# Patient Record
Sex: MALE | Race: WHITE | NOT HISPANIC OR LATINO | Employment: FULL TIME | URBAN - METROPOLITAN AREA
[De-identification: names, ages, dates, MRNs, and addresses within clinical notes are randomized per-mention and may not be internally consistent; named-entity substitution may affect disease eponyms.]

---

## 2022-02-08 ENCOUNTER — HOSPITAL ENCOUNTER (INPATIENT)
Facility: HOSPITAL | Age: 67
LOS: 9 days | Discharge: LEFT BEFORE DISCHARGE | DRG: 177 | End: 2022-02-17
Attending: EMERGENCY MEDICINE | Admitting: STUDENT IN AN ORGANIZED HEALTH CARE EDUCATION/TRAINING PROGRAM
Payer: COMMERCIAL

## 2022-02-08 ENCOUNTER — APPOINTMENT (EMERGENCY)
Dept: RADIOLOGY | Facility: HOSPITAL | Age: 67
DRG: 177 | End: 2022-02-08
Payer: COMMERCIAL

## 2022-02-08 DIAGNOSIS — U07.1 COVID: Primary | ICD-10-CM

## 2022-02-08 DIAGNOSIS — R77.8 ELEVATED TROPONIN: ICD-10-CM

## 2022-02-08 PROBLEM — R79.89 ELEVATED TROPONIN: Status: ACTIVE | Noted: 2022-02-08

## 2022-02-08 PROBLEM — I10 PRIMARY HYPERTENSION: Chronic | Status: ACTIVE | Noted: 2022-02-08

## 2022-02-08 PROBLEM — E11.9 TYPE 2 DIABETES MELLITUS (HCC): Chronic | Status: ACTIVE | Noted: 2022-02-08

## 2022-02-08 PROBLEM — J96.00 ACUTE RESPIRATORY FAILURE DUE TO COVID-19 (HCC): Status: ACTIVE | Noted: 2022-02-08

## 2022-02-08 LAB
ALBUMIN SERPL BCP-MCNC: 4 G/DL (ref 3.5–5)
ALP SERPL-CCNC: 98 U/L (ref 34–104)
ALT SERPL W P-5'-P-CCNC: 63 U/L (ref 7–52)
ANION GAP SERPL CALCULATED.3IONS-SCNC: 11 MMOL/L (ref 4–13)
AST SERPL W P-5'-P-CCNC: 58 U/L (ref 13–39)
BASOPHILS # BLD AUTO: 0.01 THOUSANDS/ΜL (ref 0–0.1)
BASOPHILS NFR BLD AUTO: 0 % (ref 0–1)
BILIRUB SERPL-MCNC: 1 MG/DL (ref 0.2–1)
BUN SERPL-MCNC: 17 MG/DL (ref 5–25)
CALCIUM SERPL-MCNC: 8.6 MG/DL (ref 8.4–10.2)
CARDIAC TROPONIN I PNL SERPL HS: 112 NG/L
CHLORIDE SERPL-SCNC: 100 MMOL/L (ref 96–108)
CO2 SERPL-SCNC: 21 MMOL/L (ref 21–32)
CREAT SERPL-MCNC: 0.9 MG/DL (ref 0.6–1.3)
D DIMER PPP FEU-MCNC: 0.5 UG/ML FEU
EOSINOPHIL # BLD AUTO: 0 THOUSAND/ΜL (ref 0–0.61)
EOSINOPHIL NFR BLD AUTO: 0 % (ref 0–6)
ERYTHROCYTE [DISTWIDTH] IN BLOOD BY AUTOMATED COUNT: 12.8 % (ref 11.6–15.1)
FLUAV RNA RESP QL NAA+PROBE: NEGATIVE
FLUBV RNA RESP QL NAA+PROBE: NEGATIVE
GFR SERPL CREATININE-BSD FRML MDRD: 89 ML/MIN/1.73SQ M
GLUCOSE SERPL-MCNC: 371 MG/DL (ref 65–140)
HCT VFR BLD AUTO: 42.2 % (ref 36.5–49.3)
HGB BLD-MCNC: 14.7 G/DL (ref 12–17)
IMM GRANULOCYTES # BLD AUTO: 0.03 THOUSAND/UL (ref 0–0.2)
IMM GRANULOCYTES NFR BLD AUTO: 0 % (ref 0–2)
INR PPP: 0.99 (ref 0.84–1.19)
LACTATE SERPL-SCNC: 1.4 MMOL/L (ref 0.5–2)
LYMPHOCYTES # BLD AUTO: 1.78 THOUSANDS/ΜL (ref 0.6–4.47)
LYMPHOCYTES NFR BLD AUTO: 24 % (ref 14–44)
MAGNESIUM SERPL-MCNC: 1.9 MG/DL (ref 1.9–2.7)
MCH RBC QN AUTO: 33.4 PG (ref 26.8–34.3)
MCHC RBC AUTO-ENTMCNC: 34.8 G/DL (ref 31.4–37.4)
MCV RBC AUTO: 96 FL (ref 82–98)
MONOCYTES # BLD AUTO: 0.58 THOUSAND/ΜL (ref 0.17–1.22)
MONOCYTES NFR BLD AUTO: 8 % (ref 4–12)
NEUTROPHILS # BLD AUTO: 5.01 THOUSANDS/ΜL (ref 1.85–7.62)
NEUTS SEG NFR BLD AUTO: 68 % (ref 43–75)
NRBC BLD AUTO-RTO: 0 /100 WBCS
PLATELET # BLD AUTO: 138 THOUSANDS/UL (ref 149–390)
PMV BLD AUTO: 10.7 FL (ref 8.9–12.7)
POTASSIUM SERPL-SCNC: 4.3 MMOL/L (ref 3.5–5.3)
PROCALCITONIN SERPL-MCNC: 0.3 NG/ML
PROT SERPL-MCNC: 6.8 G/DL (ref 6.4–8.4)
PROTHROMBIN TIME: 13 SECONDS (ref 11.6–14.5)
RBC # BLD AUTO: 4.4 MILLION/UL (ref 3.88–5.62)
RSV RNA RESP QL NAA+PROBE: NEGATIVE
SARS-COV-2 RNA RESP QL NAA+PROBE: POSITIVE
SODIUM SERPL-SCNC: 132 MMOL/L (ref 135–147)
WBC # BLD AUTO: 7.41 THOUSAND/UL (ref 4.31–10.16)

## 2022-02-08 PROCEDURE — 96360 HYDRATION IV INFUSION INIT: CPT

## 2022-02-08 PROCEDURE — 71045 X-RAY EXAM CHEST 1 VIEW: CPT

## 2022-02-08 PROCEDURE — 85025 COMPLETE CBC W/AUTO DIFF WBC: CPT | Performed by: EMERGENCY MEDICINE

## 2022-02-08 PROCEDURE — 0241U HB NFCT DS VIR RESP RNA 4 TRGT: CPT | Performed by: EMERGENCY MEDICINE

## 2022-02-08 PROCEDURE — 83605 ASSAY OF LACTIC ACID: CPT | Performed by: EMERGENCY MEDICINE

## 2022-02-08 PROCEDURE — 85610 PROTHROMBIN TIME: CPT | Performed by: EMERGENCY MEDICINE

## 2022-02-08 PROCEDURE — 87040 BLOOD CULTURE FOR BACTERIA: CPT | Performed by: EMERGENCY MEDICINE

## 2022-02-08 PROCEDURE — 99285 EMERGENCY DEPT VISIT HI MDM: CPT | Performed by: EMERGENCY MEDICINE

## 2022-02-08 PROCEDURE — 84145 PROCALCITONIN (PCT): CPT | Performed by: EMERGENCY MEDICINE

## 2022-02-08 PROCEDURE — 85379 FIBRIN DEGRADATION QUANT: CPT | Performed by: EMERGENCY MEDICINE

## 2022-02-08 PROCEDURE — 99285 EMERGENCY DEPT VISIT HI MDM: CPT

## 2022-02-08 PROCEDURE — 83735 ASSAY OF MAGNESIUM: CPT | Performed by: EMERGENCY MEDICINE

## 2022-02-08 PROCEDURE — 84484 ASSAY OF TROPONIN QUANT: CPT | Performed by: EMERGENCY MEDICINE

## 2022-02-08 PROCEDURE — 80053 COMPREHEN METABOLIC PANEL: CPT | Performed by: EMERGENCY MEDICINE

## 2022-02-08 PROCEDURE — XW033E5 INTRODUCTION OF REMDESIVIR ANTI-INFECTIVE INTO PERIPHERAL VEIN, PERCUTANEOUS APPROACH, NEW TECHNOLOGY GROUP 5: ICD-10-PCS | Performed by: INTERNAL MEDICINE

## 2022-02-08 PROCEDURE — 99223 1ST HOSP IP/OBS HIGH 75: CPT | Performed by: PHYSICIAN ASSISTANT

## 2022-02-08 PROCEDURE — 86140 C-REACTIVE PROTEIN: CPT | Performed by: PHYSICIAN ASSISTANT

## 2022-02-08 PROCEDURE — 36415 COLL VENOUS BLD VENIPUNCTURE: CPT | Performed by: EMERGENCY MEDICINE

## 2022-02-08 RX ORDER — BENZONATATE 100 MG/1
100 CAPSULE ORAL 3 TIMES DAILY PRN
COMMUNITY
End: 2022-02-17 | Stop reason: HOSPADM

## 2022-02-08 RX ORDER — DEXAMETHASONE SODIUM PHOSPHATE 4 MG/ML
6 INJECTION, SOLUTION INTRA-ARTICULAR; INTRALESIONAL; INTRAMUSCULAR; INTRAVENOUS; SOFT TISSUE EVERY 24 HOURS
Status: DISCONTINUED | OUTPATIENT
Start: 2022-02-08 | End: 2022-02-17 | Stop reason: HOSPADM

## 2022-02-08 RX ORDER — LISINOPRIL 5 MG/1
5 TABLET ORAL DAILY
COMMUNITY

## 2022-02-08 RX ORDER — ACETAMINOPHEN 325 MG/1
650 TABLET ORAL EVERY 4 HOURS PRN
Status: DISCONTINUED | OUTPATIENT
Start: 2022-02-08 | End: 2022-02-17 | Stop reason: HOSPADM

## 2022-02-08 RX ORDER — GLIPIZIDE 10 MG/1
10 TABLET ORAL
COMMUNITY
End: 2022-03-04 | Stop reason: HOSPADM

## 2022-02-08 RX ORDER — ATORVASTATIN CALCIUM 10 MG/1
10 TABLET, FILM COATED ORAL DAILY
COMMUNITY

## 2022-02-08 RX ORDER — ONDANSETRON 2 MG/ML
4 INJECTION INTRAMUSCULAR; INTRAVENOUS EVERY 6 HOURS PRN
Status: DISCONTINUED | OUTPATIENT
Start: 2022-02-08 | End: 2022-02-17 | Stop reason: HOSPADM

## 2022-02-08 RX ADMIN — SODIUM CHLORIDE 1000 ML: 0.9 INJECTION, SOLUTION INTRAVENOUS at 20:21

## 2022-02-09 LAB
2HR DELTA HS TROPONIN: 123 NG/L
4HR DELTA HS TROPONIN: 79 NG/L
ALBUMIN SERPL BCP-MCNC: 3.2 G/DL (ref 3.5–5)
ALP SERPL-CCNC: 79 U/L (ref 34–104)
ALT SERPL W P-5'-P-CCNC: 47 U/L (ref 7–52)
ANION GAP SERPL CALCULATED.3IONS-SCNC: 8 MMOL/L (ref 4–13)
AST SERPL W P-5'-P-CCNC: 46 U/L (ref 13–39)
BASOPHILS # BLD AUTO: 0 THOUSANDS/ΜL (ref 0–0.1)
BASOPHILS NFR BLD AUTO: 0 % (ref 0–1)
BILIRUB SERPL-MCNC: 0.61 MG/DL (ref 0.2–1)
BUN SERPL-MCNC: 14 MG/DL (ref 5–25)
CALCIUM ALBUM COR SERPL-MCNC: 7.7 MG/DL (ref 8.3–10.1)
CALCIUM SERPL-MCNC: 7.1 MG/DL (ref 8.4–10.2)
CARDIAC TROPONIN I PNL SERPL HS: 191 NG/L
CARDIAC TROPONIN I PNL SERPL HS: 235 NG/L
CHLORIDE SERPL-SCNC: 104 MMOL/L (ref 96–108)
CO2 SERPL-SCNC: 22 MMOL/L (ref 21–32)
CREAT SERPL-MCNC: 0.82 MG/DL (ref 0.6–1.3)
CRP SERPL QL: 70 MG/L
CRP SERPL QL: 72.4 MG/L
CRP SERPL QL: 78.8 MG/L
EOSINOPHIL # BLD AUTO: 0 THOUSAND/ΜL (ref 0–0.61)
EOSINOPHIL NFR BLD AUTO: 0 % (ref 0–6)
ERYTHROCYTE [DISTWIDTH] IN BLOOD BY AUTOMATED COUNT: 13.1 % (ref 11.6–15.1)
GFR SERPL CREATININE-BSD FRML MDRD: 92 ML/MIN/1.73SQ M
GLUCOSE SERPL-MCNC: 319 MG/DL (ref 65–140)
GLUCOSE SERPL-MCNC: 349 MG/DL (ref 65–140)
GLUCOSE SERPL-MCNC: 357 MG/DL (ref 65–140)
GLUCOSE SERPL-MCNC: 362 MG/DL (ref 65–140)
GLUCOSE SERPL-MCNC: 440 MG/DL (ref 65–140)
GLUCOSE SERPL-MCNC: 499 MG/DL (ref 65–140)
HCT VFR BLD AUTO: 36 % (ref 36.5–49.3)
HGB BLD-MCNC: 12.4 G/DL (ref 12–17)
IMM GRANULOCYTES # BLD AUTO: 0.02 THOUSAND/UL (ref 0–0.2)
IMM GRANULOCYTES NFR BLD AUTO: 0 % (ref 0–2)
LYMPHOCYTES # BLD AUTO: 1.24 THOUSANDS/ΜL (ref 0.6–4.47)
LYMPHOCYTES NFR BLD AUTO: 24 % (ref 14–44)
MCH RBC QN AUTO: 33.4 PG (ref 26.8–34.3)
MCHC RBC AUTO-ENTMCNC: 34.4 G/DL (ref 31.4–37.4)
MCV RBC AUTO: 97 FL (ref 82–98)
MONOCYTES # BLD AUTO: 0.26 THOUSAND/ΜL (ref 0.17–1.22)
MONOCYTES NFR BLD AUTO: 5 % (ref 4–12)
NEUTROPHILS # BLD AUTO: 3.75 THOUSANDS/ΜL (ref 1.85–7.62)
NEUTS SEG NFR BLD AUTO: 71 % (ref 43–75)
NRBC BLD AUTO-RTO: 0 /100 WBCS
PLATELET # BLD AUTO: 122 THOUSANDS/UL (ref 149–390)
PMV BLD AUTO: 11 FL (ref 8.9–12.7)
POTASSIUM SERPL-SCNC: 4.3 MMOL/L (ref 3.5–5.3)
PROCALCITONIN SERPL-MCNC: 0.23 NG/ML
PROT SERPL-MCNC: 5.5 G/DL (ref 6.4–8.4)
RBC # BLD AUTO: 3.71 MILLION/UL (ref 3.88–5.62)
SODIUM SERPL-SCNC: 134 MMOL/L (ref 135–147)
WBC # BLD AUTO: 5.27 THOUSAND/UL (ref 4.31–10.16)

## 2022-02-09 PROCEDURE — 82948 REAGENT STRIP/BLOOD GLUCOSE: CPT

## 2022-02-09 PROCEDURE — 94760 N-INVAS EAR/PLS OXIMETRY 1: CPT

## 2022-02-09 PROCEDURE — 84484 ASSAY OF TROPONIN QUANT: CPT | Performed by: EMERGENCY MEDICINE

## 2022-02-09 PROCEDURE — 36415 COLL VENOUS BLD VENIPUNCTURE: CPT | Performed by: EMERGENCY MEDICINE

## 2022-02-09 PROCEDURE — 86140 C-REACTIVE PROTEIN: CPT | Performed by: INTERNAL MEDICINE

## 2022-02-09 PROCEDURE — 80053 COMPREHEN METABOLIC PANEL: CPT | Performed by: PHYSICIAN ASSISTANT

## 2022-02-09 PROCEDURE — 84145 PROCALCITONIN (PCT): CPT | Performed by: EMERGENCY MEDICINE

## 2022-02-09 PROCEDURE — 99222 1ST HOSP IP/OBS MODERATE 55: CPT | Performed by: INTERNAL MEDICINE

## 2022-02-09 PROCEDURE — 97163 PT EVAL HIGH COMPLEX 45 MIN: CPT

## 2022-02-09 PROCEDURE — 99232 SBSQ HOSP IP/OBS MODERATE 35: CPT | Performed by: PHYSICIAN ASSISTANT

## 2022-02-09 PROCEDURE — 97166 OT EVAL MOD COMPLEX 45 MIN: CPT

## 2022-02-09 PROCEDURE — 85025 COMPLETE CBC W/AUTO DIFF WBC: CPT | Performed by: PHYSICIAN ASSISTANT

## 2022-02-09 PROCEDURE — 93005 ELECTROCARDIOGRAM TRACING: CPT

## 2022-02-09 RX ORDER — LISINOPRIL 5 MG/1
5 TABLET ORAL DAILY
Status: DISCONTINUED | OUTPATIENT
Start: 2022-02-09 | End: 2022-02-17 | Stop reason: HOSPADM

## 2022-02-09 RX ORDER — INSULIN GLARGINE 100 [IU]/ML
10 INJECTION, SOLUTION SUBCUTANEOUS EVERY 12 HOURS SCHEDULED
Status: DISCONTINUED | OUTPATIENT
Start: 2022-02-09 | End: 2022-02-10

## 2022-02-09 RX ORDER — INSULIN GLARGINE 100 [IU]/ML
10 INJECTION, SOLUTION SUBCUTANEOUS ONCE
Status: COMPLETED | OUTPATIENT
Start: 2022-02-09 | End: 2022-02-09

## 2022-02-09 RX ORDER — BENZONATATE 100 MG/1
100 CAPSULE ORAL 3 TIMES DAILY PRN
Status: DISCONTINUED | OUTPATIENT
Start: 2022-02-09 | End: 2022-02-17 | Stop reason: HOSPADM

## 2022-02-09 RX ORDER — ATORVASTATIN CALCIUM 10 MG/1
10 TABLET, FILM COATED ORAL
Status: DISCONTINUED | OUTPATIENT
Start: 2022-02-09 | End: 2022-02-17 | Stop reason: HOSPADM

## 2022-02-09 RX ADMIN — INSULIN LISPRO 5 UNITS: 100 INJECTION, SOLUTION INTRAVENOUS; SUBCUTANEOUS at 01:17

## 2022-02-09 RX ADMIN — REMDESIVIR 100 MG: 100 INJECTION, POWDER, LYOPHILIZED, FOR SOLUTION INTRAVENOUS at 22:27

## 2022-02-09 RX ADMIN — REMDESIVIR 200 MG: 100 INJECTION, POWDER, LYOPHILIZED, FOR SOLUTION INTRAVENOUS at 01:03

## 2022-02-09 RX ADMIN — ENOXAPARIN SODIUM 30 MG: 100 INJECTION SUBCUTANEOUS at 08:20

## 2022-02-09 RX ADMIN — LISINOPRIL 5 MG: 5 TABLET ORAL at 08:19

## 2022-02-09 RX ADMIN — DEXAMETHASONE SODIUM PHOSPHATE 6 MG: 4 INJECTION, SOLUTION INTRA-ARTICULAR; INTRALESIONAL; INTRAMUSCULAR; INTRAVENOUS; SOFT TISSUE at 01:01

## 2022-02-09 RX ADMIN — INSULIN LISPRO 10 UNITS: 100 INJECTION, SOLUTION INTRAVENOUS; SUBCUTANEOUS at 16:50

## 2022-02-09 RX ADMIN — INSULIN LISPRO 6 UNITS: 100 INJECTION, SOLUTION INTRAVENOUS; SUBCUTANEOUS at 22:20

## 2022-02-09 RX ADMIN — INSULIN LISPRO 6 UNITS: 100 INJECTION, SOLUTION INTRAVENOUS; SUBCUTANEOUS at 16:49

## 2022-02-09 RX ADMIN — INSULIN LISPRO 6 UNITS: 100 INJECTION, SOLUTION INTRAVENOUS; SUBCUTANEOUS at 12:24

## 2022-02-09 RX ADMIN — ENOXAPARIN SODIUM 30 MG: 100 INJECTION SUBCUTANEOUS at 01:09

## 2022-02-09 RX ADMIN — INSULIN LISPRO 6 UNITS: 100 INJECTION, SOLUTION INTRAVENOUS; SUBCUTANEOUS at 07:57

## 2022-02-09 RX ADMIN — DEXAMETHASONE SODIUM PHOSPHATE 6 MG: 4 INJECTION, SOLUTION INTRA-ARTICULAR; INTRALESIONAL; INTRAMUSCULAR; INTRAVENOUS; SOFT TISSUE at 22:27

## 2022-02-09 RX ADMIN — ENOXAPARIN SODIUM 30 MG: 100 INJECTION SUBCUTANEOUS at 22:26

## 2022-02-09 RX ADMIN — INSULIN GLARGINE 10 UNITS: 100 INJECTION, SOLUTION SUBCUTANEOUS at 16:51

## 2022-02-09 RX ADMIN — ATORVASTATIN CALCIUM 10 MG: 10 TABLET, FILM COATED ORAL at 16:51

## 2022-02-09 RX ADMIN — INSULIN GLARGINE 10 UNITS: 100 INJECTION, SOLUTION SUBCUTANEOUS at 22:17

## 2022-02-09 NOTE — ASSESSMENT & PLAN NOTE
Mildly elevated high sensitivity troponin  Levels are flat without significant rise and fall  Symptoms  No ekg tracings available-will order

## 2022-02-09 NOTE — ASSESSMENT & PLAN NOTE
Lab Results   Component Value Date    HGBA1C 7 7 (H) 12/06/2021       Recent Labs     02/09/22  0112 02/09/22  0755 02/09/22  1224   POCGLU 349* 357* 499*       Blood Sugar Average: Last 72 hrs:  · (P) 294 [id number]   · Blood glucose significantly elevated     · Will give Lantus 10 units now  · Start Lantus 10 units q12 hours and Humalog   · hold oral medication  · SSI coverage

## 2022-02-09 NOTE — PHYSICAL THERAPY NOTE
Physical Therapy Evaluation     Patient's Name: Jose Thakakr    Admitting Diagnosis  COVID-19 [U07 1]    Problem List  Patient Active Problem List   Diagnosis    Acute [facility] failure due to COVID-19 [facility])    Elevated troponin    Type 2 diabetes mellitus ([facility] [address] )    Primary hypertension       Past Medical History  History reviewed  No pertinent past medical history  Past Surgical History  Past Surgical History:   Procedure Laterality Date    ANKLE FRACTURE SURGERY Left     FOOT FRACTURE SURGERY      KNEE SURGERY Right     VASECTOMY          02/09/22 0827   PT Last Visit   PT Visit Date 02/09/22   Note Type   Note type Evaluation   Pain Assessment   Pain Assessment Tool 0-10   Pain Score No Pain  (denies)   Restrictions/Precautions   Weight Bearing Precautions Per Order No   Other Precautions Contact/isolation; Airborne/isolation;Multiple lines;Telemetry;O2  (8 L [address] [facility])   Home Living   Type of Home House   Home Layout Two level;Bed/bath upstairs;Stairs to enter with rails  (6 STEPHEN, full flight to 2nd floor)   Bathroom Shower/Tub Walk-in shower   Bathroom Toilet Standard   Bathroom Equipment Grab bars in shower; Shower chair   [address] Walker;Cane  (did not utilize prior to arrival)   Prior Function   Level of [place] Independent with ADLs and functional mobility   Lives With Spouse   ADL Assistance Independent   IADLs Independent   Falls in the last 6 months 0  (denies)   Vocational Full time employment  ()   General   Additional Pertinent History Frieda OT present for co-assessment due to medical complexity, care coordination of 2 skilled interventions  Family/Caregiver Present No   Cognition   Overall Cognitive Status WFL   Arousal/Participation Alert   Orientation Level Oriented X4   Memory Within functional limits   Following Commands Follows all commands and directions without difficulty   Comments Pt  agreeable to PT assessment, cooperative  Subjective   Subjective "you probably want me to sit out of bed on a chair for awhile" -provided education on the benefits of sitting up, pt  agreeable   RLE Assessment   RLE Assessment X  (4-/5 gross musculature)   LLE Assessment   LLE Assessment X  (4-/5 gross musculature)   Vision-Basic Assessment   Current Vision Wears glasses all the time   Visual History Cataracts   Vestibular   Spontaneous Nystagmus (-) no evidence of nystagmus at rest in room light   Coordination   Movements are Fluid and Coordinated 1   Bed Mobility   Supine to Sit 7  Independent   Sit to Supine   (DNT as pt  was sitting out of bed on chair upon conclusion)   Additional Comments Pt  denied lightheadedness/dizziness with positional change  SOTELO exhibited w/supplemental O2 maintained  Breathing conservation technique education provided  Transfers   Sit to Stand 5  Supervision   Additional items Assist x 1;Verbal cues   Stand to Sit 5  Supervision   Additional items Assist x 1;Verbal cues   Stand pivot 5  Supervision   Additional items Assist x 1;Verbal cues   Additional Comments Verbal cues for environmental awareness, decreased rony for line management  Ambulation/Elevation   Gait pattern Improper Weight shift; Short stride  (impulsivity)   Gait Assistance 5  Supervision   Additional items Assist x 1   Assistive Device None   Distance 20 feet   Stair Management Assistance Not tested   Balance   Static Sitting Normal   Dynamic Sitting Good   Static Standing Good   Dynamic Standing Fair +   Ambulatory Fair +   Endurance Deficit   Endurance Deficit Yes   Endurance Deficit Description SOTELO exhibited w/ambulatory progression  O2 dipped to 86% at lowest during mobility  Improvement with increased time and breathing conservation technique utilization  Activity Tolerance   Activity Tolerance Treatment limited secondary to medical complications (SOTELO)   Nurse Made Aware yes, nursing staff was informed of assessment outcome     Assessment Prognosis Fair   Problem List Decreased strength;Decreased endurance; Impaired balance;Decreased mobility; Impaired judgement;Decreased safety awareness   Assessment Pt is 73 y o  male seen for PT evaluation s/p admit to [facility] on 2/8/2022 w/ Acute respiratory failure due to COVID-19 [facility])  PT consulted to assess pt's functional mobility and d/c needs  Order placed for PT eval and tx, w/ up and OOB as tolerated order  Comorbidities affecting pt's physical performance at time of assessment include: weakness,acute respiratory failure due to COVID-19, elevated troponin, HTN,type 2 DM   PTA, pt was independent w/ all functional mobility w/ o AD usage  Personal factors affecting pt at time of IE include: stairs to enter home, inability to navigate community distances, unable to perform dynamic tasks in community, impulsivity, limited insight into impairments and inability to perform IADLs  Please find objective findings from PT assessment regarding body systems outlined above with impairments and limitations including weakness, impaired balance, decreased endurance, gait deviations, decreased activity tolerance, decreased functional mobility tolerance, decreased safety awareness and SOB upon exertion  From PT/mobility standpoint, recommendation at time of d/c would be no rehabilitation needs pending progress in order to facilitate return to PLOF  Goals   Patient Goals to go home soon   LTG Expiration Date 02/19/22   Long Term Goal #1 Patient will complete transfers independently to decrease risk of falls, facilitate upright standing posture  BLE strength to greater than/equal to 4+/5 gross musculature to increase ability to safely transfer, control descent to chair  Patient will exhibit increase dynamic standing to Good 3-4 minutes without LOB distant supervision to improve activity tolerance  Patient will exhibit increase dynamic ambulatory balance to Good 100-200 feet w/AD prn distant supervision to improve ability to mobilize to toilet, chair and decrease risk for additional medical complications  PT Treatment Day 0   Plan   Treatment/Interventions Functional transfer training;LE strengthening/ROM; Therapeutic exercise; Endurance training;Patient/family training;Gait training; Compensatory technique education;Spoke to nursing   PT Frequency 2-3x/wk   Recommendation   PT Discharge Recommendation No rehabilitation needs   Additional Comments Upon conclusion, pt  was resting out of bed on chair w/all needs within reach  Additional Comments 2 Pt's raw score on the AM-[facility] Basic Mobility inpatient short form is 20, standardized score is 43 99  Patients at this level are likely to benefit from DC to home  However, please refer to therapist recommendation for safe DC planning     AM-PAC Basic Mobility Inpatient   Turning in Bed Without Bedrails 4   Lying on Back to Sitting on Edge of Flat Bed 4   Moving Bed to Chair 3   Standing Up From Chair 3   Walk in Room 3   Climb 3-5 Stairs 3   Basic Mobility Inpatient Raw Score 20   Basic Mobility Standardized Score 43 99   Highest Level Of Mobility   [facility] Goal 6: Walk 10 steps or more     History/Personal Factors/Comorbidities: weakness,acute respiratory failure due to COVID-19, elevated troponin, HTN,type 2 DM    # of body structures/limitations: muscle weakness, activity intolerance,decreased endurance, impaired balance, gait deviations     Clinical presentation: unstable as seen in SOTELO, O2 desaturation w/8 L MFNC maintained, progressive symptoms prior to hospitalization, impulsivity    Initial Assessment Time: 2789-7141    Yo Anne, PT

## 2022-02-09 NOTE — OCCUPATIONAL THERAPY NOTE
Occupational Therapy Evaluation      Parker Jean    2/9/2022    Patient Active Problem List   Diagnosis    Acute respiratory failure due to COVID-19 [facility])    Elevated troponin    Type 2 diabetes mellitus ([address] )    Primary hypertension       History reviewed  No pertinent past medical history  Past Surgical History:   Procedure Laterality Date    ANKLE FRACTURE SURGERY Left     FOOT FRACTURE SURGERY      KNEE SURGERY Right     VASECTOMY          02/09/22 4995   OT Last Visit   OT Visit Date 02/09/22   Note Type   Note type Evaluation   Additional Comments Pt agreeable to OT eval  Upon arrival pt supine in bed    Restrictions/Precautions   Weight Bearing Precautions Per Order No   Other Precautions Contact/isolation; Airborne/isolation;O2;Fall Risk  (8 L MFNC- not used at baseline )   Pain Assessment   Pain Assessment Tool 0-10   Pain Score No Pain   Home Living   Type of Home House   Home Layout Two level;Bed/bath upstairs;Stairs to enter with rails  (6 STEPHEN; FOS to 2nd floor )   Bathroom Shower/Tub Walk-in shower   Bathroom Toilet Standard   Bathroom Equipment Grab bars in shower; Shower chair   Bathroom Accessibility Accessible   Home Equipment Walker;Cane  (no AD used at baseline )   Prior Function   Level of [place] Independent with ADLs and functional mobility   Lives With Spouse   ADL Assistance Independent   IADLs Independent   Falls in the last 6 months 0   Vocational Full time employment   Comments (+) driving    Lifestyle   Autonomy Patient reporting being independent with ADLs/IADLs, ambulatory with no AD and lives with wife in a two story house    Reciprocal Relationships pt reports that he cares for his wife    Service to Others works as a     ADL   [address] 6  Modified independent   [address] [facility] 6  Modified Independent   [address] 6  [address] 5  [address] [facility] 6  Modified independent LB Dressing Assistance 5  Supervision/Setup   Toileting Assistance  5  Supervision/Setup   Bed Mobility   Supine to Sit 6  Modified independent   Additional items HOB elevated   Sit to Supine   (DNT: pt seated OOB in chair at end of eval )   Additional Comments Pt denied lightheaded/dizziness with transitional movements    Transfers   Sit to Stand 6  Modified independent   Additional items Bedrails   Stand to Sit 6  Modified independent   Additional items Armrests   Functional Mobility   Functional Mobility 7  Independent   Additional Comments Pt ambulated in room from bed to chair with no overt LOB  SpO2 decreased to 86% with mobility  Encouraged pursed lip breathing  SpO2 returned to 90% with ~2 minutes of rest     Additional items   (Pt ambulated with no AD)   Balance   Static Sitting Normal   Dynamic Sitting Good   Static Standing Good   Dynamic Standing Fair +   Activity Tolerance   Activity Tolerance Treatment limited secondary to medical complications (Comment)  (SOB/SOTELO)   Medical Staff Made Aware Pt seen as a co-eval with PT due to the patient's co-morbidities & clinically unstable presentation   Nurse Made Aware RN made aware of outcomes    RUE Assessment   RUE Assessment WNL   LUE Assessment   LUE Assessment WNL   Hand Function   Gross Motor Coordination Functional   Fine Motor Coordination Functional   Sensation   Light Touch No apparent deficits   Vision-Basic Assessment   Current Vision Wears glasses all the time   Visual History Cataracts  ("just starting to get them")   Cognition   Overall Cognitive Status [facility]   Arousal/Participation Alert; Responsive; Cooperative   Attention Within functional limits   Orientation Level Oriented X4   Memory Within functional limits   Following Commands Follows all commands and directions without difficulty   Assessment   Prognosis Good   Assessment Pt is a 73 y o  male who was admitted to [facility] on 2/8/2022 with Acute respiratory failure due to COVID-19 ([facility] [address] )  At this time, patient is also affected by the comorbidities of: HTN and DM2  Additionally, patient is affected by the following personal factors:steps to enter environment  Orders placed for OT evaluation/treatment with up and OOB as tolerated orders  Prior to admission, Patient reporting being independent with ADLs/IADLs, ambulatory with no AD and lives with wife in a two story house  Upon OT evaluation, patient requires modified independent  for UB ADLs and supervision/set-up for LB ADLs  Patient presents with functional use of BUEs, with intact prehension and fine motor coordination, and symmetrical muscle strength  Patient appears to be functioning at baseline, no further Acute OT needs identified at this time to warrant OT services  D/C OT and from OT standpoint, recommendation at time of d/c would be No rehabilitation needs  Goals   Patient Goals to go home soon    Plan   OT Frequency Eval only   Recommendation   OT Discharge Recommendation No rehabilitation needs   OT - OK to Discharge Yes  (Once medically cleared )   Additional Comments  At end of eval, pt seated OOB in chair with all needs met and breakfast tray set-up    Additional Comments 2 The patient's raw score on the AM-PAC Daily Activity inpatient short form is 22, standardized score is 47 1, greater than 39 4  Patients at this level are likely to benefit from discharge to home  Please refer to the recommendation of the Occupational Therapist for safe discharge planning  AM-PAC Daily Activity Inpatient   Lower Body Dressing 3   Bathing 3   Toileting 4   Upper Body Dressing 4   Grooming 4   Eating 4   Daily Activity Raw Score 22   Daily Activity Standardized Score (Calc for Raw Score >=11) 47  1   AM-PAC Applied Cognition Inpatient   Following a Speech/Presentation 4   Understanding Ordinary Conversation 4   Taking Medications 4   Remembering Where Things Are Placed or Put Away 4   Remembering List of 4-5 Errands 4   Taking Care of Complicated Tasks 4   Applied Cognition Raw Score 24   Applied Cognition Standardized Score 62 21     Wally Oconnor, OT

## 2022-02-09 NOTE — PROGRESS NOTES
Angelique U  67   Progress Note - Kathryn Najjar 1955, 73 y o  male MRN: [id number]  Unit/Bed#: ED 21 Encounter: [id number]  Primary Care Provider: Jenene Krabbe, MD   Date and time admitted to hospital: 2/8/2022  8:41 PM    * Acute respiratory failure due to COVID-19 [facility])  Assessment & Plan  · Patient reported pulse ox 74% at home  · Placed on 4L NC in ED, currently on 7 L midflow  · Moderate pathway  · Serial labs    Primary hypertension  Assessment & Plan  · Continue lisinopril  · Monitor blood pressure trends    Type 2 diabetes mellitus [facility])  Assessment & Plan  Lab Results   Component Value Date    HGBA1C 7 7 (H) 12/06/2021       Recent Labs     02/09/22  0112 02/09/22  0755 02/09/22  1224   POCGLU 349* 357* 499*       Blood Sugar Average: Last 72 hrs:  · (P) 569 [id number]   · Blood glucose significantly elevated  · Will give Lantus 10 units now  · Start Lantus 10 units q12 hours and Humalog   · hold oral medication  · SSI coverage    Elevated troponin  Assessment & Plan  · Likely secondary to covid infection with hypoxia  · Trending down  · No chest pain      VTE Pharmacologic Prophylaxis:   Moderate Risk (Score 3-4) - Pharmacological DVT Prophylaxis Ordered: enoxaparin (Lovenox)  Patient Centered Rounds: I performed bedside rounds with nursing staff today  Discussions with Specialists or Other Care Team Provider: nursing, CM    Education and Discussions with Family / Patient: Patient declined call to   Time Spent for Care: 20 minutes  More than 50% of total time spent on counseling and coordination of care as described above  Current Length of Stay: 1 day(s)  Current Patient Status: Inpatient   Certification Statement: The patient will continue to require additional inpatient hospital stay due to treatment for COVID  Discharge Plan: Anticipate discharge in 48-72 hrs to home  Code Status: Level 1 - Full Code    Subjective:    The patient was seen and examined  The patient denies any complaints  Objective:     Vitals:   Temp (24hrs), Av 4 °F (36 9 °C), Min:97 7 °F (36 5 °C), Max:99 °F (37 2 °C)    Temp:  [97 7 °F (36 5 °C)-99 °F (37 2 °C)] 98 4 °F (36 9 °C)  HR:  [73-96] 80  Resp:  [18-20] 20  BP: (123-161)/(64-78) 123/74  SpO2:  [86 %-95 %] 91 %  Body mass index is 29 65 kg/m²  Input and Output Summary (last 24 hours):   No intake or output data in the 24 hours ending 22 1519    Physical Exam:   Physical Exam  Vitals and nursing note reviewed  Constitutional:       General: He is awake  Appearance: Normal appearance  Interventions: Nasal cannula in place  Cardiovascular:      Rate and Rhythm: Normal rate and regular rhythm  Pulmonary:      Effort: Pulmonary effort is normal       Breath sounds: Normal breath sounds  No wheezing, rhonchi or rales  Abdominal:      Palpations: Abdomen is soft  Tenderness: There is no abdominal tenderness  Skin:     General: Skin is warm and dry  Neurological:      General: No focal deficit present  Mental Status: He is alert and oriented to person, place, and time  Psychiatric:         Attention and Perception: Attention normal          Mood and Affect: Mood normal          Speech: Speech normal          Behavior: Behavior is cooperative            Additional Data:     Labs:  Results from last 7 days   Lab Units 22  0435   WBC Thousand/uL 5 27   HEMOGLOBIN g/dL 12 4   HEMATOCRIT % 36 0*   PLATELETS Thousands/uL 122*   NEUTROS PCT % 71   LYMPHS PCT % 24   MONOS PCT % 5   EOS PCT % 0     Results from last 7 days   Lab Units 22  0435   SODIUM mmol/L 134*   POTASSIUM mmol/L 4 3   CHLORIDE mmol/L 104   CO2 mmol/L 22   BUN mg/dL 14   CREATININE mg/dL 0 82   ANION GAP mmol/L 8   CALCIUM mg/dL 7 1*   ALBUMIN g/dL 3 2*   TOTAL BILIRUBIN mg/dL 0 61   ALK PHOS U/L 79   ALT U/L 47   AST U/L 46*   GLUCOSE RANDOM mg/dL 319*     Results from last 7 days   Lab Units 22 INR  0 99     Results from last 7 days   Lab Units 02/09/22  1224 02/09/22  0755 02/09/22  0112   POC GLUCOSE mg/dl 499* 357* 349*         Results from last 7 days   Lab Units 02/09/22  0435 02/08/22 2021   LACTIC ACID mmol/L  --  1 4   PROCALCITONIN ng/ml 0 23 0 30*       Lines/Drains:  Invasive Devices  Report    Peripheral Intravenous Line            Peripheral IV 02/08/22 Left Antecubital <1 day                      Imaging: No pertinent imaging reviewed  Recent Cultures (last 7 days):   Results from last 7 days   Lab Units 02/08/22 2021   BLOOD CULTURE  Received in Microbiology Lab  Culture in Progress  Received in Microbiology Lab  Culture in Progress  Last 24 Hours Medication List:   Current Facility-Administered Medications   Medication Dose Route Frequency Provider Last Rate    acetaminophen  650 mg Oral Q4H PRN Nada PufferCORINA      atorvastatin  10 mg Oral Daily With [address] [place], [place]      benzonatate  100 mg Oral TID PRN Nada CORINA Chaudhry      dexamethasone  6 mg Intravenous Q24H [facility] Americo Gibson PA-C      enoxaparin  30 mg Subcutaneous Q12H [address] Monserrat [place], [place]      insulin glargine  10 Units Subcutaneous Once [employer], PA-C      insulin glargine  10 Units Subcutaneous Q12H [address] Kahlil Murdock PA-C      insulin lispro  1-6 Units Subcutaneous TID AC Monserrat Gibson PA-C      insulin lispro  1-6 Units Subcutaneous HS [place] CORINA Chaudhry      insulin lispro  10 Units Subcutaneous TID With Meals [employer], PA-C      lisinopril  5 mg Oral Daily Nada PuCORINA mojica      ondansetron  4 mg Intravenous Q6H PRN Nada PuCORINA mojica      remdesivir  100 mg Intravenous Q24H Rolanda CORINA Chaudhry          Today, Patient Was Seen By: [employer], PA-C    **Please Note: This note may have been constructed using a voice recognition system  **

## 2022-02-09 NOTE — ASSESSMENT & PLAN NOTE
Lab Results   Component Value Date    HGBA1C 7 7 (H) 12/06/2021       No results for input(s): POCGLU in the last 72 hours      Blood Sugar Average: Last 72 hrs:  · hold oral medication  · SSI coverage

## 2022-02-09 NOTE — ED PROVIDER NOTES
History  Chief Complaint   Patient presents with    Shortness of Breath     55-year-old male presenting with low oxygen reading of 70% on room air at home  Patient tested positive for COVID with a home test yesterday  Describes shortness of breath, nonproductive cough  Shortness of Breath  Severity:  Moderate  Onset quality:  Gradual  Timing:  Constant  Chronicity:  New  Relieved by:  Nothing  Worsened by:  Nothing  Ineffective treatments:  None tried  Associated symptoms: cough and fever    Associated symptoms: no abdominal pain, no chest pain, no rash, no sore throat, no vomiting and no wheezing        Prior to Admission Medications   Prescriptions Last Dose Informant Patient Reported? Taking? Semaglutide (OZEMPIC, 0 25 OR 0 5 MG/DOSE, SC)   Yes Yes   Sig: Inject under the skin   atorvastatin (LIPITOR) 10 mg tablet   Yes Yes   Sig: Take 10 mg by mouth daily   benzonatate (TESSALON PERLES) 100 mg capsule   Yes Yes   Sig: Take 100 mg by mouth 3 (three) times a day as needed for cough   glipiZIDE (GLUCOTROL) 10 mg tablet   Yes Yes   Sig: Take 10 mg by mouth 2 (two) times a day before meals   lisinopril (ZESTRIL) 5 mg tablet   Yes Yes   Sig: Take 5 mg by mouth daily   metFORMIN (GLUCOPHAGE) 1000 MG tablet   Yes Yes   Sig: Take 1,000 mg by mouth 2 (two) times a day with meals      Facility-Administered Medications: None       History reviewed  No pertinent past medical history  Past Surgical History:   Procedure Laterality Date    ANKLE FRACTURE SURGERY Left     FOOT FRACTURE SURGERY      KNEE SURGERY Right     VASECTOMY         Family History   Problem Relation Age of Onset    Diabetes Mother     Alcohol abuse Father      I have reviewed and agree with the history as documented      E-Cigarette/Vaping    E-Cigarette Use Never User      E-Cigarette/Vaping Substances     Social History     Tobacco Use    Smoking status: Never Smoker    Smokeless tobacco: Never Used   Vaping Use    Vaping Use: Never used   Substance Use Topics    Alcohol use: Not Currently    Drug use: Never       Review of Systems   Constitutional: Positive for fever  Negative for chills  HENT: Negative for congestion, nosebleeds, rhinorrhea and sore throat  Eyes: Negative for pain and visual disturbance  Respiratory: Positive for cough and shortness of breath  Negative for wheezing  Cardiovascular: Negative for chest pain and leg swelling  Gastrointestinal: Negative for abdominal distention, abdominal pain, diarrhea, nausea and vomiting  Genitourinary: Negative for dysuria and frequency  Musculoskeletal: Negative for back pain and joint swelling  Skin: Negative for rash and wound  Neurological: Negative for weakness and numbness  Psychiatric/Behavioral: Negative for decreased concentration and suicidal ideas  Physical Exam  Physical Exam  Vitals and nursing note reviewed  Constitutional:       Appearance: He is well-developed  HENT:      Head: Normocephalic and atraumatic  Eyes:      Conjunctiva/sclera: Conjunctivae normal       Pupils: Pupils are equal, round, and reactive to light  Neck:      Trachea: No tracheal deviation  Cardiovascular:      Rate and Rhythm: Normal rate and regular rhythm  Heart sounds: Normal heart sounds  No murmur heard  Pulmonary:      Effort: Pulmonary effort is normal  No respiratory distress  Breath sounds: Normal breath sounds  No wheezing or rales  Abdominal:      General: Bowel sounds are normal  There is no distension  Palpations: Abdomen is soft  Tenderness: There is no abdominal tenderness  Musculoskeletal:         General: No deformity  Cervical back: Normal range of motion and neck supple  Skin:     General: Skin is warm and dry  Capillary Refill: Capillary refill takes less than 2 seconds  Neurological:      Mental Status: He is alert and oriented to person, place, and time  Sensory: No sensory deficit     Psychiatric: Judgment: Judgment normal          Vital Signs  ED Triage Vitals   Temperature Pulse Respirations Blood Pressure SpO2   02/08/22 2005 02/08/22 2005 02/08/22 2005 02/08/22 2005 02/08/22 2005   99 °F (37 2 °C) 89 18 146/64 93 %      Temp Source Heart Rate Source Patient Position - Orthostatic VS BP Location FiO2 (%)   02/08/22 2005 02/09/22 1226 02/10/22 0855 02/09/22 1226 --   Oral Monitor Lying Left arm       Pain Score       02/08/22 2005       5           Vitals:    02/10/22 0855 02/10/22 0900 02/10/22 1000 02/10/22 1400   BP: 155/79   160/70   Pulse: 72 71 73 69   Patient Position - Orthostatic VS: Lying   Sitting         Visual Acuity      ED Medications  Medications   dexamethasone (DECADRON) injection 6 mg (6 mg Intravenous Given 2/9/22 2227)   enoxaparin (LOVENOX) subcutaneous injection 30 mg (30 mg Subcutaneous Given 2/10/22 0857)   remdesivir (Veklury) 200 mg in sodium chloride 0 9 % 290 mL IVPB (200 mg Intravenous Given 2/9/22 0103)     Followed by   remdesivir Ermalinda Setting) 100 mg in sodium chloride 0 9 % 270 mL IVPB (0 mg Intravenous Stopped 2/10/22 0001)   acetaminophen (TYLENOL) tablet 650 mg (has no administration in time range)   ondansetron (ZOFRAN) injection 4 mg (has no administration in time range)   atorvastatin (LIPITOR) tablet 10 mg (10 mg Oral Given 2/9/22 1651)   lisinopril (ZESTRIL) tablet 5 mg (5 mg Oral Given 2/10/22 0859)   benzonatate (TESSALON PERLES) capsule 100 mg (100 mg Oral Given 2/10/22 0909)   insulin regular (HumuLIN R,NovoLIN R) 1 Units/mL in sodium chloride 0 9 % 100 mL infusion (10 Units/hr Intravenous New Bag 2/10/22 1330)   sodium chloride 0 9 % bolus 1,000 mL (0 mL Intravenous Stopped 2/8/22 2221)   insulin glargine (LANTUS) subcutaneous injection 10 Units 0 1 mL (10 Units Subcutaneous Given 2/9/22 1651)       Diagnostic Studies  Results Reviewed     Procedure Component Value Units Date/Time    Fingerstick Glucose (POCT) [[id number]]  (Abnormal) Collected: 02/10/22 5453 Lab Status: Final result Updated: 02/10/22 1325     POC Glucose >500 mg/dl     Fingerstick Glucose (POCT) [[id number]]  (Abnormal) Collected: 02/10/22 1200    Lab Status: Final result Updated: 02/10/22 1208     POC Glucose >500 mg/dl     Fingerstick Glucose (POCT) [[id number]]  (Abnormal) Collected: 02/10/22 0833    Lab Status: Final result Updated: 02/10/22 0836     POC Glucose 390 mg/dl     Blood culture #1 [[id number]] Collected: 02/08/22 2021    Lab Status: Preliminary result Specimen: Blood from Arm, Left Updated: 02/10/22 0801     Blood Culture No Growth at 24 hrs  Blood culture #2 [[id number]] Collected: 02/08/22 2021    Lab Status: Preliminary result Specimen: Blood from Arm, Left Updated: 02/10/22 0801     Blood Culture No Growth at 24 hrs      Procalcitonin with AM Reflex [[id number]]  (Normal) Collected: 02/10/22 0613    Lab Status: Final result Specimen: Blood from Arm, Left Updated: 02/10/22 0651     Procalcitonin 0 19 ng/ml     Procalcitonin Reflex [[id number]]     Lab Status: No result Specimen: Blood     Basic metabolic panel [[id number]]  (Abnormal) Collected: 02/10/22 0613    Lab Status: Final result Specimen: Blood from Arm, Left Updated: 02/10/22 0643     Sodium 131 mmol/L      Potassium 4 6 mmol/L      Chloride 101 mmol/L      CO2 22 mmol/L      ANION GAP 8 mmol/L      BUN 26 mg/dL      Creatinine 0 93 mg/dL      Glucose 403 mg/dL      Calcium 8 4 mg/dL      eGFR 85 ml/min/1 73sq m     Narrative:      [facility] guidelines for Chronic Kidney Disease (CKD):     Stage 1 with normal or high GFR (GFR > 90 mL/min/1 73 square meters)    Stage 2 Mild CKD (GFR = 60-89 mL/min/1 73 square meters)    Stage 3A Moderate CKD (GFR = 45-59 mL/min/1 73 square meters)    Stage 3B Moderate CKD (GFR = 30-44 mL/min/1 73 square meters)    Stage 4 Severe CKD (GFR = 15-29 mL/min/1 73 square meters)    Stage 5 End Stage CKD (GFR <15 mL/min/1 73 square meters)  Note: GFR calculation is accurate only with a steady state creatinine    CBC and differential [[id number]]  (Abnormal) Collected: 02/10/22 0613    Lab Status: Final result Specimen: Blood from Arm, Left Updated: 02/10/22 0622     WBC 7 44 Thousand/uL      RBC 4 02 Million/uL      Hemoglobin 13 5 g/dL      Hematocrit 39 3 %      MCV 98 fL      MCH 33 6 pg      MCHC 34 4 g/dL      RDW 12 8 %      MPV 10 8 fL      Platelets 888 Thousands/uL      nRBC 0 /100 WBCs      Neutrophils Relative 69 %      Immat GRANS % 0 %      Lymphocytes Relative 26 %      Monocytes Relative 5 %      Eosinophils Relative 0 %      Basophils Relative 0 %      Neutrophils Absolute 5 12 Thousands/µL      Immature Grans Absolute 0 03 Thousand/uL      Lymphocytes Absolute 1 91 Thousands/µL      Monocytes Absolute 0 37 Thousand/µL      Eosinophils Absolute 0 00 Thousand/µL      Basophils Absolute 0 01 Thousands/µL     Fingerstick Glucose (POCT) [[id number]]  (Abnormal) Collected: 02/09/22 2217    Lab Status: Final result Updated: 02/09/22 2236     POC Glucose 362 mg/dl     Fingerstick Glucose (POCT) [[id number]]  (Abnormal) Collected: 02/09/22 1648    Lab Status: Final result Updated: 02/09/22 1700     POC Glucose 440 mg/dl     Fingerstick Glucose (POCT) [[id number]]  (Abnormal) Collected: 02/09/22 1224    Lab Status: Final result Updated: 02/09/22 1400     POC Glucose 499 mg/dl     C-reactive protein [[id number]]  (Abnormal) Collected: 02/09/22 0746    Lab Status: Final result Specimen: Blood Updated: 02/09/22 1316     CRP 70 0 mg/L     C-reactive protein [[id number]]  (Abnormal) Collected: 02/08/22 2021    Lab Status: Final result Specimen: Blood from Arm, Left Updated: 02/09/22 0817     CRP 78 8 mg/L     Fingerstick Glucose (POCT) [[id number]]  (Abnormal) Collected: 02/09/22 0755    Lab Status: Final result Updated: 02/09/22 0800     POC Glucose 357 mg/dl     Procalcitonin Reflex [[id number]]  (Normal) Collected: 02/09/22 0435    Lab Status: Final result Specimen: Blood from Arm, Left Updated: 02/09/22 0524     Procalcitonin 0 23 ng/ml     HS Troponin I 4hr [[id number]]  (Abnormal) Collected: 02/09/22 0435    Lab Status: Final result Specimen: Blood from Arm, Left Updated: 02/09/22 0524     hs TnI 4hr 191 ng/L      Delta 4hr hsTnI 79 ng/L     Comprehensive metabolic panel [[id number]]  (Abnormal) Collected: 02/09/22 0435    Lab Status: Final result Specimen: Blood from Arm, Left Updated: 02/09/22 0508     Sodium 134 mmol/L      Potassium 4 3 mmol/L      Chloride 104 mmol/L      CO2 22 mmol/L      ANION GAP 8 mmol/L      BUN 14 mg/dL      Creatinine 0 82 mg/dL      Glucose 319 mg/dL      Calcium 7 1 mg/dL      Corrected Calcium 7 7 mg/dL      AST 46 U/L      ALT 47 U/L      Alkaline Phosphatase 79 U/L      Total Protein 5 5 g/dL      Albumin 3 2 g/dL      Total Bilirubin 0 61 mg/dL      eGFR 92 ml/min/1 73sq m     Narrative:      [facility] guidelines for Chronic Kidney Disease (CKD):     Stage 1 with normal or high GFR (GFR > 90 mL/min/1 73 square meters)    Stage 2 Mild CKD (GFR = 60-89 mL/min/1 73 square meters)    Stage 3A Moderate CKD (GFR = 45-59 mL/min/1 73 square meters)    Stage 3B Moderate CKD (GFR = 30-44 mL/min/1 73 square meters)    Stage 4 Severe CKD (GFR = 15-29 mL/min/1 73 square meters)    Stage 5 End Stage CKD (GFR <15 mL/min/1 73 square meters)  Note: GFR calculation is accurate only with a steady state creatinine    C-reactive protein [[id number]]  (Abnormal) Collected: 02/09/22 0435    Lab Status: Final result Specimen: Blood from Arm, Left Updated: 02/09/22 0508     CRP 72 4 mg/L     CBC and differential [[id number]]  (Abnormal) Collected: 02/09/22 0435    Lab Status: Final result Specimen: Blood from Arm, Left Updated: 02/09/22 0455     WBC 5 27 Thousand/uL      RBC 3 71 Million/uL      Hemoglobin 12 4 g/dL      Hematocrit 36 0 %      MCV 97 fL      MCH 33 4 pg      MCHC 34 4 g/dL      RDW 13 1 %      MPV 11 0 fL      Platelets 452 Thousands/uL      nRBC 0 /100 WBCs      Neutrophils Relative 71 %      Immat GRANS % 0 %      Lymphocytes Relative 24 %      Monocytes Relative 5 %      Eosinophils Relative 0 %      Basophils Relative 0 %      Neutrophils Absolute 3 75 Thousands/µL      Immature Grans Absolute 0 02 Thousand/uL      Lymphocytes Absolute 1 24 Thousands/µL      Monocytes Absolute 0 26 Thousand/µL      Eosinophils Absolute 0 00 Thousand/µL      Basophils Absolute 0 00 Thousands/µL     HS Troponin I 2hr [[id number]]  (Abnormal) Collected: 02/09/22 0100    Lab Status: Final result Specimen: Blood from Arm, Left Updated: 02/09/22 0143     hs TnI 2hr 235 ng/L      Delta 2hr hsTnI 123 ng/L     Fingerstick Glucose (POCT) [[id number]]  (Abnormal) Collected: 02/09/22 0112    Lab Status: Final result Updated: 02/09/22 0129     POC Glucose 349 mg/dl     COVID/FLU/RSV [[id number]]  (Abnormal) Collected: 02/08/22 2021    Lab Status: Final result Specimen: Nares from Nasopharyngeal Swab Updated: 02/08/22 2124     SARS-CoV-2 Positive     INFLUENZA A PCR Negative     INFLUENZA B PCR Negative     RSV PCR Negative    Narrative:      FOR PEDIATRIC PATIENTS - copy/paste COVID Guidelines URL to browser: [email or web address]    SARS-CoV-2 assay is a Nucleic Acid Amplification assay intended for the  qualitative detection of nucleic acid from SARS-CoV-2 in nasopharyngeal  swabs  Results are for the presumptive identification of SARS-CoV-2 RNA  Positive results are indicative of infection with SARS-CoV-2, the virus  causing COVID-19, but do not rule out bacterial infection or co-infection  with other viruses  Laboratories within the [place] and its  territories are required to report all positive results to the appropriate  public health authorities  Negative results do not preclude SARS-CoV-2  infection and should not be used as the sole basis for treatment or other  patient management decisions  Negative results must be combined with  clinical observations, patient history, and epidemiological information  This test has not been FDA cleared or approved  This test has been authorized by FDA under an Emergency Use Authorization  (EUA)  This test is only authorized for the duration of time the  declaration that circumstances exist justifying the authorization of the  emergency use of an in vitro diagnostic tests for detection of SARS-CoV-2  virus and/or diagnosis of COVID-19 infection under section 564(b)(1) of  the Act, 21 U  S C  264TXE-9(D)(2), unless the authorization is terminated  or revoked sooner  The test has been validated but independent review by FDA  and CLIA is pending  Test performed using [employer] Gene[employer]pert: This RT-PCR assay targets N2,  a region unique to SARS-CoV-2  A conserved region in the E-gene was chosen  for pan-Sarbecovirus detection which includes SARS-CoV-2  CBC and differential [[id number]]  (Abnormal) Collected: 02/08/22 2020    Lab Status: Final result Specimen: Blood from Arm, Left Updated: 02/08/22 2109     WBC 7 41 Thousand/uL      RBC 4 40 Million/uL      Hemoglobin 14 7 g/dL      Hematocrit 42 2 %      MCV 96 fL      MCH 33 4 pg      MCHC 34 8 g/dL      RDW 12 8 %      MPV 10 7 fL      Platelets 277 Thousands/uL      nRBC 0 /100 WBCs      Neutrophils Relative 68 %      Immat GRANS % 0 %      Lymphocytes Relative 24 %      Monocytes Relative 8 %      Eosinophils Relative 0 %      Basophils Relative 0 %      Neutrophils Absolute 5 01 Thousands/µL      Immature Grans Absolute 0 03 Thousand/uL      Lymphocytes Absolute 1 78 Thousands/µL      Monocytes Absolute 0 58 Thousand/µL      Eosinophils Absolute 0 00 Thousand/µL      Basophils Absolute 0 01 Thousands/µL     Narrative: This is an appended report  These results have been appended to a previously verified report      HS Troponin 0hr (reflex protocol) [[id number]]  (Abnormal) Collected: 02/08/22 2021    Lab Status: Final result Specimen: Blood from Arm, Left Updated: 02/08/22 2104     hs TnI 0hr 112 ng/L     Procalcitonin with AM Reflex [[id number]]  (Abnormal) Collected: 02/08/22 2021    Lab Status: Final result Specimen: Blood from Arm, Left Updated: 02/08/22 2104     Procalcitonin 0 30 ng/ml     Comprehensive metabolic panel [[id number]]  (Abnormal) Collected: 02/08/22 2021    Lab Status: Final result Specimen: Blood from Arm, Left Updated: 02/08/22 2054     Sodium 132 mmol/L      Potassium 4 3 mmol/L      Chloride 100 mmol/L      CO2 21 mmol/L      ANION GAP 11 mmol/L      BUN 17 mg/dL      Creatinine 0 90 mg/dL      Glucose 371 mg/dL      Calcium 8 6 mg/dL      AST 58 U/L      ALT 63 U/L      Alkaline Phosphatase 98 U/L      Total Protein 6 8 g/dL      Albumin 4 0 g/dL      Total Bilirubin 1 00 mg/dL      eGFR 89 ml/min/1 73sq m     Narrative:      [facility] guidelines for Chronic Kidney Disease (CKD):     Stage 1 with normal or high GFR (GFR > 90 mL/min/1 73 square meters)    Stage 2 Mild CKD (GFR = 60-89 mL/min/1 73 square meters)    Stage 3A Moderate CKD (GFR = 45-59 mL/min/1 73 square meters)    Stage 3B Moderate CKD (GFR = 30-44 mL/min/1 73 square meters)    Stage 4 Severe CKD (GFR = 15-29 mL/min/1 73 square meters)    Stage 5 End Stage CKD (GFR <15 mL/min/1 73 square meters)  Note: GFR calculation is accurate only with a steady state creatinine    Magnesium [[id number]]  (Normal) Collected: 02/08/22 2021    Lab Status: Final result Specimen: Blood from Arm, Left Updated: 02/08/22 2054     Magnesium 1 9 mg/dL     D-dimer, quantitative [[id number]]  (Abnormal) Collected: 02/08/22 2021    Lab Status: Final result Specimen: Blood from Arm, Left Updated: 02/08/22 2053     D-Dimer, Quant 0 50 ug/ml FEU     Lactic acid, plasma [[id number]]  (Normal) Collected: 02/08/22 2021    Lab Status: Final result Specimen: Blood from Arm, Left Updated: 02/08/22 2052     LACTIC ACID 1 4 mmol/L     Narrative: Result may be elevated if tourniquet was used during collection  Protime-INR [[id number]]  (Normal) Collected: 02/08/22 2021    Lab Status: Final result Specimen: Blood from Arm, Left Updated: 02/08/22 2047     Protime 13 0 seconds      INR 0 99                 XR chest 1 view portable   Final Result by Rona Simons MD (02/09 6812)      Bilateral multifocal groundglass opacities are present  In the setting of COVID-19 infection, this is most in keeping with COVID 19 pneumonia  Workstation performed: [id number]                    Procedures  Procedures         ED Course  ED Course as of 02/10/22 1419   Tue Feb 08, 2022 2134 Slightly elevated troponin which is likely hypoxia driven from [place], will repeat                                             MDM  Number of Diagnoses or Management Options  COVID: new and requires workup  Diagnosis management comments: This is a 71-year-old male presenting with hypoxic respiratory failure in the setting of COVID diagnosed on home test  Patient is 81 date a 5% on room air, was placed on 2 L nasal cannula and started saturating at 90%, increased to 4 L as patient was seen in the family room with no available rooms for patient due to the [place] pandemic and excess boarding in the emergency department        Will require admission for covid       Amount and/or Complexity of Data Reviewed  Review and summarize past medical records: yes  Independent visualization of images, tracings, or specimens: yes    Risk of Complications, Morbidity, and/or Mortality  Presenting problems: high  Diagnostic procedures: minimal  Management options: high        Disposition  Final diagnoses:   COVID     Time reflects when diagnosis was documented in both MDM as applicable and the Disposition within this note     Time User Action Codes Description Comment    2/8/2022  9:34 PM Samantha Winter Add [U07 1] COVID     2/9/2022  1:53 AM [address] [facility] Add [R77 8] Elevated troponin       ED Disposition     ED Disposition Condition Date/Time Comment    Admit Stable Tue Feb 8, 2022  9:34 PM Case was discussed with Anastasia Ford and the patient's admission status was agreed to be Admission Status: inpatient status to the service of Dr Indra Moore   Follow-up Information    None         Patient's Medications   Discharge Prescriptions    No medications on file       No discharge procedures on file      PDMP Review     None          ED Provider  Electronically Signed by           Mark Hilliard DO  02/10/22 7332

## 2022-02-09 NOTE — UTILIZATION REVIEW
Initial Clinical Review    Admission: Date/Time/Statement:   Admission Orders (From admission, onward)     Ordered        02/08/22 2138  Inpatient Admission  Once                      Orders Placed This Encounter   Procedures    Inpatient Admission     Standing Status:   Standing     Number of Occurrences:   1     Order Specific Question:   Level of Care     Answer:   Med Surg [16]     Order Specific Question:   Estimated length of stay     Answer:   More than 2 Midnights     Order Specific Question:   Certification     Answer:   I certify that inpatient services are medically necessary for this patient for a duration of greater than two midnights  See H&P and MD Progress Notes for additional information about the patient's course of treatment  ED Arrival Information     Expected Arrival Acuity    - 2/8/2022 19:00 Urgent         Means of arrival Escorted by Service Admission type    [facility] Urgent         Arrival complaint    COVID +, O2 IS 74/fam waiting room        Chief Complaint   Patient presents with    Shortness of Breath     Initial Presentation:   73 yom to ER from home c/o SOB, hypoxic @ 70% RA, NPC, tested COVID+ on home test yesterday  Hx HLD, DM2 not on insulin, HTN  Presents febrile with s/s as above, lungs with decreased breath sounds  Admission work-up showing COVID+, hyponatremia, elevated d-dimer, procalcitonin, CRP, +troponin, bilateral opacities on imaging  Admitted to inpatient status for acute respiratory failure 2nd COVID pneumonia  O2 requirements @ 6ltr/44%FiO2  Started on COVID tx  Date: 2/9/22   Day 2:   Respiratory failure 2nd COVID  Persistent SOTELO, lungs with diminished breath sounds  O2 requirements increased to 8ltr/52%FiO2 [address]  Prone positioning encouraged  Per cardio: Elevated troponin/non MI troponin elevation in the setting of hypoxia due to Covid  Patient is without any symptoms suggestive of or concerning for ACS       ED Triage Vitals [02/08/22 2005]   Temperature Pulse Respirations Blood Pressure SpO2   99 °F (37 2 °C) 89 18 146/64 93 %      Temp Source Heart Rate Source Patient Position - Orthostatic VS BP Location FiO2 (%)   Oral -- -- -- --      Pain Score       5          Wt Readings from Last 1 Encounters:   02/08/22 88 5 kg (195 lb)     Additional Vital Signs:   02/09/22 0825 -- -- -- -- -- 91 % 52 8 L/min Mid flow nasal cannula   02/09/22 0818 97 7 °F (36 5 °C) 73 20 161/71 -- 95 % 396 94 L/min --   02/09/22 0531 -- -- -- -- -- 91 % 52 8 L/min Mid flow nasal cannula   02/09/22 0500 -- 73 -- -- -- 86 % Abnormal  -- -- --   02/09/22 0300 -- 81 -- -- -- 94 % -- -- --   02/09/22 0100 -- 85 -- -- -- 90 % -- -- --   02/09/22 0000 98 5 °F (36 9 °C) 82 -- 131/67 92 90 % -- -- --   02/08/22 2300 -- 93 -- 139/66 93 91 % -- -- --   02/08/22 2230 -- 96 -- 134/78 100 91 % 44 6 L/min Nasal cannula     Pertinent Labs/Diagnostic Test Results:   Results from last 7 days   Lab Units 02/08/22 2021   SARS-COV-2  Positive*     Results from last 7 days   Lab Units 02/09/22 0435 02/08/22 2020   WBC Thousand/uL 5 27 7 41   HEMOGLOBIN g/dL 12 4 14 7   HEMATOCRIT % 36 0* 42 2   PLATELETS Thousands/uL 122* 138*   NEUTROS ABS Thousands/µL 3 75 5 01     Results from last 7 days   Lab Units 02/09/22 0435 02/08/22 2021   SODIUM mmol/L 134* 132*   POTASSIUM mmol/L 4 3 4 3   CHLORIDE mmol/L 104 100   CO2 mmol/L 22 21   ANION GAP mmol/L 8 11   BUN mg/dL 14 17   CREATININE mg/dL 0 82 0 90   EGFR ml/min/1 73sq m 92 89   CALCIUM mg/dL 7 1* 8 6   MAGNESIUM mg/dL  --  1 9     Results from last 7 days   Lab Units 02/09/22  0435 02/08/22 2021   AST U/L 46* 58*   ALT U/L 47 63*   ALK PHOS U/L 79 98   TOTAL PROTEIN g/dL 5 5* 6 8   ALBUMIN g/dL 3 2* 4 0   TOTAL BILIRUBIN mg/dL 0 61 1 00     Results from last 7 days   Lab Units 02/09/22  0755 02/09/22  0112   POC GLUCOSE mg/dl 357* 349*     Results from last 7 days   Lab Units 02/09/22  0435 02/08/22 2021   GLUCOSE RANDOM mg/dL 319* 371*     Results from last 7 days   Lab Units 02/09/22  0435 02/09/22  0100 02/08/22 2021   HS TNI 0HR ng/L  --   --  112*   HS TNI 2HR ng/L  --  235*  --    HSTNI D2 ng/L  --  123*  --    HS TNI 4HR ng/L 191*  --   --    HSTNI D4 ng/L 79*  --   --      Results from last 7 days   Lab Units 02/08/22 2021   D-DIMER QUANTITATIVE ug/ml FEU 0 50*     Results from last 7 days   Lab Units 02/08/22 2021   PROTIME seconds 13 0   INR  0 99     Results from last 7 days   Lab Units 02/09/22  0435 02/08/22 2021   PROCALCITONIN ng/ml 0 23 0 30*     Results from last 7 days   Lab Units 02/08/22 2021   LACTIC ACID mmol/L 1 4     Results from last 7 days   Lab Units 02/09/22 0435 02/08/22 2021   CRP mg/L 72 4* 78 8*     Results from last 7 days   Lab Units 02/08/22 2021   INFLUENZA A PCR  Negative   INFLUENZA B PCR  Negative   RSV PCR  Negative     2/8  Cxr=  Bilateral multifocal groundglass opacities are present  In the setting of COVID-19 infection, this is most in keeping with COVID 19 pneumonia  ED Treatment:   Medication Administration from 02/08/2022 1900 to 02/09/2022 9481       Date/Time Order Dose Route Action     02/08/2022 2021 sodium chloride 0 9 % bolus 1,000 mL 1,000 mL Intravenous New Bag     02/09/2022 0101 dexamethasone (DECADRON) injection 6 mg 6 mg Intravenous Given     02/09/2022 0820 enoxaparin (LOVENOX) subcutaneous injection 30 mg 30 mg Subcutaneous Given     02/09/2022 0109 enoxaparin (LOVENOX) subcutaneous injection 30 mg 30 mg Subcutaneous Given     02/09/2022 0103 remdesivir (Veklury) 200 mg in sodium chloride 0 9 % 290 mL IVPB 200 mg Intravenous Given     02/09/2022 0757 insulin lispro (HumaLOG) 100 units/mL subcutaneous injection 1-6 Units 6 Units Subcutaneous Given     02/09/2022 0117 insulin lispro (HumaLOG) 100 units/mL subcutaneous injection 1-6 Units 5 Units Subcutaneous Given     02/09/2022 0819 lisinopril (ZESTRIL) tablet 5 mg 5 mg Oral Given        History reviewed   No pertinent past medical history  Present on Admission:   Acute respiratory failure due to COVID-19 ([facility] [address] )   Elevated troponin   Type 2 diabetes mellitus (HCC)   Primary hypertension    Admitting Diagnosis: COVID-19 [U07 1]  Age/Sex: 73 y o  male  Admission Orders:  O2 to keep sats>90%  Self-proning  Pt/iot eval & tx  Scd/foot pumps  accuchecks  Consult cardio    Scheduled Medications:  atorvastatin, 10 mg, Oral, Daily With Dinner  dexamethasone, 6 mg, Intravenous, Q24H  enoxaparin, 30 mg, Subcutaneous, Q12H [address]  insulin lispro, 1-6 Units, Subcutaneous, TID AC  insulin lispro, 1-6 Units, Subcutaneous, HS  lisinopril, 5 mg, Oral, Daily  remdesivir, 100 mg, Intravenous, Q24H    PRN Meds:  acetaminophen, 650 mg, Oral, Q4H PRN  benzonatate, 100 mg, Oral, TID PRN  ondansetron, 4 mg, Intravenous, Q6H PRN    Network Utilization Review Department  ATTENTION: Please call with any questions or concerns to [phone] and carefully listen to the prompts so that you are directed to the right person  All voicemails are confidential   Farrel Bodily all requests for admission clinical reviews, approved or denied determinations and any other requests to dedicated fax number below belonging to the campus where the patient is receiving treatment   List of dedicated fax numbers for the Facilities:  [address] DENIALS (Administrative/Medical Necessity) [phone]   [address] (Maternity/NICU/Pediatrics) [phone]   [address]  [address] [facility] [place] [address] [address] [place] [address] [phone]   Jose Manuel Dennison [address] [place] [address] [address] [place] [address] [phone]

## 2022-02-09 NOTE — RESPIRATORY THERAPY NOTE
02/09/22 1329   Non-Invasive Information   O2 Interface Device MFNC prongs   Non-Invasive Ventilation Mode MFNC (Mid flow)   SpO2 91 %   $ Pulse Oximetry Spot Check Charge Completed   Non-Invasive Settings   Flow (lpm) 7

## 2022-02-09 NOTE — CONSULTS
Elvira Winston [address] 1955, 73 y o  male MRN: [id number]  Unit/Bed#: ED 21 Encounter: [id number]  Primary Care Provider: Dread Soriano MD   Date and time admitted to hospital: 2/8/2022  8:41 PM    Inpatient consult to Cardiology  Consult performed by: NEGRITA Farah  Consult ordered by: Shilpa Chaudhry PA-C          Primary hypertension  Assessment & Plan  Fairly well controlled  Continue home regimen for now and monitor    Elevated troponin  Assessment & Plan  Mildly elevated high sensitivity troponin  Levels are flat without significant rise and fall  Symptoms  No ekg tracings available-will order      * Acute respiratory failure due to COVID-19 [facility])  Assessment & Plan  Primary reason for admission  Management per medical team      Other summary comments:   Elevated troponin/non MI troponin elevation in the setting of hypoxia due to Covid  Patient is without any symptoms suggestive of or concerning for ACS  Will check EKG as I was not able to find one for review  Pt is stable from a cardiac perspective  We will arrange for outpatient follow up and ischemic testing  Outpatient Cardiologist: none    HPI: Malik Gonzalez is a 73y o  year old male who presented with hypoxia  Patient reports symptoms of shortness of breath and cough beginning on 01/31/2022  He tested positive for COVID on a home test performed 02/08/2022  He measured his pulse oximetry at home and had a reading of 74%  It is for this reason he presented to the emergency department  In the emergency department, troponin levels were drawn and were noted to be elevated  It is for this reason the Cardiology was consulted  Medical problems include non-insulin-dependent type 2 diabetes mellitus, hypertension, hyperlipidemia  There is no known prior cardiac history  Ami Reap is active at home and works as a     He denies any symptoms of chest pain, pressure, tightness, burning  He does not experience any palpitations, dizziness, lightheadedness, syncope  No changes in his breathing or exertional capacity  EKG:  Ordered      MOST  RECENT CARDIAC IMAGING:   None      Review of Systems: a 10 point review of systems was conducted and is negative except for as mentioned in the HPI or as below  Historical Information   History reviewed  No pertinent past medical history  Past Surgical History:   Procedure Laterality Date    ANKLE FRACTURE SURGERY Left     FOOT FRACTURE SURGERY      KNEE SURGERY Right     VASECTOMY       Social History     Substance and Sexual Activity   Alcohol Use Not Currently     Social History     Substance and Sexual Activity   Drug Use Never     Social History     Tobacco Use   Smoking Status Never Smoker   Smokeless Tobacco Never Used       Family History:  Father  of an MI at the age of 48      Meds/Allergies   all current active meds have been reviewed  (Not in a hospital admission)      No Known Allergies    Objective   Vitals: Blood pressure 161/71, pulse 73, temperature 97 7 °F (36 5 °C), resp  rate 20, weight 88 5 kg (195 lb), SpO2 91 %  , There is no height or weight on file to calculate BMI ,   Orthostatic Blood Pressures      Most Recent Value   Blood Pressure 161/71 filed at 2022 0536          Systolic (89FJG), SMS:140 , Min:131 , UTD:123     Diastolic (71YZN), ISR:87, Min:64, Max:78      Physical Exam:    General:  Normal appearance in no distress  Eyes:  Anicteric  Oral mucosa:  Moist   Neck:  No JVD  Carotid upstrokes are brisk without bruits  No masses  Chest:  Deferred due to PAPR use  Cardiac:  Deferred due to PAPR use  Abdomen:  Soft and nontender  No palpable organomegaly or aortic enlargement  Extremities:  No peripheral edema  Musculoskeletal:  Symmetric  Vascular:   Pedal pulses are intact  Neuro:  Grossly symmetric  Psych:  Alert and oriented x3          Lab Results:     Troponins:    Results from last 7 days   Lab Units 02/09/22  0435 02/09/22  0100 02/08/22 2021   HS TNI 0HR ng/L  --   --  112*   HS TNI 2HR ng/L  --  235*  --    HSTNI D2 ng/L  --  123*  --    HS TNI 4HR ng/L 191*  --   --    HSTNI D4 ng/L 79*  --   --      BNP:       CBC :   Results from last 7 days   Lab Units 02/09/22 0435 02/08/22 2020   WBC Thousand/uL 5 27 7 41   HEMOGLOBIN g/dL 12 4 14 7   HEMATOCRIT % 36 0* 42 2   MCV fL 97 96   PLATELETS Thousands/uL 122* 138*     TSH:     CMP:   Results from last 7 days   Lab Units 02/09/22 0435 02/08/22 2021   POTASSIUM mmol/L 4 3 4 3   CHLORIDE mmol/L 104 100   CO2 mmol/L 22 21   BUN mg/dL 14 17   CREATININE mg/dL 0 82 0 90   AST U/L 46* 58*   ALT U/L 47 63*   EGFR ml/min/1 73sq m 92 89     Lipid Profile:     Coags:   Results from last 7 days   Lab Units 02/08/22 2021   INR  0 99

## 2022-02-09 NOTE — ASSESSMENT & PLAN NOTE
· Patient reported pulse ox 74% at home  · Placed on 4L NC in ED, currently on 7 L midflow  · Moderate pathway  · Serial labs

## 2022-02-09 NOTE — PLAN OF CARE
Problem: PHYSICAL THERAPY ADULT  Goal: Performs mobility at highest level of function for planned discharge setting  See evaluation for individualized goals  Description: Treatment/Interventions: Functional transfer training,LE strengthening/ROM,Therapeutic exercise,Endurance training,Patient/family training,Gait training,Compensatory technique education,Spoke to nursing          See flowsheet documentation for full assessment, interventions and recommendations  Note: Prognosis: Fair  Problem List: Decreased strength,Decreased endurance,Impaired balance,Decreased mobility,Impaired judgement,Decreased safety awareness  Assessment: Pt is 73 y o  male seen for PT evaluation s/p admit to [place] [address] on 2/8/2022 w/ Acute respiratory failure due to COVID-19 [facility])  PT consulted to assess pt's functional mobility and d/c needs  Order placed for PT eval and tx, w/ up and OOB as tolerated order  Comorbidities affecting pt's physical performance at time of assessment include: weakness,acute respiratory failure due to COVID-19, elevated troponin, HTN,type 2 DM   PTA, pt was independent w/ all functional mobility w/ o AD usage  Personal factors affecting pt at time of IE include: stairs to enter home, inability to navigate community distances, unable to perform dynamic tasks in community, impulsivity, limited insight into impairments and inability to perform IADLs  Please find objective findings from PT assessment regarding body systems outlined above with impairments and limitations including weakness, impaired balance, decreased endurance, gait deviations, decreased activity tolerance, decreased functional mobility tolerance, decreased safety awareness and SOB upon exertion  From PT/mobility standpoint, recommendation at time of d/c would be no rehabilitation needs pending progress in order to facilitate return to PLOF             PT Discharge Recommendation: No rehabilitation needs          See flowsheet documentation for full assessment

## 2022-02-09 NOTE — H&P
[address]  H&P- Yamilet Madison 1955, 73 y o  male MRN: [id number]  Unit/Bed#: ED 21 Encounter: [id number]  Primary Care Provider: Mitchell Grullon MD   Date and time admitted to hospital: 2/8/2022  8:41 PM    * Acute respiratory failure due to COVID-19 [facility])  Assessment & Plan  · Patient reported pulse ox 74% at home  · Placed on 4L NC in ED  · Moderate pathway  · Serial labs    Elevated troponin  Assessment & Plan  · Likely secondary to covid infection with hypoxia  · Continue to trend  · No chest pain    Primary hypertension  Assessment & Plan  · Continue meds and monitor    Type 2 diabetes mellitus ([facility] [address] )  Assessment & Plan  Lab Results   Component Value Date    HGBA1C 7 7 (H) 12/06/2021       No results for input(s): POCGLU in the last 72 hours  Blood Sugar Average: Last 72 hrs:  · hold oral medication  · SSI coverage    VTE Pharmacologic Prophylaxis:   Low Risk (Score 0-2) - Encourage Ambulation  Code Status: Level 1 - Full Code   Discussion with patient    Anticipated Length of Stay: Patient will be admitted on an inpatient basis with an anticipated length of stay of greater than 2 midnights secondary to covid treatment  Chief Complaint: shortness of breath    History of Present Illness:  Yamilet Madison is a 73 y o  male with a PMH of HLD, DM2 not on insulin, HTN who presents with shortness of breath  Patient started with covid symptoms on 1/31/22  He did home test on Monday  Got [place] from PCP on Tuesday  Cough, sob, He checked pulse ox at home was 74% on RA  Has fatigue  No loss taste or smell  Has dry mouth  Review of Systems:  Review of Systems   Constitutional: Positive for fatigue  Negative for chills and fever  HENT: Positive for rhinorrhea  Negative for sore throat and trouble swallowing  Eyes: Negative for discharge and redness  Respiratory: Positive for cough and shortness of breath      Cardiovascular: Negative for chest pain and leg swelling  Gastrointestinal: Negative for abdominal pain, diarrhea, nausea and vomiting  Genitourinary: Negative for dysuria and hematuria  Musculoskeletal: Positive for back pain and neck pain  Negative for myalgias  Skin: Negative for rash and wound  Neurological: Positive for weakness  Negative for dizziness and headaches  Psychiatric/Behavioral: Negative for agitation and confusion  Past Medical and Surgical History:   History reviewed  No pertinent past medical history  Past Surgical History:   Procedure Laterality Date    ANKLE FRACTURE SURGERY Left     FOOT FRACTURE SURGERY      KNEE SURGERY Right     VASECTOMY         Meds/Allergies:  Prior to Admission medications    Medication Sig Start Date End Date Taking? Authorizing Provider   atorvastatin (LIPITOR) 10 mg tablet Take 10 mg by mouth daily   Yes Historical Provider, MD   benzonatate (TESSALON PERLES) 100 mg capsule Take 100 mg by mouth 3 (three) times a day as needed for cough   Yes Historical Provider, MD   glipiZIDE (GLUCOTROL) 10 mg tablet Take 10 mg by mouth 2 (two) times a day before meals   Yes Historical Provider, MD   lisinopril (ZESTRIL) 5 mg tablet Take 5 mg by mouth daily   Yes Historical Provider, MD   metFORMIN (GLUCOPHAGE) 1000 MG tablet Take 1,000 mg by mouth 2 (two) times a day with meals   Yes Historical Provider, MD   Semaglutide (OZEMPIC, 0 25 OR 0 5 MG/DOSE, SC) Inject under the skin   Yes Historical Provider, MD     I have reviewed home medications with patient personally      Allergies: No Known Allergies    Social History:  Marital Status: /Civil Union   Occupation:   Patient Pre-hospital Living Situation: Home  Patient Pre-hospital Level of Mobility: walks  Patient Pre-hospital Diet Restrictions: None  Substance Use History:   Social History     Substance and Sexual Activity   Alcohol Use Not Currently     Social History     Tobacco Use   Smoking Status Never Smoker   Smokeless Tobacco Never Used     Social History     Substance and Sexual Activity   Drug Use Never       Family History:  Family History   Problem Relation Age of Onset    Diabetes Mother     Alcohol abuse Father        Physical Exam:     Vitals:   Blood Pressure: 131/67 (02/09/22 0000)  Pulse: 82 (02/09/22 0000)  Temperature: 98 5 °F (36 9 °C) (02/09/22 0000)  Temp Source: Oral (02/09/22 0000)  Respirations: 18 (02/08/22 2005)  Weight - Scale: 88 5 kg (195 lb) (02/08/22 2133)  SpO2: 90 % (02/09/22 0000)    Physical Exam  Vitals reviewed  Constitutional:       General: He is not in acute distress  Appearance: Normal appearance  Comments: Occasional cough   HENT:      Head: Normocephalic and atraumatic  Right Ear: External ear normal       Left Ear: External ear normal       Nose: Nose normal    Eyes:      General:         Right eye: No discharge  Left eye: No discharge  Conjunctiva/sclera: Conjunctivae normal    Cardiovascular:      Rate and Rhythm: Normal rate and regular rhythm  Heart sounds: Normal heart sounds  No murmur heard  Pulmonary:      Effort: Pulmonary effort is normal  No respiratory distress  Breath sounds: Examination of the right-lower field reveals decreased breath sounds  Examination of the left-lower field reveals decreased breath sounds  Decreased breath sounds present  No wheezing or rales  Abdominal:      General: Bowel sounds are normal  There is no distension  Palpations: Abdomen is soft  Tenderness: There is no abdominal tenderness  There is no guarding  Musculoskeletal:         General: Normal range of motion  Cervical back: Normal range of motion  Skin:     General: Skin is warm and dry  Neurological:      Mental Status: He is alert and oriented to person, place, and time  Mental status is at baseline  Psychiatric:         Mood and Affect: Mood normal          Behavior: Behavior normal          Thought Content:  Thought content normal  Judgment: Judgment normal           Additional Data:     Lab Results:  Results from last 7 days   Lab Units 02/08/22 2020   WBC Thousand/uL 7 41   HEMOGLOBIN g/dL 14 7   HEMATOCRIT % 42 2   PLATELETS Thousands/uL 138*   NEUTROS PCT % 68   LYMPHS PCT % 24   MONOS PCT % 8   EOS PCT % 0     Results from last 7 days   Lab Units 02/08/22 2021   SODIUM mmol/L 132*   POTASSIUM mmol/L 4 3   CHLORIDE mmol/L 100   CO2 mmol/L 21   BUN mg/dL 17   CREATININE mg/dL 0 90   ANION GAP mmol/L 11   CALCIUM mg/dL 8 6   ALBUMIN g/dL 4 0   TOTAL BILIRUBIN mg/dL 1 00   ALK PHOS U/L 98   ALT U/L 63*   AST U/L 58*   GLUCOSE RANDOM mg/dL 371*     Results from last 7 days   Lab Units 02/08/22 2021   INR  0 99     Results from last 7 days   Lab Units 02/09/22  0112   POC GLUCOSE mg/dl 349*         Results from last 7 days   Lab Units 02/08/22 2021   LACTIC ACID mmol/L 1 4   PROCALCITONIN ng/ml 0 30*       Imaging: CXR pending  XR chest 1 view portable    (Results Pending)       ** Please Note: This note has been constructed using a voice recognition system   **

## 2022-02-10 LAB
ANION GAP SERPL CALCULATED.3IONS-SCNC: 8 MMOL/L (ref 4–13)
ATRIAL RATE: 87 BPM
BASOPHILS # BLD AUTO: 0.01 THOUSANDS/ΜL (ref 0–0.1)
BASOPHILS NFR BLD AUTO: 0 % (ref 0–1)
BUN SERPL-MCNC: 26 MG/DL (ref 5–25)
CALCIUM SERPL-MCNC: 8.4 MG/DL (ref 8.4–10.2)
CHLORIDE SERPL-SCNC: 101 MMOL/L (ref 96–108)
CO2 SERPL-SCNC: 22 MMOL/L (ref 21–32)
CREAT SERPL-MCNC: 0.93 MG/DL (ref 0.6–1.3)
EOSINOPHIL # BLD AUTO: 0 THOUSAND/ΜL (ref 0–0.61)
EOSINOPHIL NFR BLD AUTO: 0 % (ref 0–6)
ERYTHROCYTE [DISTWIDTH] IN BLOOD BY AUTOMATED COUNT: 12.8 % (ref 11.6–15.1)
GFR SERPL CREATININE-BSD FRML MDRD: 85 ML/MIN/1.73SQ M
GLUCOSE SERPL-MCNC: 228 MG/DL (ref 65–140)
GLUCOSE SERPL-MCNC: 254 MG/DL (ref 65–140)
GLUCOSE SERPL-MCNC: 366 MG/DL (ref 65–140)
GLUCOSE SERPL-MCNC: 371 MG/DL (ref 65–140)
GLUCOSE SERPL-MCNC: 390 MG/DL (ref 65–140)
GLUCOSE SERPL-MCNC: 403 MG/DL (ref 65–140)
GLUCOSE SERPL-MCNC: 412 MG/DL (ref 65–140)
GLUCOSE SERPL-MCNC: 415 MG/DL (ref 65–140)
GLUCOSE SERPL-MCNC: >500 MG/DL (ref 65–140)
GLUCOSE SERPL-MCNC: >500 MG/DL (ref 65–140)
HCT VFR BLD AUTO: 39.3 % (ref 36.5–49.3)
HGB BLD-MCNC: 13.5 G/DL (ref 12–17)
IMM GRANULOCYTES # BLD AUTO: 0.03 THOUSAND/UL (ref 0–0.2)
IMM GRANULOCYTES NFR BLD AUTO: 0 % (ref 0–2)
LYMPHOCYTES # BLD AUTO: 1.91 THOUSANDS/ΜL (ref 0.6–4.47)
LYMPHOCYTES NFR BLD AUTO: 26 % (ref 14–44)
MCH RBC QN AUTO: 33.6 PG (ref 26.8–34.3)
MCHC RBC AUTO-ENTMCNC: 34.4 G/DL (ref 31.4–37.4)
MCV RBC AUTO: 98 FL (ref 82–98)
MONOCYTES # BLD AUTO: 0.37 THOUSAND/ΜL (ref 0.17–1.22)
MONOCYTES NFR BLD AUTO: 5 % (ref 4–12)
NEUTROPHILS # BLD AUTO: 5.12 THOUSANDS/ΜL (ref 1.85–7.62)
NEUTS SEG NFR BLD AUTO: 69 % (ref 43–75)
NRBC BLD AUTO-RTO: 0 /100 WBCS
P AXIS: 52 DEGREES
PLATELET # BLD AUTO: 144 THOUSANDS/UL (ref 149–390)
PMV BLD AUTO: 10.8 FL (ref 8.9–12.7)
POTASSIUM SERPL-SCNC: 4.6 MMOL/L (ref 3.5–5.3)
PR INTERVAL: 142 MS
PROCALCITONIN SERPL-MCNC: 0.19 NG/ML
QRS AXIS: -69 DEGREES
QRSD INTERVAL: 144 MS
QT INTERVAL: 410 MS
QTC INTERVAL: 493 MS
RBC # BLD AUTO: 4.02 MILLION/UL (ref 3.88–5.62)
SODIUM SERPL-SCNC: 131 MMOL/L (ref 135–147)
T WAVE AXIS: -8 DEGREES
VENTRICULAR RATE: 87 BPM
WBC # BLD AUTO: 7.44 THOUSAND/UL (ref 4.31–10.16)

## 2022-02-10 PROCEDURE — 80048 BASIC METABOLIC PNL TOTAL CA: CPT | Performed by: PHYSICIAN ASSISTANT

## 2022-02-10 PROCEDURE — 93010 ELECTROCARDIOGRAM REPORT: CPT | Performed by: INTERNAL MEDICINE

## 2022-02-10 PROCEDURE — 84145 PROCALCITONIN (PCT): CPT | Performed by: PHYSICIAN ASSISTANT

## 2022-02-10 PROCEDURE — 36415 COLL VENOUS BLD VENIPUNCTURE: CPT | Performed by: PHYSICIAN ASSISTANT

## 2022-02-10 PROCEDURE — 82948 REAGENT STRIP/BLOOD GLUCOSE: CPT

## 2022-02-10 PROCEDURE — 99232 SBSQ HOSP IP/OBS MODERATE 35: CPT | Performed by: PHYSICIAN ASSISTANT

## 2022-02-10 PROCEDURE — 85025 COMPLETE CBC W/AUTO DIFF WBC: CPT | Performed by: PHYSICIAN ASSISTANT

## 2022-02-10 PROCEDURE — 94760 N-INVAS EAR/PLS OXIMETRY 1: CPT

## 2022-02-10 RX ORDER — OMEGA-3S/DHA/EPA/FISH OIL/D3 300MG-1000
400 CAPSULE ORAL DAILY
Status: DISCONTINUED | OUTPATIENT
Start: 2022-02-11 | End: 2022-02-17 | Stop reason: HOSPADM

## 2022-02-10 RX ORDER — ASCORBIC ACID 500 MG
1000 TABLET ORAL 2 TIMES DAILY
Status: DISCONTINUED | OUTPATIENT
Start: 2022-02-10 | End: 2022-02-17 | Stop reason: HOSPADM

## 2022-02-10 RX ORDER — INSULIN GLARGINE 100 [IU]/ML
15 INJECTION, SOLUTION SUBCUTANEOUS EVERY 12 HOURS SCHEDULED
Status: DISCONTINUED | OUTPATIENT
Start: 2022-02-10 | End: 2022-02-10

## 2022-02-10 RX ORDER — ZINC SULFATE 50(220)MG
220 CAPSULE ORAL DAILY
Status: DISCONTINUED | OUTPATIENT
Start: 2022-02-11 | End: 2022-02-17 | Stop reason: HOSPADM

## 2022-02-10 RX ORDER — CHLORAL HYDRATE 500 MG
1000 CAPSULE ORAL DAILY
Status: DISCONTINUED | OUTPATIENT
Start: 2022-02-11 | End: 2022-02-17 | Stop reason: HOSPADM

## 2022-02-10 RX ORDER — LANOLIN ALCOHOL/MO/W.PET/CERES
100 CREAM (GRAM) TOPICAL DAILY
Status: DISCONTINUED | OUTPATIENT
Start: 2022-02-11 | End: 2022-02-17 | Stop reason: HOSPADM

## 2022-02-10 RX ADMIN — ATORVASTATIN CALCIUM 10 MG: 10 TABLET, FILM COATED ORAL at 17:45

## 2022-02-10 RX ADMIN — OXYCODONE HYDROCHLORIDE AND ACETAMINOPHEN 1000 MG: 500 TABLET ORAL at 18:40

## 2022-02-10 RX ADMIN — DEXAMETHASONE SODIUM PHOSPHATE 6 MG: 4 INJECTION, SOLUTION INTRA-ARTICULAR; INTRALESIONAL; INTRAMUSCULAR; INTRAVENOUS; SOFT TISSUE at 21:38

## 2022-02-10 RX ADMIN — BENZONATATE 100 MG: 100 CAPSULE ORAL at 09:09

## 2022-02-10 RX ADMIN — INSULIN LISPRO 10 UNITS: 100 INJECTION, SOLUTION INTRAVENOUS; SUBCUTANEOUS at 08:57

## 2022-02-10 RX ADMIN — SODIUM CHLORIDE 10 UNITS/HR: 9 INJECTION, SOLUTION INTRAVENOUS at 13:30

## 2022-02-10 RX ADMIN — INSULIN LISPRO 6 UNITS: 100 INJECTION, SOLUTION INTRAVENOUS; SUBCUTANEOUS at 08:58

## 2022-02-10 RX ADMIN — INSULIN GLARGINE 15 UNITS: 100 INJECTION, SOLUTION SUBCUTANEOUS at 08:58

## 2022-02-10 RX ADMIN — ENOXAPARIN SODIUM 30 MG: 100 INJECTION SUBCUTANEOUS at 21:39

## 2022-02-10 RX ADMIN — ENOXAPARIN SODIUM 30 MG: 100 INJECTION SUBCUTANEOUS at 08:57

## 2022-02-10 RX ADMIN — LISINOPRIL 5 MG: 5 TABLET ORAL at 08:59

## 2022-02-10 RX ADMIN — REMDESIVIR 100 MG: 100 INJECTION, POWDER, LYOPHILIZED, FOR SOLUTION INTRAVENOUS at 21:40

## 2022-02-10 NOTE — PROGRESS NOTES
Angelique U  67   Progress Note - Tyrese McGee 1955, 73 y o  male MRN: [id number]  Unit/Bed#: ED 21 Encounter: [id number]  Primary Care Provider: Susan Hermosillo MD   Date and time admitted to hospital: 2/8/2022  8:41 PM    * Acute respiratory failure due to COVID-19 [facility])  Assessment & Plan  · Patient reported pulse ox 74% at home  · Placed on 4L NC in ED, was on 7 L midflow on 2/9, currently on 9 L midflow today  · Moderate pathway  · Remdesivir day 3/5  · Decadron day 3/10  · Will discuss with the patient regarding starting baricitinib  · Serial labs    Primary hypertension  Assessment & Plan  · Continue lisinopril  · Monitor blood pressure trends    Type 2 diabetes mellitus [facility])  Assessment & Plan  Lab Results   Component Value Date    HGBA1C 7 7 (H) 12/06/2021       Recent Labs     02/09/22  1648 02/09/22  2217 02/10/22  0833 02/10/22  1200   POCGLU 440* 362* 390* >500*       Blood Sugar Average: Last 72 hrs:  · (P) 399 5   · hold oral medication  · Hyperglycemia is likely steroid induced   · Blood glucose continue to be significantly elevated despite initiating lantus, meal coverage with humalog and sliding scale  · Will start non DKA insulin drip given patient will continue to need to be on high dose steroids in the setting of COVID    Elevated troponin  Assessment & Plan  · Likely secondary to covid infection with hypoxia  · Trended down  · No chest pain      VTE Pharmacologic Prophylaxis: VTE Score: 4 Moderate Risk (Score 3-4) - Pharmacological DVT Prophylaxis Ordered: enoxaparin (Lovenox)  Patient Centered Rounds: I performed bedside rounds with nursing staff today  Discussions with Specialists or Other Care Team Provider: nursing, CM    Education and Discussions with Family / Patient: Patient declined call to   Time Spent for Care: 20 minutes  More than 50% of total time spent on counseling and coordination of care as described above      Current Length of Stay: 2 day(s)  Current Patient Status: Inpatient   Certification Statement: The patient will continue to require additional inpatient hospital stay due to treatment of COVID  Discharge Plan: Anticipate discharge in 48-72 hrs to home  Code Status: Level 1 - Full Code    Subjective: The patient was seen and examined  The patient states he feels well  He continues to be on mid flow oxygen at 9 L  Objective:     Vitals:   Temp (24hrs), Av 2 °F (36 2 °C), Min:97 2 °F (36 2 °C), Max:97 2 °F (36 2 °C)    Temp:  [97 2 °F (36 2 °C)] 97 2 °F (36 2 °C)  HR:  [55-73] 73  Resp:  [20-21] 21  BP: (108-155)/(54-79) 155/79  SpO2:  [77 %-95 %] 92 %  Body mass index is 29 65 kg/m²  Input and Output Summary (last 24 hours): Intake/Output Summary (Last 24 hours) at 2/10/2022 1344  Last data filed at 2/10/2022 0001  Gross per 24 hour   Intake 250 ml   Output --   Net 250 ml       Physical Exam:   Physical Exam  Vitals and nursing note reviewed  Constitutional:       General: He is awake  Interventions: Nasal cannula in place  Cardiovascular:      Rate and Rhythm: Normal rate and regular rhythm  Pulmonary:      Effort: Pulmonary effort is normal       Breath sounds: Normal breath sounds  Abdominal:      Palpations: Abdomen is soft  Tenderness: There is no abdominal tenderness  Skin:     General: Skin is warm and dry  Neurological:      General: No focal deficit present  Mental Status: He is alert and oriented to person, place, and time  Psychiatric:         Attention and Perception: Attention normal          Mood and Affect: Mood normal          Speech: Speech normal          Behavior: Behavior is cooperative            Additional Data:     Labs:  Results from last 7 days   Lab Units 02/10/22  0613   WBC Thousand/uL 7 44   HEMOGLOBIN g/dL 13 5   HEMATOCRIT % 39 3   PLATELETS Thousands/uL 144*   NEUTROS PCT % 69   LYMPHS PCT % 26   MONOS PCT % 5   EOS PCT % 0     Results from last 7 days Lab Units 02/10/22  0613 02/09/22  0435 02/09/22  0435   SODIUM mmol/L 131*   < > 134*   POTASSIUM mmol/L 4 6   < > 4 3   CHLORIDE mmol/L 101   < > 104   CO2 mmol/L 22   < > 22   BUN mg/dL 26*   < > 14   CREATININE mg/dL 0 93   < > 0 82   ANION GAP mmol/L 8   < > 8   CALCIUM mg/dL 8 4   < > 7 1*   ALBUMIN g/dL  --   --  3 2*   TOTAL BILIRUBIN mg/dL  --   --  0 61   ALK PHOS U/L  --   --  79   ALT U/L  --   --  47   AST U/L  --   --  46*   GLUCOSE RANDOM mg/dL 403*   < > 319*    < > = values in this interval not displayed  Results from last 7 days   Lab Units 02/08/22 2021   INR  0 99     Results from last 7 days   Lab Units 02/10/22  1324 02/10/22  1200 02/10/22  0833 02/09/22  2217 02/09/22  1648 02/09/22  1224 02/09/22  0755 02/09/22  0112   POC GLUCOSE mg/dl >500* >500* 390* 362* 440* 499* 357* 349*         Results from last 7 days   Lab Units 02/10/22  0613 02/09/22  0435 02/08/22 2021   LACTIC ACID mmol/L  --   --  1 4   PROCALCITONIN ng/ml 0 19 0 23 0 30*       Lines/Drains:  Invasive Devices  Report    Peripheral Intravenous Line            Peripheral IV 02/08/22 Left Antecubital 1 day                      Imaging: No pertinent imaging reviewed  Recent Cultures (last 7 days):   Results from last 7 days   Lab Units 02/08/22 2021   BLOOD CULTURE  No Growth at 24 hrs  No Growth at 24 hrs         Last 24 Hours Medication List:   Current Facility-Administered Medications   Medication Dose Route Frequency Provider Last Rate    acetaminophen  650 mg Oral Q4H PRN Berta Alert, PA-C      atorvastatin  10 mg Oral Daily With [address] [place], [place]      benzonatate  100 mg Oral TID PRN Berta Alert, PA-C      dexamethasone  6 mg Intravenous Q24H [facility] [place]      enoxaparin  30 mg Subcutaneous Q12H [address] Monserrat Lamas [place], [place]      insulin regular (HumuLIN R,NovoLIN R) infusion  0 3-21 Units/hr Intravenous Titrated CORINA [place] Units/hr (02/10/22 1330)    lisinopril  5 mg Oral Daily Roland Rabago PA-C      ondansetron  4 mg Intravenous Q6H PRN Roland Rabago PA-C      remdesivir  100 mg Intravenous Q24H [place] Stopped (02/10/22 0001)        Today, Patient Was Seen By: Emily Forrester PA-C    **Please Note: This note may have been constructed using a voice recognition system  **

## 2022-02-10 NOTE — ASSESSMENT & PLAN NOTE
· Patient reported pulse ox 74% at home  · Placed on 4L NC in ED, was on 7 L midflow on 2/9, currently on 9 L midflow today  · Moderate pathway  · Remdesivir day 3/5  · Decadron day 3/10  · Will discuss with the patient regarding starting baricitinib  · Serial labs

## 2022-02-10 NOTE — ASSESSMENT & PLAN NOTE
Lab Results   Component Value Date    HGBA1C 7 7 (H) 12/06/2021       Recent Labs     02/09/22  1648 02/09/22  2217 02/10/22  0833 02/10/22  1200   POCGLU 440* 362* 390* >500*       Blood Sugar Average: Last 72 hrs:  · (P) 399 5   · hold oral medication  · Hyperglycemia is likely steroid induced   · Blood glucose continue to be significantly elevated despite initiating lantus, meal coverage with humalog and sliding scale      · Will start non DKA insulin drip given patient will continue to need to be on high dose steroids in the setting of COVID

## 2022-02-11 LAB
ANION GAP SERPL CALCULATED.3IONS-SCNC: 8 MMOL/L (ref 4–13)
BUN SERPL-MCNC: 24 MG/DL (ref 5–25)
CALCIUM SERPL-MCNC: 8.5 MG/DL (ref 8.4–10.2)
CHLORIDE SERPL-SCNC: 103 MMOL/L (ref 96–108)
CO2 SERPL-SCNC: 23 MMOL/L (ref 21–32)
CREAT SERPL-MCNC: 0.77 MG/DL (ref 0.6–1.3)
ERYTHROCYTE [DISTWIDTH] IN BLOOD BY AUTOMATED COUNT: 12.6 % (ref 11.6–15.1)
GFR SERPL CREATININE-BSD FRML MDRD: 95 ML/MIN/1.73SQ M
GLUCOSE SERPL-MCNC: 165 MG/DL (ref 65–140)
GLUCOSE SERPL-MCNC: 178 MG/DL (ref 65–140)
GLUCOSE SERPL-MCNC: 179 MG/DL (ref 65–140)
GLUCOSE SERPL-MCNC: 180 MG/DL (ref 65–140)
GLUCOSE SERPL-MCNC: 193 MG/DL (ref 65–140)
GLUCOSE SERPL-MCNC: 200 MG/DL (ref 65–140)
GLUCOSE SERPL-MCNC: 247 MG/DL (ref 65–140)
GLUCOSE SERPL-MCNC: 260 MG/DL (ref 65–140)
GLUCOSE SERPL-MCNC: 265 MG/DL (ref 65–140)
HCT VFR BLD AUTO: 38.7 % (ref 36.5–49.3)
HGB BLD-MCNC: 13.6 G/DL (ref 12–17)
MCH RBC QN AUTO: 33.2 PG (ref 26.8–34.3)
MCHC RBC AUTO-ENTMCNC: 35.1 G/DL (ref 31.4–37.4)
MCV RBC AUTO: 94 FL (ref 82–98)
PLATELET # BLD AUTO: 185 THOUSANDS/UL (ref 149–390)
PMV BLD AUTO: 10.8 FL (ref 8.9–12.7)
POTASSIUM SERPL-SCNC: 4.1 MMOL/L (ref 3.5–5.3)
PROCALCITONIN SERPL-MCNC: 0.16 NG/ML
RBC # BLD AUTO: 4.1 MILLION/UL (ref 3.88–5.62)
SODIUM SERPL-SCNC: 134 MMOL/L (ref 135–147)
WBC # BLD AUTO: 10.29 THOUSAND/UL (ref 4.31–10.16)

## 2022-02-11 PROCEDURE — 94760 N-INVAS EAR/PLS OXIMETRY 1: CPT

## 2022-02-11 PROCEDURE — 82948 REAGENT STRIP/BLOOD GLUCOSE: CPT

## 2022-02-11 PROCEDURE — 85027 COMPLETE CBC AUTOMATED: CPT | Performed by: PHYSICIAN ASSISTANT

## 2022-02-11 PROCEDURE — 99232 SBSQ HOSP IP/OBS MODERATE 35: CPT | Performed by: PHYSICIAN ASSISTANT

## 2022-02-11 PROCEDURE — 80048 BASIC METABOLIC PNL TOTAL CA: CPT | Performed by: PHYSICIAN ASSISTANT

## 2022-02-11 PROCEDURE — 84145 PROCALCITONIN (PCT): CPT | Performed by: PHYSICIAN ASSISTANT

## 2022-02-11 RX ADMIN — ATORVASTATIN CALCIUM 10 MG: 10 TABLET, FILM COATED ORAL at 15:55

## 2022-02-11 RX ADMIN — REMDESIVIR 100 MG: 100 INJECTION, POWDER, LYOPHILIZED, FOR SOLUTION INTRAVENOUS at 21:50

## 2022-02-11 RX ADMIN — ENOXAPARIN SODIUM 30 MG: 100 INJECTION SUBCUTANEOUS at 21:50

## 2022-02-11 RX ADMIN — SODIUM CHLORIDE 8 UNITS/HR: 9 INJECTION, SOLUTION INTRAVENOUS at 01:24

## 2022-02-11 RX ADMIN — ZINC SULFATE 220 MG (50 MG) CAPSULE 220 MG: CAPSULE at 09:24

## 2022-02-11 RX ADMIN — CHOLECALCIFEROL TAB 10 MCG (400 UNIT) 400 UNITS: 10 TAB at 09:24

## 2022-02-11 RX ADMIN — BENZONATATE 100 MG: 100 CAPSULE ORAL at 15:55

## 2022-02-11 RX ADMIN — OMEGA-3 FATTY ACIDS CAP 1000 MG 1000 MG: 1000 CAP at 09:27

## 2022-02-11 RX ADMIN — OXYCODONE HYDROCHLORIDE AND ACETAMINOPHEN 1000 MG: 500 TABLET ORAL at 09:24

## 2022-02-11 RX ADMIN — BENZONATATE 100 MG: 100 CAPSULE ORAL at 22:26

## 2022-02-11 RX ADMIN — LISINOPRIL 5 MG: 5 TABLET ORAL at 09:24

## 2022-02-11 RX ADMIN — SODIUM CHLORIDE 15 UNITS/HR: 9 INJECTION, SOLUTION INTRAVENOUS at 22:12

## 2022-02-11 RX ADMIN — DEXAMETHASONE SODIUM PHOSPHATE 6 MG: 4 INJECTION, SOLUTION INTRA-ARTICULAR; INTRALESIONAL; INTRAMUSCULAR; INTRAVENOUS; SOFT TISSUE at 21:50

## 2022-02-11 RX ADMIN — ENOXAPARIN SODIUM 30 MG: 100 INJECTION SUBCUTANEOUS at 09:24

## 2022-02-11 RX ADMIN — SODIUM CHLORIDE 15 UNITS/HR: 9 INJECTION, SOLUTION INTRAVENOUS at 15:23

## 2022-02-11 RX ADMIN — OXYCODONE HYDROCHLORIDE AND ACETAMINOPHEN 1000 MG: 500 TABLET ORAL at 18:59

## 2022-02-11 RX ADMIN — Medication 30 MG: at 09:24

## 2022-02-11 RX ADMIN — THIAMINE HCL TAB 100 MG 100 MG: 100 TAB at 09:24

## 2022-02-11 NOTE — PROGRESS NOTES
Patient transferred to ICU 9 via wheelchair  Report given to Chelsea Rocha RN at bedside  No questions or concerns at this time

## 2022-02-11 NOTE — RESPIRATORY THERAPY NOTE
02/11/22 0743   Non-Invasive Information   O2 Interface Device MFNC prongs   Non-Invasive Ventilation Mode MFNC (Mid flow)   SpO2 (!) 86 %   $ Pulse Oximetry Spot Check Charge Completed   Non-Invasive Settings   Flow (lpm) 10

## 2022-02-11 NOTE — NURSING NOTE
Awake and oriented x4  02 is maintained 10 liters mid flow   Denies sob  02 sat is 89-91%  Lungs clear and decreased  Denies cough  Abdomen soft lbm 2/9/22  Denies pain  No edema  Insulin drip maintained and and absorbing 10 units /hr  Call bell near

## 2022-02-11 NOTE — CASE MANAGEMENT
Case Management Assessment    Patient name Sunita Castelan  Location ICU 09/ICU 45-65 MRN [id number]  : 1955 Date 2022       Current Admission Date: 2022  Current Admission Diagnosis:Acute respiratory failure due to [address])   Patient Active Problem List    Diagnosis Date Noted    Acute respiratory failure due to [address]) 2022    Elevated troponin 2022    Type 2 diabetes mellitus ([facility] [address] ) 2022    Primary hypertension 2022      LOS (days): 3  Geometric Mean LOS (GMLOS) (days): 5 40  Days to GMLOS:2 6     OBJECTIVE:    Risk of Unplanned Readmission Score: 10     Current admission status: Inpatient  Referral Reason:  (Discharge planning)    Preferred Pharmacy:    [address] [address]  Phone: [phone] Fax: [phone], [facility] [address]  Phone: [phone] Fax: [phone]    Primary Care Provider: Ana Rowe MD    Primary Insurance: [address] [place] [address]  Secondary Insurance: MEDICARE    ASSESSMENT:  Active Health Care Agents    There are no active Health Care Agents on file         Advance Directives  Does patient have a [address]?: No  Was patient offered paperwork?: Yes (Wife states to ask spouse)  Does patient currently have a Health Care decision maker?: Yes, please see Health Care Proxy section  Does patient have Advance Directives?: No  Was patient offered paperwork?: Yes (Wife states to ask spouse)  Primary Contact: Raegan wife  Readmission Root Cause  30 Day Readmission: No    Patient Information  Admitted from[de-identified] Home  Mental Status: Alert  During Assessment patient was accompanied by: Not accompanied during assessment  Assessment information provided by[de-identified] Spouse  Primary Caregiver: Self  Support Systems: Spouse/significant other  [place] of Residence: [address] [place] [address] do you live in?: [facility]  entry access options   Select all that apply : Stairs  Number of steps to enter home : 6  Do the steps have railings?: Yes  Type of Current Residence: 2 story home  Upon entering residence, is there a bedroom on the main floor (no further steps)?: No  A bedroom is located on the following floor levels of residence (select all that apply):: 2nd Floor  Upon entering residence, is there a bathroom on the main floor (no further steps)?: No  Indicate which floors of current residence have a bathroom (select all the apply):: 2nd Floor  Number of steps to 2nd floor from main floor: One Flight  In the last 12 months, was there a time when you were not able to pay the mortgage or rent on time?: No  In the last 12 months, how many places have you lived?: 1  In the last 12 months, was there a time when you did not have a steady place to sleep or slept in a shelter (including now)?: No  Homeless/housing insecurity resource given?: No  Living Arrangements: Lives w/ Spouse/significant other  Is patient a ?: No    Activities of Daily Living Prior to Admission  Functional Status: Independent  Completes ADLs independently?: Yes  Ambulates independently?: Yes  Does patient use assisted devices?: No  Does patient currently own DME?: No  Does patient have a history of Outpatient Therapy (PT/OT)?: No  Does the patient have a history of Short-Term Rehab?: No  Does patient have a history of HHC?: No  Does patient currently have [facility]?: No    Patient Information Continued  Income Source: Employed  Does patient have prescription coverage?: Yes  Within the past 12 months, you worried that your food would run out before you got the money to buy more : Never true  Within the past 12 months, the food you bought just didnt last and you didnt have money to get more : Never true  Food insecurity resource given?: N/A  Does patient receive dialysis treatments?: No  Does patient have a history of substance abuse?: No  Does patient have a history of Mental Health Diagnosis?: No    PHQ 2/9 Screening   Reviewed PHQ 2/9 Depression Screening Score?: No    Means of Transportation  Means of Transport to Appts[de-identified] Drives Self  In the past 12 months, has lack of transportation kept you from medical appointments or from getting medications?: No  In the past 12 months, has lack of transportation kept you from meetings, work, or from getting things needed for daily living?: No  Was application for public transport provided?: N/A

## 2022-02-11 NOTE — RESPIRATORY THERAPY NOTE
02/11/22 1513   Non-Invasive Information   O2 Interface Device MFNC prongs   Non-Invasive Ventilation Mode MFNC (Mid flow)   SpO2 (!) 88 %   $ Pulse Oximetry Spot Check Charge Completed   Non-Invasive Settings   Flow (lpm) 10

## 2022-02-12 LAB
ANION GAP SERPL CALCULATED.3IONS-SCNC: 8 MMOL/L (ref 4–13)
BUN SERPL-MCNC: 20 MG/DL (ref 5–25)
CALCIUM SERPL-MCNC: 8.6 MG/DL (ref 8.4–10.2)
CHLORIDE SERPL-SCNC: 103 MMOL/L (ref 96–108)
CO2 SERPL-SCNC: 22 MMOL/L (ref 21–32)
CREAT SERPL-MCNC: 0.85 MG/DL (ref 0.6–1.3)
ERYTHROCYTE [DISTWIDTH] IN BLOOD BY AUTOMATED COUNT: 12.7 % (ref 11.6–15.1)
GFR SERPL CREATININE-BSD FRML MDRD: 91 ML/MIN/1.73SQ M
GLUCOSE SERPL-MCNC: 132 MG/DL (ref 65–140)
GLUCOSE SERPL-MCNC: 162 MG/DL (ref 65–140)
GLUCOSE SERPL-MCNC: 164 MG/DL (ref 65–140)
GLUCOSE SERPL-MCNC: 201 MG/DL (ref 65–140)
GLUCOSE SERPL-MCNC: 206 MG/DL (ref 65–140)
GLUCOSE SERPL-MCNC: 207 MG/DL (ref 65–140)
GLUCOSE SERPL-MCNC: 221 MG/DL (ref 65–140)
GLUCOSE SERPL-MCNC: 225 MG/DL (ref 65–140)
GLUCOSE SERPL-MCNC: 233 MG/DL (ref 65–140)
GLUCOSE SERPL-MCNC: 242 MG/DL (ref 65–140)
GLUCOSE SERPL-MCNC: 248 MG/DL (ref 65–140)
GLUCOSE SERPL-MCNC: 271 MG/DL (ref 65–140)
GLUCOSE SERPL-MCNC: 280 MG/DL (ref 65–140)
GLUCOSE SERPL-MCNC: 338 MG/DL (ref 65–140)
HCT VFR BLD AUTO: 39.1 % (ref 36.5–49.3)
HGB BLD-MCNC: 13.9 G/DL (ref 12–17)
MCH RBC QN AUTO: 33.7 PG (ref 26.8–34.3)
MCHC RBC AUTO-ENTMCNC: 35.5 G/DL (ref 31.4–37.4)
MCV RBC AUTO: 95 FL (ref 82–98)
PLATELET # BLD AUTO: 186 THOUSANDS/UL (ref 149–390)
PMV BLD AUTO: 11.2 FL (ref 8.9–12.7)
POTASSIUM SERPL-SCNC: 4.2 MMOL/L (ref 3.5–5.3)
RBC # BLD AUTO: 4.12 MILLION/UL (ref 3.88–5.62)
SODIUM SERPL-SCNC: 133 MMOL/L (ref 135–147)
WBC # BLD AUTO: 11.53 THOUSAND/UL (ref 4.31–10.16)

## 2022-02-12 PROCEDURE — 82948 REAGENT STRIP/BLOOD GLUCOSE: CPT

## 2022-02-12 PROCEDURE — 94760 N-INVAS EAR/PLS OXIMETRY 1: CPT

## 2022-02-12 PROCEDURE — 99232 SBSQ HOSP IP/OBS MODERATE 35: CPT | Performed by: PHYSICIAN ASSISTANT

## 2022-02-12 PROCEDURE — 80048 BASIC METABOLIC PNL TOTAL CA: CPT | Performed by: PHYSICIAN ASSISTANT

## 2022-02-12 PROCEDURE — 85027 COMPLETE CBC AUTOMATED: CPT | Performed by: PHYSICIAN ASSISTANT

## 2022-02-12 RX ORDER — PANTOPRAZOLE SODIUM 40 MG/1
40 TABLET, DELAYED RELEASE ORAL
Status: DISCONTINUED | OUTPATIENT
Start: 2022-02-12 | End: 2022-02-17 | Stop reason: HOSPADM

## 2022-02-12 RX ORDER — CALCIUM CARBONATE 200(500)MG
1000 TABLET,CHEWABLE ORAL 2 TIMES DAILY PRN
Status: DISCONTINUED | OUTPATIENT
Start: 2022-02-12 | End: 2022-02-17 | Stop reason: HOSPADM

## 2022-02-12 RX ADMIN — DEXAMETHASONE SODIUM PHOSPHATE 6 MG: 4 INJECTION, SOLUTION INTRA-ARTICULAR; INTRALESIONAL; INTRAMUSCULAR; INTRAVENOUS; SOFT TISSUE at 21:09

## 2022-02-12 RX ADMIN — ENOXAPARIN SODIUM 30 MG: 100 INJECTION SUBCUTANEOUS at 21:09

## 2022-02-12 RX ADMIN — ATORVASTATIN CALCIUM 10 MG: 10 TABLET, FILM COATED ORAL at 16:41

## 2022-02-12 RX ADMIN — OXYCODONE HYDROCHLORIDE AND ACETAMINOPHEN 1000 MG: 500 TABLET ORAL at 17:56

## 2022-02-12 RX ADMIN — THIAMINE HCL TAB 100 MG 100 MG: 100 TAB at 08:40

## 2022-02-12 RX ADMIN — CALCIUM CARBONATE (ANTACID) CHEW TAB 500 MG 1000 MG: 500 CHEW TAB at 21:09

## 2022-02-12 RX ADMIN — SODIUM CHLORIDE 14 UNITS/HR: 9 INJECTION, SOLUTION INTRAVENOUS at 10:14

## 2022-02-12 RX ADMIN — PANTOPRAZOLE SODIUM 40 MG: 40 TABLET, DELAYED RELEASE ORAL at 21:09

## 2022-02-12 RX ADMIN — OXYCODONE HYDROCHLORIDE AND ACETAMINOPHEN 1000 MG: 500 TABLET ORAL at 08:41

## 2022-02-12 RX ADMIN — REMDESIVIR 100 MG: 100 INJECTION, POWDER, LYOPHILIZED, FOR SOLUTION INTRAVENOUS at 22:20

## 2022-02-12 RX ADMIN — LISINOPRIL 5 MG: 5 TABLET ORAL at 08:40

## 2022-02-12 RX ADMIN — Medication 30 MG: at 08:41

## 2022-02-12 RX ADMIN — ZINC SULFATE 220 MG (50 MG) CAPSULE 220 MG: CAPSULE at 08:40

## 2022-02-12 RX ADMIN — CHOLECALCIFEROL TAB 10 MCG (400 UNIT) 400 UNITS: 10 TAB at 08:41

## 2022-02-12 RX ADMIN — SODIUM CHLORIDE 18 UNITS/HR: 9 INJECTION, SOLUTION INTRAVENOUS at 14:23

## 2022-02-12 RX ADMIN — ENOXAPARIN SODIUM 30 MG: 100 INJECTION SUBCUTANEOUS at 08:41

## 2022-02-12 RX ADMIN — OMEGA-3 FATTY ACIDS CAP 1000 MG 1000 MG: 1000 CAP at 08:40

## 2022-02-12 NOTE — PLAN OF CARE
Pt  Doing okay but oxygen requirements are up tonight  Pt  Educated about fall precautions  Pt  Wears shoes when ambulating/OOB  Pt  Reminded to call for help when needed as he is connected to a lot of equipment  Pt  States he will call if needed  He is oriented and steady on his feet  Continue care and monitoring

## 2022-02-12 NOTE — PLAN OF CARE
Patient is awake, alert and oriented x4  No c/o discomfort  Complex physical assessment as noted see chart for details  Call bell in reach  Verbalizing needs

## 2022-02-12 NOTE — PROGRESS NOTES
[address]  Progress Note - Koki Vance 1955, 73 y o  male MRN: [id number]  Unit/Bed#: ICU 09-01 Encounter: [id number]  Primary Care Provider: Asya Valente MD   Date and time admitted to hospital: 2/8/2022  8:41 PM    * Acute respiratory failure due to COVID-19 [facility])  Assessment & Plan  · Patient reported pulse ox 74% at home  · Placed on 4L NC in ED, was on 7 L midflow on 2/9, currently on 10 L midflow today  · Moderate pathway  · Remdesivir day 4/5  · Decadron day 4/10  · I discuss with the patient regarding starting baricitinib, patient declined medication at this time since it's not FDA approved to treat COVID  · Serial labs    Primary hypertension  Assessment & Plan  · Continue lisinopril  · Monitor blood pressure trends    Type 2 diabetes mellitus [facility])  Assessment & Plan  Lab Results   Component Value Date    HGBA1C 7 7 (H) 12/06/2021       Recent Labs     02/11/22  0930 02/11/22  1135 02/11/22  1328 02/11/22  1544   POCGLU 260* 265* 247* 193*       Blood Sugar Average: Last 72 hrs:  · (P) 306 55   · hold oral medication  · Hyperglycemia is likely steroid induced   · Blood glucose continue to be significantly elevated despite initiating lantus, meal coverage with humalog and sliding scale  · Continue non DKA insulin drip given patient will continue to need to be on high dose steroids in the setting of COVID    Elevated troponin  Assessment & Plan  · Likely secondary to covid infection with hypoxia  · Trended down  · No chest pain        VTE Pharmacologic Prophylaxis: VTE Score: 4 Moderate Risk (Score 3-4) - Pharmacological DVT Prophylaxis Ordered: enoxaparin (Lovenox)  Patient Centered Rounds: I performed bedside rounds with nursing staff today  Discussions with Specialists or Other Care Team Provider: nursing, CM    Education and Discussions with Family / Patient: Patient declined call to   Time Spent for Care: 20 minutes   More than 50% of total time spent on counseling and coordination of care as described above  Current Length of Stay: 3 day(s)  Current Patient Status: Inpatient   Certification Statement: The patient will continue to require additional inpatient hospital stay due to treatment of COVID  Discharge Plan: Anticipate discharge in >72 hrs to home  Code Status: Level 1 - Full Code    Subjective: The patient was seen and examined  The patient reports dyspnea with exertion otherwise feeling well  Objective:     Vitals:   Temp (24hrs), Av 9 °F (36 6 °C), Min:97 6 °F (36 4 °C), Max:98 1 °F (36 7 °C)    Temp:  [97 6 °F (36 4 °C)-98 1 °F (36 7 °C)] 97 6 °F (36 4 °C)  HR:  [59-83] 64  Resp:  [18-27] 22  BP: (120-151)/(54-99) 132/67  SpO2:  [85 %-91 %] 90 %  Body mass index is 29 65 kg/m²  Input and Output Summary (last 24 hours): Intake/Output Summary (Last 24 hours) at 2022  Last data filed at 2022 1300  Gross per 24 hour   Intake 585 ml   Output 1129 ml   Net -544 ml       Physical Exam:   Physical Exam  Vitals and nursing note reviewed  Constitutional:       General: He is awake  Appearance: Normal appearance  Interventions: Nasal cannula in place  Cardiovascular:      Rate and Rhythm: Normal rate and regular rhythm  Pulmonary:      Effort: Pulmonary effort is normal       Breath sounds: Normal breath sounds  Abdominal:      Palpations: Abdomen is soft  Tenderness: There is no abdominal tenderness  Skin:     General: Skin is warm and dry  Neurological:      General: No focal deficit present  Mental Status: He is alert and oriented to person, place, and time  Psychiatric:         Attention and Perception: Attention normal          Mood and Affect: Mood normal          Speech: Speech normal          Behavior: Behavior is cooperative          Additional Data:     Labs:  Results from last 7 days   Lab Units 22  0610 02/10/22  0613 02/10/22  0613   WBC Thousand/uL 10 29*   < > 7 44   HEMOGLOBIN g/dL 13 6   < > 13 5   HEMATOCRIT % 38 7   < > 39 3   PLATELETS Thousands/uL 185   < > 144*   NEUTROS PCT %  --   --  69   LYMPHS PCT %  --   --  26   MONOS PCT %  --   --  5   EOS PCT %  --   --  0    < > = values in this interval not displayed  Results from last 7 days   Lab Units 02/11/22  0610 02/10/22  0613 02/09/22  0435   SODIUM mmol/L 134*   < > 134*   POTASSIUM mmol/L 4 1   < > 4 3   CHLORIDE mmol/L 103   < > 104   CO2 mmol/L 23   < > 22   BUN mg/dL 24   < > 14   CREATININE mg/dL 0 77   < > 0 82   ANION GAP mmol/L 8   < > 8   CALCIUM mg/dL 8 5   < > 7 1*   ALBUMIN g/dL  --   --  3 2*   TOTAL BILIRUBIN mg/dL  --   --  0 61   ALK PHOS U/L  --   --  79   ALT U/L  --   --  47   AST U/L  --   --  46*   GLUCOSE RANDOM mg/dL 179*   < > 319*    < > = values in this interval not displayed  Results from last 7 days   Lab Units 02/08/22 2021   INR  0 99     Results from last 7 days   Lab Units 02/11/22  1544 02/11/22  1328 02/11/22  1135 02/11/22  0930 02/11/22  0738 02/11/22  0537 02/11/22  0329 02/11/22  0131 02/10/22  2338 02/10/22  2127 02/10/22  1932 02/10/22  1737   POC GLUCOSE mg/dl 193* 247* 265* 260* 180* 200* 178* 165* 228* 254* 366* 371*         Results from last 7 days   Lab Units 02/11/22  0616 02/10/22  0613 02/09/22  0435 02/08/22 2021   LACTIC ACID mmol/L  --   --   --  1 4   PROCALCITONIN ng/ml 0 16 0 19 0 23 0 30*       Lines/Drains:  Invasive Devices  Report    Peripheral Intravenous Line            Peripheral IV 02/08/22 Left Antecubital 2 days                      Imaging: No pertinent imaging reviewed  Recent Cultures (last 7 days):   Results from last 7 days   Lab Units 02/08/22 2021   BLOOD CULTURE  No Growth at 48 hrs  No Growth at 48 hrs         Last 24 Hours Medication List:   Current Facility-Administered Medications   Medication Dose Route Frequency Provider Last Rate    acetaminophen  650 mg Oral Q4H PRN Felecia Monique PA-C      ascorbic acid 1,000 mg Oral BID [facility] [place]      atorvastatin  10 mg Oral Daily With [address] [place], [place]      benzonatate  100 mg Oral TID PRN Shilpa Chaudhry PA-C      cholecalciferol  400 Units Oral Daily [place], [place]      co-enzyme Q-10  30 mg Oral Daily [place], [place]      dexamethasone  6 mg Intravenous Q24H [place], [place]      enoxaparin  30 mg Subcutaneous Q12H [address] Monserr[address] Cydney [place], [place]      fish oil  1,000 mg Oral Daily [place]      insulin regular (HumuLIN R,NovoLIN R) infusion  0 3-21 Units/hr Intravenous Titrated Shilpa Chaudhry PA-C 12 Units/hr (02/11/22 1910)    lisinopril  5 mg Oral Daily [facility]      ondansetron  4 mg Intravenous Q6H PRN Shilpa Chaudhry PA-C      remdesivir  100 mg Intravenous Q24H Shilpa Chaudhry PA-C 0 mg (02/10/22 0001)    thiamine  100 mg Oral Daily Katherine Stewart      zinc sulfate  220 mg Oral Daily Shilpa Chaudhry PA-C          Today, Patient Was Seen By: Yani Luna PA-C    **Please Note: This note may have been constructed using a voice recognition system  **

## 2022-02-12 NOTE — PROGRESS NOTES
Pt  Up walking around at the start of the shift  SPO2 around 88-90% on 10L NC  As the night has gone on, pt  Is resting and his SPO2 is down  Oxygen titrated up to get to goal SPO2 of 88%  Pt  Currently laying on his left side on 15 L with SPO@ of 83%  Pt  States he is unable to prone  Pt  Is doing the IS  He is able to hold conversation without getting SOB  He has a minimal cough  His HR is in the 60s and RR 14-28  Pt  Appears in no distress and states he doesn't feel SOB  Will notify PA and Respiratory

## 2022-02-12 NOTE — ASSESSMENT & PLAN NOTE
Lab Results   Component Value Date    HGBA1C 7 7 (H) 12/06/2021       Recent Labs     02/11/22  0930 02/11/22  1135 02/11/22  1328 02/11/22  1544   POCGLU 260* 265* 247* 193*       Blood Sugar Average: Last 72 hrs:  · (P) 306 55   · hold oral medication  · Hyperglycemia is likely steroid induced   · Blood glucose continue to be significantly elevated despite initiating lantus, meal coverage with humalog and sliding scale      · Continue non DKA insulin drip given patient will continue to need to be on high dose steroids in the setting of COVID

## 2022-02-12 NOTE — PROGRESS NOTES
[facility] 46  Progress Note - Lolly Calhoun 1955, 73 y o  male MRN: [id number]  Unit/Bed#: ICU 09-01 Encounter: [id number]  Primary Care Provider: Alphonso Strickland MD   Date and time admitted to hospital: 2/8/2022  8:41 PM    * Acute respiratory failure due to COVID-19 [facility])  Assessment & Plan  · Patient reported pulse ox 74% at home  · Placed on 4L NC in ED, was on 7 L midflow on 2/9, currently on 10 L -> 11 L midflow today  · Moderate pathway  · Remdesivir day 5/5  · Decadron day 5/10  · I discuss with the patient regarding starting baricitinib, patient declined medication at this time since it's not FDA approved to treat COVID  · Serial labs    Primary hypertension  Assessment & Plan  · Continue lisinopril  · Monitor blood pressure trends    Type 2 diabetes mellitus [facility])  Assessment & Plan  Lab Results   Component Value Date    HGBA1C 7 7 (H) 12/06/2021       Recent Labs     02/12/22  0636 02/12/22  1013 02/12/22  1122 02/12/22  1422   POCGLU 164* 233* 242* 338*       Blood Sugar Average: Last 72 hrs:  · (P) 266 [id number]   · hold oral medication  · Hyperglycemia is likely steroid induced   · Blood glucose continue to be significantly elevated despite initiating lantus, meal coverage with humalog and sliding scale  · Continue non DKA insulin drip given patient will continues to need to be on high dose steroids in the setting of COVID    Elevated troponin  Assessment & Plan  · Likely secondary to covid infection with hypoxia  · Trended down  · No chest pain        VTE Pharmacologic Prophylaxis: VTE Score: 4 Moderate Risk (Score 3-4) - Pharmacological DVT Prophylaxis Ordered: enoxaparin (Lovenox)  Patient Centered Rounds: I performed bedside rounds with nursing staff today  Discussions with Specialists or Other Care Team Provider: nursing, CM    Education and Discussions with Family / Patient: Updated  (wife) via phone  Time Spent for Care: 20 minutes  More than 50% of total time spent on counseling and coordination of care as described above  Current Length of Stay: 4 day(s)  Current Patient Status: Inpatient   Certification Statement: The patient will continue to require additional inpatient hospital stay due to treatment for COVID  Discharge Plan: Anticipate discharge in 48-72 hrs to home  Code Status: Level 1 - Full Code    Subjective: The patient was seen and examined  The patient denies any complaints  He currently is on 11 L midflow  He denies shortness of breath  Objective:     Vitals:   Temp (24hrs), Av 7 °F (36 5 °C), Min:97 °F (36 1 °C), Max:98 4 °F (36 9 °C)    Temp:  [97 °F (36 1 °C)-98 4 °F (36 9 °C)] 98 2 °F (36 8 °C)  HR:  [58-83] 65  Resp:  [13-37] 31  BP: (109-151)/(56-77) 129/62  SpO2:  [80 %-96 %] 92 %  Body mass index is 28 93 kg/m²  Input and Output Summary (last 24 hours): Intake/Output Summary (Last 24 hours) at 2022 1520  Last data filed at 2022 1423  Gross per 24 hour   Intake 1718 47 ml   Output 2000 ml   Net -281 53 ml       Physical Exam:   Physical Exam  Vitals and nursing note reviewed  Constitutional:       General: He is awake  Appearance: Normal appearance  Interventions: Nasal cannula in place  Cardiovascular:      Rate and Rhythm: Normal rate and regular rhythm  Pulmonary:      Effort: Pulmonary effort is normal       Breath sounds: Normal breath sounds  No wheezing, rhonchi or rales  Abdominal:      Palpations: Abdomen is soft  Tenderness: There is no abdominal tenderness  Skin:     General: Skin is warm and dry  Neurological:      General: No focal deficit present  Mental Status: He is alert and oriented to person, place, and time  Psychiatric:         Attention and Perception: Attention normal          Mood and Affect: Mood normal          Speech: Speech normal          Behavior: Behavior is cooperative            Additional Data:     Labs:  Results from last 7 days   Lab Units 02/12/22  0433 02/11/22  0610 02/10/22  0613   WBC Thousand/uL 11 53*   < > 7 44   HEMOGLOBIN g/dL 13 9   < > 13 5   HEMATOCRIT % 39 1   < > 39 3   PLATELETS Thousands/uL 186   < > 144*   NEUTROS PCT %  --   --  69   LYMPHS PCT %  --   --  26   MONOS PCT %  --   --  5   EOS PCT %  --   --  0    < > = values in this interval not displayed  Results from last 7 days   Lab Units 02/12/22  0433 02/10/22  0613 02/09/22  0435   SODIUM mmol/L 133*   < > 134*   POTASSIUM mmol/L 4 2   < > 4 3   CHLORIDE mmol/L 103   < > 104   CO2 mmol/L 22   < > 22   BUN mg/dL 20   < > 14   CREATININE mg/dL 0 85   < > 0 82   ANION GAP mmol/L 8   < > 8   CALCIUM mg/dL 8 6   < > 7 1*   ALBUMIN g/dL  --   --  3 2*   TOTAL BILIRUBIN mg/dL  --   --  0 61   ALK PHOS U/L  --   --  79   ALT U/L  --   --  47   AST U/L  --   --  46*   GLUCOSE RANDOM mg/dL 206*   < > 319*    < > = values in this interval not displayed  Results from last 7 days   Lab Units 02/08/22 2021   INR  0 99     Results from last 7 days   Lab Units 02/12/22  1422 02/12/22  1122 02/12/22  1013 02/12/22  0636 02/12/22  0427 02/12/22  0210 02/12/22  0038 02/11/22  2230 02/11/22  2057 02/11/22  1908 02/11/22  1544 02/11/22  1328   POC GLUCOSE mg/dl 338* 242* 233* 164* 201* 162* 132 221* 248* 207* 193* 247*         Results from last 7 days   Lab Units 02/11/22  0616 02/10/22  0613 02/09/22  0435 02/08/22 2021   LACTIC ACID mmol/L  --   --   --  1 4   PROCALCITONIN ng/ml 0 16 0 19 0 23 0 30*       Lines/Drains:  Invasive Devices  Report    Peripheral Intravenous Line            Peripheral IV 02/08/22 Left Antecubital 3 days    Peripheral IV 02/11/22 Distal;Right;Upper;Ventral (anterior) Arm <1 day                      Imaging: No pertinent imaging reviewed  Recent Cultures (last 7 days):   Results from last 7 days   Lab Units 02/08/22 2021   BLOOD CULTURE  No Growth at 72 hrs  No Growth at 72 hrs         Last 24 Hours Medication List:   Current Facility-Administered Medications   Medication Dose Route Frequency Provider Last Rate    acetaminophen  650 mg Oral Q4H PRN Eual Shoe, PA-C      ascorbic acid  1,000 mg Oral BID Eual Shoe, PA-C      atorvastatin  10 mg Oral Daily With [address] [place], [place]      benzonatate  100 mg Oral TID PRN Eual Shoe, PA-C      cholecalciferol  400 Units Oral Daily [place], [place]      co-enzyme Q-10  30 mg Oral Daily [place], [place]      dexamethasone  6 mg Intravenous Q24H [place], [place]      enoxaparin  30 mg Subcutaneous Q12H [address] [place]      fish oil  1,000 mg Oral Daily [place], [place]      insulin regular (HumuLIN R,NovoLIN R) infusion  0 3-21 Units/hr Intravenous Titrated Eual Steven, PA-C 18 Units/hr (02/12/22 1423)    lisinopril  5 mg Oral Daily Eual Shoe, PA-C      ondansetron  4 mg Intravenous Q6H PRN Eual Shoe, PA-C      remdesivir  100 mg Intravenous Q24H Eual Shoe, PA-C 0 mg (02/10/22 0001)    thiamine  100 mg Oral Daily Eual Shoe, PA-C      zinc sulfate  220 mg Oral Daily Eual Shoe, PA-C          Today, Patient Was Seen By: Jolynn Yeboah PA-C    **Please Note: This note may have been constructed using a voice recognition system  **

## 2022-02-12 NOTE — ASSESSMENT & PLAN NOTE
Lab Results   Component Value Date    HGBA1C 7 7 (H) 12/06/2021       Recent Labs     02/12/22  0636 02/12/22  1013 02/12/22  1122 02/12/22  1422   POCGLU 164* 233* 242* 338*       Blood Sugar Average: Last 72 hrs:  · (P) 992 [id number]   · hold oral medication  · Hyperglycemia is likely steroid induced   · Blood glucose continue to be significantly elevated despite initiating lantus, meal coverage with humalog and sliding scale      · Continue non DKA insulin drip given patient will continues to need to be on high dose steroids in the setting of COVID

## 2022-02-12 NOTE — ASSESSMENT & PLAN NOTE
· Patient reported pulse ox 74% at home  · Placed on 4L NC in ED, was on 7 L midflow on 2/9, currently on 10 L -> 11 L midflow today  · Moderate pathway  · Remdesivir day 5/5  · Decadron day 5/10  · I discuss with the patient regarding starting baricitinib, patient declined medication at this time since it's not FDA approved to treat COVID     · Serial labs

## 2022-02-12 NOTE — ASSESSMENT & PLAN NOTE
· Patient reported pulse ox 74% at home  · Placed on 4L NC in ED, was on 7 L midflow on 2/9, currently on 10 L midflow today  · Moderate pathway  · Remdesivir day 4/5  · Decadron day 4/10  · I discuss with the patient regarding starting baricitinib, patient declined medication at this time since it's not FDA approved to treat COVID     · Serial labs

## 2022-02-13 LAB
ANION GAP SERPL CALCULATED.3IONS-SCNC: 9 MMOL/L (ref 4–13)
BUN SERPL-MCNC: 17 MG/DL (ref 5–25)
CALCIUM SERPL-MCNC: 9 MG/DL (ref 8.4–10.2)
CHLORIDE SERPL-SCNC: 103 MMOL/L (ref 96–108)
CO2 SERPL-SCNC: 22 MMOL/L (ref 21–32)
CREAT SERPL-MCNC: 0.78 MG/DL (ref 0.6–1.3)
ERYTHROCYTE [DISTWIDTH] IN BLOOD BY AUTOMATED COUNT: 13.2 % (ref 11.6–15.1)
GFR SERPL CREATININE-BSD FRML MDRD: 94 ML/MIN/1.73SQ M
GLUCOSE SERPL-MCNC: 116 MG/DL (ref 65–140)
GLUCOSE SERPL-MCNC: 127 MG/DL (ref 65–140)
GLUCOSE SERPL-MCNC: 138 MG/DL (ref 65–140)
GLUCOSE SERPL-MCNC: 142 MG/DL (ref 65–140)
GLUCOSE SERPL-MCNC: 145 MG/DL (ref 65–140)
GLUCOSE SERPL-MCNC: 159 MG/DL (ref 65–140)
GLUCOSE SERPL-MCNC: 169 MG/DL (ref 65–140)
GLUCOSE SERPL-MCNC: 184 MG/DL (ref 65–140)
GLUCOSE SERPL-MCNC: 187 MG/DL (ref 65–140)
GLUCOSE SERPL-MCNC: 188 MG/DL (ref 65–140)
GLUCOSE SERPL-MCNC: 198 MG/DL (ref 65–140)
GLUCOSE SERPL-MCNC: 209 MG/DL (ref 65–140)
GLUCOSE SERPL-MCNC: 261 MG/DL (ref 65–140)
GLUCOSE SERPL-MCNC: 299 MG/DL (ref 65–140)
HCT VFR BLD AUTO: 41.6 % (ref 36.5–49.3)
HGB BLD-MCNC: 14.5 G/DL (ref 12–17)
MCH RBC QN AUTO: 33.3 PG (ref 26.8–34.3)
MCHC RBC AUTO-ENTMCNC: 34.9 G/DL (ref 31.4–37.4)
MCV RBC AUTO: 96 FL (ref 82–98)
PLATELET # BLD AUTO: 216 THOUSANDS/UL (ref 149–390)
PMV BLD AUTO: 10.7 FL (ref 8.9–12.7)
POTASSIUM SERPL-SCNC: 3.8 MMOL/L (ref 3.5–5.3)
RBC # BLD AUTO: 4.35 MILLION/UL (ref 3.88–5.62)
SODIUM SERPL-SCNC: 134 MMOL/L (ref 135–147)
WBC # BLD AUTO: 14.67 THOUSAND/UL (ref 4.31–10.16)

## 2022-02-13 PROCEDURE — 80048 BASIC METABOLIC PNL TOTAL CA: CPT | Performed by: PHYSICIAN ASSISTANT

## 2022-02-13 PROCEDURE — 82948 REAGENT STRIP/BLOOD GLUCOSE: CPT

## 2022-02-13 PROCEDURE — 94760 N-INVAS EAR/PLS OXIMETRY 1: CPT

## 2022-02-13 PROCEDURE — 99232 SBSQ HOSP IP/OBS MODERATE 35: CPT | Performed by: PHYSICIAN ASSISTANT

## 2022-02-13 PROCEDURE — 85027 COMPLETE CBC AUTOMATED: CPT | Performed by: PHYSICIAN ASSISTANT

## 2022-02-13 RX ORDER — DOCUSATE SODIUM 100 MG/1
100 CAPSULE, LIQUID FILLED ORAL 2 TIMES DAILY
Status: DISCONTINUED | OUTPATIENT
Start: 2022-02-13 | End: 2022-02-17 | Stop reason: HOSPADM

## 2022-02-13 RX ADMIN — BENZONATATE 100 MG: 100 CAPSULE ORAL at 06:25

## 2022-02-13 RX ADMIN — SODIUM CHLORIDE 12 UNITS/HR: 9 INJECTION, SOLUTION INTRAVENOUS at 20:45

## 2022-02-13 RX ADMIN — PANTOPRAZOLE SODIUM 40 MG: 40 TABLET, DELAYED RELEASE ORAL at 06:25

## 2022-02-13 RX ADMIN — ZINC SULFATE 220 MG (50 MG) CAPSULE 220 MG: CAPSULE at 08:48

## 2022-02-13 RX ADMIN — OXYCODONE HYDROCHLORIDE AND ACETAMINOPHEN 1000 MG: 500 TABLET ORAL at 17:22

## 2022-02-13 RX ADMIN — DEXAMETHASONE SODIUM PHOSPHATE 6 MG: 4 INJECTION, SOLUTION INTRA-ARTICULAR; INTRALESIONAL; INTRAMUSCULAR; INTRAVENOUS; SOFT TISSUE at 22:24

## 2022-02-13 RX ADMIN — LISINOPRIL 5 MG: 5 TABLET ORAL at 08:48

## 2022-02-13 RX ADMIN — CHOLECALCIFEROL TAB 10 MCG (400 UNIT) 400 UNITS: 10 TAB at 08:47

## 2022-02-13 RX ADMIN — Medication 30 MG: at 08:48

## 2022-02-13 RX ADMIN — THIAMINE HCL TAB 100 MG 100 MG: 100 TAB at 08:48

## 2022-02-13 RX ADMIN — ATORVASTATIN CALCIUM 10 MG: 10 TABLET, FILM COATED ORAL at 17:22

## 2022-02-13 RX ADMIN — SODIUM CHLORIDE 12 UNITS/HR: 9 INJECTION, SOLUTION INTRAVENOUS at 02:31

## 2022-02-13 RX ADMIN — SODIUM CHLORIDE 12 UNITS/HR: 9 INJECTION, SOLUTION INTRAVENOUS at 13:20

## 2022-02-13 RX ADMIN — OXYCODONE HYDROCHLORIDE AND ACETAMINOPHEN 1000 MG: 500 TABLET ORAL at 08:47

## 2022-02-13 RX ADMIN — DOCUSATE SODIUM 100 MG: 100 CAPSULE, LIQUID FILLED ORAL at 22:24

## 2022-02-13 RX ADMIN — OMEGA-3 FATTY ACIDS CAP 1000 MG 1000 MG: 1000 CAP at 08:47

## 2022-02-13 RX ADMIN — ENOXAPARIN SODIUM 30 MG: 100 INJECTION SUBCUTANEOUS at 08:47

## 2022-02-13 RX ADMIN — ENOXAPARIN SODIUM 30 MG: 100 INJECTION SUBCUTANEOUS at 20:45

## 2022-02-13 NOTE — ASSESSMENT & PLAN NOTE
Lab Results   Component Value Date    HGBA1C 7 7 (H) 12/06/2021       Recent Labs     02/13/22  0615 02/13/22  0751 02/13/22  1019 02/13/22  1209   POCGLU 127 159* 261* 198*       Blood Sugar Average: Last 72 hrs:  · (P) 167 [id number]   · hold oral medication  · Hyperglycemia is likely steroid induced   · Blood glucose continue to be significantly elevated despite initiating lantus, meal coverage with humalog and sliding scale      · Continue non DKA insulin drip given patient will continues to need to be on high dose steroids in the setting of COVID

## 2022-02-13 NOTE — PROGRESS NOTES
Pt  Doing well overall, back to 10L NC with adequate SPO2  His WOB  Is WNL  Pt  C/o heartburn earlier yaniv Fall NP notified and orders placed for Tums and Protonix  Pt  Irritated with situation and getting tired of being in the hospital   Pt  Offered help to wash up but he wants to wait until am for a shower  Shower considered this shift as pt  Seems to be in no distress and really wants to shower but not done as it may be too much for him given his Covid PNA   VSS  Will continue to monitor

## 2022-02-13 NOTE — PROGRESS NOTES
[facility] [address]  Progress Note - Jose Founds 1955, 73 y o  male MRN: [id number]  Unit/Bed#: ICU 09-01 Encounter: [id number]  Primary Care Provider: Hugh Hernandez MD   Date and time admitted to hospital: 2/8/2022  8:41 PM    * Acute respiratory failure due to COVID-19 [facility])  Assessment & Plan  · Patient reported pulse ox 74% at home  · Placed on 4L NC in ED, was on 7 L midflow on 2/9, on 11 L on 2/12  -> 8 L  midflow today  · Moderate pathway  · Completed 5 days of Remdesivir on 2/12/2022  · Decadron day 6/10  · I discuss with the patient regarding starting baricitinib, patient declined medication at this time since it's not FDA approved to treat COVID  · Serial labs    Primary hypertension  Assessment & Plan  · Continue lisinopril  · Monitor blood pressure trends    Type 2 diabetes mellitus [facility])  Assessment & Plan  Lab Results   Component Value Date    HGBA1C 7 7 (H) 12/06/2021       Recent Labs     02/13/22  0615 02/13/22  0751 02/13/22  1019 02/13/22  1209   POCGLU 127 159* 261* 198*       Blood Sugar Average: Last 72 hrs:  · (P) 384 [id number]   · hold oral medication  · Hyperglycemia is likely steroid induced   · Blood glucose continue to be significantly elevated despite initiating lantus, meal coverage with humalog and sliding scale  · Continue non DKA insulin drip given patient will continues to need to be on high dose steroids in the setting of COVID    Elevated troponin  Assessment & Plan  · Likely secondary to covid infection with hypoxia  · Trended down  · No chest pain        VTE Pharmacologic Prophylaxis: VTE Score: 4 Moderate Risk (Score 3-4) - Pharmacological DVT Prophylaxis Ordered: enoxaparin (Lovenox)  Patient Centered Rounds: I performed bedside rounds with nursing staff today  Discussions with Specialists or Other Care Team Provider: nursing    Education and Discussions with Family / Patient: Patient declined call to        Time Spent for Care: 20 minutes  More than 50% of total time spent on counseling and coordination of care as described above  Current Length of Stay: 5 day(s)  Current Patient Status: Inpatient   Certification Statement: The patient will continue to require additional inpatient hospital stay due to treatment of COVID  Discharge Plan: Anticipate discharge in 48-72 hrs to home  Code Status: Level 1 - Full Code    Subjective: The patient was seen and examined  The patient states he feeling weak/tired today  Objective:     Vitals:   Temp (24hrs), Av 1 °F (36 7 °C), Min:97 °F (36 1 °C), Max:99 5 °F (37 5 °C)    Temp:  [97 °F (36 1 °C)-99 5 °F (37 5 °C)] 99 °F (37 2 °C)  HR:  [54-81] 73  Resp:  [18-39] 30  BP: (115-136)/(57-63) 136/59  SpO2:  [87 %-95 %] 88 %  Body mass index is 28 79 kg/m²  Input and Output Summary (last 24 hours): Intake/Output Summary (Last 24 hours) at 2022 1407  Last data filed at 2022 5122  Gross per 24 hour   Intake 1017 11 ml   Output 1700 ml   Net -682 89 ml       Physical Exam:   Physical Exam  Vitals and nursing note reviewed  Constitutional:       General: He is awake  Interventions: Nasal cannula in place  Cardiovascular:      Rate and Rhythm: Normal rate and regular rhythm  Pulmonary:      Effort: Pulmonary effort is normal       Breath sounds: Normal breath sounds  Abdominal:      Palpations: Abdomen is soft  Tenderness: There is no abdominal tenderness  Skin:     General: Skin is warm and dry  Neurological:      General: No focal deficit present  Mental Status: He is alert and oriented to person, place, and time  Psychiatric:         Attention and Perception: Attention normal          Mood and Affect: Mood normal          Speech: Speech normal          Behavior: Behavior is cooperative            Additional Data:     Labs:  Results from last 7 days   Lab Units 22  0445 22  0610 02/10/22  0613   WBC Thousand/uL 14 67*   < > 7 44 HEMOGLOBIN g/dL 14 5   < > 13 5   HEMATOCRIT % 41 6   < > 39 3   PLATELETS Thousands/uL 216   < > 144*   NEUTROS PCT %  --   --  69   LYMPHS PCT %  --   --  26   MONOS PCT %  --   --  5   EOS PCT %  --   --  0    < > = values in this interval not displayed  Results from last 7 days   Lab Units 02/13/22  0445 02/10/22  0613 02/09/22  0435   SODIUM mmol/L 134*   < > 134*   POTASSIUM mmol/L 3 8   < > 4 3   CHLORIDE mmol/L 103   < > 104   CO2 mmol/L 22   < > 22   BUN mg/dL 17   < > 14   CREATININE mg/dL 0 78   < > 0 82   ANION GAP mmol/L 9   < > 8   CALCIUM mg/dL 9 0   < > 7 1*   ALBUMIN g/dL  --   --  3 2*   TOTAL BILIRUBIN mg/dL  --   --  0 61   ALK PHOS U/L  --   --  79   ALT U/L  --   --  47   AST U/L  --   --  46*   GLUCOSE RANDOM mg/dL 145*   < > 319*    < > = values in this interval not displayed  Results from last 7 days   Lab Units 02/08/22 2021   INR  0 99     Results from last 7 days   Lab Units 02/13/22  1209 02/13/22  1019 02/13/22  0751 02/13/22  0615 02/13/22  0436 02/13/22  0228 02/13/22  0023 02/12/22  2225 02/12/22 2001 02/12/22  1755 02/12/22  1551 02/12/22  1422   POC GLUCOSE mg/dl 198* 261* 159* 127 142* 188* 138 169* 225* 271* 280* 338*         Results from last 7 days   Lab Units 02/11/22  0616 02/10/22  0613 02/09/22  0435 02/08/22 2021   LACTIC ACID mmol/L  --   --   --  1 4   PROCALCITONIN ng/ml 0 16 0 19 0 23 0 30*       Lines/Drains:  Invasive Devices  Report    Peripheral Intravenous Line            Peripheral IV 02/08/22 Left Antecubital 4 days    Peripheral IV 02/11/22 Distal;Right;Upper;Ventral (anterior) Arm 1 day                      Imaging: No pertinent imaging reviewed  Recent Cultures (last 7 days):   Results from last 7 days   Lab Units 02/08/22 2021   BLOOD CULTURE  No Growth After 4 Days  No Growth After 4 Days         Last 24 Hours Medication List:   Current Facility-Administered Medications   Medication Dose Route Frequency Provider Last Rate    acetaminophen 650 mg Oral Q4H PRN Colette Martínez PA-C      ascorbic acid  1,000 mg Oral BID Katherine Posey      atorvastatin  10 mg Oral Daily With [address] [place], [place]      benzonatate  100 mg Oral TID PRN Colette Martínez PA-C      calcium carbonate  1,000 mg Oral BID PRN Colette Martínez PA-C      cholecalciferol  400 Units Oral Daily [place], [place]      co-enzyme Q-10  30 mg Oral Daily [place], [place]      dexamethasone  6 mg Intravenous Q24H [place], [place]      enoxaparin  30 mg Subcutaneous Q12H [address] [place], [place]      fish oil  1,000 mg Oral Daily [place], [place]      insulin regular (HumuLIN R,NovoLIN R) infusion  0 3-21 Units/hr Intravenous Titrated Colette Martínez PA-C 12 Units/hr (02/13/22 1320)    lisinopril  5 mg Oral Daily Colette Martínez PA-C      ondansetron  4 mg Intravenous Q6H PRN Colette Martínez PA-C      pantoprazole  40 mg Oral Early Morning [place], [place]      thiamine  100 mg Oral Daily [place], [place]      zinc sulfate  220 mg Oral Daily Colette Martínez PA-C          Today, Patient Was Seen By: Mely Ji PA-C    **Please Note: This note may have been constructed using a voice recognition system  **

## 2022-02-13 NOTE — ASSESSMENT & PLAN NOTE
· Patient reported pulse ox 74% at home  · Placed on 4L NC in ED, was on 7 L midflow on 2/9, on 11 L on 2/12  -> 8 L  midflow today  · Moderate pathway  · Completed 5 days of Remdesivir on 2/12/2022  · Decadron day 6/10  · I discuss with the patient regarding starting baricitinib, patient declined medication at this time since it's not FDA approved to treat COVID     · Serial labs

## 2022-02-14 LAB
ANION GAP SERPL CALCULATED.3IONS-SCNC: 6 MMOL/L (ref 4–13)
BACTERIA BLD CULT: NORMAL
BACTERIA BLD CULT: NORMAL
BUN SERPL-MCNC: 21 MG/DL (ref 5–25)
CALCIUM SERPL-MCNC: 8.4 MG/DL (ref 8.4–10.2)
CHLORIDE SERPL-SCNC: 103 MMOL/L (ref 96–108)
CO2 SERPL-SCNC: 24 MMOL/L (ref 21–32)
CREAT SERPL-MCNC: 1 MG/DL (ref 0.6–1.3)
ERYTHROCYTE [DISTWIDTH] IN BLOOD BY AUTOMATED COUNT: 13.4 % (ref 11.6–15.1)
GFR SERPL CREATININE-BSD FRML MDRD: 78 ML/MIN/1.73SQ M
GLUCOSE SERPL-MCNC: 130 MG/DL (ref 65–140)
GLUCOSE SERPL-MCNC: 134 MG/DL (ref 65–140)
GLUCOSE SERPL-MCNC: 141 MG/DL (ref 65–140)
GLUCOSE SERPL-MCNC: 149 MG/DL (ref 65–140)
GLUCOSE SERPL-MCNC: 152 MG/DL (ref 65–140)
GLUCOSE SERPL-MCNC: 172 MG/DL (ref 65–140)
GLUCOSE SERPL-MCNC: 175 MG/DL (ref 65–140)
GLUCOSE SERPL-MCNC: 204 MG/DL (ref 65–140)
GLUCOSE SERPL-MCNC: 209 MG/DL (ref 65–140)
GLUCOSE SERPL-MCNC: 216 MG/DL (ref 65–140)
GLUCOSE SERPL-MCNC: 253 MG/DL (ref 65–140)
GLUCOSE SERPL-MCNC: 337 MG/DL (ref 65–140)
HCT VFR BLD AUTO: 40.5 % (ref 36.5–49.3)
HGB BLD-MCNC: 14 G/DL (ref 12–17)
MCH RBC QN AUTO: 33.3 PG (ref 26.8–34.3)
MCHC RBC AUTO-ENTMCNC: 34.6 G/DL (ref 31.4–37.4)
MCV RBC AUTO: 96 FL (ref 82–98)
PLATELET # BLD AUTO: 216 THOUSANDS/UL (ref 149–390)
PMV BLD AUTO: 10.6 FL (ref 8.9–12.7)
POTASSIUM SERPL-SCNC: 4.6 MMOL/L (ref 3.5–5.3)
RBC # BLD AUTO: 4.2 MILLION/UL (ref 3.88–5.62)
SODIUM SERPL-SCNC: 133 MMOL/L (ref 135–147)
WBC # BLD AUTO: 14.94 THOUSAND/UL (ref 4.31–10.16)

## 2022-02-14 PROCEDURE — 80048 BASIC METABOLIC PNL TOTAL CA: CPT | Performed by: PHYSICIAN ASSISTANT

## 2022-02-14 PROCEDURE — 85027 COMPLETE CBC AUTOMATED: CPT | Performed by: PHYSICIAN ASSISTANT

## 2022-02-14 PROCEDURE — 94760 N-INVAS EAR/PLS OXIMETRY 1: CPT

## 2022-02-14 PROCEDURE — 82948 REAGENT STRIP/BLOOD GLUCOSE: CPT

## 2022-02-14 PROCEDURE — 99233 SBSQ HOSP IP/OBS HIGH 50: CPT | Performed by: NURSE PRACTITIONER

## 2022-02-14 RX ADMIN — ZINC SULFATE 220 MG (50 MG) CAPSULE 220 MG: CAPSULE at 08:29

## 2022-02-14 RX ADMIN — DOCUSATE SODIUM 100 MG: 100 CAPSULE, LIQUID FILLED ORAL at 17:25

## 2022-02-14 RX ADMIN — ATORVASTATIN CALCIUM 10 MG: 10 TABLET, FILM COATED ORAL at 17:25

## 2022-02-14 RX ADMIN — OMEGA-3 FATTY ACIDS CAP 1000 MG 1000 MG: 1000 CAP at 08:23

## 2022-02-14 RX ADMIN — LISINOPRIL 5 MG: 5 TABLET ORAL at 08:22

## 2022-02-14 RX ADMIN — DEXAMETHASONE SODIUM PHOSPHATE 6 MG: 4 INJECTION, SOLUTION INTRA-ARTICULAR; INTRALESIONAL; INTRAMUSCULAR; INTRAVENOUS; SOFT TISSUE at 21:16

## 2022-02-14 RX ADMIN — Medication 30 MG: at 08:22

## 2022-02-14 RX ADMIN — SODIUM CHLORIDE 18 UNITS/HR: 9 INJECTION, SOLUTION INTRAVENOUS at 10:42

## 2022-02-14 RX ADMIN — OXYCODONE HYDROCHLORIDE AND ACETAMINOPHEN 1000 MG: 500 TABLET ORAL at 17:25

## 2022-02-14 RX ADMIN — CHOLECALCIFEROL TAB 10 MCG (400 UNIT) 400 UNITS: 10 TAB at 08:22

## 2022-02-14 RX ADMIN — ENOXAPARIN SODIUM 30 MG: 100 INJECTION SUBCUTANEOUS at 08:22

## 2022-02-14 RX ADMIN — ENOXAPARIN SODIUM 30 MG: 100 INJECTION SUBCUTANEOUS at 21:16

## 2022-02-14 RX ADMIN — PANTOPRAZOLE SODIUM 40 MG: 40 TABLET, DELAYED RELEASE ORAL at 06:38

## 2022-02-14 RX ADMIN — THIAMINE HCL TAB 100 MG 100 MG: 100 TAB at 08:22

## 2022-02-14 RX ADMIN — OXYCODONE HYDROCHLORIDE AND ACETAMINOPHEN 1000 MG: 500 TABLET ORAL at 08:22

## 2022-02-14 RX ADMIN — BENZONATATE 100 MG: 100 CAPSULE ORAL at 00:05

## 2022-02-14 RX ADMIN — DOCUSATE SODIUM 100 MG: 100 CAPSULE, LIQUID FILLED ORAL at 08:21

## 2022-02-14 RX ADMIN — SODIUM CHLORIDE 12 UNITS/HR: 9 INJECTION, SOLUTION INTRAVENOUS at 16:31

## 2022-02-14 NOTE — ASSESSMENT & PLAN NOTE
Lab Results   Component Value Date    HGBA1C 7 7 (H) 12/06/2021       Recent Labs     02/14/22  0425 02/14/22  0639 02/14/22  0740 02/14/22  0949   POCGLU 149* 134 175* 337*       Blood Sugar Average: Last 72 hrs:  · (P) 202 7   · hold oral medication  · Hyperglycemia is likely steroid induced   · Blood glucose continue to be significantly elevated despite initiating lantus, meal coverage with humalog and sliding scale      · Continue non DKA insulin drip given patient will continues to need to be on high dose steroids in the setting of COVID

## 2022-02-14 NOTE — ASSESSMENT & PLAN NOTE
· Patient reported pulse ox 74% at home  · Placed on 4L NC in ED, was on 7 L midflow on 2/9, on 11 L on 2/12  -> 8 L  midflow today  · Moderate pathway  · Completed 5 days of Remdesivir on 2/12/2022  · Decadron day 7/10   · Patient has declined baricitinib at this time since it's not FDA approved to treat COVID  · Serial labs

## 2022-02-14 NOTE — PLAN OF CARE
Pt  Doing okay  He remains on insulin drip for glucose control  UO adequate, PO intake adequate  Pt  Given colace as he has not had a BM since admission  Tolerating midflow Oxygen  WOB WNL  Continue care

## 2022-02-14 NOTE — PLAN OF CARE
Complex physical assessment as noted see chart for details  Using IS at bedside  Up ad marcus to void  Verbalizes needs

## 2022-02-14 NOTE — PROGRESS NOTES
[facility] [address]  Progress Note - Lucas Eugene 1955, 73 y o  male MRN: [id number]  Unit/Bed#: ICU 09-01 Encounter: [id number]  Primary Care Provider: Monique Braga MD   Date and time admitted to hospital: 2/8/2022  8:41 PM    * Acute respiratory failure due to COVID-19 [facility])  Assessment & Plan  · Patient reported pulse ox 74% at home  · Placed on 4L NC in ED, was on 7 L midflow on 2/9, on 11 L on 2/12  -> 8 L  midflow today  · Moderate pathway  · Completed 5 days of Remdesivir on 2/12/2022  · Decadron day 7/10   · Patient has declined baricitinib at this time since it's not FDA approved to treat COVID  · Serial labs    Type 2 diabetes mellitus [facility])  Assessment & Plan  Lab Results   Component Value Date    HGBA1C 7 7 (H) 12/06/2021       Recent Labs     02/14/22  0425 02/14/22  0639 02/14/22  0740 02/14/22  0949   POCGLU 149* 134 175* 337*       Blood Sugar Average: Last 72 hrs:  · (P) 202 7   · hold oral medication  · Hyperglycemia is likely steroid induced   · Blood glucose continue to be significantly elevated despite initiating lantus, meal coverage with humalog and sliding scale  · Continue non DKA insulin drip given patient will continues to need to be on high dose steroids in the setting of COVID    Primary hypertension  Assessment & Plan  · Continue lisinopril  · Monitor blood pressure trends    Elevated troponin  Assessment & Plan  · Likely secondary to covid infection with hypoxia  · Trended down  · No chest pain    VTE Pharmacologic Prophylaxis:   Pharmacologic: Enoxaparin (Lovenox)  Patient Centered Rounds: I have performed bedside rounds with nursing staff today  Education and Discussions with Family / Patient: Patient he declined family phone call  Time Spent for Care: 30 minutes  More than 50% of total time spent on counseling and coordination of care as described above      Current Length of Stay: 6 day(s)    Current Patient Status: Inpatient   Certification Statement: The patient will continue to require additional inpatient hospital stay due to plan above    Discharge Plan:  Home, maybe 48 hours  Code Status: Level 1 - Full Code      Subjective:   Patient seen and examined  He says he feels a little better but not 100%  Objective:     Vitals:   Temp (24hrs), Av 3 °F (36 8 °C), Min:97 3 °F (36 3 °C), Max:99 2 °F (37 3 °C)    Temp:  [97 3 °F (36 3 °C)-99 2 °F (37 3 °C)] 98 2 °F (36 8 °C)  HR:  [56-95] 57  Resp:  [19-34] 25  BP: (108-150)/(53-63) 150/63  SpO2:  [87 %-96 %] 91 %  Body mass index is 28 73 kg/m²  Input and Output Summary (last 24 hours): Intake/Output Summary (Last 24 hours) at 2022 1433  Last data filed at 2022 1430  Gross per 24 hour   Intake 1313 74 ml   Output 1925 ml   Net -611 26 ml       Physical Exam:     Physical Exam  Vitals and nursing note reviewed  HENT:      Head: Normocephalic and atraumatic  Mouth/Throat:      Mouth: Mucous membranes are moist       Pharynx: Oropharynx is clear  Eyes:      Pupils: Pupils are equal, round, and reactive to light  Cardiovascular:      Rate and Rhythm: Normal rate and regular rhythm  Pulses: Normal pulses  Heart sounds: Normal heart sounds  Pulmonary:      Effort: Pulmonary effort is normal       Breath sounds: Decreased breath sounds present  Abdominal:      General: Bowel sounds are normal       Palpations: Abdomen is soft  Tenderness: There is no abdominal tenderness  Musculoskeletal:         General: Normal range of motion  Cervical back: Normal range of motion and neck supple  Skin:     General: Skin is warm and dry  Capillary Refill: Capillary refill takes less than 2 seconds  Neurological:      General: No focal deficit present  Mental Status: He is alert and oriented to person, place, and time           Additional Data:     Labs:    Results from last 7 days   Lab Units 22  0426 22  0610 02/10/22  0613   WBC Thousand/uL 14 94*   < > 7 44   HEMOGLOBIN g/dL 14 0   < > 13 5   HEMATOCRIT % 40 5   < > 39 3   PLATELETS Thousands/uL 216   < > 144*   NEUTROS PCT %  --   --  69   LYMPHS PCT %  --   --  26   MONOS PCT %  --   --  5   EOS PCT %  --   --  0    < > = values in this interval not displayed  Results from last 7 days   Lab Units 02/14/22  0426 02/10/22  0613 02/09/22  0435   SODIUM mmol/L 133*   < > 134*   POTASSIUM mmol/L 4 6   < > 4 3   CHLORIDE mmol/L 103   < > 104   CO2 mmol/L 24   < > 22   BUN mg/dL 21   < > 14   CREATININE mg/dL 1 00   < > 0 82   ANION GAP mmol/L 6   < > 8   CALCIUM mg/dL 8 4   < > 7 1*   ALBUMIN g/dL  --   --  3 2*   TOTAL BILIRUBIN mg/dL  --   --  0 61   ALK PHOS U/L  --   --  79   ALT U/L  --   --  47   AST U/L  --   --  46*   GLUCOSE RANDOM mg/dL 141*   < > 319*    < > = values in this interval not displayed  Results from last 7 days   Lab Units 02/08/22 2021   INR  0 99     Results from last 7 days   Lab Units 02/14/22  1244 02/14/22  0949 02/14/22  0740 02/14/22  0639 02/14/22  0425 02/14/22  0219 02/14/22  0005 02/13/22  2220 02/13/22  2012 02/13/22  1811 02/13/22  1619 02/13/22  1407   POC GLUCOSE mg/dl 253* 337* 175* 134 149* 152* 172* 116 187* 184* 209* 299*         Results from last 7 days   Lab Units 02/11/22  0616 02/10/22  0613 02/09/22  0435 02/08/22 2021   LACTIC ACID mmol/L  --   --   --  1 4   PROCALCITONIN ng/ml 0 16 0 19 0 23 0 30*           * I Have Reviewed All Lab Data Listed Above  * Additional Pertinent Lab Tests Reviewed: [address] Admission Reviewed    Recent Cultures (last 7 days):     Results from last 7 days   Lab Units 02/08/22 2021   BLOOD CULTURE  No Growth After 5 Days  No Growth After 5 Days         Last 24 Hours Medication List:   Current Facility-Administered Medications   Medication Dose Route Frequency Provider Last Rate    acetaminophen  650 mg Oral Q4H PRN Min Eldridge PA-C      ascorbic acid  1,000 mg Oral BID Anuel Blum PA-C      atorvastatin  10 mg Oral Daily With [address] [place], [place]      benzonatate  100 mg Oral TID PRN Anuel Blum PA-C      calcium carbonate  1,000 mg Oral BID PRN Aunel Blum PA-C      cholecalciferol  400 Units Oral Daily [place], [place]      co-enzyme Q-10  30 mg Oral Daily [place], [place]      dexamethasone  6 mg Intravenous Q24H [place], [place]      docusate sodium  100 mg Oral BID [facility]      enoxaparin  30 mg Subcutaneous Q12H [address] Monserrat Lamas [place], [place]      fish oil  1,000 mg Oral Daily [place], [place]      insulin regular (HumuLIN R,NovoLIN R) infusion  0 3-21 Units/hr Intravenous Titrated Anuel Blum PA-C 14 Units/hr (02/14/22 1430)    lisinopril  5 mg Oral Daily Anuel Blum PA-C      ondansetron  4 mg Intravenous Q6H PRN Anuel Blum PA-C      pantoprazole  40 mg Oral Early Morning [place], [place]      thiamine  100 mg Oral Daily [place], [place]      zinc sulfate  220 mg Oral Daily [place], [place]          Today, Patient Was Seen By: Jamison Krabbe, CRNP    ** Please Note: Dictation voice to text software may have been used in the creation of this document   **

## 2022-02-15 LAB
ANION GAP SERPL CALCULATED.3IONS-SCNC: 6 MMOL/L (ref 4–13)
ANION GAP SERPL CALCULATED.3IONS-SCNC: 7 MMOL/L (ref 4–13)
BASOPHILS # BLD AUTO: 0.02 THOUSANDS/ΜL (ref 0–0.1)
BASOPHILS NFR BLD AUTO: 0 % (ref 0–1)
BUN SERPL-MCNC: 18 MG/DL (ref 5–25)
BUN SERPL-MCNC: 22 MG/DL (ref 5–25)
CALCIUM SERPL-MCNC: 6.1 MG/DL (ref 8.4–10.2)
CALCIUM SERPL-MCNC: 8.3 MG/DL (ref 8.4–10.2)
CHLORIDE SERPL-SCNC: 101 MMOL/L (ref 96–108)
CHLORIDE SERPL-SCNC: 111 MMOL/L (ref 96–108)
CO2 SERPL-SCNC: 18 MMOL/L (ref 21–32)
CO2 SERPL-SCNC: 22 MMOL/L (ref 21–32)
CREAT SERPL-MCNC: 0.66 MG/DL (ref 0.6–1.3)
CREAT SERPL-MCNC: 0.95 MG/DL (ref 0.6–1.3)
EOSINOPHIL # BLD AUTO: 0.01 THOUSAND/ΜL (ref 0–0.61)
EOSINOPHIL NFR BLD AUTO: 0 % (ref 0–6)
ERYTHROCYTE [DISTWIDTH] IN BLOOD BY AUTOMATED COUNT: 13.2 % (ref 11.6–15.1)
GFR SERPL CREATININE-BSD FRML MDRD: 101 ML/MIN/1.73SQ M
GFR SERPL CREATININE-BSD FRML MDRD: 83 ML/MIN/1.73SQ M
GLUCOSE SERPL-MCNC: 109 MG/DL (ref 65–140)
GLUCOSE SERPL-MCNC: 117 MG/DL (ref 65–140)
GLUCOSE SERPL-MCNC: 133 MG/DL (ref 65–140)
GLUCOSE SERPL-MCNC: 136 MG/DL (ref 65–140)
GLUCOSE SERPL-MCNC: 144 MG/DL (ref 65–140)
GLUCOSE SERPL-MCNC: 151 MG/DL (ref 65–140)
GLUCOSE SERPL-MCNC: 162 MG/DL (ref 65–140)
GLUCOSE SERPL-MCNC: 166 MG/DL (ref 65–140)
GLUCOSE SERPL-MCNC: 179 MG/DL (ref 65–140)
GLUCOSE SERPL-MCNC: 201 MG/DL (ref 65–140)
GLUCOSE SERPL-MCNC: 236 MG/DL (ref 65–140)
GLUCOSE SERPL-MCNC: 246 MG/DL (ref 65–140)
GLUCOSE SERPL-MCNC: 253 MG/DL (ref 65–140)
GLUCOSE SERPL-MCNC: 291 MG/DL (ref 65–140)
HCT VFR BLD AUTO: 36 % (ref 36.5–49.3)
HGB BLD-MCNC: 12.3 G/DL (ref 12–17)
IMM GRANULOCYTES # BLD AUTO: 0.15 THOUSAND/UL (ref 0–0.2)
IMM GRANULOCYTES NFR BLD AUTO: 1 % (ref 0–2)
LYMPHOCYTES # BLD AUTO: 1.26 THOUSANDS/ΜL (ref 0.6–4.47)
LYMPHOCYTES NFR BLD AUTO: 11 % (ref 14–44)
MAGNESIUM SERPL-MCNC: 1.8 MG/DL (ref 1.9–2.7)
MCH RBC QN AUTO: 33.8 PG (ref 26.8–34.3)
MCHC RBC AUTO-ENTMCNC: 34.2 G/DL (ref 31.4–37.4)
MCV RBC AUTO: 99 FL (ref 82–98)
MONOCYTES # BLD AUTO: 0.47 THOUSAND/ΜL (ref 0.17–1.22)
MONOCYTES NFR BLD AUTO: 4 % (ref 4–12)
NEUTROPHILS # BLD AUTO: 9.26 THOUSANDS/ΜL (ref 1.85–7.62)
NEUTS SEG NFR BLD AUTO: 84 % (ref 43–75)
NRBC BLD AUTO-RTO: 0 /100 WBCS
PHOSPHATE SERPL-MCNC: 3.2 MG/DL (ref 2.3–4.1)
PLATELET # BLD AUTO: 213 THOUSANDS/UL (ref 149–390)
PMV BLD AUTO: 10.3 FL (ref 8.9–12.7)
POTASSIUM SERPL-SCNC: 3.3 MMOL/L (ref 3.5–5.3)
POTASSIUM SERPL-SCNC: 4.4 MMOL/L (ref 3.5–5.3)
RBC # BLD AUTO: 3.64 MILLION/UL (ref 3.88–5.62)
SODIUM SERPL-SCNC: 129 MMOL/L (ref 135–147)
SODIUM SERPL-SCNC: 136 MMOL/L (ref 135–147)
WBC # BLD AUTO: 11.17 THOUSAND/UL (ref 4.31–10.16)

## 2022-02-15 PROCEDURE — 84100 ASSAY OF PHOSPHORUS: CPT | Performed by: NURSE PRACTITIONER

## 2022-02-15 PROCEDURE — 85025 COMPLETE CBC W/AUTO DIFF WBC: CPT | Performed by: NURSE PRACTITIONER

## 2022-02-15 PROCEDURE — 82948 REAGENT STRIP/BLOOD GLUCOSE: CPT

## 2022-02-15 PROCEDURE — 80048 BASIC METABOLIC PNL TOTAL CA: CPT | Performed by: NURSE PRACTITIONER

## 2022-02-15 PROCEDURE — 83735 ASSAY OF MAGNESIUM: CPT | Performed by: NURSE PRACTITIONER

## 2022-02-15 PROCEDURE — 94760 N-INVAS EAR/PLS OXIMETRY 1: CPT

## 2022-02-15 PROCEDURE — 99233 SBSQ HOSP IP/OBS HIGH 50: CPT | Performed by: NURSE PRACTITIONER

## 2022-02-15 RX ADMIN — DOCUSATE SODIUM 100 MG: 100 CAPSULE, LIQUID FILLED ORAL at 17:24

## 2022-02-15 RX ADMIN — PANTOPRAZOLE SODIUM 40 MG: 40 TABLET, DELAYED RELEASE ORAL at 05:39

## 2022-02-15 RX ADMIN — DOCUSATE SODIUM 100 MG: 100 CAPSULE, LIQUID FILLED ORAL at 08:13

## 2022-02-15 RX ADMIN — SODIUM CHLORIDE 16 UNITS/HR: 9 INJECTION, SOLUTION INTRAVENOUS at 20:28

## 2022-02-15 RX ADMIN — ENOXAPARIN SODIUM 30 MG: 100 INJECTION SUBCUTANEOUS at 08:13

## 2022-02-15 RX ADMIN — THIAMINE HCL TAB 100 MG 100 MG: 100 TAB at 08:13

## 2022-02-15 RX ADMIN — OXYCODONE HYDROCHLORIDE AND ACETAMINOPHEN 1000 MG: 500 TABLET ORAL at 08:13

## 2022-02-15 RX ADMIN — LISINOPRIL 5 MG: 5 TABLET ORAL at 08:13

## 2022-02-15 RX ADMIN — ACETAMINOPHEN 650 MG: 325 TABLET ORAL at 22:55

## 2022-02-15 RX ADMIN — Medication 30 MG: at 08:13

## 2022-02-15 RX ADMIN — CHOLECALCIFEROL TAB 10 MCG (400 UNIT) 400 UNITS: 10 TAB at 08:13

## 2022-02-15 RX ADMIN — ZINC SULFATE 220 MG (50 MG) CAPSULE 220 MG: CAPSULE at 08:13

## 2022-02-15 RX ADMIN — ATORVASTATIN CALCIUM 10 MG: 10 TABLET, FILM COATED ORAL at 17:24

## 2022-02-15 RX ADMIN — SODIUM CHLORIDE 14 UNITS/HR: 9 INJECTION, SOLUTION INTRAVENOUS at 13:47

## 2022-02-15 RX ADMIN — SODIUM CHLORIDE 4 UNITS/HR: 9 INJECTION, SOLUTION INTRAVENOUS at 05:38

## 2022-02-15 RX ADMIN — OXYCODONE HYDROCHLORIDE AND ACETAMINOPHEN 1000 MG: 500 TABLET ORAL at 17:24

## 2022-02-15 RX ADMIN — ENOXAPARIN SODIUM 30 MG: 100 INJECTION SUBCUTANEOUS at 21:26

## 2022-02-15 RX ADMIN — DEXAMETHASONE SODIUM PHOSPHATE 6 MG: 4 INJECTION, SOLUTION INTRA-ARTICULAR; INTRALESIONAL; INTRAMUSCULAR; INTRAVENOUS; SOFT TISSUE at 21:26

## 2022-02-15 RX ADMIN — OMEGA-3 FATTY ACIDS CAP 1000 MG 1000 MG: 1000 CAP at 08:13

## 2022-02-15 NOTE — PROGRESS NOTES
[facility] [address]  Progress Note - Nadiya Beaumont 1955, 73 y o  male MRN: [id number]  Unit/Bed#: ICU 09-01 Encounter: [id number]  Primary Care Provider: Madalyn Velazquez MD   Date and time admitted to hospital: 2/8/2022  8:41 PM    * Acute respiratory failure due to COVID-19 [facility])  Assessment & Plan  · Patient reported pulse ox 74% at home  · Placed on 4L NC in ED,is currently on 7 L midflow  · Moderate pathway  · Completed 5 days of Remdesivir on 2/12/2022  · Decadron day 8/10   · Patient has declined baricitinib at this time since it's not FDA approved to treat COVID  · Serial labs    Type 2 diabetes mellitus [facility])  Assessment & Plan  Lab Results   Component Value Date    HGBA1C 7 7 (H) 12/06/2021       Recent Labs     02/15/22  0917 02/15/22  1129 02/15/22  1317 02/15/22  1515   POCGLU 246* 162* 236* 253*       Blood Sugar Average: Last 72 hrs:  · (P) 516 [id number]   · hold oral medication  · Hyperglycemia is likely steroid induced   · Blood glucose continue to be significantly elevated despite initiating lantus, meal coverage with humalog and sliding scale  · Continue non DKA insulin drip given patient will continues to need to be on high dose steroids in the setting of COVID    Primary hypertension  Assessment & Plan  · Continue lisinopril  · Monitor blood pressure trends    Elevated troponin  Assessment & Plan  · Likely secondary to covid infection with hypoxia  · Trended down  · No chest pain    VTE Pharmacologic Prophylaxis:   Pharmacologic: Enoxaparin (Lovenox)  Mechanical VTE Prophylaxis in Place: Yes    Patient Centered Rounds: I have performed bedside rounds with nursing staff today  Education and Discussions with Family / Patient: Patient    Time Spent for Care: 30 minutes  More than 50% of total time spent on counseling and coordination of care as described above  Current Length of Stay: 7 day(s)    Current Patient Status: Inpatient   Certification Statement:  The patient will continue to require additional inpatient hospital stay due to per plan above    Discharge Plan: To be determined    Code Status: Level 1 - Full Code      Subjective:   Patient seen and examined  He has no specific complaints aside from being woken up frequently  Objective:     Vitals:   Temp (24hrs), Av 1 °F (36 7 °C), Min:98 °F (36 7 °C), Max:98 1 °F (36 7 °C)    Temp:  [98 °F (36 7 °C)-98 1 °F (36 7 °C)] 98 1 °F (36 7 °C)  HR:  [56-75] 65  Resp:  [17-25] 25  BP: ()/(44-88) 132/63  SpO2:  [88 %-96 %] 90 %  Body mass index is 28 73 kg/m²  Input and Output Summary (last 24 hours): Intake/Output Summary (Last 24 hours) at 2/15/2022 1714  Last data filed at 2/15/2022 1600  Gross per 24 hour   Intake 1002 81 ml   Output 1750 ml   Net -747 19 ml       Physical Exam:     Physical Exam  Vitals and nursing note reviewed  HENT:      Head: Normocephalic and atraumatic  Mouth/Throat:      Mouth: Mucous membranes are moist       Pharynx: Oropharynx is clear  Eyes:      Pupils: Pupils are equal, round, and reactive to light  Cardiovascular:      Rate and Rhythm: Normal rate and regular rhythm  Pulses: Normal pulses  Heart sounds: Normal heart sounds  Pulmonary:      Effort: Pulmonary effort is normal       Breath sounds: Normal breath sounds  Abdominal:      General: Bowel sounds are normal       Palpations: Abdomen is soft  Tenderness: There is no abdominal tenderness  Musculoskeletal:         General: Normal range of motion  Cervical back: Normal range of motion and neck supple  Skin:     General: Skin is warm and dry  Capillary Refill: Capillary refill takes less than 2 seconds  Neurological:      General: No focal deficit present  Mental Status: He is alert and oriented to person, place, and time           Additional Data:     Labs:    Results from last 7 days   Lab Units 02/15/22  0538   WBC Thousand/uL 11 17*   HEMOGLOBIN g/dL 12 3 HEMATOCRIT % 36 0*   PLATELETS Thousands/uL 213   NEUTROS PCT % 84*   LYMPHS PCT % 11*   MONOS PCT % 4   EOS PCT % 0     Results from last 7 days   Lab Units 02/15/22  0538 02/10/22  0613 02/09/22  0435   SODIUM mmol/L 136   < > 134*   POTASSIUM mmol/L 3 3*   < > 4 3   CHLORIDE mmol/L 111*   < > 104   CO2 mmol/L 18*   < > 22   BUN mg/dL 18   < > 14   CREATININE mg/dL 0 66   < > 0 82   ANION GAP mmol/L 7   < > 8   CALCIUM mg/dL 6 1*   < > 7 1*   ALBUMIN g/dL  --   --  3 2*   TOTAL BILIRUBIN mg/dL  --   --  0 61   ALK PHOS U/L  --   --  79   ALT U/L  --   --  47   AST U/L  --   --  46*   GLUCOSE RANDOM mg/dL 109   < > 319*    < > = values in this interval not displayed  Results from last 7 days   Lab Units 02/08/22 2021   INR  0 99     Results from last 7 days   Lab Units 02/15/22  1515 02/15/22  1317 02/15/22  1129 02/15/22  0917 02/15/22  0723 02/15/22  0537 02/15/22  0258 02/15/22  0101 02/14/22  2328 02/14/22  2115 02/14/22  1812 02/14/22  1555   POC GLUCOSE mg/dl 253* 236* 162* 246* 144* 133 201* 117 179* 209* 130 204*         Results from last 7 days   Lab Units 02/11/22  0616 02/10/22  0613 02/09/22  0435 02/08/22 2021   LACTIC ACID mmol/L  --   --   --  1 4   PROCALCITONIN ng/ml 0 16 0 19 0 23 0 30*           * I Have Reviewed All Lab Data Listed Above  * Additional Pertinent Lab Tests Reviewed: [facility] 67 Admission Reviewed      Recent Cultures (last 7 days):     Results from last 7 days   Lab Units 02/08/22 2021   BLOOD CULTURE  No Growth After 5 Days  No Growth After 5 Days         Last 24 Hours Medication List:   Current Facility-Administered Medications   Medication Dose Route Frequency Provider Last Rate    acetaminophen  650 mg Oral Q4H PRN Darren Byrnes PA-C      ascorbic acid  1,000 mg Oral BID Darren Byrnes PA-C      atorvastatin  10 mg Oral Daily With [address] [place], [place]      benzonatate  100 mg Oral TID PRN Salas Brandt CORINA [place]      calcium carbonate  1,000 mg Oral BID PRN Valdemar German PA-C      cholecalciferol  400 Units Oral Daily [place], [place]      co-enzyme Q-10  30 mg Oral Daily [place], [place]      dexamethasone  6 mg Intravenous Q24H [place], [place]      docusate sodium  100 mg Oral BID [facility]      enoxaparin  30 mg Subcutaneous Q12H [facility] Monserrat Lamas [place], [place]      fish oil  1,000 mg Oral Daily [place], [place]      insulin regular (HumuLIN R,NovoLIN R) infusion  0 3-21 Units/hr Intravenous Titrated Valdemar German PA-C 15 Units/hr (02/15/22 1515)    lisinopril  5 mg Oral Daily Valdemar German PA-C      ondansetron  4 mg Intravenous Q6H PRN Valdemar German PA-C      pantoprazole  40 mg Oral Early Morning [place], [place]      thiamine  100 mg Oral Daily [place], [place]      zinc sulfate  220 mg Oral Daily Vlademar German PA-C          Today, Patient Was Seen By: NEGRITA Mason    ** Please Note: Dictation voice to text software may have been used in the creation of this document   **

## 2022-02-15 NOTE — PLAN OF CARE
Problem: PAIN - ADULT  Goal: Verbalizes/displays adequate comfort level or baseline comfort level  Description: Interventions:  - Encourage patient to monitor pain and request assistance  - Assess pain using appropriate pain scale  - Administer analgesics based on type and severity of pain and evaluate response  - Implement non-pharmacological measures as appropriate and evaluate response  - Consider cultural and social influences on pain and pain management  - Notify physician/advanced practitioner if interventions unsuccessful or patient reports new pain  Outcome: Progressing     Problem: INFECTION - ADULT  Goal: Absence or prevention of progression during hospitalization  Description: INTERVENTIONS:  - Assess and monitor for signs and symptoms of infection  - Monitor lab/diagnostic results  - Monitor all insertion sites, i e  indwelling lines, tubes, and drains  - Monitor endotracheal if appropriate and nasal secretions for changes in amount and color  - [place] appropriate cooling/warming therapies per order  - Administer medications as ordered  - Instruct and encourage patient and family to use good hand hygiene technique  - Identify and instruct in appropriate isolation precautions for identified infection/condition  Outcome: Progressing  Goal: Absence of fever/infection during neutropenic period  Description: INTERVENTIONS:  - Monitor WBC    Outcome: Completed     Problem: SAFETY ADULT  Goal: Patient will remain free of falls  Description: INTERVENTIONS:  - Educate patient/family on patient safety including physical limitations  - Instruct patient to call for assistance with activity   - Consult OT/PT to assist with strengthening/mobility   - Keep Call bell within reach  - Keep bed low and locked with side rails adjusted as appropriate  - Keep care items and personal belongings within reach  - Initiate and maintain comfort rounds  - Make Fall Risk Sign visible to staff  - Offer Toileting every 2  Hours, in advance of need  - Apply yellow socks and bracelet for high fall risk patients  - Consider moving patient to room near nurses station  Outcome: Progressing  Goal: Maintain or return to baseline ADL function  Description: INTERVENTIONS:  -  Assess patient's ability to carry out ADLs; assess patient's baseline for ADL function and identify physical deficits which impact ability to perform ADLs (bathing, care of mouth/teeth, toileting, grooming, dressing, etc )  - Assess/evaluate cause of self-care deficits   - Assess range of motion  - Assess patient's mobility; develop plan if impaired  - Assess patient's need for assistive devices and provide as appropriate  - Encourage maximum independence but intervene and supervise when necessary  - Involve family in performance of ADLs  - Assess for home care needs following discharge   - Consider OT consult to assist with ADL evaluation and planning for discharge  - Provide patient education as appropriate  Outcome: Progressing  Goal: Maintains/Returns to pre admission functional level  Description: INTERVENTIONS:  - Perform BMAT or MOVE assessment daily    - Set and communicate daily mobility goal to care team and patient/family/caregiver  - Collaborate with rehabilitation services on mobility goals if consulted  - Perform Range of Motion 3 times a day  - Dangle patient 3 times a day  - Stand patient 2 times a day  - Ambulate patient 2 times a day  - Out of bed for meals   Problem: Knowledge Deficit  Goal: Patient/family/caregiver demonstrates understanding of disease process, treatment plan, medications, and discharge instructions  Description: Complete learning assessment and assess knowledge base    Interventions:  - Provide teaching at level of understanding  - Provide teaching via preferred learning methods  Outcome: Progressing     Problem: Potential for Falls  Goal: Patient will remain free of falls  Description: INTERVENTIONS:  - Educate patient/family on patient safety including physical limitations  - Instruct patient to call for assistance with activity   - Consult OT/PT to assist with strengthening/mobility   - Keep Call bell within reach  - Keep bed low and locked with side rails adjusted as appropriate  - Keep care items and personal belongings within reach  - Initiate and maintain comfort rounds  - Make Fall Risk Sign visible to staff  - Offer Toileting every 2 Hours, in advance of need  - Apply yellow socks and bracelet for high fall risk patients  - Consider moving patient to room near nurses station  Outcome: Progressing   - Out of bed for toileting  - Record patient progress and toleration of activity level   Outcome: Progressing

## 2022-02-15 NOTE — ASSESSMENT & PLAN NOTE
Lab Results   Component Value Date    HGBA1C 7 7 (H) 12/06/2021       Recent Labs     02/15/22  0917 02/15/22  1129 02/15/22  1317 02/15/22  1515   POCGLU 246* 162* 236* 253*       Blood Sugar Average: Last 72 hrs:  · (P) 449 [id number]   · hold oral medication  · Hyperglycemia is likely steroid induced   · Blood glucose continue to be significantly elevated despite initiating lantus, meal coverage with humalog and sliding scale      · Continue non DKA insulin drip given patient will continues to need to be on high dose steroids in the setting of COVID

## 2022-02-15 NOTE — UTILIZATION REVIEW
Continued Stay Review    Date: 2/15/22                          Current Patient Class: inpatient  Current Level of Care:level 2 stepdown/HOT  HPI:66 y o  male initially admitted on 2/8/22     Assessment/Plan:   Tmax 99 2  Respiratory failure 2nd COVID  O2 requirements weaned to 7ltr/48%FiO2 [address]  Lungs with decreased breath sounds, remains on IV steroids  Continue non DKA insulin drip as patient continues to need high dose steroids in the setting of COVID    Patient has declined baricitinib at this time since it's not FDA approved to treat COVID    Vital Signs:   02/15/22 1200 -- 70 21 123/61 88 89 % Abnormal  48 -- 7 L/min Mid flow nasal cannula Lying   02/15/22 1000 -- 68 17 133/63 90 90 % 48 -- 7 L/min Mid flow nasal cannula Lying   02/15/22 0813 -- -- -- 149/88 -- -- -- -- -- -- --   02/15/22 0800 -- 73 18 149/88 95 89 % Abnormal  48 -- 7 L/min Mid flow nasal cannula Sitting   02/15/22 0741 -- -- -- -- -- 95 % 48 -- 7 L/min Mid flow nasal cannula --   02/15/22 0700 98 °F (36 7 °C) 61 21 -- -- 94 % -- -- -- -- --   02/15/22 0600 -- 62 18 -- -- 92 % -- -- -- -- --   02/15/22 0500 -- 58 19 95/46 Abnormal  67 91 % 48 -- 7 L/min Mid flow nasal cannula Lying   02/15/22 0400 -- 56 19 95/44 Abnormal  63 92 % -- -- -- -- --     Pertinent Labs/Diagnostic Results:   Results from last 7 days   Lab Units 02/08/22 2021   SARS-COV-2  Positive*     Results from last 7 days   Lab Units 02/15/22  0538 02/14/22  0426 02/13/22  0445 02/12/22  0433 02/12/22  0433 02/11/22  0610 02/11/22  0610 02/10/22  0613 02/10/22  0613 02/09/22  0435 02/09/22  0435   WBC Thousand/uL 11 17* 14 94* 14 67*   < > 11 53*   < > 10 29*   < > 7 44   < > 5 27   HEMOGLOBIN g/dL 12 3 14 0 14 5  --  13 9  --  13 6   < > 13 5   < > 12 4   HEMATOCRIT % 36 0* 40 5 41 6  --  39 1  --  38 7   < > 39 3   < > 36 0*   PLATELETS Thousands/uL 213 216 216   < > 186   < > 185   < > 144*   < > 122*   NEUTROS ABS Thousands/µL 9 26*  --   --   --   --   --   --   --  5 12 --  3 75    < > = values in this interval not displayed  Results from last 7 days   Lab Units 02/15/22  0538 02/14/22  0426 02/13/22  0445 02/12/22  0433 02/11/22  0610 02/09/22  0435 02/08/22 2021   SODIUM mmol/L 136 133* 134* 133* 134*   < > 132*   POTASSIUM mmol/L 3 3* 4 6 3 8 4 2 4 1   < > 4 3   CHLORIDE mmol/L 111* 103 103 103 103   < > 100   CO2 mmol/L 18* 24 22 22 23   < > 21   ANION GAP mmol/L 7 6 9 8 8   < > 11   BUN mg/dL 18 21 17 20 24   < > 17   CREATININE mg/dL 0 66 1 00 0 78 0 85 0 77   < > 0 90   EGFR ml/min/1 73sq m 101 78 94 91 95   < > 89   CALCIUM mg/dL 6 1* 8 4 9 0 8 6 8 5   < > 8 6   MAGNESIUM mg/dL  --   --   --   --   --   --  1 9    < > = values in this interval not displayed       Results from last 7 days   Lab Units 02/09/22  0435 02/08/22 2021   AST U/L 46* 58*   ALT U/L 47 63*   ALK PHOS U/L 79 98   TOTAL PROTEIN g/dL 5 5* 6 8   ALBUMIN g/dL 3 2* 4 0   TOTAL BILIRUBIN mg/dL 0 61 1 00     Results from last 7 days   Lab Units 02/15/22  1317 02/15/22  1129 02/15/22  0917 02/15/22  0723 02/15/22  0537 02/15/22  0258 02/15/22  0101 02/14/22  2328 02/14/22  2115 02/14/22  1812 02/14/22  1555 02/14/22  1428   POC GLUCOSE mg/dl 236* 162* 246* 144* 133 201* 117 179* 209* 130 204* 216*     Results from last 7 days   Lab Units 02/15/22  0538 02/14/22  0426 02/13/22  0445 02/12/22  0433 02/11/22  0610 02/10/22  0613 02/09/22  0435 02/08/22 2021   GLUCOSE RANDOM mg/dL 109 141* 145* 206* 179* 403* 319* 371*     Results from last 7 days   Lab Units 02/09/22  0435 02/09/22  0100 02/08/22 2021   HS TNI 0HR ng/L  --   --  112*   HS TNI 2HR ng/L  --  235*  --    HSTNI D2 ng/L  --  123*  --    HS TNI 4HR ng/L 191*  --   --    HSTNI D4 ng/L 79*  --   --      Results from last 7 days   Lab Units 02/08/22 2021   D-DIMER QUANTITATIVE ug/ml FEU 0 50*     Results from last 7 days   Lab Units 02/08/22 2021   PROTIME seconds 13 0   INR  0 99     Results from last 7 days   Lab Units 02/11/22  0616 02/10/22  8201 02/09/22  0435 02/08/22 2021   PROCALCITONIN ng/ml 0 16 0 19 0 23 0 30*     Results from last 7 days   Lab Units 02/08/22 2021   LACTIC ACID mmol/L 1 4     Results from last 7 days   Lab Units 02/09/22  0746 02/09/22  0435 02/08/22 2021   CRP mg/L 70 0* 72 4* 78 8*     Results from last 7 days   Lab Units 02/08/22 2021   INFLUENZA A PCR  Negative   INFLUENZA B PCR  Negative   RSV PCR  Negative     Results from last 7 days   Lab Units 02/08/22 2021   BLOOD CULTURE  No Growth After 5 Days  No Growth After 5 Days  Medications:   ascorbic acid, 1,000 mg, Oral, BID  atorvastatin, 10 mg, Oral, Daily With Dinner  cholecalciferol, 400 Units, Oral, Daily  co-enzyme Q-10, 30 mg, Oral, Daily  dexamethasone, 6 mg, Intravenous, Q24H  docusate sodium, 100 mg, Oral, BID  enoxaparin, 30 mg, Subcutaneous, Q12H CATHLEEN  fish oil, 1,000 mg, Oral, Daily  lisinopril, 5 mg, Oral, Daily  pantoprazole, 40 mg, Oral, Early Morning  thiamine, 100 mg, Oral, Daily  zinc sulfate, 220 mg, Oral, Daily    Continuous IV Infusions:  insulin regular (HumuLIN R,NovoLIN R) infusion, 0 3-21 Units/hr, Intravenous, Titrated    PRN Meds:  acetaminophen, 650 mg, Oral, Q4H PRN  benzonatate, 100 mg, Oral, TID PRN  calcium carbonate, 1,000 mg, Oral, BID PRN  ondansetron, 4 mg, Intravenous, Q6H PRN    Discharge Plan: d    Network Utilization Review Department  ATTENTION: Please call with any questions or concerns to [phone] and carefully listen to the prompts so that you are directed to the right person  All voicemails are confidential   [place] all requests for admission clinical reviews, approved or denied determinations and any other requests to dedicated fax number below belonging to the campus where the patient is receiving treatment   List of dedicated fax numbers for the Facilities:  FACILITY NAME UR FAX NUMBER   ADMISSION DENIALS (Administrative/Medical Necessity) [phone]   [address] (Maternity/NICU/Pediatrics) [address] [facility],7Th Floor [facility] [address] [place] [address]  [phone]   Allan Mahmood 50 [address] [place] [address] [id number] [place] [address] Elvira Lomax [address] [place] [address] [phone]

## 2022-02-15 NOTE — ASSESSMENT & PLAN NOTE
· Patient reported pulse ox 74% at home  · Placed on 4L NC in ED,is currently on 7 L midflow  · Moderate pathway  · Completed 5 days of Remdesivir on 2/12/2022  · Decadron day 8/10   · Patient has declined baricitinib at this time since it's not FDA approved to treat COVID  · Serial labs

## 2022-02-16 LAB
ANION GAP SERPL CALCULATED.3IONS-SCNC: 6 MMOL/L (ref 4–13)
BASOPHILS # BLD AUTO: 0.02 THOUSANDS/ΜL (ref 0–0.1)
BASOPHILS NFR BLD AUTO: 0 % (ref 0–1)
BUN SERPL-MCNC: 18 MG/DL (ref 5–25)
CALCIUM SERPL-MCNC: 7.1 MG/DL (ref 8.4–10.2)
CHLORIDE SERPL-SCNC: 107 MMOL/L (ref 96–108)
CO2 SERPL-SCNC: 20 MMOL/L (ref 21–32)
CREAT SERPL-MCNC: 0.74 MG/DL (ref 0.6–1.3)
EOSINOPHIL # BLD AUTO: 0.02 THOUSAND/ΜL (ref 0–0.61)
EOSINOPHIL NFR BLD AUTO: 0 % (ref 0–6)
ERYTHROCYTE [DISTWIDTH] IN BLOOD BY AUTOMATED COUNT: 13.2 % (ref 11.6–15.1)
GFR SERPL CREATININE-BSD FRML MDRD: 96 ML/MIN/1.73SQ M
GLUCOSE SERPL-MCNC: 106 MG/DL (ref 65–140)
GLUCOSE SERPL-MCNC: 121 MG/DL (ref 65–140)
GLUCOSE SERPL-MCNC: 168 MG/DL (ref 65–140)
GLUCOSE SERPL-MCNC: 170 MG/DL (ref 65–140)
GLUCOSE SERPL-MCNC: 185 MG/DL (ref 65–140)
GLUCOSE SERPL-MCNC: 246 MG/DL (ref 65–140)
GLUCOSE SERPL-MCNC: 303 MG/DL (ref 65–140)
GLUCOSE SERPL-MCNC: 316 MG/DL (ref 65–140)
GLUCOSE SERPL-MCNC: 323 MG/DL (ref 65–140)
GLUCOSE SERPL-MCNC: 344 MG/DL (ref 65–140)
GLUCOSE SERPL-MCNC: 80 MG/DL (ref 65–140)
GLUCOSE SERPL-MCNC: 92 MG/DL (ref 65–140)
HCT VFR BLD AUTO: 37.2 % (ref 36.5–49.3)
HGB BLD-MCNC: 12.8 G/DL (ref 12–17)
IMM GRANULOCYTES # BLD AUTO: 0.12 THOUSAND/UL (ref 0–0.2)
IMM GRANULOCYTES NFR BLD AUTO: 1 % (ref 0–2)
LYMPHOCYTES # BLD AUTO: 0.92 THOUSANDS/ΜL (ref 0.6–4.47)
LYMPHOCYTES NFR BLD AUTO: 9 % (ref 14–44)
MCH RBC QN AUTO: 33.5 PG (ref 26.8–34.3)
MCHC RBC AUTO-ENTMCNC: 34.4 G/DL (ref 31.4–37.4)
MCV RBC AUTO: 97 FL (ref 82–98)
MONOCYTES # BLD AUTO: 0.4 THOUSAND/ΜL (ref 0.17–1.22)
MONOCYTES NFR BLD AUTO: 4 % (ref 4–12)
NEUTROPHILS # BLD AUTO: 8.89 THOUSANDS/ΜL (ref 1.85–7.62)
NEUTS SEG NFR BLD AUTO: 86 % (ref 43–75)
NRBC BLD AUTO-RTO: 0 /100 WBCS
PLATELET # BLD AUTO: 176 THOUSANDS/UL (ref 149–390)
PMV BLD AUTO: 10.1 FL (ref 8.9–12.7)
POTASSIUM SERPL-SCNC: 4.5 MMOL/L (ref 3.5–5.3)
RBC # BLD AUTO: 3.82 MILLION/UL (ref 3.88–5.62)
SODIUM SERPL-SCNC: 133 MMOL/L (ref 135–147)
WBC # BLD AUTO: 10.37 THOUSAND/UL (ref 4.31–10.16)

## 2022-02-16 PROCEDURE — 82948 REAGENT STRIP/BLOOD GLUCOSE: CPT

## 2022-02-16 PROCEDURE — 80048 BASIC METABOLIC PNL TOTAL CA: CPT | Performed by: NURSE PRACTITIONER

## 2022-02-16 PROCEDURE — 99233 SBSQ HOSP IP/OBS HIGH 50: CPT | Performed by: NURSE PRACTITIONER

## 2022-02-16 PROCEDURE — 94760 N-INVAS EAR/PLS OXIMETRY 1: CPT

## 2022-02-16 PROCEDURE — 85025 COMPLETE CBC W/AUTO DIFF WBC: CPT | Performed by: NURSE PRACTITIONER

## 2022-02-16 RX ORDER — MAGNESIUM SULFATE HEPTAHYDRATE 40 MG/ML
2 INJECTION, SOLUTION INTRAVENOUS ONCE
Status: COMPLETED | OUTPATIENT
Start: 2022-02-16 | End: 2022-02-16

## 2022-02-16 RX ADMIN — ZINC SULFATE 220 MG (50 MG) CAPSULE 220 MG: CAPSULE at 08:33

## 2022-02-16 RX ADMIN — THIAMINE HCL TAB 100 MG 100 MG: 100 TAB at 08:34

## 2022-02-16 RX ADMIN — Medication 30 MG: at 08:35

## 2022-02-16 RX ADMIN — CHOLECALCIFEROL TAB 10 MCG (400 UNIT) 400 UNITS: 10 TAB at 08:34

## 2022-02-16 RX ADMIN — OXYCODONE HYDROCHLORIDE AND ACETAMINOPHEN 1000 MG: 500 TABLET ORAL at 08:34

## 2022-02-16 RX ADMIN — OMEGA-3 FATTY ACIDS CAP 1000 MG 1000 MG: 1000 CAP at 08:34

## 2022-02-16 RX ADMIN — ACETAMINOPHEN 650 MG: 325 TABLET ORAL at 19:50

## 2022-02-16 RX ADMIN — PANTOPRAZOLE SODIUM 40 MG: 40 TABLET, DELAYED RELEASE ORAL at 05:47

## 2022-02-16 RX ADMIN — DOCUSATE SODIUM 100 MG: 100 CAPSULE, LIQUID FILLED ORAL at 08:34

## 2022-02-16 RX ADMIN — DEXAMETHASONE SODIUM PHOSPHATE 6 MG: 4 INJECTION, SOLUTION INTRA-ARTICULAR; INTRALESIONAL; INTRAMUSCULAR; INTRAVENOUS; SOFT TISSUE at 21:41

## 2022-02-16 RX ADMIN — OXYCODONE HYDROCHLORIDE AND ACETAMINOPHEN 1000 MG: 500 TABLET ORAL at 18:02

## 2022-02-16 RX ADMIN — SODIUM CHLORIDE 17 UNITS/HR: 9 INJECTION, SOLUTION INTRAVENOUS at 20:02

## 2022-02-16 RX ADMIN — ACETAMINOPHEN 650 MG: 325 TABLET ORAL at 09:38

## 2022-02-16 RX ADMIN — ENOXAPARIN SODIUM 30 MG: 100 INJECTION SUBCUTANEOUS at 21:41

## 2022-02-16 RX ADMIN — ATORVASTATIN CALCIUM 10 MG: 10 TABLET, FILM COATED ORAL at 15:38

## 2022-02-16 RX ADMIN — ENOXAPARIN SODIUM 30 MG: 100 INJECTION SUBCUTANEOUS at 08:33

## 2022-02-16 RX ADMIN — SODIUM CHLORIDE 17 UNITS/HR: 9 INJECTION, SOLUTION INTRAVENOUS at 09:46

## 2022-02-16 RX ADMIN — DOCUSATE SODIUM 100 MG: 100 CAPSULE, LIQUID FILLED ORAL at 18:02

## 2022-02-16 RX ADMIN — LISINOPRIL 5 MG: 5 TABLET ORAL at 08:33

## 2022-02-16 RX ADMIN — MAGNESIUM SULFATE 2 G: 2 INJECTION INTRAVENOUS at 01:40

## 2022-02-16 NOTE — ASSESSMENT & PLAN NOTE
· Patient reported pulse ox 74% at home  · Placed on 4L NC in ED,is currently on 7 L midflow  · Moderate pathway  · Completed 5 days of Remdesivir on 2/12/2022  · Decadron day 9/10   · Patient has declined baricitinib at this time since it's not FDA approved to treat COVID  · Serial labs

## 2022-02-16 NOTE — ASSESSMENT & PLAN NOTE
Lab Results   Component Value Date    HGBA1C 7 7 (H) 12/06/2021       Recent Labs     02/16/22  0545 02/16/22  0726 02/16/22  0941 02/16/22  1133   POCGLU 185* 92 303* 344*       Blood Sugar Average: Last 72 hrs:  · (P) 190 825   · hold oral medication  · Hyperglycemia is likely steroid induced   · Blood glucose continue to be significantly elevated despite initiating lantus, meal coverage with humalog and sliding scale      · Continue non DKA insulin drip given patient will continues to need to be on high dose steroids in the setting of COVID

## 2022-02-16 NOTE — PROGRESS NOTES
[facility]  Progress Note - Cassia Rodriguez 1955, 73 y o  male MRN: [id number]  Unit/Bed#: ICU 09-01 Encounter: [id number]  Primary Care Provider: Aris Lomeli MD   Date and time admitted to hospital: 2/8/2022  8:41 PM    * Acute respiratory failure due to COVID-19 [facility])  Assessment & Plan  · Patient reported pulse ox 74% at home  · Placed on 4L NC in ED,is currently on 7 L midflow  · Moderate pathway  · Completed 5 days of Remdesivir on 2/12/2022  · Decadron day 9/10   · Patient has declined baricitinib at this time since it's not FDA approved to treat COVID  · Serial labs    Type 2 diabetes mellitus [facility])  Assessment & Plan  Lab Results   Component Value Date    HGBA1C 7 7 (H) 12/06/2021       Recent Labs     02/16/22  0545 02/16/22  0726 02/16/22  0941 02/16/22  1133   POCGLU 185* 92 303* 344*       Blood Sugar Average: Last 72 hrs:  · (P) 190 825   · hold oral medication  · Hyperglycemia is likely steroid induced   · Blood glucose continue to be significantly elevated despite initiating lantus, meal coverage with humalog and sliding scale  · Continue non DKA insulin drip given patient will continues to need to be on high dose steroids in the setting of COVID    Primary hypertension  Assessment & Plan  · Continue lisinopril  · Monitor blood pressure trends    Elevated troponin  Assessment & Plan  · Likely secondary to covid infection with hypoxia  · Trended down  · No chest pain      VTE Pharmacologic Prophylaxis:   Pharmacologic: Enoxaparin (Lovenox)  Mechanical VTE Prophylaxis in Place: Yes    Patient Centered Rounds: I have performed bedside rounds with nursing staff today  Education and Discussions with Family / Patient:  Patient    Time Spent for Care: 20 minutes  More than 50% of total time spent on counseling and coordination of care as described above      Current Length of Stay: 8 day(s)    Current Patient Status: Inpatient   Certification Statement: The patient will continue to require additional inpatient hospital stay due to plan above    Discharge Plan:  Pending clinical progress    Code Status: Level 1 - Full Code      Subjective:   No complaints offered    Objective:     Vitals:   Temp (24hrs), Av 2 °F (36 8 °C), Min:97 9 °F (36 6 °C), Max:98 9 °F (37 2 °C)    Temp:  [97 9 °F (36 6 °C)-98 9 °F (37 2 °C)] 97 9 °F (36 6 °C)  HR:  [57-99] 73  Resp:  [13-28] 28  BP: (104-198)/(52-88) 120/63  SpO2:  [86 %-94 %] 90 %  Body mass index is 29 6 kg/m²  Input and Output Summary (last 24 hours): Intake/Output Summary (Last 24 hours) at 2022 1232  Last data filed at 2022 1134  Gross per 24 hour   Intake 1024 31 ml   Output 2475 ml   Net -1450 69 ml       Physical Exam:     Physical Exam  Vitals and nursing note reviewed  HENT:      Head: Normocephalic and atraumatic  Mouth/Throat:      Mouth: Mucous membranes are moist       Pharynx: Oropharynx is clear  Eyes:      Extraocular Movements: Extraocular movements intact  Pupils: Pupils are equal, round, and reactive to light  Cardiovascular:      Rate and Rhythm: Normal rate and regular rhythm  Pulses: Normal pulses  Heart sounds: Normal heart sounds  Pulmonary:      Effort: Pulmonary effort is normal       Breath sounds: Decreased air movement present  Abdominal:      General: Bowel sounds are normal       Palpations: Abdomen is soft  Tenderness: There is no abdominal tenderness  Musculoskeletal:         General: Normal range of motion  Cervical back: Normal range of motion and neck supple  Skin:     General: Skin is warm and dry  Capillary Refill: Capillary refill takes less than 2 seconds  Neurological:      General: No focal deficit present  Mental Status: He is alert and oriented to person, place, and time           Additional Data:     Labs:    Results from last 7 days   Lab Units 22  0445   WBC Thousand/uL 10 37*   HEMOGLOBIN g/dL 12 8   HEMATOCRIT % 37  2   PLATELETS Thousands/uL 176   NEUTROS PCT % 86*   LYMPHS PCT % 9*   MONOS PCT % 4   EOS PCT % 0     Results from last 7 days   Lab Units 02/16/22  0445   SODIUM mmol/L 133*   POTASSIUM mmol/L 4 5   CHLORIDE mmol/L 107   CO2 mmol/L 20*   BUN mg/dL 18   CREATININE mg/dL 0 74   ANION GAP mmol/L 6   CALCIUM mg/dL 7 1*   GLUCOSE RANDOM mg/dL 168*         Results from last 7 days   Lab Units 02/16/22  1133 02/16/22  0941 02/16/22  0726 02/16/22  0545 02/16/22  0133 02/15/22  2131 02/15/22  1935 02/15/22  1722 02/15/22  1515 02/15/22  1317 02/15/22  1129 02/15/22  0917   POC GLUCOSE mg/dl 344* 303* 92 185* 106 166* 291* 136 253* 236* 162* 246*         Results from last 7 days   Lab Units 02/11/22  0616 02/10/22  0613   PROCALCITONIN ng/ml 0 16 0 19           * I Have Reviewed All Lab Data Listed Above  * Additional Pertinent Lab Tests Reviewed:  [facility] Admission Reviewed            Last 24 Hours Medication List:   Current Facility-Administered Medications   Medication Dose Route Frequency Provider Last Rate    acetaminophen  650 mg Oral Q4H PRN Marques Nine, PA-C      ascorbic acid  1,000 mg Oral BID Marques Nine, PA-C      atorvastatin  10 mg Oral Daily With [address] [place], [place]      benzonatate  100 mg Oral TID PRN Tichnor Nine, PA-C      calcium carbonate  1,000 mg Oral BID PRN Tichnor Nine, PA-C      cholecalciferol  400 Units Oral Daily [place], [place]      co-enzyme Q-10  30 mg Oral Daily [place], [place]      dexamethasone  6 mg Intravenous Q24H [place], [place]      docusate sodium  100 mg Oral BID Tichnor Nine, PA-C      enoxaparin  30 mg Subcutaneous Q12H [address] [place] Lam[place], [place]      fish oil  1,000 mg Oral Daily [place], [place]      insulin regular (HumuLIN R,NovoLIN R) infusion  0 3-21 Units/hr Intravenous Titrated [facility] CORINA Fall 18 Units/hr (02/16/22 1134)    lisinopril  5 mg Oral Daily Nakia Gibson PA-C      ondansetron  4 mg Intravenous Q6H PRN Balwinder Vides PA-C      pantoprazole  40 mg Oral Early Morning Nakia Gibson, [facility]      thiamine  100 mg Oral Daily Nakia Gibson, [facility]      zinc sulfate  220 mg Oral Daily [facility]          Today, Patient Was Seen By: NEGRITA Elizondo    ** Please Note: Dictation voice to text software may have been used in the creation of this document   **

## 2022-02-16 NOTE — RESPIRATORY THERAPY NOTE
02/16/22 1432   Non-Invasive Information   O2 Interface Device MFNC prongs   Non-Invasive Ventilation Mode MFNC (Mid flow)   SpO2 (!) 89 %   $ Pulse Oximetry Spot Check Charge Completed   Non-Invasive Settings   Flow (lpm) 8

## 2022-02-16 NOTE — RESPIRATORY THERAPY NOTE
02/16/22 0934   Non-Invasive Information   O2 Interface Device MFNC prongs   Non-Invasive Ventilation Mode MFNC (Mid flow)   SpO2 90 %   $ Pulse Oximetry Spot Check Charge Completed   Non-Invasive Settings   Flow (lpm) 8

## 2022-02-16 NOTE — PLAN OF CARE
Problem: PAIN - ADULT  Goal: Verbalizes/displays adequate comfort level or baseline comfort level  Description: Interventions:  - Encourage patient to monitor pain and request assistance  - Assess pain using appropriate pain scale  - Administer analgesics based on type and severity of pain and evaluate response  - Implement non-pharmacological measures as appropriate and evaluate response  - Consider cultural and social influences on pain and pain management  - Notify physician/advanced practitioner if interventions unsuccessful or patient reports new pain  Outcome: Progressing     Problem: INFECTION - ADULT  Goal: Absence or prevention of progression during hospitalization  Description: INTERVENTIONS:  - Assess and monitor for signs and symptoms of infection  - Monitor lab/diagnostic results  - Monitor all insertion sites, i e  indwelling lines, tubes, and drains  - Monitor endotracheal if appropriate and nasal secretions for changes in amount and color  - [place] appropriate cooling/warming therapies per order  - Administer medications as ordered  - Instruct and encourage patient and family to use good hand hygiene technique  - Identify and instruct in appropriate isolation precautions for identified infection/condition  Outcome: Progressing     Problem: SAFETY ADULT  Goal: Patient will remain free of falls  Description: INTERVENTIONS:  - Educate patient/family on patient safety including physical limitations  - Instruct patient to call for assistance with activity   - Consult OT/PT to assist with strengthening/mobility   - Keep Call bell within reach  - Keep bed low and locked with side rails adjusted as appropriate  - Keep care items and personal belongings within reach  - Initiate and maintain comfort rounds  - Make Fall Risk Sign visible to staff  - Offer Toileting every 3 Hours, in advance of need  - Initiate/Maintain bed alarm  - Obtain necessary fall risk management equipment: at bedside  - Apply yellow socks and bracelet for high fall risk patients  - Consider moving patient to room near nurses station  Outcome: Progressing

## 2022-02-16 NOTE — PLAN OF CARE
Problem: PAIN - ADULT  Goal: Verbalizes/displays adequate comfort level or baseline comfort level  Description: Interventions:  - Encourage patient to monitor pain and request assistance  - Assess pain using appropriate pain scale  - Administer analgesics based on type and severity of pain and evaluate response  - Implement non-pharmacological measures as appropriate and evaluate response  - Consider cultural and social influences on pain and pain management  - Notify physician/advanced practitioner if interventions unsuccessful or patient reports new pain  2/16/2022 1142 by Myrna Moreau RN  Outcome: Progressing  2/16/2022 0934 by Myrna Moreau RN  Outcome: Progressing     Problem: SAFETY ADULT  Goal: Patient will remain free of falls  Description: INTERVENTIONS:  - Educate patient/family on patient safety including physical limitations  - Instruct patient to call for assistance with activity   - Consult OT/PT to assist with strengthening/mobility   - Keep Call bell within reach  - Keep bed low and locked with side rails adjusted as appropriate  - Keep care items and personal belongings within reach  - Initiate and maintain comfort rounds  - Make Fall Risk Sign visible to staff  - Offer Toileting every 2 Hours, in advance of need     Obtain necessary fall risk management equipment: walker  - Apply yellow socks and bracelet for high fall risk patients  - Consider moving patient to room near nurses station  2/16/2022 1142 by Myrna Moreau RN  Outcome: Progressing  2/16/2022 0934 by Myrna Moreau RN  Outcome: Progressing     Problem: SAFETY ADULT  Goal: Maintain or return to baseline ADL function  Description: INTERVENTIONS:  -  Assess patient's ability to carry out ADLs; assess patient's baseline for ADL function and identify physical deficits which impact ability to perform ADLs (bathing, care of mouth/teeth, toileting, grooming, dressing, etc )  - Assess/evaluate cause of self-care deficits   - Assess range of motion  - Assess patient's mobility; develop plan if impaired  - Assess patient's need for assistive devices and provide as appropriate  - Encourage maximum independence but intervene and supervise when necessary  - Involve family in performance of ADLs  - Assess for home care needs following discharge   - Consider OT consult to assist with ADL evaluation and planning for discharge  - Provide patient education as appropriate  2/16/2022 1142 by Susan Agudelo RN  Outcome: Progressing  2/16/2022 0934 by Susan Agudelo RN  Outcome: Progressing     Problem: SAFETY ADULT  Goal: Maintains/Returns to pre admission functional level  Description: INTERVENTIONS:  - Perform BMAT or MOVE assessment daily    - Set and communicate daily mobility goal to care team and patient/family/caregiver  - Collaborate with rehabilitation services on mobility goals if consulted  - Perform Range of Motion  2 times a day  - Reposition patient every 2  hours    - Dangle patient 2 times a day  - Stand patient 2 times a day  - Ambulate patient 2  times a day  - Out of bed to chair 2  times a day   - Out of bed for meals 2 times a day  - Out of bed for toileting  - Record patient progress and toleration of activity level   2/16/2022 1142 by Susan Agudelo RN  Outcome: Progressing  2/16/2022 0934 by Susan Agudelo RN  Outcome: Progressing     Problem: INFECTION - ADULT  Goal: Absence or prevention of progression during hospitalization  Description: INTERVENTIONS:  - Assess and monitor for signs and symptoms of infection  - Monitor lab/diagnostic results  - Monitor all insertion sites, i e  indwelling lines, tubes, and drains  - Monitor endotracheal if appropriate and nasal secretions for changes in amount and color  - [place] appropriate cooling/warming therapies per order  - Administer medications as ordered  - Instruct and encourage patient and family to use good hand hygiene technique  - Identify and instruct in appropriate isolation precautions for identified infection/condition  2/16/2022 1142 by Aris Galloway RN  Outcome: Progressing  2/16/2022 0934 by Aris Galloway RN  Outcome: Progressing     Problem: Potential for Falls  Goal: Patient will remain free of falls  Description: INTERVENTIONS:  - Educate patient/family on patient safety including physical limitations  - Instruct patient to call for assistance with activity   - Consult OT/PT to assist with strengthening/mobility   - Keep Call bell within reach  - Keep bed low and locked with side rails adjusted as appropriate  - Keep care items and personal belongings within reach  - Initiate and maintain comfort rounds  - Make Fall Risk Sign visible to staff  - Offer Toileting every  2 Hours, in advance of need  -  Obtain necessary fall risk management equipment: walker  - Apply yellow socks and bracelet for high fall risk patients  - Consider moving patient to room near nurses station  2/16/2022 1142 by Aris Galloway RN  Outcome: Progressing  2/16/2022 0934 by Aris Galloway RN  Outcome: Progressing

## 2022-02-17 ENCOUNTER — APPOINTMENT (EMERGENCY)
Dept: RADIOLOGY | Facility: HOSPITAL | Age: 67
End: 2022-02-17
Payer: COMMERCIAL

## 2022-02-17 ENCOUNTER — HOSPITAL ENCOUNTER (EMERGENCY)
Facility: HOSPITAL | Age: 67
End: 2022-02-18
Attending: EMERGENCY MEDICINE | Admitting: EMERGENCY MEDICINE
Payer: COMMERCIAL

## 2022-02-17 VITALS
HEIGHT: 68 IN | TEMPERATURE: 98 F | DIASTOLIC BLOOD PRESSURE: 65 MMHG | SYSTOLIC BLOOD PRESSURE: 121 MMHG | BODY MASS INDEX: 29.5 KG/M2 | WEIGHT: 194.67 LBS | OXYGEN SATURATION: 90 % | RESPIRATION RATE: 20 BRPM | HEART RATE: 69 BPM

## 2022-02-17 DIAGNOSIS — N17.9 AKI (ACUTE KIDNEY INJURY) (HCC): ICD-10-CM

## 2022-02-17 DIAGNOSIS — R77.8 ELEVATED TROPONIN I LEVEL: ICD-10-CM

## 2022-02-17 DIAGNOSIS — J12.82 PNEUMONIA DUE TO COVID-19 VIRUS: ICD-10-CM

## 2022-02-17 DIAGNOSIS — R79.89 ELEVATED LFTS: ICD-10-CM

## 2022-02-17 DIAGNOSIS — U07.1 PNEUMONIA DUE TO COVID-19 VIRUS: ICD-10-CM

## 2022-02-17 DIAGNOSIS — J96.01 ACUTE RESPIRATORY FAILURE WITH HYPOXIA (HCC): Primary | ICD-10-CM

## 2022-02-17 DIAGNOSIS — R94.31 ABNORMAL EKG: ICD-10-CM

## 2022-02-17 DIAGNOSIS — R79.89 ELEVATED LACTIC ACID LEVEL: ICD-10-CM

## 2022-02-17 LAB
ALBUMIN SERPL BCP-MCNC: 2.7 G/DL (ref 3.5–5)
ALP SERPL-CCNC: 110 U/L (ref 46–116)
ALT SERPL W P-5'-P-CCNC: 88 U/L (ref 12–78)
ANION GAP SERPL CALCULATED.3IONS-SCNC: 12 MMOL/L (ref 4–13)
ANION GAP SERPL CALCULATED.3IONS-SCNC: 6 MMOL/L (ref 4–13)
APTT PPP: 28 SECONDS (ref 23–37)
AST SERPL W P-5'-P-CCNC: 92 U/L (ref 5–45)
BASOPHILS # BLD AUTO: 0.02 THOUSANDS/ΜL (ref 0–0.1)
BASOPHILS # BLD AUTO: 0.02 THOUSANDS/ΜL (ref 0–0.1)
BASOPHILS NFR BLD AUTO: 0 % (ref 0–1)
BASOPHILS NFR BLD AUTO: 0 % (ref 0–1)
BILIRUB DIRECT SERPL-MCNC: 0.16 MG/DL (ref 0–0.2)
BILIRUB SERPL-MCNC: 0.65 MG/DL (ref 0.2–1)
BUN SERPL-MCNC: 18 MG/DL (ref 5–25)
BUN SERPL-MCNC: 28 MG/DL (ref 5–25)
CALCIUM SERPL-MCNC: 8 MG/DL (ref 8.4–10.2)
CALCIUM SERPL-MCNC: 8.5 MG/DL (ref 8.3–10.1)
CARDIAC TROPONIN I PNL SERPL HS: 3350 NG/L
CHLORIDE SERPL-SCNC: 103 MMOL/L (ref 96–108)
CHLORIDE SERPL-SCNC: 98 MMOL/L (ref 100–108)
CO2 SERPL-SCNC: 18 MMOL/L (ref 21–32)
CO2 SERPL-SCNC: 21 MMOL/L (ref 21–32)
CREAT SERPL-MCNC: 0.8 MG/DL (ref 0.6–1.3)
CREAT SERPL-MCNC: 1.31 MG/DL (ref 0.6–1.3)
CRP SERPL QL: 16.7 MG/L
D DIMER PPP FEU-MCNC: 1.35 UG/ML FEU
EOSINOPHIL # BLD AUTO: 0.03 THOUSAND/ΜL (ref 0–0.61)
EOSINOPHIL # BLD AUTO: 0.16 THOUSAND/ΜL (ref 0–0.61)
EOSINOPHIL NFR BLD AUTO: 0 % (ref 0–6)
EOSINOPHIL NFR BLD AUTO: 1 % (ref 0–6)
ERYTHROCYTE [DISTWIDTH] IN BLOOD BY AUTOMATED COUNT: 13.2 % (ref 11.6–15.1)
ERYTHROCYTE [DISTWIDTH] IN BLOOD BY AUTOMATED COUNT: 13.2 % (ref 11.6–15.1)
GFR SERPL CREATININE-BSD FRML MDRD: 56 ML/MIN/1.73SQ M
GFR SERPL CREATININE-BSD FRML MDRD: 93 ML/MIN/1.73SQ M
GLUCOSE SERPL-MCNC: 114 MG/DL (ref 65–140)
GLUCOSE SERPL-MCNC: 150 MG/DL (ref 65–140)
GLUCOSE SERPL-MCNC: 151 MG/DL (ref 65–140)
GLUCOSE SERPL-MCNC: 173 MG/DL (ref 65–140)
GLUCOSE SERPL-MCNC: 206 MG/DL (ref 65–140)
GLUCOSE SERPL-MCNC: 213 MG/DL (ref 65–140)
GLUCOSE SERPL-MCNC: 302 MG/DL (ref 65–140)
HCT VFR BLD AUTO: 39 % (ref 36.5–49.3)
HCT VFR BLD AUTO: 42.3 % (ref 36.5–49.3)
HGB BLD-MCNC: 13.3 G/DL (ref 12–17)
HGB BLD-MCNC: 14.5 G/DL (ref 12–17)
IMM GRANULOCYTES # BLD AUTO: 0.14 THOUSAND/UL (ref 0–0.2)
IMM GRANULOCYTES # BLD AUTO: 0.18 THOUSAND/UL (ref 0–0.2)
IMM GRANULOCYTES NFR BLD AUTO: 1 % (ref 0–2)
IMM GRANULOCYTES NFR BLD AUTO: 1 % (ref 0–2)
INR PPP: 1.12 (ref 0.84–1.19)
LACTATE SERPL-SCNC: 4 MMOL/L (ref 0.5–2)
LYMPHOCYTES # BLD AUTO: 0.81 THOUSANDS/ΜL (ref 0.6–4.47)
LYMPHOCYTES # BLD AUTO: 1.67 THOUSANDS/ΜL (ref 0.6–4.47)
LYMPHOCYTES NFR BLD AUTO: 11 % (ref 14–44)
LYMPHOCYTES NFR BLD AUTO: 8 % (ref 14–44)
MAGNESIUM SERPL-MCNC: 1.9 MG/DL (ref 1.6–2.6)
MCH RBC QN AUTO: 33 PG (ref 26.8–34.3)
MCH RBC QN AUTO: 33.7 PG (ref 26.8–34.3)
MCHC RBC AUTO-ENTMCNC: 34.1 G/DL (ref 31.4–37.4)
MCHC RBC AUTO-ENTMCNC: 34.3 G/DL (ref 31.4–37.4)
MCV RBC AUTO: 96 FL (ref 82–98)
MCV RBC AUTO: 99 FL (ref 82–98)
MONOCYTES # BLD AUTO: 0.52 THOUSAND/ΜL (ref 0.17–1.22)
MONOCYTES # BLD AUTO: 1.28 THOUSAND/ΜL (ref 0.17–1.22)
MONOCYTES NFR BLD AUTO: 5 % (ref 4–12)
MONOCYTES NFR BLD AUTO: 9 % (ref 4–12)
NEUTROPHILS # BLD AUTO: 11.59 THOUSANDS/ΜL (ref 1.85–7.62)
NEUTROPHILS # BLD AUTO: 8.63 THOUSANDS/ΜL (ref 1.85–7.62)
NEUTS SEG NFR BLD AUTO: 78 % (ref 43–75)
NEUTS SEG NFR BLD AUTO: 86 % (ref 43–75)
NRBC BLD AUTO-RTO: 0 /100 WBCS
NRBC BLD AUTO-RTO: 0 /100 WBCS
NT-PROBNP SERPL-MCNC: 441 PG/ML
PLATELET # BLD AUTO: 219 THOUSANDS/UL (ref 149–390)
PLATELET # BLD AUTO: 319 THOUSANDS/UL (ref 149–390)
PMV BLD AUTO: 10.1 FL (ref 8.9–12.7)
PMV BLD AUTO: 10.6 FL (ref 8.9–12.7)
POTASSIUM SERPL-SCNC: 4.3 MMOL/L (ref 3.5–5.3)
POTASSIUM SERPL-SCNC: 4.9 MMOL/L (ref 3.5–5.3)
PROCALCITONIN SERPL-MCNC: 0.18 NG/ML
PROT SERPL-MCNC: 6.4 G/DL (ref 6.4–8.2)
PROTHROMBIN TIME: 13.8 SECONDS (ref 11.6–14.5)
RBC # BLD AUTO: 3.95 MILLION/UL (ref 3.88–5.62)
RBC # BLD AUTO: 4.39 MILLION/UL (ref 3.88–5.62)
SODIUM SERPL-SCNC: 128 MMOL/L (ref 136–145)
SODIUM SERPL-SCNC: 130 MMOL/L (ref 135–147)
WBC # BLD AUTO: 10.15 THOUSAND/UL (ref 4.31–10.16)
WBC # BLD AUTO: 14.9 THOUSAND/UL (ref 4.31–10.16)

## 2022-02-17 PROCEDURE — 99291 CRITICAL CARE FIRST HOUR: CPT | Performed by: EMERGENCY MEDICINE

## 2022-02-17 PROCEDURE — 94760 N-INVAS EAR/PLS OXIMETRY 1: CPT

## 2022-02-17 PROCEDURE — 80048 BASIC METABOLIC PNL TOTAL CA: CPT | Performed by: NURSE PRACTITIONER

## 2022-02-17 PROCEDURE — 96375 TX/PRO/DX INJ NEW DRUG ADDON: CPT

## 2022-02-17 PROCEDURE — 84484 ASSAY OF TROPONIN QUANT: CPT | Performed by: EMERGENCY MEDICINE

## 2022-02-17 PROCEDURE — 71045 X-RAY EXAM CHEST 1 VIEW: CPT

## 2022-02-17 PROCEDURE — 85730 THROMBOPLASTIN TIME PARTIAL: CPT | Performed by: EMERGENCY MEDICINE

## 2022-02-17 PROCEDURE — 99285 EMERGENCY DEPT VISIT HI MDM: CPT

## 2022-02-17 PROCEDURE — 85379 FIBRIN DEGRADATION QUANT: CPT | Performed by: EMERGENCY MEDICINE

## 2022-02-17 PROCEDURE — 84145 PROCALCITONIN (PCT): CPT | Performed by: EMERGENCY MEDICINE

## 2022-02-17 PROCEDURE — 96365 THER/PROPH/DIAG IV INF INIT: CPT

## 2022-02-17 PROCEDURE — 83735 ASSAY OF MAGNESIUM: CPT | Performed by: EMERGENCY MEDICINE

## 2022-02-17 PROCEDURE — 80048 BASIC METABOLIC PNL TOTAL CA: CPT | Performed by: EMERGENCY MEDICINE

## 2022-02-17 PROCEDURE — 83605 ASSAY OF LACTIC ACID: CPT | Performed by: EMERGENCY MEDICINE

## 2022-02-17 PROCEDURE — 85610 PROTHROMBIN TIME: CPT | Performed by: EMERGENCY MEDICINE

## 2022-02-17 PROCEDURE — 85025 COMPLETE CBC W/AUTO DIFF WBC: CPT | Performed by: EMERGENCY MEDICINE

## 2022-02-17 PROCEDURE — 86140 C-REACTIVE PROTEIN: CPT | Performed by: EMERGENCY MEDICINE

## 2022-02-17 PROCEDURE — 85025 COMPLETE CBC W/AUTO DIFF WBC: CPT | Performed by: NURSE PRACTITIONER

## 2022-02-17 PROCEDURE — 87040 BLOOD CULTURE FOR BACTERIA: CPT | Performed by: EMERGENCY MEDICINE

## 2022-02-17 PROCEDURE — 83880 ASSAY OF NATRIURETIC PEPTIDE: CPT | Performed by: EMERGENCY MEDICINE

## 2022-02-17 PROCEDURE — 80076 HEPATIC FUNCTION PANEL: CPT | Performed by: EMERGENCY MEDICINE

## 2022-02-17 PROCEDURE — 82948 REAGENT STRIP/BLOOD GLUCOSE: CPT

## 2022-02-17 PROCEDURE — 96366 THER/PROPH/DIAG IV INF ADDON: CPT

## 2022-02-17 PROCEDURE — 96368 THER/DIAG CONCURRENT INF: CPT

## 2022-02-17 PROCEDURE — 36415 COLL VENOUS BLD VENIPUNCTURE: CPT | Performed by: EMERGENCY MEDICINE

## 2022-02-17 PROCEDURE — 99238 HOSP IP/OBS DSCHRG MGMT 30/<: CPT | Performed by: NURSE PRACTITIONER

## 2022-02-17 RX ORDER — HEPARIN SODIUM 1000 [USP'U]/ML
2000 INJECTION, SOLUTION INTRAVENOUS; SUBCUTANEOUS
Status: DISCONTINUED | OUTPATIENT
Start: 2022-02-17 | End: 2022-02-18 | Stop reason: HOSPADM

## 2022-02-17 RX ORDER — THIAMINE MONONITRATE (VIT B1) 100 MG
100 TABLET ORAL DAILY
Qty: 60 TABLET | Refills: 0 | Status: SHIPPED | OUTPATIENT
Start: 2022-02-18

## 2022-02-17 RX ORDER — HEPARIN SODIUM 10000 [USP'U]/100ML
3-20 INJECTION, SOLUTION INTRAVENOUS
Status: DISCONTINUED | OUTPATIENT
Start: 2022-02-17 | End: 2022-02-18 | Stop reason: HOSPADM

## 2022-02-17 RX ORDER — HEPARIN SODIUM 1000 [USP'U]/ML
4000 INJECTION, SOLUTION INTRAVENOUS; SUBCUTANEOUS ONCE
Status: COMPLETED | OUTPATIENT
Start: 2022-02-17 | End: 2022-02-17

## 2022-02-17 RX ORDER — PANTOPRAZOLE SODIUM 40 MG/1
40 TABLET, DELAYED RELEASE ORAL
Qty: 60 TABLET | Refills: 0 | Status: SHIPPED | OUTPATIENT
Start: 2022-02-18

## 2022-02-17 RX ORDER — HEPARIN SODIUM 1000 [USP'U]/ML
4000 INJECTION, SOLUTION INTRAVENOUS; SUBCUTANEOUS
Status: DISCONTINUED | OUTPATIENT
Start: 2022-02-17 | End: 2022-02-18 | Stop reason: HOSPADM

## 2022-02-17 RX ORDER — ZINC SULFATE 50(220)MG
220 CAPSULE ORAL DAILY
Qty: 60 CAPSULE | Refills: 0 | Status: SHIPPED | OUTPATIENT
Start: 2022-02-18

## 2022-02-17 RX ORDER — ASPIRIN 81 MG/1
324 TABLET, CHEWABLE ORAL ONCE
Status: COMPLETED | OUTPATIENT
Start: 2022-02-17 | End: 2022-02-17

## 2022-02-17 RX ADMIN — Medication 30 MG: at 08:23

## 2022-02-17 RX ADMIN — LISINOPRIL 5 MG: 5 TABLET ORAL at 08:24

## 2022-02-17 RX ADMIN — ENOXAPARIN SODIUM 30 MG: 100 INJECTION SUBCUTANEOUS at 08:23

## 2022-02-17 RX ADMIN — ZINC SULFATE 220 MG (50 MG) CAPSULE 220 MG: CAPSULE at 08:23

## 2022-02-17 RX ADMIN — DOCUSATE SODIUM 100 MG: 100 CAPSULE, LIQUID FILLED ORAL at 08:24

## 2022-02-17 RX ADMIN — CHOLECALCIFEROL TAB 10 MCG (400 UNIT) 400 UNITS: 10 TAB at 08:24

## 2022-02-17 RX ADMIN — OMEGA-3 FATTY ACIDS CAP 1000 MG 1000 MG: 1000 CAP at 08:24

## 2022-02-17 RX ADMIN — HEPARIN SODIUM 4000 UNITS: 1000 INJECTION INTRAVENOUS; SUBCUTANEOUS at 22:04

## 2022-02-17 RX ADMIN — ASPIRIN 81 MG CHEWABLE TABLET 324 MG: 81 TABLET CHEWABLE at 21:55

## 2022-02-17 RX ADMIN — PANTOPRAZOLE SODIUM 40 MG: 40 TABLET, DELAYED RELEASE ORAL at 05:36

## 2022-02-17 RX ADMIN — HEPARIN SODIUM 11.8 UNITS/KG/HR: 10000 INJECTION, SOLUTION INTRAVENOUS at 22:06

## 2022-02-17 RX ADMIN — SODIUM CHLORIDE, SODIUM LACTATE, POTASSIUM CHLORIDE, AND CALCIUM CHLORIDE 500 ML: .6; .31; .03; .02 INJECTION, SOLUTION INTRAVENOUS at 23:34

## 2022-02-17 RX ADMIN — THIAMINE HCL TAB 100 MG 100 MG: 100 TAB at 08:24

## 2022-02-17 RX ADMIN — OXYCODONE HYDROCHLORIDE AND ACETAMINOPHEN 1000 MG: 500 TABLET ORAL at 08:24

## 2022-02-17 NOTE — DISCHARGE INSTRUCTIONS
COVID-19 (Coronavirus Disease 2019)   WHAT YOU NEED TO KNOW:   Coronavirus disease 2019 (COVID-19) is the disease caused by a coronavirus first discovered in December 2019  Coronaviruses generally cause upper respiratory (nose, throat, and lung) infections, such as a cold  The new virus spreads quickly and easily  The virus can be spread starting 2 days before symptoms even begin  The virus has also changed into several new forms (called variants) since it was discovered  The variants may be more contagious (easily spread) than the original form  Some may also cause more severe illness than others  It is important to follow local, national, and worldwide measures to protect yourself and others from infection  DISCHARGE INSTRUCTIONS:   If you think you or someone you know may be infected:  Do the following to protect others:  · If emergency care is needed,  tell the  about the possible infection, or call ahead and tell the emergency department  · Call a healthcare provider  for instructions if symptoms are mild  Anyone who may be infected should not  arrive without calling first  The provider will need to protect staff members and other patients  · The person who may be infected needs to wear a face covering  while getting medical care  This will help lower the risk of infecting others  Coverings are not used for anyone who is younger than 2 years, has breathing problems, or cannot remove it  The provider can give you instructions for anyone who cannot wear a covering  Call your local emergency number (911 in the [address] [place] [address],[address]) or an emergency department if:   · You have trouble breathing or shortness of breath at rest     · You have chest pain or pressure that lasts longer than 5 minutes  · You become confused or hard to wake  · Your lips or face are blue  · You have a fever of 104°F (40°C) or higher      Call your doctor if:   · You do not  have symptoms of COVID-19 but had close physical contact within 14 days with someone who tested positive  · You have questions or concerns about your condition or care  Medicines: You may need any of the following for mild symptoms:  · Decongestants  help reduce nasal congestion and help you breathe more easily  If you take decongestant pills, they may make you feel restless or cause problems with your sleep  Do not use decongestant sprays for more than a few days  · Cough suppressants  help reduce coughing  Ask your healthcare provider which type of cough medicine is best for you  · Acetaminophen  decreases pain and fever  It is available without a doctor's order  Ask how much to take and how often to take it  Follow directions  Read the labels of all other medicines you are using to see if they also contain acetaminophen, or ask your doctor or pharmacist  Acetaminophen can cause liver damage if not taken correctly  Do not use more than 4 grams (4,000 milligrams) total of acetaminophen in one day  · NSAIDs , such as ibuprofen, help decrease swelling, pain, and fever  NSAIDs can cause stomach bleeding or kidney problems in certain people  If you take blood thinner medicine, always ask your healthcare provider if NSAIDs are safe for you  Always read the medicine label and follow directions  · Take your medicine as directed  Contact your healthcare provider if you think your medicine is not helping or if you have side effects  Tell him or her if you are allergic to any medicine  Keep a list of the medicines, vitamins, and herbs you take  Include the amounts, and when and why you take them  Bring the list or the pill bottles to follow-up visits  Carry your medicine list with you in case of an emergency  What you need to know about COVID-19 vaccines:  Get a vaccine even if you already had COVID-19  · COVID-19 vaccines are given as a shot in 1 or 2 doses  Some vaccines have emergency use authorization (EUA)   An EUA means the vaccine is not approved but is given because the benefits outweigh the risks  A 2-dose vaccine is fully approved for use in those 16 years or older  This vaccine also has an EUA for adolescents 12 to 15 years  Your healthcare provider can help you understand the benefits and risks  · A third dose is recommended for adults with a weakened immune system who get a 2-dose vaccine  The third dose is given at least 28 days after the second  · Even after you get the vaccine, continue social distancing and other measures  Experts are still learning how well the vaccines work to prevent infection, transmission, and severe illness  Although rare, you can become infected after you get the vaccine  You may also be able to pass the virus to others without knowing you are infected  · After you get the vaccine, check local, national, and international travel rules  Check to see if you need to be tested before you travel  You may also need to quarantine after you return  Some countries require proof of a negative test before you leave  You should also be tested 3 to 5 days after you return from another country  How the 2019 coronavirus spreads: The following are ways the virus is thought to spread, but more information may be coming:  · Droplets are the main way all coronaviruses spread  The virus travels in droplets that form when a person talks, coughs, or sneezes  The droplets can also float in the air for minutes or hours  Infection happens when you breathe in the droplets or get them in your eyes or nose  Close personal contact with an infected person increases your risk for infection  This means being within 6 feet (2 meters) of the person for at least 15 minutes over 24 hours  · Person-to-person contact can spread the virus  For example, a person with the virus on his or her hands can spread it by shaking hands with someone  · The virus can stay on objects and surfaces for a short time    You may become infected by touching the object or surface and then touching your eyes or mouth  · An infected animal may be able to infect a person who touches it  This may happen at live markets or on a farm  Help lower the risk for COVID-19:  The best way to prevent infection is to avoid anyone who is infected, but this can be hard to do  An infected person can spread the virus before signs or symptoms begin, or even if signs or symptoms never develop  The following can help lower the risk for infection:      · Wash your hands often throughout the day  Use soap and water  Rub your soapy hands together, lacing your fingers, for at least 20 seconds  Rinse with warm, running water  Dry your hands with a clean towel or paper towel  Use hand  that contains alcohol if soap and water are not available  Teach children how to wash their hands and use hand   · Cover sneezes and coughs  Turn your face away and cover your mouth and nose with a tissue  Throw the tissue away  Use the bend of your arm if a tissue is not available  Then wash your hands well with soap and water or use hand   Teach children how to cover a cough or sneeze  · Wear a face covering (mask) around anyone who does not live in your home  Use a cloth covering with at least 2 layers  You can also create layers by putting a cloth covering over a disposable non-medical mask  Cover your mouth and your nose  The covering should fit snugly against the bridge of your nose  Securely fasten it under your chin and on the sides of your face  Do not  wear a plastic face shield instead of a covering  Continue social distancing and washing your hands often  A face covering is not a substitute for social distancing safety measures  · Follow worldwide, national, and local social distancing guidelines  Keep at least 6 feet (2 meters) between you and others  Also keep this distance from anyone who comes to your home, such as someone making a delivery  Wear a face covering while you are around others  You will need to wear a covering in restaurants, stores, and other public buildings  You will also need a covering on mass transit, such as a bus, subway, or airplane  Remember to use a covering made from thick material or wear 2 coverings together  · Make a habit of not touching your face  If you get the virus on your hands, you can transfer it to your eyes, nose, or mouth and become infected  You can also transfer it to objects, surfaces, or people  Do not touch your eyes, nose, or mouth without washing your hands first     · Clean and disinfect high-touch surfaces and objects often  Use disinfecting wipes, or make a solution of 4 teaspoons of bleach in 1 quart (4 cups) of water  Clean and disinfect even if you think no one living in or coming to your home is infected with the virus  · Ask about other vaccines you may need  Get the influenza (flu) vaccine as soon as recommended each year, usually starting in September or October  Get the pneumonia vaccine if recommended  Your healthcare provider can tell you if you should also get other vaccines, and when to get them  Follow social distancing guidelines:  National and local social distancing rules vary  Rules may change over time as restrictions are lifted  Restrictions may return if an outbreak happens where you live  It is important to know and follow all current social distancing rules in your area  The following are general guidelines:  · Stay home if you are sick or think you may have COVID-19  It is important to stay home if you are waiting for a testing appointment or for test results  Even if you do not have symptoms, you can pass the virus to others  · Limit trips out of your home  Have food, medicines, and other supplies delivered and left at your door or other area, if possible  Plan trips out of your home so you make the fewest stops possible to limit close personal contact   Keep track of places you go  This will help contact tracers notify others if you become infected  · Avoid close physical contact with anyone who does not live in your home  Do not shake hands with, hug, or kiss a person as a greeting  If you must use public transportation (such as a bus or subway), try to sit or stand away from others  Only allow necessary people into your home  Wear your face covering, and remind others to wear a face covering  Remind them to wash their hands when they arrive and before they leave  Do not  let someone into your home or go to someone's home just to visit  Even if you both do not feel sick, the virus can pass from one of you to the other  · Avoid in-person gatherings and crowds  Gatherings or crowds of 10 or more individuals can cause the virus to spread  Avoid places such as greer, beaches, sporting events, and tourist attractions  For events such as parties, holiday meals, [employer] services, and conferences, attend virtually (on a computer), if possible  · Ask your healthcare provider for other ways to have appointments  Some providers offer phone, video, or other types of appointments  You may also be able to get prescriptions for a few months of your medicines at a time  · Stay safe if you must go out to work  Keep physical distance between you and other workers as much as possible  Follow your employer's rules so everyone stays safe  If you have COVID-19 and are recovering at home,  healthcare providers will give you specific instructions to follow  The following are general guidelines to remind you how to keep others safe until you are well:  · Wash your hands often  Use soap and water as much as possible  Use hand  that contains alcohol if soap and water are not available  Dry your hands with a clean towel or paper towel  Do not share towels with anyone  If you use paper towels, throw them away in a lined trash can kept in your room or area   Use a covered trash can, if possible  · Do not go out of your home unless it is necessary  Ask someone who is not infected to go out for groceries or supplies, or have them delivered  Do not go to your healthcare provider's office without an appointment  · Only have close physical contact with a person giving direct care, or a baby or child you must care for  Family members and friends should not visit you  If possible, stay in a separate area or room of your home if you live with others  No one should go into the area or room except to give you care  You can visit with others by phone, video chat, e-mail, or similar systems  · Wear a face covering while others are near you  This can help prevent droplets from spreading the virus when you talk, sneeze, or cough  Put the covering on before anyone comes into your room or area  Remind the person to cover his or her nose and mouth before coming in to provide care for you  · Do not share items  Do not share dishes, towels, or other items with anyone  Items need to be washed after you use them  · Protect your baby  Some newborns have tested positive for the virus  It is not known if they became infected before or after birth  The highest risk is when a  has close contact with an infected person  If you are pregnant or breastfeeding, talk to your healthcare provider or obstetrician about any concerns you have  He or she will tell you when to bring your baby in for check-ups and vaccines  He or she will also tell you what to do if you think your baby was infected with the coronavirus  Wash your hands and put on a clean face covering before you breastfeed or care for your baby  · Do not handle live animals unless it is necessary  Some animals, including pets, have been infected with the new coronavirus  Ask someone who is not infected to take care of your pet until you are well  If you must care for a pet, wear a face covering   Wash your hands before and after you give care  Talk to your healthcare provider about how to keep a service animal safe, if needed  · Follow directions from your healthcare provider for being around others after you recover  It is not known if or for how long a recovered person can pass the virus to others  Your provider may give you instructions, such as continuing social distancing and wearing a face covering  He or she will tell you when it is okay to be around others again  This may be 10 to 20 days after symptoms started or you had a positive test  Most symptoms will also need to be gone  Your provider will give you more information  Follow up with your doctor as directed:  Write down your questions so you remember to ask them during your visits  For more information:   · Centers for Disease Control and Prevention  [address] [place] , [address]  Phone: 6- 800 - [phone]  Web Address: [facility]    © [address] [place] [address] 2021 Information is for End User's use only and may not be sold, redistributed or otherwise used for commercial purposes  All illustrations and images included in CareNotes® are the copyrighted property of A D A M , Inc  or [address] [place], [place] [address]   The above information is an  only  It is not intended as medical advice for individual conditions or treatments  Talk to your doctor, nurse or pharmacist before following any medical regimen to see if it is safe and effective for you

## 2022-02-17 NOTE — DISCHARGE SUMMARY
[facility]  Discharge- Moni Duarte 1955, 73 y o  male MRN: [id number]  Unit/Bed#: ICU 09-01 Encounter: [id number]  Primary Care Provider: Mini Patterson MD   Date and time admitted to hospital: 2/8/2022  8:41 PM    * Acute respiratory failure due to COVID-19 [facility])  Assessment & Plan  · Patient reported pulse ox 74% at home  · Placed on 4L NC in ED,is currently on 7 L midflow  · Moderate pathway  · Completed 5 days of Remdesivir on 2/12/2022  · Decadron day 10/10   · Patient has declined baricitinib at this time since it's not FDA approved to treat COVID  · Serial labs    Type 2 diabetes mellitus [facility])  Assessment & Plan  Lab Results   Component Value Date    HGBA1C 7 7 (H) 12/06/2021       Recent Labs     02/17/22  0338 02/17/22  0535 02/17/22  0759 02/17/22  1004   POCGLU 206* 151* 114 213*       Blood Sugar Average: Last 72 hrs:  · (P) 193 [id number]   · hold oral medication  · Hyperglycemia is likely steroid induced   · Blood glucose continue to be significantly elevated despite initiating lantus, meal coverage with humalog and sliding scale      · Continue non DKA insulin drip given patient will continues to need to be on high dose steroids in the setting of COVID    Primary hypertension  Assessment & Plan  · Continue lisinopril  · Monitor blood pressure trends    Elevated troponin  Assessment & Plan  · Likely secondary to covid infection with hypoxia  · Trended down  · No chest pain    Discharging Physician / Practitioner: NEGRITA Gracia  PCP: Mini Patterson MD  Admission Date:   Admission Orders (From admission, onward)     Ordered        02/08/22 2138  Inpatient Admission  Once                      Discharge Date: 02/17/22    Medical Problems             Resolved Problems  Date Reviewed: 2/16/2022    None                Consultations During Hospital Stay:  · Cardiology     Procedures Performed:   · None    Significant Findings / Test Results:   · None    Incidental Findings:   · None     Test Results Pending at Discharge (will require follow up): · None     Outpatient Tests Requested:  · None    Complications:  Patient signed out against medical advice    Reason for Admission:  Shortness of breath, COVID    Hospital Course:     Joselo Stewart is a 73 y o  male patient who originally presented to the hospital on 2/8/2022 due to shortness of breath, COVID    Please see above list of diagnoses and related plan for additional information  Condition at Discharge: serious     Discharge Day Visit / Exam:     Subjective:  "I want to leave now" please refer to note 11:28 a m     Vitals: Blood Pressure: 121/65 (02/17/22 1000)  Pulse: 69 (02/17/22 1000)  Temperature: 98 °F (36 7 °C) (02/17/22 0800)  Temp Source: Tympanic (02/17/22 0800)  Respirations: 20 (02/17/22 1000)  Height: 5' 8" (172 7 cm) (02/09/22 1226)  Weight - Scale: 88 3 kg (194 lb 10 7 oz) (02/16/22 0500)  SpO2: 90 % (02/17/22 1000)  Exam:   Physical Exam  Vitals and nursing note reviewed  HENT:      Head: Normocephalic and atraumatic  Mouth/Throat:      Mouth: Mucous membranes are moist       Pharynx: Oropharynx is clear  Eyes:      Extraocular Movements: Extraocular movements intact  Pupils: Pupils are equal, round, and reactive to light  Cardiovascular:      Rate and Rhythm: Normal rate and regular rhythm  Pulses: Normal pulses  Heart sounds: Normal heart sounds  Pulmonary:      Effort: Pulmonary effort is normal       Breath sounds: Normal breath sounds  Abdominal:      General: Bowel sounds are normal       Palpations: Abdomen is soft  Tenderness: There is no abdominal tenderness  Musculoskeletal:         General: Normal range of motion  Cervical back: Normal range of motion and neck supple  Skin:     General: Skin is warm and dry  Capillary Refill: Capillary refill takes less than 2 seconds  Neurological:      General: No focal deficit present        Mental Status: He is alert and oriented to person, place, and time  Psychiatric:         Mood and Affect: Affect is angry  Behavior: Behavior is aggressive  Discharge instructions/Information to patient and family:   See after visit summary for information provided to patient and family  Provisions for Follow-Up Care:  See after visit summary for information related to follow-up care and any pertinent home health orders  Disposition:     Home    Planned Readmission: No     Discharge Statement:  I spent 30 minutes discharging the patient  This time was spent on the day of discharge  I had direct contact with the patient on the day of discharge  Greater than 50% of the total time was spent examining patient, answering all patient questions, arranging and discussing plan of care with patient as well as directly providing post-discharge instructions  Additional time then spent on discharge activities  Discharge Medications:  See after visit summary for reconciled discharge medications provided to patient and family        ** Please Note: This note has been constructed using a voice recognition system **

## 2022-02-17 NOTE — ASSESSMENT & PLAN NOTE
· Patient reported pulse ox 74% at home  · Placed on 4L NC in ED,is currently on 7 L midflow  · Moderate pathway  · Completed 5 days of Remdesivir on 2/12/2022  · Decadron day 10/10   · Patient has declined baricitinib at this time since it's not FDA approved to treat COVID  · Serial labs

## 2022-02-17 NOTE — ASSESSMENT & PLAN NOTE
Lab Results   Component Value Date    HGBA1C 7 7 (H) 12/06/2021       Recent Labs     02/17/22  0338 02/17/22  0535 02/17/22  0759 02/17/22  1004   POCGLU 206* 151* 114 213*       Blood Sugar Average: Last 72 hrs:  · (P) 193 [id number]   · hold oral medication  · Hyperglycemia is likely steroid induced   · Blood glucose continue to be significantly elevated despite initiating lantus, meal coverage with humalog and sliding scale      · Continue non DKA insulin drip given patient will continues to need to be on high dose steroids in the setting of COVID

## 2022-02-17 NOTE — DISCHARGE INSTR - AVS FIRST PAGE
You have requested to leave the hospital against medical advice  We do not want to leave as you are still requiring significant amounts of oxygen  We are concerned that you will get worse at home  Please come back at any time, especially if you notice increasing shortness of breath, confusion, heart palpitations, or bluish color of lips or skin

## 2022-02-17 NOTE — PLAN OF CARE
Problem: PAIN - ADULT  Goal: Verbalizes/displays adequate comfort level or baseline comfort level  Description: Interventions:  - Encourage patient to monitor pain and request assistance  - Assess pain using appropriate pain scale  - Administer analgesics based on type and severity of pain and evaluate response  - Implement non-pharmacological measures as appropriate and evaluate response  - Consider cultural and social influences on pain and pain management  - Notify physician/advanced practitioner if interventions unsuccessful or patient reports new pain  Outcome: Progressing     Problem: INFECTION - ADULT  Goal: Absence or prevention of progression during hospitalization  Description: INTERVENTIONS:  - Assess and monitor for signs and symptoms of infection  - Monitor lab/diagnostic results  - Monitor all insertion sites, i e  indwelling lines, tubes, and drains  - Monitor endotracheal if appropriate and nasal secretions for changes in amount and color  - [place] appropriate cooling/warming therapies per order  - Administer medications as ordered  - Instruct and encourage patient and family to use good hand hygiene technique  - Identify and instruct in appropriate isolation precautions for identified infection/condition  Outcome: Progressing

## 2022-02-17 NOTE — NURSING NOTE
Patient irritable and angry with staff; requests to see attending physician ASAP; Patient educated on Covid restrictions and medications prescribed for treatment  States that he wants to sign AMA if his questions are not answered  Attending hospitalist notified via [facility] text of same

## 2022-02-17 NOTE — PLAN OF CARE
Problem: PAIN - ADULT  Goal: Verbalizes/displays adequate comfort level or baseline comfort level  Description: Interventions:  - Encourage patient to monitor pain and request assistance  - Assess pain using appropriate pain scale  - Administer analgesics based on type and severity of pain and evaluate response  - Implement non-pharmacological measures as appropriate and evaluate response  - Consider cultural and social influences on pain and pain management  - Notify physician/advanced practitioner if interventions unsuccessful or patient reports new pain  Outcome: Progressing     Problem: INFECTION - ADULT  Goal: Absence or prevention of progression during hospitalization  Description: INTERVENTIONS:  - Assess and monitor for signs and symptoms of infection  - Monitor lab/diagnostic results  - Monitor all insertion sites, i e  indwelling lines, tubes, and drains  - Monitor endotracheal if appropriate and nasal secretions for changes in amount and color  - [place] appropriate cooling/warming therapies per order  - Administer medications as ordered  - Instruct and encourage patient and family to use good hand hygiene technique  - Identify and instruct in appropriate isolation precautions for identified infection/condition  Outcome: Progressing     Problem: SAFETY ADULT  Goal: Patient will remain free of falls  Description: INTERVENTIONS:  - Educate patient/family on patient safety including physical limitations  - Instruct patient to call for assistance with activity   - Consult OT/PT to assist with strengthening/mobility   - Keep Call bell within reach  - Keep bed low and locked with side rails adjusted as appropriate  - Keep care items and personal belongings within reach  - Initiate and maintain comfort rounds  - Make Fall Risk Sign visible to staff  - Offer Toileting every 2 Hours, in advance of need    - Obtain necessary fall risk management equipment: walker  - Apply yellow socks and bracelet for high fall risk patients  - Consider moving patient to room near nurses station  Outcome: Progressing     Problem: SAFETY ADULT  Goal: Maintain or return to baseline ADL function  Description: INTERVENTIONS:  -  Assess patient's ability to carry out ADLs; assess patient's baseline for ADL function and identify physical deficits which impact ability to perform ADLs (bathing, care of mouth/teeth, toileting, grooming, dressing, etc )  - Assess/evaluate cause of self-care deficits   - Assess range of motion  - Assess patient's mobility; develop plan if impaired  - Assess patient's need for assistive devices and provide as appropriate  - Encourage maximum independence but intervene and supervise when necessary  - Involve family in performance of ADLs  - Assess for home care needs following discharge   - Consider OT consult to assist with ADL evaluation and planning for discharge  - Provide patient education as appropriate  Outcome: Progressing     Problem: SAFETY ADULT  Goal: Maintains/Returns to pre admission functional level  Description: INTERVENTIONS:  - Perform BMAT or MOVE assessment daily    - Set and communicate daily mobility goal to care team and patient/family/caregiver  - Collaborate with rehabilitation services on mobility goals if consulted  - Perform Range of Motion 2 times a day  - Reposition patient every 2 hours    - Dangle patient 2 times a day  - Stand patient 2 times a day  - Ambulate patient 2  times a day  - Out of bed to chair 2 times a day   - Out of bed for meals 3 times a day  - Out of bed for toileting  - Record patient progress and toleration of activity level   Outcome: Progressing     Problem: Potential for Falls  Goal: Patient will remain free of falls  Description: INTERVENTIONS:  - Educate patient/family on patient safety including physical limitations  - Instruct patient to call for assistance with activity   - Consult OT/PT to assist with strengthening/mobility   - Keep Call bell within reach  - Keep bed low and locked with side rails adjusted as appropriate  - Keep care items and personal belongings within reach  - Initiate and maintain comfort rounds  - Make Fall Risk Sign visible to staff  - Offer Toileting every 2  Hours, in advance of need    Obtain necessary fall risk management equipment:   - Apply yellow socks and bracelet for high fall risk patients  - Consider moving patient to room near nurses station  Outcome: Progressing

## 2022-02-17 NOTE — QUICK NOTE
Went to see patient who is requesting to leave AMA  Upon entering the room, he immediately escalated and began yelling and waving his arms  I discussed with him that he is still requiring 7 L of oxygen, and I could not discharge him home and advised against it  He yelled, "I want to leave, you guys aren't doing anything for me, this is all just a government conspiracy, all you are doing is sucking my insurance dry " I explained that if he went home without oxygen, he would likely get worse, and could even die  He yelled back, waving his arms, "if I go home and die, then I die " This conversation was witnessed by the primary nurse  AMA form was signed

## 2022-02-18 ENCOUNTER — HOSPITAL ENCOUNTER (INPATIENT)
Facility: HOSPITAL | Age: 67
LOS: 14 days | Discharge: HOME | DRG: 177 | End: 2022-03-04
Attending: INTERNAL MEDICINE | Admitting: STUDENT IN AN ORGANIZED HEALTH CARE EDUCATION/TRAINING PROGRAM
Payer: COMMERCIAL

## 2022-02-18 VITALS
OXYGEN SATURATION: 92 % | SYSTOLIC BLOOD PRESSURE: 107 MMHG | RESPIRATION RATE: 25 BRPM | HEART RATE: 69 BPM | TEMPERATURE: 98.5 F | DIASTOLIC BLOOD PRESSURE: 62 MMHG

## 2022-02-18 DIAGNOSIS — U07.1 PNEUMONIA DUE TO COVID-19 VIRUS: ICD-10-CM

## 2022-02-18 DIAGNOSIS — R77.8 ELEVATED TROPONIN: Primary | ICD-10-CM

## 2022-02-18 DIAGNOSIS — J96.01 ACUTE RESPIRATORY FAILURE WITH HYPOXIA (HCC): ICD-10-CM

## 2022-02-18 DIAGNOSIS — E11.9 TYPE 2 DIABETES MELLITUS WITHOUT COMPLICATION, WITHOUT LONG-TERM CURRENT USE OF INSULIN (HCC): ICD-10-CM

## 2022-02-18 DIAGNOSIS — J12.82 PNEUMONIA DUE TO COVID-19 VIRUS: ICD-10-CM

## 2022-02-18 PROBLEM — N17.9 ACUTE RENAL FAILURE (ARF) (HCC): Status: ACTIVE | Noted: 2022-02-18

## 2022-02-18 LAB
2HR DELTA HS TROPONIN: 325 NG/L
ALBUMIN SERPL BCP-MCNC: 2.3 G/DL (ref 3.5–5)
ALP SERPL-CCNC: 98 U/L (ref 46–116)
ALT SERPL W P-5'-P-CCNC: 75 U/L (ref 12–78)
ANION GAP SERPL CALCULATED.3IONS-SCNC: 9 MMOL/L (ref 4–13)
APTT PPP: 46 SECONDS (ref 23–37)
APTT PPP: 58 SECONDS (ref 23–37)
APTT PPP: >210 SECONDS (ref 23–37)
AST SERPL W P-5'-P-CCNC: 74 U/L (ref 5–45)
ATRIAL RATE: 78 BPM
BACTERIA UR QL AUTO: ABNORMAL /HPF
BILIRUB SERPL-MCNC: 0.76 MG/DL (ref 0.2–1)
BILIRUB UR QL STRIP: NEGATIVE
BUN SERPL-MCNC: 25 MG/DL (ref 5–25)
CALCIUM ALBUM COR SERPL-MCNC: 9.6 MG/DL (ref 8.3–10.1)
CALCIUM SERPL-MCNC: 8.2 MG/DL (ref 8.3–10.1)
CARDIAC TROPONIN I PNL SERPL HS: 3675 NG/L
CHLORIDE SERPL-SCNC: 105 MMOL/L (ref 100–108)
CLARITY UR: CLEAR
CO2 SERPL-SCNC: 18 MMOL/L (ref 21–32)
COLOR UR: YELLOW
CREAT SERPL-MCNC: 0.89 MG/DL (ref 0.6–1.3)
CRP SERPL QL: 18.3 MG/L
D DIMER PPP FEU-MCNC: 1.49 UG/ML FEU
ERYTHROCYTE [DISTWIDTH] IN BLOOD BY AUTOMATED COUNT: 13.3 % (ref 11.6–15.1)
GFR SERPL CREATININE-BSD FRML MDRD: 89 ML/MIN/1.73SQ M
GLUCOSE SERPL-MCNC: 226 MG/DL (ref 65–140)
GLUCOSE SERPL-MCNC: 229 MG/DL (ref 65–140)
GLUCOSE SERPL-MCNC: 263 MG/DL (ref 65–140)
GLUCOSE SERPL-MCNC: 270 MG/DL (ref 65–140)
GLUCOSE SERPL-MCNC: 292 MG/DL (ref 65–140)
GLUCOSE SERPL-MCNC: 334 MG/DL (ref 65–140)
GLUCOSE UR STRIP-MCNC: ABNORMAL MG/DL
HCT VFR BLD AUTO: 40.2 % (ref 36.5–49.3)
HGB BLD-MCNC: 13.6 G/DL (ref 12–17)
HGB UR QL STRIP.AUTO: NEGATIVE
HYALINE CASTS #/AREA URNS LPF: ABNORMAL /LPF
KETONES UR STRIP-MCNC: ABNORMAL MG/DL
LACTATE SERPL-SCNC: 2.5 MMOL/L (ref 0.5–2)
LACTATE SERPL-SCNC: 2.7 MMOL/L (ref 0.5–2)
LEUKOCYTE ESTERASE UR QL STRIP: NEGATIVE
MCH RBC QN AUTO: 34.1 PG (ref 26.8–34.3)
MCHC RBC AUTO-ENTMCNC: 33.8 G/DL (ref 31.4–37.4)
MCV RBC AUTO: 101 FL (ref 82–98)
NITRITE UR QL STRIP: NEGATIVE
NON-SQ EPI CELLS URNS QL MICRO: ABNORMAL /HPF
P AXIS: 46 DEGREES
PH UR STRIP.AUTO: 6 [PH]
PLATELET # BLD AUTO: 230 THOUSANDS/UL (ref 149–390)
PMV BLD AUTO: 10.5 FL (ref 8.9–12.7)
POTASSIUM SERPL-SCNC: 4.3 MMOL/L (ref 3.5–5.3)
PR INTERVAL: 142 MS
PROCALCITONIN SERPL-MCNC: 0.32 NG/ML
PROT SERPL-MCNC: 6.2 G/DL (ref 6.4–8.2)
PROT UR STRIP-MCNC: NEGATIVE MG/DL
QRS AXIS: -56 DEGREES
QRSD INTERVAL: 132 MS
QT INTERVAL: 422 MS
QTC INTERVAL: 481 MS
RBC # BLD AUTO: 3.99 MILLION/UL (ref 3.88–5.62)
RBC #/AREA URNS AUTO: ABNORMAL /HPF
SODIUM SERPL-SCNC: 132 MMOL/L (ref 136–145)
SP GR UR STRIP.AUTO: 1.02 (ref 1–1.03)
T WAVE AXIS: 47 DEGREES
UROBILINOGEN UR QL STRIP.AUTO: 1 E.U./DL
VENTRICULAR RATE: 78 BPM
WBC # BLD AUTO: 10.37 THOUSAND/UL (ref 4.31–10.16)
WBC #/AREA URNS AUTO: ABNORMAL /HPF

## 2022-02-18 PROCEDURE — 99223 1ST HOSP IP/OBS HIGH 75: CPT | Performed by: INTERNAL MEDICINE

## 2022-02-18 PROCEDURE — 81001 URINALYSIS AUTO W/SCOPE: CPT | Performed by: INTERNAL MEDICINE

## 2022-02-18 PROCEDURE — 85027 COMPLETE CBC AUTOMATED: CPT | Performed by: INTERNAL MEDICINE

## 2022-02-18 PROCEDURE — 83605 ASSAY OF LACTIC ACID: CPT | Performed by: INTERNAL MEDICINE

## 2022-02-18 PROCEDURE — 85379 FIBRIN DEGRADATION QUANT: CPT | Performed by: INTERNAL MEDICINE

## 2022-02-18 PROCEDURE — XW033E5 INTRODUCTION OF REMDESIVIR ANTI-INFECTIVE INTO PERIPHERAL VEIN, PERCUTANEOUS APPROACH, NEW TECHNOLOGY GROUP 5: ICD-10-PCS | Performed by: INTERNAL MEDICINE

## 2022-02-18 PROCEDURE — 84145 PROCALCITONIN (PCT): CPT | Performed by: INTERNAL MEDICINE

## 2022-02-18 PROCEDURE — 93005 ELECTROCARDIOGRAM TRACING: CPT

## 2022-02-18 PROCEDURE — 85730 THROMBOPLASTIN TIME PARTIAL: CPT | Performed by: INTERNAL MEDICINE

## 2022-02-18 PROCEDURE — 86140 C-REACTIVE PROTEIN: CPT | Performed by: INTERNAL MEDICINE

## 2022-02-18 PROCEDURE — 99232 SBSQ HOSP IP/OBS MODERATE 35: CPT | Performed by: INTERNAL MEDICINE

## 2022-02-18 PROCEDURE — 82948 REAGENT STRIP/BLOOD GLUCOSE: CPT

## 2022-02-18 PROCEDURE — 93010 ELECTROCARDIOGRAM REPORT: CPT | Performed by: INTERNAL MEDICINE

## 2022-02-18 PROCEDURE — 96366 THER/PROPH/DIAG IV INF ADDON: CPT

## 2022-02-18 PROCEDURE — 80053 COMPREHEN METABOLIC PANEL: CPT | Performed by: INTERNAL MEDICINE

## 2022-02-18 RX ORDER — ASPIRIN 81 MG/1
81 TABLET, CHEWABLE ORAL DAILY
Status: DISCONTINUED | OUTPATIENT
Start: 2022-02-19 | End: 2022-03-04 | Stop reason: HOSPADM

## 2022-02-18 RX ORDER — PANTOPRAZOLE SODIUM 40 MG/1
40 TABLET, DELAYED RELEASE ORAL
Status: DISCONTINUED | OUTPATIENT
Start: 2022-02-18 | End: 2022-03-04 | Stop reason: HOSPADM

## 2022-02-18 RX ORDER — LANOLIN ALCOHOL/MO/W.PET/CERES
100 CREAM (GRAM) TOPICAL DAILY
Status: DISCONTINUED | OUTPATIENT
Start: 2022-02-18 | End: 2022-03-04 | Stop reason: HOSPADM

## 2022-02-18 RX ORDER — DEXAMETHASONE SODIUM PHOSPHATE 4 MG/ML
6 INJECTION, SOLUTION INTRA-ARTICULAR; INTRALESIONAL; INTRAMUSCULAR; INTRAVENOUS; SOFT TISSUE EVERY 24 HOURS
Status: COMPLETED | OUTPATIENT
Start: 2022-02-18 | End: 2022-02-18

## 2022-02-18 RX ORDER — DEXAMETHASONE SODIUM PHOSPHATE 4 MG/ML
6 INJECTION, SOLUTION INTRA-ARTICULAR; INTRALESIONAL; INTRAMUSCULAR; INTRAVENOUS; SOFT TISSUE EVERY 24 HOURS
Status: DISCONTINUED | OUTPATIENT
Start: 2022-02-18 | End: 2022-02-18

## 2022-02-18 RX ORDER — SODIUM CHLORIDE, SODIUM GLUCONATE, SODIUM ACETATE, POTASSIUM CHLORIDE, MAGNESIUM CHLORIDE, SODIUM PHOSPHATE, DIBASIC, AND POTASSIUM PHOSPHATE .53; .5; .37; .037; .03; .012; .00082 G/100ML; G/100ML; G/100ML; G/100ML; G/100ML; G/100ML; G/100ML
75 INJECTION, SOLUTION INTRAVENOUS CONTINUOUS
Status: DISCONTINUED | OUTPATIENT
Start: 2022-02-18 | End: 2022-02-18

## 2022-02-18 RX ORDER — INSULIN GLARGINE 100 [IU]/ML
15 INJECTION, SOLUTION SUBCUTANEOUS
Status: DISCONTINUED | OUTPATIENT
Start: 2022-02-18 | End: 2022-02-19

## 2022-02-18 RX ORDER — BENZONATATE 100 MG/1
100 CAPSULE ORAL 3 TIMES DAILY PRN
Status: DISCONTINUED | OUTPATIENT
Start: 2022-02-18 | End: 2022-02-19

## 2022-02-18 RX ORDER — ATORVASTATIN CALCIUM 10 MG/1
10 TABLET, FILM COATED ORAL DAILY
Status: DISCONTINUED | OUTPATIENT
Start: 2022-02-18 | End: 2022-03-04 | Stop reason: HOSPADM

## 2022-02-18 RX ORDER — ZINC SULFATE 50(220)MG
220 CAPSULE ORAL DAILY
Status: DISCONTINUED | OUTPATIENT
Start: 2022-02-18 | End: 2022-03-04 | Stop reason: HOSPADM

## 2022-02-18 RX ORDER — ONDANSETRON 2 MG/ML
4 INJECTION INTRAMUSCULAR; INTRAVENOUS EVERY 6 HOURS PRN
Status: DISCONTINUED | OUTPATIENT
Start: 2022-02-18 | End: 2022-03-04 | Stop reason: HOSPADM

## 2022-02-18 RX ORDER — ACETAMINOPHEN 325 MG/1
650 TABLET ORAL EVERY 4 HOURS PRN
Status: DISCONTINUED | OUTPATIENT
Start: 2022-02-18 | End: 2022-03-04 | Stop reason: HOSPADM

## 2022-02-18 RX ADMIN — INSULIN LISPRO 3 UNITS: 100 INJECTION, SOLUTION INTRAVENOUS; SUBCUTANEOUS at 22:04

## 2022-02-18 RX ADMIN — INSULIN LISPRO 3 UNITS: 100 INJECTION, SOLUTION INTRAVENOUS; SUBCUTANEOUS at 17:30

## 2022-02-18 RX ADMIN — SODIUM CHLORIDE, SODIUM GLUCONATE, SODIUM ACETATE, POTASSIUM CHLORIDE, MAGNESIUM CHLORIDE, SODIUM PHOSPHATE, DIBASIC, AND POTASSIUM PHOSPHATE 75 ML/HR: .53; .5; .37; .037; .03; .012; .00082 INJECTION, SOLUTION INTRAVENOUS at 04:09

## 2022-02-18 RX ADMIN — INSULIN LISPRO 5 UNITS: 100 INJECTION, SOLUTION INTRAVENOUS; SUBCUTANEOUS at 12:45

## 2022-02-18 RX ADMIN — ATORVASTATIN CALCIUM 10 MG: 10 TABLET, FILM COATED ORAL at 09:29

## 2022-02-18 RX ADMIN — HEPARIN SODIUM 11.8 UNITS/KG/HR: 10000 INJECTION, SOLUTION INTRAVENOUS; SUBCUTANEOUS at 04:09

## 2022-02-18 RX ADMIN — INSULIN GLARGINE 15 UNITS: 100 INJECTION, SOLUTION SUBCUTANEOUS at 22:04

## 2022-02-18 RX ADMIN — THIAMINE HCL TAB 100 MG 100 MG: 100 TAB at 09:29

## 2022-02-18 RX ADMIN — PANTOPRAZOLE SODIUM 40 MG: 40 TABLET, DELAYED RELEASE ORAL at 06:10

## 2022-02-18 RX ADMIN — INSULIN LISPRO 4 UNITS: 100 INJECTION, SOLUTION INTRAVENOUS; SUBCUTANEOUS at 12:45

## 2022-02-18 RX ADMIN — INSULIN LISPRO 5 UNITS: 100 INJECTION, SOLUTION INTRAVENOUS; SUBCUTANEOUS at 17:30

## 2022-02-18 RX ADMIN — BENZONATATE 100 MG: 100 CAPSULE ORAL at 06:10

## 2022-02-18 RX ADMIN — ZINC SULFATE 220 MG (50 MG) CAPSULE 220 MG: CAPSULE at 09:29

## 2022-02-18 RX ADMIN — DEXAMETHASONE SODIUM PHOSPHATE 6 MG: 4 INJECTION INTRA-ARTICULAR; INTRALESIONAL; INTRAMUSCULAR; INTRAVENOUS; SOFT TISSUE at 09:29

## 2022-02-18 NOTE — ASSESSMENT & PLAN NOTE
· Admitted 02/08/2022-02/17/2022 with acute respiratory failure secondary to COVID-19 at the [facility], unfortunately left AMA on date of discharge  · [facility] with shortness of breath, noted with hypoxia requiring 7L [address]  · Currently requiring 10 L [address] at time of admission here  · Obtain updated CRP, D-dimer  · Completed dexamethasone today  · Patient refusing Baricitinib as it is not FDA approved  · Continue supplemental oxygen and titrate as needed to maintain SaO2 greater than 90%  · Respiratory protocol, incentive spirometry, pulmonary toileting

## 2022-02-18 NOTE — ED NOTES
Pt left unit in stable condion  V/ss wnl  All belongings sent with pt  [facility] physician Dr Logan Castro  Report given to [facility] at [phone]       Carmen Gresham RN  02/18/22 3243

## 2022-02-18 NOTE — ASSESSMENT & PLAN NOTE
· POA, baseline approximately 0 8  · Suspect secondary to covid-19 infection, ?dehydration  · Received IVF and renal function is back to baseline  · D/c IVF

## 2022-02-18 NOTE — ASSESSMENT & PLAN NOTE
· Admitted 02/08/2022-02/17/2022 with acute respiratory failure secondary to COVID-19 at the [facility], unfortunately left AMA on date of discharge  · Presented again to [facility] ED with shortness of breath, noted with hypoxia requiring 7L [address]  · Currently requiring 10 L [address] at time of admission here  · Obtain updated CRP, D-dimer  · To receive 1 further dose dexamethasone to complete 10 day course, and patient completed 5 days of remdesivir at [facility]  · Patient refusing Baricitinib as it is not FDA approved  · Continue supplemental oxygen and titrate as needed to maintain SaO2 greater than 90%  · Respiratory protocol, incentive spirometry, pulmonary toileting

## 2022-02-18 NOTE — PROGRESS NOTES
[address] [facility]  Progress Note - Moni Duarte 1955, 73 y o  male MRN: [id number]  Unit/Bed#: [address] Encounter: [id number]  Primary Care Provider: Mini Patterson MD   Date and time admitted to hospital: 2/18/2022  2:15 AM    * Acute respiratory failure due to COVID-19 [facility])  Assessment & Plan  · Admitted 02/08/2022-02/17/2022 with acute respiratory failure secondary to COVID-19 at the [facility], unfortunately left AMA on date of discharge  · Presented again [facility] with shortness of breath, noted with hypoxia requiring 7L [address]  · Currently requiring 10 L [address] at time of admission here  · Obtain updated CRP, D-dimer  · Completed dexamethasone today  · Patient refusing Baricitinib as it is not FDA approved  · Continue supplemental oxygen and titrate as needed to maintain SaO2 greater than 90%  · Respiratory protocol, incentive spirometry, pulmonary toileting    Non MI elevated troponin  Assessment & Plan  · Likely due to prolonged hypoxia from covid-19  · No ischemic symptoms  · No further workup at this time per cardiology  · Outpatient cardiology follow up    Acute renal failure (ARF) ([address] )  Assessment & Plan  · POA, baseline approximately 0 8  · Suspect secondary to covid-19 infection, ?dehydration  · Received IVF and renal function is back to baseline  · D/c IVF    Primary hypertension  Assessment & Plan  · Holding home lisinopril secondary to acute renal failure    Type 2 diabetes mellitus [facility])  Assessment & Plan  Lab Results   Component Value Date    HGBA1C 7 7 (H) 12/06/2021       Recent Labs     02/17/22  1004 02/18/22  0229 02/18/22  0633 02/18/22  1105   POCGLU 213* 263* 229* 334*       Blood Sugar Average: Last 72 hrs:  (P) 357 [id number]   · Hold home oral medications, start SSI with Accu-Cheks while here  · Carb controlled diet  · Initiate hypoglycemia protocol  · Start basal bolus insulin regimen while inpatient      VTE Pharmacologic Prophylaxis:   Pharmacologic: Heparin Drip  Mechanical VTE Prophylaxis in Place: Yes    Patient Centered Rounds: I have performed bedside rounds with nursing staff today  Discussions with Specialists or Other Care Team Provider: cardiology    Education and Discussions with Family / Patient: patient, plan of care    Time Spent for Care: 20 minutes  More than 50% of total time spent on counseling and coordination of care as described above  Current Length of Stay: 0 day(s)    Current Patient Status: Inpatient   Certification Statement: The patient will continue to require additional inpatient hospital stay due to hypoxia    Discharge Plan: home when stable    Code Status: Level 1 - Full Code      Subjective:   Reports that his breathing is stable, no chest pain  Mild cough with sputum  Objective:     Vitals:   Temp (24hrs), Av 8 °F (36 6 °C), Min:97 2 °F (36 2 °C), Max:98 5 °F (36 9 °C)    Temp:  [97 2 °F (36 2 °C)-98 5 °F (36 9 °C)] 97 7 °F (36 5 °C)  HR:  [] 81  Resp:  [18-29] 18  BP: (106-138)/(51-69) 138/69  SpO2:  [74 %-92 %] 88 %  There is no height or weight on file to calculate BMI  Input and Output Summary (last 24 hours): Intake/Output Summary (Last 24 hours) at 2022 1355  Last data filed at 2022 1101  Gross per 24 hour   Intake 120 ml   Output 800 ml   Net -680 ml       Physical Exam:     Physical Exam  Constitutional:       Appearance: Normal appearance  HENT:      Head: Normocephalic and atraumatic  Mouth/Throat:      Mouth: Mucous membranes are moist       Pharynx: Oropharynx is clear  Eyes:      Extraocular Movements: Extraocular movements intact  Cardiovascular:      Rate and Rhythm: Normal rate and regular rhythm  Pulmonary:      Effort: Pulmonary effort is normal       Breath sounds: No wheezing or rales  Abdominal:      Palpations: Abdomen is soft  Neurological:      General: No focal deficit present        Mental Status: He is alert and oriented to person, place, and time  Psychiatric:         Mood and Affect: Mood normal          Behavior: Behavior normal            Additional Data:     Labs:    Results from last 7 days   Lab Units 02/18/22  0404 02/17/22  2130 02/17/22  2130   WBC Thousand/uL 10 37*   < > 14 90*   HEMOGLOBIN g/dL 13 6   < > 14 5   HEMATOCRIT % 40 2   < > 42 3   PLATELETS Thousands/uL 230   < > 319   NEUTROS PCT %  --   --  78*   LYMPHS PCT %  --   --  11*   MONOS PCT %  --   --  9   EOS PCT %  --   --  1    < > = values in this interval not displayed  Results from last 7 days   Lab Units 02/18/22  0405   SODIUM mmol/L 132*   POTASSIUM mmol/L 4 3   CHLORIDE mmol/L 105   CO2 mmol/L 18*   BUN mg/dL 25   CREATININE mg/dL 0 89   ANION GAP mmol/L 9   CALCIUM mg/dL 8 2*   ALBUMIN g/dL 2 3*   TOTAL BILIRUBIN mg/dL 0 76   ALK PHOS U/L 98   ALT U/L 75   AST U/L 74*   GLUCOSE RANDOM mg/dL 226*     Results from last 7 days   Lab Units 02/17/22  2130   INR  1 12     Results from last 7 days   Lab Units 02/18/22  1105 02/18/22  0633 02/18/22  0229 02/17/22  1004 02/17/22  0759 02/17/22  0535 02/17/22  0338 02/17/22  0144 02/16/22  2337 02/16/22  2140 02/16/22  1938 02/16/22  1757   POC GLUCOSE mg/dl 334* 229* 263* 213* 114 151* 206* 173* 80 246* 323* 121         Results from last 7 days   Lab Units 02/18/22  0633 02/18/22  0406 02/18/22  0404 02/17/22  2130   LACTIC ACID mmol/L 2 5*  --  2 7* 4 0*   PROCALCITONIN ng/ml  --  0 32*  --  0 18           * I Have Reviewed All Lab Data Listed Above  * Additional Pertinent Lab Tests Reviewed: All Labs Within Last 24 Hours Reviewed    Imaging:    Imaging Reports Reviewed Today Include: cxr  Imaging Personally Reviewed by Myself Includes:  cxr    Recent Cultures (last 7 days):     Results from last 7 days   Lab Units 02/17/22 2130   BLOOD CULTURE  Received in Microbiology Lab  Culture in Progress  Received in Microbiology Lab  Culture in Progress         Last 24 Hours Medication List: Current Facility-Administered Medications   Medication Dose Route Frequency Provider Last Rate    acetaminophen  650 mg Oral Q4H PRN Selin Hoff DO      [START ON 2/19/2022] aspirin  81 mg Oral Daily Selin Hoff DO      atorvastatin  10 mg Oral Daily Selin Hoff DO      benzonatate  100 mg Oral TID PRN Selin Hoff DO      heparin (porcine)  3-20 Units/kg/hr (Order-Specific) Intravenous Titrated Selin Hoff DO 13 8 Units/kg/hr (02/18/22 0440)    insulin glargine  15 Units Subcutaneous HS Judge Jose MD      insulin lispro  1-5 Units Subcutaneous TID AC Judge Jose MD      insulin lispro  1-5 Units Subcutaneous HS Judge Jose MD      insulin lispro  5 Units Subcutaneous TID With Meals Judge Jose MD      ondansetron  4 mg Intravenous Q6H PRN Selin Hoff DO      pantoprazole  40 mg Oral Early Morning Selin Hoff DO      thiamine  100 mg Oral Daily Selin Hoff DO      zinc sulfate  220 mg Oral Daily Selin Hoff DO          Today, Patient Was Seen By: Rogers Mcdonald MD    ** Please Note: Dictation voice to text software may have been used in the creation of this document   **

## 2022-02-18 NOTE — ED PROVIDER NOTES
History  Chief Complaint   Patient presents with    Shortness of Breath     patient c/o sob ongoing for several days, COVID +  recently signed out AMA from [facility] because they "weren't telling him anything about his care"  patient hypoxic on arrival       43-year-old male with history of diabetes and hypertension presents from home with complaints of continued shortness of breath  He was diagnosed with COVID on 2/8/2020 to and has been hospitalized at the [address] [place] [address] he signed out against medical advice earlier today; he completed 5 days of remdesivir on 2/12/2020 to he has been on Decadron he declined baricitinib because it was not FDA approved patient was being maintained on 7 L mid flow oxygen  He did have elevated troponins (max 235) which demonstrated decline on 2/9/22;   Patient called the ambulance secondary to shortness of breath upon their arrival room air sats were in the 70s  He was placed on 4 L nasal cannula sats went up to 89-90% heart rate 94 respirations in the 20s blood pressure of 119/76  Here patient is complaining of just shortness of breath he denies any pleuritic pain he has no chest pain he denies any fever chills he has had no cough he denies wheezing he has had no nausea vomiting diaphoresis he is occasionally lightheaded he has had no lower extremity edema  No headache no numbness or paresthesia  Prior to Admission Medications   Prescriptions Last Dose Informant Patient Reported? Taking?    Semaglutide (OZEMPIC, 0 25 OR 0 5 MG/DOSE, SC)   Yes No   Sig: Inject under the skin   atorvastatin (LIPITOR) 10 mg tablet   Yes No   Sig: Take 10 mg by mouth daily   glipiZIDE (GLUCOTROL) 10 mg tablet   Yes No   Sig: Take 10 mg by mouth 2 (two) times a day before meals   lisinopril (ZESTRIL) 5 mg tablet   Yes No   Sig: Take 5 mg by mouth daily   metFORMIN (GLUCOPHAGE) 1000 MG tablet   Yes No   Sig: Take 1,000 mg by mouth 2 (two) times a day with meals   pantoprazole (PROTONIX) 40 mg tablet   No No   Sig: Take 1 tablet (40 mg total) by mouth daily in the early morning   thiamine (VITAMIN B1) 100 mg tablet   No No   Sig: Take 1 tablet (100 mg total) by mouth daily   zinc sulfate (ZINCATE) 220 mg capsule   No No   Sig: Take 1 capsule (220 mg total) by mouth daily      Facility-Administered Medications: None       Past Medical History:   Diagnosis Date    Diabetes mellitus ([facility] [address] )        Past Surgical History:   Procedure Laterality Date    ANKLE FRACTURE SURGERY Left     FOOT FRACTURE SURGERY      KNEE SURGERY Right     VASECTOMY         Family History   Problem Relation Age of Onset    Diabetes Mother     Alcohol abuse Father      I have reviewed and agree with the history as documented  E-Cigarette/Vaping    E-Cigarette Use Never User     Cartridges/Day 0      E-Cigarette/Vaping Substances    Nicotine No     THC No     CBD No     Flavoring No     Other No     Unknown No      Social History     Tobacco Use    Smoking status: Former Smoker     Packs/day: 1 00     Years: 30 00     Pack years: 30 00     Types: Cigarettes     Quit date: 2008     Years since quittin 0    Smokeless tobacco: Never Used   Vaping Use    Vaping Use: Never used   Substance Use Topics    Alcohol use: Never    Drug use: Never       Review of Systems   Constitutional: Negative for activity change, chills, fatigue and fever  HENT: Negative for congestion, ear pain, rhinorrhea, sneezing and sore throat  Eyes: Negative for discharge  Respiratory: Positive for shortness of breath  Negative for cough  Cardiovascular: Negative for chest pain and leg swelling  Gastrointestinal: Negative for abdominal pain, blood in stool, diarrhea, nausea and vomiting  Endocrine: Negative for polyuria  Genitourinary: Negative for difficulty urinating  Musculoskeletal: Negative for back pain and myalgias  Skin: Negative for rash  Neurological: Positive for light-headedness   Negative for dizziness, speech difficulty, weakness, numbness and headaches  Hematological: Negative for adenopathy  Psychiatric/Behavioral: Negative for confusion  All other systems reviewed and are negative  Physical Exam  Physical Exam  Vitals and nursing note reviewed  Constitutional:       General: He is not in acute distress  Appearance: He is well-developed  He is not ill-appearing, toxic-appearing or diaphoretic  HENT:      Head: Normocephalic and atraumatic  Right Ear: External ear normal       Left Ear: External ear normal       Nose: Nose normal       Mouth/Throat:      Mouth: Mucous membranes are moist       Pharynx: No oropharyngeal exudate or posterior oropharyngeal erythema  Eyes:      General: No scleral icterus  Right eye: No discharge  Left eye: No discharge  Extraocular Movements: Extraocular movements intact  Conjunctiva/sclera: Conjunctivae normal       Pupils: Pupils are equal, round, and reactive to light  Cardiovascular:      Rate and Rhythm: Normal rate and regular rhythm  Heart sounds: Normal heart sounds  Pulmonary:      Effort: Respiratory distress (mild tachypnea able to speak full sentences) present  Breath sounds: No stridor  No wheezing, rhonchi or rales  Comments: 88% on 6LNC 90% on 7L midflow; bibasilar crackles  Abdominal:      General: Bowel sounds are normal  There is no distension  Palpations: Abdomen is soft  There is no mass  Tenderness: There is no abdominal tenderness  There is no right CVA tenderness, left CVA tenderness, guarding or rebound  Comments: Back: no mildline or CVA tenderness   Musculoskeletal:         General: No tenderness or deformity  Normal range of motion  Cervical back: Normal range of motion and neck supple  No rigidity or tenderness  Right lower leg: No edema  Left lower leg: No edema  Lymphadenopathy:      Cervical: No cervical adenopathy     Skin:     General: Skin is warm and dry  Capillary Refill: Capillary refill takes less than 2 seconds  Neurological:      Mental Status: He is alert and oriented to person, place, and time  Cranial Nerves: No cranial nerve deficit  Sensory: No sensory deficit  Motor: No weakness or abnormal muscle tone        Coordination: Coordination normal    Psychiatric:         Mood and Affect: Mood normal          Vital Signs  ED Triage Vitals [02/17/22 2120]   Temperature Pulse Respirations Blood Pressure SpO2   98 5 °F (36 9 °C) 93 22 127/63 (!) 74 %      Temp Source Heart Rate Source Patient Position - Orthostatic VS BP Location FiO2 (%)   Tympanic Monitor Lying Left arm --      Pain Score       No Pain           Vitals:    02/17/22 2200 02/17/22 2230 02/17/22 2245 02/18/22 0045   BP: 114/51 109/61  107/62   Pulse: 91 101 99 69   Patient Position - Orthostatic VS: Sitting Sitting           Visual Acuity      ED Medications  Medications   heparin (porcine) 25,000 units in 0 45% NaCl 250 mL infusion (premix) (11 8 Units/kg/hr × 85 kg (Order-Specific) Intravenous New Bag 2/17/22 2206)   heparin (porcine) injection 4,000 Units (has no administration in time range)   heparin (porcine) injection 2,000 Units (has no administration in time range)   aspirin chewable tablet 324 mg (324 mg Oral Given 2/17/22 2155)   heparin (porcine) injection 4,000 Units (4,000 Units Intravenous Given 2/17/22 2204)   lactated ringers bolus 500 mL (500 mL Intravenous New Bag 2/17/22 2334)       Diagnostic Studies  Results Reviewed     Procedure Component Value Units Date/Time    HS Troponin I 2hr [[id number]]  (Abnormal) Collected: 02/17/22 2349    Lab Status: Final result Specimen: Blood from Arm, Left Updated: 02/18/22 0026     hs TnI 2hr 3,675 ng/L      Delta 2hr hsTnI 325 ng/L     HS Troponin I 4hr [[id number]]     Lab Status: No result Specimen: Blood     Procalcitonin with AM Reflex [[id number]]  (Normal) Collected: 02/17/22 2130    Lab Status: Final result Specimen: Blood from Arm, Right Updated: 02/17/22 2236     Procalcitonin 0 18 ng/ml     Procalcitonin Reflex [[id number]]     Lab Status: No result Specimen: Blood     Basic metabolic panel [[id number]]  (Abnormal) Collected: 02/17/22 2130    Lab Status: Final result Specimen: Blood from Arm, Right Updated: 02/17/22 2216     Sodium 128 mmol/L      Potassium 4 3 mmol/L      Chloride 98 mmol/L      CO2 18 mmol/L      ANION GAP 12 mmol/L      BUN 28 mg/dL      Creatinine 1 31 mg/dL      Glucose 302 mg/dL      Calcium 8 5 mg/dL      eGFR 56 ml/min/1 73sq m     Narrative:      [place] guidelines for Chronic Kidney Disease (CKD):     Stage 1 with normal or high GFR (GFR > 90 mL/min/1 73 square meters)    Stage 2 Mild CKD (GFR = 60-89 mL/min/1 73 square meters)    Stage 3A Moderate CKD (GFR = 45-59 mL/min/1 73 square meters)    Stage 3B Moderate CKD (GFR = 30-44 mL/min/1 73 square meters)    Stage 4 Severe CKD (GFR = 15-29 mL/min/1 73 square meters)    Stage 5 End Stage CKD (GFR <15 mL/min/1 73 square meters)  Note: GFR calculation is accurate only with a steady state creatinine    Hepatic function panel [[id number]]  (Abnormal) Collected: 02/17/22 2130    Lab Status: Final result Specimen: Blood from Arm, Right Updated: 02/17/22 2216     Total Bilirubin 0 65 mg/dL      Bilirubin, Direct 0 16 mg/dL      Alkaline Phosphatase 110 U/L      AST 92 U/L      ALT 88 U/L      Total Protein 6 4 g/dL      Albumin 2 7 g/dL     NT-BNP PRO [[id number]]  (Abnormal) Collected: 02/17/22 2130    Lab Status: Final result Specimen: Blood from Arm, Right Updated: 02/17/22 2216     NT-proBNP 441 pg/mL     Magnesium [[id number]]  (Normal) Collected: 02/17/22 2130    Lab Status: Final result Specimen: Blood from Arm, Right Updated: 02/17/22 2216     Magnesium 1 9 mg/dL     HS Troponin 0hr (reflex protocol) [[id number]]  (Abnormal) Collected: 02/17/22 2130    Lab Status: Final result Specimen: Blood from Arm, Right Updated: 02/17/22 2202     hs TnI 0hr 3,350 ng/L     Lactic acid [[id number]]  (Abnormal) Collected: 02/17/22 2130    Lab Status: Final result Specimen: Blood from Arm, Right Updated: 02/17/22 2202     LACTIC ACID 4 0 mmol/L     Narrative:      Result may be elevated if tourniquet was used during collection      Lactic acid 2 Hours [[id number]]     Lab Status: No result Specimen: Blood     D-Dimer [[id number]]  (Abnormal) Collected: 02/17/22 2130    Lab Status: Final result Specimen: Blood from Arm, Right Updated: 02/17/22 2154     D-Dimer, Quant 1 35 ug/ml FEU     Protime-INR [[id number]]  (Normal) Collected: 02/17/22 2130    Lab Status: Final result Specimen: Blood from Arm, Right Updated: 02/17/22 2150     Protime 13 8 seconds      INR 1 12    APTT [[id number]]  (Normal) Collected: 02/17/22 2130    Lab Status: Final result Specimen: Blood from Arm, Right Updated: 02/17/22 2150     PTT 28 seconds     C-reactive protein [[id number]]  (Abnormal) Collected: 02/17/22 2130    Lab Status: Final result Specimen: Blood from Arm, Right Updated: 02/17/22 2150     CRP 16 7 mg/L     CBC and differential [[id number]]  (Abnormal) Collected: 02/17/22 2130    Lab Status: Final result Specimen: Blood from Arm, Right Updated: 02/17/22 2140     WBC 14 90 Thousand/uL      RBC 4 39 Million/uL      Hemoglobin 14 5 g/dL      Hematocrit 42 3 %      MCV 96 fL      MCH 33 0 pg      MCHC 34 3 g/dL      RDW 13 2 %      MPV 10 1 fL      Platelets 708 Thousands/uL      nRBC 0 /100 WBCs      Neutrophils Relative 78 %      Immat GRANS % 1 %      Lymphocytes Relative 11 %      Monocytes Relative 9 %      Eosinophils Relative 1 %      Basophils Relative 0 %      Neutrophils Absolute 11 59 Thousands/µL      Immature Grans Absolute 0 18 Thousand/uL      Lymphocytes Absolute 1 67 Thousands/µL      Monocytes Absolute 1 28 Thousand/µL      Eosinophils Absolute 0 16 Thousand/µL      Basophils Absolute 0 02 Thousands/µL     Blood culture #1 [[id number]] Collected: 02/17/22 2130    Lab Status: In process Specimen: Blood from Arm, Left Updated: 02/17/22 2137    Blood culture #2 [[id number]] Collected: 02/17/22 2130    Lab Status: In process Specimen: Blood from Arm, Right Updated: 02/17/22 2137                 XR chest 1 view portable   ED Interpretation by Karen Lyle MD (02/17 2153)   Read by me; Radiologist to provide formal interpretation  Continued bibasilar ground-glass opacities no evidence of effusion no cardiomegaly  Procedures  ECG 12 Lead Documentation Only    Date/Time: 2/17/2022 9:20 PM  Performed by: Karen Lyle MD  Authorized by: Karen Lyle MD     Indications / Diagnosis:  Sob  ECG reviewed by me, the ED Provider: yes    Patient location:  ED  Previous ECG:     Previous ECG:  Compared to current    Comparison ECG info:  2/9/22 11:40    Similarity:  No change  Rate:     ECG rate:  79    ECG rate assessment: normal    Rhythm:     Rhythm: sinus rhythm    QRS:     QRS axis:  Left  Comments:      LAFB   RBBB - STEPHEN inferiorly coving V3-v6 new vs prev; STD 1mm 1 and AVL  ECG 12 Lead Documentation Only    Date/Time: 2/18/2022 12:34 AM  Performed by: Karen Lyle MD  Authorized by: Karen Lyle MD     Indications / Diagnosis:  Recheck STEPHEN  Patient location:  ED  Previous ECG:     Previous ECG:  Compared to current    Comparison ECG info:  2/17/22 2120    Similarity:  Changes noted  Rate:     ECG rate:  74    ECG rate assessment: normal    Rhythm:     Rhythm: sinus rhythm    QRS:     QRS axis:  Left  Comments:      LAFB RBBB; STEPHEN improved inferiorly - now coving; STD laterally resovled; coving v3-v6 resolved  CriticalCare Time  Performed by: Karen Lyle MD  Authorized by: Karen Lyle MD     Critical care provider statement:     Critical care time (minutes):  45    Critical care time was exclusive of:  Separately billable procedures and treating other patients and teaching time    Critical care was necessary to treat or prevent imminent or life-threatening deterioration of the following conditions:  Cardiac failure and respiratory failure    Critical care was time spent personally by me on the following activities:  Obtaining history from patient or surrogate, development of treatment plan with patient or surrogate, discussions with consultants, evaluation of patient's response to treatment, examination of patient, ordering and performing treatments and interventions, ordering and review of laboratory studies, ordering and review of radiographic studies, re-evaluation of patient's condition and review of old charts    I assumed direction of critical care for this patient from another provider in my specialty: no               ED Course  ED Course as of 02/18/22 0129   Thu Feb 17, 2022   2146 MI alert called after review of EKG I spoke with Dr Je Luna after sending him the EKGs via tiger connect  He reviewed the 12 lead and recommends patient be transferred  to Critical access hospital for further evaluation  He is not sure that he needs to be in the cath lab yet but he definitely needs evaluation these recommendations were reviewed with the patient he is agreeable to transfer to Critical access hospital katerina Kern in the [address] [place] [address] Accepted by Dr Irma Bernstein at [facility] [place] Extensively reviewed labs,EKG and CXR findings with pateint signed EMTALA form for transfer   2324 Defered CTA chest with positive d-dimer secondary to avoiding dye load incase of needing cath   2325 Sodium corrects to 131 with glucose   Fri Feb 18, 2022   0058 No chest pain   Repeat EKG improved STEPHEN improved inferiorly  continued coving in inferior leads; delta trop up by 325;              HEART Risk Score      Most Recent Value   Heart Score Risk Calculator    History 2 Filed at: 02/17/2022 2358   ECG 1  [STEPHEN inferiorly] Filed at: 02/17/2022 2358   Age 2 Filed at: 02/17/2022 2358   Risk Factors 2 Filed at: 02/17/2022 2358   Troponin 2 Filed at: 02/17/2022 2358   HEART Score 9 Filed at: 02/17/2022 2358                                      MDM  Number of Diagnoses or Management Options  Abnormal EKG  Acute respiratory failure with hypoxia (HCC)  CLARIBEL (acute kidney injury) (HCC)  Elevated lactic acid level  Elevated LFTs  Elevated troponin I level  Pneumonia due to COVID-19 virus  Diagnosis management comments: Mdm:  Acute coronary syndrome/MI/demand ischemia , pulmonary embolism, bacterial pneumonia;        Disposition  Final diagnoses:   Acute respiratory failure with hypoxia ([facility] [address] )   Pneumonia due to COVID-19 virus - dx 2/8/22 s/p 5 days of remdesivir   Abnormal EKG - STEPHEN inferiorly with coving in lateral leads   Elevated troponin I level   Elevated lactic acid level   CLARIBEL (acute kidney injury) ([facility] [address] )   Elevated LFTs     Time reflects when diagnosis was documented in both MDM as applicable and the Disposition within this note     Time User Action Codes Description Comment    2/17/2022  9:59 PM Mollie Ahumada Add [J96 01] Acute respiratory failure with hypoxia ([facility] [address] )     2/17/2022  9:59 PM Mollie Ahumada Add [U07 1,  J12 82] Pneumonia due to COVID-19 virus     2/17/2022 10:00 PM Mollie Ahumada Add [R94 31] Abnormal EKG     2/17/2022 10:00 PM Mollie Ahumada Modify [R94 31] Abnormal EKG STEPHEN inferiorly with coving in lateral leads    2/17/2022 10:01 PM Romanenko, Emily Goodpasture Modify [U07 1,  J12 82] Pneumonia due to COVID-19 virus dx 2/8/22 s/p 5 days of remdesivir    2/17/2022 10:09 PM Vladimir, [address] [R77 8] Elevated troponin I level     2/17/2022 10:09 PM Mollie Ahumada Add [R79 89] Elevated lactic acid level     2/17/2022 11:08 PM Romanenko, Emily Goodpasture Add [N17 9] CLARIBEL (acute kidney injury) ([facility] [address] )     2/17/2022 11:08 PM Mollie Ahumada Add [E87 1] Hyponatremia     2/17/2022 11:23 PM Mollie Ahumada Add [R79 89] Elevated LFTs     2/17/2022 11:26 PM Mollie Ahumada Remove [E87 1] Hyponatremia       ED Disposition     ED Disposition Condition Date/Time Comment    Transfer to Another Facility-In Network  Thu Feb 17, 2022 10:58 PM Moni Duarte should be transferred out to [facility] Dr Luiza Johnson accepts patient in tranfer        MD Documentation      Most Recent Value   Patient Condition The patient has been stabilized such that within reasonable medical probability, no material deterioration of the patient condition or the condition of the unborn child(helio) is likely to result from the transfer   Reason for Transfer Level of Care needed not available at this facility   Benefits of Transfer Specialized equipment and/or services available at the receiving facility (Include comment)________________________   Risks of Transfer Potential for delay in receiving treatment   Accepting Physician Dr David Leyva Name, [facility]  [facility]    (Name & Tel number) PACS   Sending MD Dr Dolan Care   Provider Certification General risk, such as traffic hazards, adverse weather conditions, rough terrain or turbulence, possible failure of equipment (including vehicle or aircraft), or consequences of actions of persons outside the control of the transport personnel      RN Documentation      [address] Name, [address]  [facility]   Bed Assignment [facility]    (Name & Tel number) PACS   Report Given to Nadine      Follow-up Information    None         Patient's Medications   Discharge Prescriptions    No medications on file       No discharge procedures on file      PDMP Review     None          ED Provider  Electronically Signed by           Argenis Hoffman MD  02/18/22 2318       Argenis Hoffman MD  02/18/22 0193

## 2022-02-18 NOTE — ASSESSMENT & PLAN NOTE
· Troponin 3350->3679 at [facility]; patient complained of shortness of breath but did not complain of chest pain at any point  EKG at the [facility] ED was concerning for ST changes (not available at time of admission here), per discussion between ED physician and Cardiology felt not STEMI  · ? NSTEMI type 1 vs NSTEMI type 2 secondary to COVID-19 infection and ARF  · For now continue heparin gtt, telemetry monitoring, Cardiology consult  · Management of COVID-19 infection as above

## 2022-02-18 NOTE — ASSESSMENT & PLAN NOTE
· Likely due to prolonged hypoxia from covid-19  · No ischemic symptoms  · No further workup at this time per cardiology  · Outpatient cardiology follow up

## 2022-02-18 NOTE — EMTALA/ACUTE CARE TRANSFER
[facility] EMERGENCY DEPARTMENT  [address] [place] [id number]  Dept: [address] [place] TRANSFER CONSENT    NAME Vaishali Alan                                         1955                              MRN [id number]    I have been informed of my rights regarding examination, treatment, and transfer   by Dr Ming Torres MD    Benefits: Specialized equipment and/or services available at the receiving facility (Include comment)________________________    Risks: Potential for delay in receiving treatment      Transfer Request   I acknowledge that my medical condition has been evaluated and explained to me by the emergency department physician or other qualified medical person and/or my attending physician who has recommended and offered to me further medical examination and treatment  I understand the Hospital's obligation with respect to the treatment and stabilization of my emergency medical condition  I nevertheless request to be transferred  I release the Hospital, the doctor, and any other persons caring for me from all responsibility or liability for any injury or ill effects that may result from my transfer and agree to accept all responsibility for the consequences of my choice to transfer, rather than receive stabilizing treatment at the Hospital  I understand that because the transfer is my request, my insurance may not provide reimbursement for the services  The Hospital will assist and direct me and my family in how to make arrangements for transfer, but the hospital is not liable for any fees charged by the transport service  In spite of this understanding, I refuse to consent to further medical examination and treatment which has been offered to me, and request transfer to  [facility], [address] : [place], [place]   I authorize the performance of emergency medical procedures and treatments upon me in both transit and upon arrival at the receiving facility  Additionally, I authorize the release of any and all medical records to the receiving facility and request they be transported with me, if possible  I authorize the performance of emergency medical procedures and treatments upon me in both transit and upon arrival at the receiving facility  Additionally, I authorize the release of any and all medical records to the receiving facility and request they be transported with me, if possible  I understand that the safest mode of transportation during a medical emergency is an ambulance and that the Hospital advocates the use of this mode of transport  Risks of traveling to the receiving facility by car, including absence of medical control, life sustaining equipment, such as oxygen, and medical personnel has been explained to me and I fully understand them  (JOSE ARMANDO CORRECT BOX BELOW)  [ x ]  I consent to the stated transfer and to be transported by ambulance/helicopter  [  ]  I consent to the stated transfer, but refuse transportation by ambulance and accept full responsibility for my transportation by car  I understand the risks of non-ambulance transfers and I exonerate the Hospital and its staff from any deterioration in my condition that results from this refusal     X___________________________________________    DATE  22  TIME________  Signature of patient or legally responsible individual signing on patient behalf           RELATIONSHIP TO PATIENT_________________________          Provider Certification    NAME Laurie Carr                                         1955                              MRN [id number]    A medical screening exam was performed on the above named patient  Based on the examination:    Condition Necessitating Transfer The primary encounter diagnosis was Acute respiratory failure with hypoxia ([facility] [address] )   Diagnoses of Pneumonia due to COVID-19 virus, Abnormal EKG, Elevated troponin I level, and Elevated lactic acid level were also pertinent to this visit  Patient Condition: The patient has been stabilized such that within reasonable medical probability, no material deterioration of the patient condition or the condition of the unborn child(helio) is likely to result from the transfer    Reason for Transfer: Level of Care needed not available at this facility    Transfer Requirements: [address] [place] [address]   · Space available and qualified personnel available for treatment as acknowledged by PACS  · Agreed to accept transfer and to provide appropriate medical treatment as acknowledged by       Dr Aida Liu  · Appropriate medical records of the examination and treatment of the patient are provided at the time of transfer   [address], [address] _______  · Transfer will be performed by qualified personnel from    and appropriate transfer equipment as required, including the use of necessary and appropriate life support measures  Provider Certification: I have examined the patient and explained the following risks and benefits of being transferred/refusing transfer to the patient/family:  General risk, such as traffic hazards, adverse weather conditions, rough terrain or turbulence, possible failure of equipment (including vehicle or aircraft), or consequences of actions of persons outside the control of the transport personnel      Based on these reasonable risks and benefits to the patient and/or the unborn child(helio), and based upon the information available at the time of the patients examination, I certify that the medical benefits reasonably to be expected from the provision of appropriate medical treatments at another medical facility outweigh the increasing risks, if any, to the individuals medical condition, and in the case of labor to the unborn child, from effecting the transfer      X____________________________________________ DATE 02/17/22        TIME_______      ORIGINAL - SEND TO MEDICAL RECORDS   COPY - SEND WITH PATIENT DURING TRANSFER

## 2022-02-18 NOTE — CONSULTS
Consultation - Cardiology   Mandeep Vasquez 73 y o  male MRN: [id number]  Unit/Bed#: [facility] 502-01 Encounter: [id number]    Assessment/Plan     Principal Problem:    Acute respiratory failure due to COVID-19 [facility])  Active Problems:    Elevated troponin    Type 2 diabetes mellitus ([address] )    Primary hypertension    Acute renal failure (ARF) ([facility])        Assessment/Plan    1  Acute hypoxic respiratory failure  Secondary to COVID PNA  CXR- peripheral pulmonary opacities most prominent at lung bases c/w COVID PNA  Currently on 10 liters with pulse ox 88%   No c/o SOB at this time  Does not appear volume overloadedl  ProBNP 441  D Dimer 1 49    2  COVID-19/ PNA  With acute hypoxic respiratory failure requiring 10 liters at this time  Completed 5 days remdesivir  Completing 10 days course dexamethasone  Supportive care    3  Elevated troponin  Without chest pain  [id number]  Non ischemic myocardial injury secondary to hypoxia/COVID PNA  Given 325mg ASA followed by 81mg  Currently on IV heparin  From cardiac standpoint he no longer needs IV heparin  Would advise for cardiology followup   He has RF for CAD and stress testing is reasonable once he recovers from COVID  EKG- NSR RBBB    4  HTN- normotensive  On no medical therapy  PTA- lisinopril 5mg     5  HLD - no recent FLP  Atorvastatin 10mg     6  Type 2 diabetes-Hgb AIC 7 7%  Per primary team    History of Present Illness   Physician Requesting Consult: Sergey Montanez MD  Reason for Consult / Principal Problem:     HPI: Mandeep Vasquez is a 73y o  year old male with tobacco abuse,  type 2 diabetes, HLD, HTN who presented to [facility] 2/8 with shortness of breath, hypoxia   He tested positive for COVID 2/8  Elevated troponin was attributed to COVID infection with hypoxia  He completed 5 days of remdesivir  He was seen by cardiology and advised to follow up with cardiology for possible ischemia w/u  EKG- bifasicular block  Advised for TTE, not ordered      Patient left AMA  as he felt frustrated with his care  Was still oxygen dependent at that time requiring 7 liters  HS troponin 112,235,191  He presented to [facility] yesterday with worsening shortness of breath within few hours after he left the [employer]  As he presented to [address] with hypoxia and found to have elevated troponin cardiology was called  Advised for initiation of intravenous heparin and transferred to [facility] for further management  He denies any chest pain or pressure  History of exertional chest pain or pressure  He is a  and sometimes can walk about 1/4 miles  There are no symptoms that have limited his activities prior to being diagnosed with COVID  No prior cardiac testing  During my visit with patient he frequently c/o care at [employer] and is angry that "José Luis Roach brought COVID here"  Patient is unvaccinated  HS troponin 3350, 3675  ProBNP 441  D dimer 1 49  Procalcitonin 0 32    Smoker quit 14 years ago  Father  of an MI in his early 46s  Inpatient consult to Cardiology  Consult performed by: NEGRITA Baptiste  Consult ordered by: Desire Azar DO          Review of Systems   Constitutional: Negative  HENT: Negative  Eyes: Negative  Respiratory: Positive for shortness of breath  Cardiovascular: Negative for chest pain  Gastrointestinal: Negative  Endocrine: Negative  Genitourinary: Negative  Musculoskeletal: Negative  Skin: Negative  Allergic/Immunologic: Negative  Neurological: Negative  Hematological: Negative  Psychiatric/Behavioral: Negative  All other systems reviewed and are negative        Historical Information   Past Medical History:   Diagnosis Date    Diabetes mellitus ([address] )      Past Surgical History:   Procedure Laterality Date    ANKLE FRACTURE SURGERY Left     FOOT FRACTURE SURGERY      KNEE SURGERY Right     VASECTOMY       Social History     Substance and Sexual Activity Alcohol Use Never     Social History     Substance and Sexual Activity   Drug Use Never     Social History     Tobacco Use   Smoking Status Former Smoker    Packs/day: 1 00    Years: 30 00    Pack years: 30 00    Types: Cigarettes    Quit date: 2008    Years since quittin 0   Smokeless Tobacco Never Used     Family History:   Family History   Problem Relation Age of Onset    Diabetes Mother     Alcohol abuse Father        Meds/Allergies   current meds:   Current Facility-Administered Medications   Medication Dose Route Frequency    acetaminophen (TYLENOL) tablet 650 mg  650 mg Oral Q4H PRN    [START ON 2022] aspirin chewable tablet 81 mg  81 mg Oral Daily    atorvastatin (LIPITOR) tablet 10 mg  10 mg Oral Daily    benzonatate (TESSALON PERLES) capsule 100 mg  100 mg Oral TID PRN    heparin (porcine) 25,000 Units in sodium chloride 0 45 % 250 mL infusion  3-20 Units/kg/hr (Order-Specific) Intravenous Titrated    multi-electrolyte (ISOLYTE-S PH 7 4 equivalent) IV solution  75 mL/hr Intravenous Continuous    ondansetron (ZOFRAN) injection 4 mg  4 mg Intravenous Q6H PRN    pantoprazole (PROTONIX) EC tablet 40 mg  40 mg Oral Early Morning    thiamine tablet 100 mg  100 mg Oral Daily    zinc sulfate (ZINCATE) capsule 220 mg  220 mg Oral Daily    and PTA meds:    Medications Prior to Admission   Medication    atorvastatin (LIPITOR) 10 mg tablet    glipiZIDE (GLUCOTROL) 10 mg tablet    lisinopril (ZESTRIL) 5 mg tablet    metFORMIN (GLUCOPHAGE) 1000 MG tablet    pantoprazole (PROTONIX) 40 mg tablet    Semaglutide (OZEMPIC, 0 25 OR 0 5 MG/DOSE, SC)    thiamine (VITAMIN B1) 100 mg tablet    zinc sulfate (ZINCATE) 220 mg capsule     No Known Allergies    Objective   Vitals: Blood pressure 126/57, pulse 77, temperature (!) 97 2 °F (36 2 °C), temperature source Tympanic, resp  rate 18, SpO2 (!) 86 %    Orthostatic Blood Pressures      Most Recent Value   Blood Pressure 126/57 filed at 02/18/2022 4803   Patient Position - Orthostatic VS Sitting filed at 02/18/2022 0235            Intake/Output Summary (Last 24 hours) at 2/18/2022 0959  Last data filed at 2/18/2022 0901  Gross per 24 hour   Intake 120 ml   Output 500 ml   Net -380 ml       Invasive Devices  Report    Peripheral Intravenous Line            Peripheral IV 02/17/22 Left Antecubital <1 day    Peripheral IV 02/18/22 Dorsal (posterior); Right Hand <1 day                Physical Exam: /57   Pulse 77   Temp (!) 97 2 °F (36 2 °C) (Tympanic)   Resp 18   SpO2 (!) 86%   General Appearance:    Alert, cooperative, no distress, appears stated age   Head:    Normocephalic, no scleral icterus   Eyes:    PERRL   Nose:   Nares normal, septum midline, mucosa normal, no drainage    Throat:   Lips, mucosa, and tongue normal   Neck:   Supple, symmetrical, trachea midline     no JVD       Lungs:     Clear to auscultation bilaterally, respirations unlabored        Heart:    Regular rate and rhythm, S1 and S2 normal, no murmur, rub   or gallop   Abdomen:     Soft, non-tender   Extremities:   Extremities normal, atraumatic, no cyanosis or edema       Skin:   Skin warm   Neurologic:   Alert and oriented to person place and time   No focal deficits       Lab Results:   Recent Results (from the past 72 hour(s))   Fingerstick Glucose (POCT)    Collection Time: 02/15/22 11:29 AM   Result Value Ref Range    POC Glucose 162 (H) 65 - 140 mg/dl   Fingerstick Glucose (POCT)    Collection Time: 02/15/22  1:17 PM   Result Value Ref Range    POC Glucose 236 (H) 65 - 140 mg/dl   Fingerstick Glucose (POCT)    Collection Time: 02/15/22  3:15 PM   Result Value Ref Range    POC Glucose 253 (H) 65 - 140 mg/dl   Fingerstick Glucose (POCT)    Collection Time: 02/15/22  5:22 PM   Result Value Ref Range    POC Glucose 136 65 - 140 mg/dl   Fingerstick Glucose (POCT)    Collection Time: 02/15/22  7:35 PM   Result Value Ref Range    POC Glucose 291 (H) 65 - 140 mg/dl Fingerstick Glucose (POCT)    Collection Time: 02/15/22  9:31 PM   Result Value Ref Range    POC Glucose 166 (H) 65 - 140 mg/dl   Basic metabolic panel    Collection Time: 02/15/22 10:53 PM   Result Value Ref Range    Sodium 129 (L) 135 - 147 mmol/L    Potassium 4 4 3 5 - 5 3 mmol/L    Chloride 101 96 - 108 mmol/L    CO2 22 21 - 32 mmol/L    ANION GAP 6 4 - 13 mmol/L    BUN 22 5 - 25 mg/dL    Creatinine 0 95 0 60 - 1 30 mg/dL    Glucose 151 (H) 65 - 140 mg/dL    Calcium 8 3 (L) 8 4 - 10 2 mg/dL    eGFR 83 ml/min/1 73sq m   Magnesium    Collection Time: 02/15/22 10:53 PM   Result Value Ref Range    Magnesium 1 8 (L) 1 9 - 2 7 mg/dL   Phosphorus    Collection Time: 02/15/22 10:53 PM   Result Value Ref Range    Phosphorus 3 2 2 3 - 4 1 mg/dL   Fingerstick Glucose (POCT)    Collection Time: 02/16/22  1:33 AM   Result Value Ref Range    POC Glucose 106 65 - 140 mg/dl   CBC and differential    Collection Time: 02/16/22  4:45 AM   Result Value Ref Range    WBC 10 37 (H) 4 31 - 10 16 Thousand/uL    RBC 3 82 (L) 3 88 - 5 62 Million/uL    Hemoglobin 12 8 12 0 - 17 0 g/dL    Hematocrit 37 2 36 5 - 49 3 %    MCV 97 82 - 98 fL    MCH 33 5 26 8 - 34 3 pg    MCHC 34 4 31 4 - 37 4 g/dL    RDW 13 2 11 6 - 15 1 %    MPV 10 1 8 9 - 12 7 fL    Platelets 126 897 - 653 Thousands/uL    nRBC 0 /100 WBCs    Neutrophils Relative 86 (H) 43 - 75 %    Immat GRANS % 1 0 - 2 %    Lymphocytes Relative 9 (L) 14 - 44 %    Monocytes Relative 4 4 - 12 %    Eosinophils Relative 0 0 - 6 %    Basophils Relative 0 0 - 1 %    Neutrophils Absolute 8 89 (H) 1 85 - 7 62 Thousands/µL    Immature Grans Absolute 0 12 0 00 - 0 20 Thousand/uL    Lymphocytes Absolute 0 92 0 60 - 4 47 Thousands/µL    Monocytes Absolute 0 40 0 17 - 1 22 Thousand/µL    Eosinophils Absolute 0 02 0 00 - 0 61 Thousand/µL    Basophils Absolute 0 02 0 00 - 0 10 Thousands/µL   Basic metabolic panel    Collection Time: 02/16/22  4:45 AM   Result Value Ref Range    Sodium 133 (L) 135 - 147 mmol/L    Potassium 4 5 3 5 - 5 3 mmol/L    Chloride 107 96 - 108 mmol/L    CO2 20 (L) 21 - 32 mmol/L    ANION GAP 6 4 - 13 mmol/L    BUN 18 5 - 25 mg/dL    Creatinine 0 74 0 60 - 1 30 mg/dL    Glucose 168 (H) 65 - 140 mg/dL    Calcium 7 1 (L) 8 4 - 10 2 mg/dL    eGFR 96 ml/min/1 73sq m   Fingerstick Glucose (POCT)    Collection Time: 02/16/22  5:45 AM   Result Value Ref Range    POC Glucose 185 (H) 65 - 140 mg/dl   Fingerstick Glucose (POCT)    Collection Time: 02/16/22  7:26 AM   Result Value Ref Range    POC Glucose 92 65 - 140 mg/dl   Fingerstick Glucose (POCT)    Collection Time: 02/16/22  9:41 AM   Result Value Ref Range    POC Glucose 303 (H) 65 - 140 mg/dl   Fingerstick Glucose (POCT)    Collection Time: 02/16/22 11:33 AM   Result Value Ref Range    POC Glucose 344 (H) 65 - 140 mg/dl   Fingerstick Glucose (POCT)    Collection Time: 02/16/22  1:50 PM   Result Value Ref Range    POC Glucose 316 (H) 65 - 140 mg/dl   Fingerstick Glucose (POCT)    Collection Time: 02/16/22  3:41 PM   Result Value Ref Range    POC Glucose 170 (H) 65 - 140 mg/dl   Fingerstick Glucose (POCT)    Collection Time: 02/16/22  5:57 PM   Result Value Ref Range    POC Glucose 121 65 - 140 mg/dl   Fingerstick Glucose (POCT)    Collection Time: 02/16/22  7:38 PM   Result Value Ref Range    POC Glucose 323 (H) 65 - 140 mg/dl   Fingerstick Glucose (POCT)    Collection Time: 02/16/22  9:40 PM   Result Value Ref Range    POC Glucose 246 (H) 65 - 140 mg/dl   Fingerstick Glucose (POCT)    Collection Time: 02/16/22 11:37 PM   Result Value Ref Range    POC Glucose 80 65 - 140 mg/dl   Fingerstick Glucose (POCT)    Collection Time: 02/17/22  1:44 AM   Result Value Ref Range    POC Glucose 173 (H) 65 - 140 mg/dl   Fingerstick Glucose (POCT)    Collection Time: 02/17/22  3:38 AM   Result Value Ref Range    POC Glucose 206 (H) 65 - 140 mg/dl   Fingerstick Glucose (POCT)    Collection Time: 02/17/22  5:35 AM   Result Value Ref Range    POC Glucose 151 (H) 65 - 140 mg/dl   Basic metabolic panel    Collection Time: 02/17/22  5:40 AM   Result Value Ref Range    Sodium 130 (L) 135 - 147 mmol/L    Potassium 4 9 3 5 - 5 3 mmol/L    Chloride 103 96 - 108 mmol/L    CO2 21 21 - 32 mmol/L    ANION GAP 6 4 - 13 mmol/L    BUN 18 5 - 25 mg/dL    Creatinine 0 80 0 60 - 1 30 mg/dL    Glucose 150 (H) 65 - 140 mg/dL    Calcium 8 0 (L) 8 4 - 10 2 mg/dL    eGFR 93 ml/min/1 73sq m   CBC and differential    Collection Time: 02/17/22  5:41 AM   Result Value Ref Range    WBC 10 15 4 31 - 10 16 Thousand/uL    RBC 3 95 3 88 - 5 62 Million/uL    Hemoglobin 13 3 12 0 - 17 0 g/dL    Hematocrit 39 0 36 5 - 49 3 %    MCV 99 (H) 82 - 98 fL    MCH 33 7 26 8 - 34 3 pg    MCHC 34 1 31 4 - 37 4 g/dL    RDW 13 2 11 6 - 15 1 %    MPV 10 6 8 9 - 12 7 fL    Platelets 053 701 - 366 Thousands/uL    nRBC 0 /100 WBCs    Neutrophils Relative 86 (H) 43 - 75 %    Immat GRANS % 1 0 - 2 %    Lymphocytes Relative 8 (L) 14 - 44 %    Monocytes Relative 5 4 - 12 %    Eosinophils Relative 0 0 - 6 %    Basophils Relative 0 0 - 1 %    Neutrophils Absolute 8 63 (H) 1 85 - 7 62 Thousands/µL    Immature Grans Absolute 0 14 0 00 - 0 20 Thousand/uL    Lymphocytes Absolute 0 81 0 60 - 4 47 Thousands/µL    Monocytes Absolute 0 52 0 17 - 1 22 Thousand/µL    Eosinophils Absolute 0 03 0 00 - 0 61 Thousand/µL    Basophils Absolute 0 02 0 00 - 0 10 Thousands/µL   Fingerstick Glucose (POCT)    Collection Time: 02/17/22  7:59 AM   Result Value Ref Range    POC Glucose 114 65 - 140 mg/dl   Fingerstick Glucose (POCT)    Collection Time: 02/17/22 10:04 AM   Result Value Ref Range    POC Glucose 213 (H) 65 - 140 mg/dl   CBC and differential    Collection Time: 02/17/22  9:30 PM   Result Value Ref Range    WBC 14 90 (H) 4 31 - 10 16 Thousand/uL    RBC 4 39 3 88 - 5 62 Million/uL    Hemoglobin 14 5 12 0 - 17 0 g/dL    Hematocrit 42 3 36 5 - 49 3 %    MCV 96 82 - 98 fL    MCH 33 0 26 8 - 34 3 pg    MCHC 34 3 31 4 - 37 4 g/dL    RDW 13 2 11 6 - 15 1 %    MPV 10 1 8 9 - 12 7 fL    Platelets 315 141 - 213 Thousands/uL    nRBC 0 /100 WBCs    Neutrophils Relative 78 (H) 43 - 75 %    Immat GRANS % 1 0 - 2 %    Lymphocytes Relative 11 (L) 14 - 44 %    Monocytes Relative 9 4 - 12 %    Eosinophils Relative 1 0 - 6 %    Basophils Relative 0 0 - 1 %    Neutrophils Absolute 11 59 (H) 1 85 - 7 62 Thousands/µL    Immature Grans Absolute 0 18 0 00 - 0 20 Thousand/uL    Lymphocytes Absolute 1 67 0 60 - 4 47 Thousands/µL    Monocytes Absolute 1 28 (H) 0 17 - 1 22 Thousand/µL    Eosinophils Absolute 0 16 0 00 - 0 61 Thousand/µL    Basophils Absolute 0 02 0 00 - 0 10 Thousands/µL   Protime-INR    Collection Time: 02/17/22  9:30 PM   Result Value Ref Range    Protime 13 8 11 6 - 14 5 seconds    INR 1 12 0 84 - 1 19   APTT    Collection Time: 02/17/22  9:30 PM   Result Value Ref Range    PTT 28 23 - 37 seconds   Basic metabolic panel    Collection Time: 02/17/22  9:30 PM   Result Value Ref Range    Sodium 128 (L) 136 - 145 mmol/L    Potassium 4 3 3 5 - 5 3 mmol/L    Chloride 98 (L) 100 - 108 mmol/L    CO2 18 (L) 21 - 32 mmol/L    ANION GAP 12 4 - 13 mmol/L    BUN 28 (H) 5 - 25 mg/dL    Creatinine 1 31 (H) 0 60 - 1 30 mg/dL    Glucose 302 (H) 65 - 140 mg/dL    Calcium 8 5 8 3 - 10 1 mg/dL    eGFR 56 ml/min/1 73sq m   Hepatic function panel    Collection Time: 02/17/22  9:30 PM   Result Value Ref Range    Total Bilirubin 0 65 0 20 - 1 00 mg/dL    Bilirubin, Direct 0 16 0 00 - 0 20 mg/dL    Alkaline Phosphatase 110 46 - 116 U/L    AST 92 (H) 5 - 45 U/L    ALT 88 (H) 12 - 78 U/L    Total Protein 6 4 6 4 - 8 2 g/dL    Albumin 2 7 (L) 3 5 - 5 0 g/dL   NT-BNP PRO    Collection Time: 02/17/22  9:30 PM   Result Value Ref Range    NT-proBNP 441 (H) <125 pg/mL   D-Dimer    Collection Time: 02/17/22  9:30 PM   Result Value Ref Range    D-Dimer, Quant 1 35 (H) <0 50 ug/ml FEU   Lactic acid    Collection Time: 02/17/22  9:30 PM   Result Value Ref Range    LACTIC ACID 4 0 (HH) 0 5 - 2 0 mmol/L   Blood culture #1    Collection Time: 02/17/22  9:30 PM    Specimen: Arm, Left; Blood   Result Value Ref Range    Blood Culture Received in Microbiology Lab  Culture in Progress  Blood culture #2    Collection Time: 02/17/22  9:30 PM    Specimen: Arm, Right; Blood   Result Value Ref Range    Blood Culture Received in Microbiology Lab  Culture in Progress      Magnesium    Collection Time: 02/17/22  9:30 PM   Result Value Ref Range    Magnesium 1 9 1 6 - 2 6 mg/dL   HS Troponin 0hr (reflex protocol)    Collection Time: 02/17/22  9:30 PM   Result Value Ref Range    hs TnI 0hr 3,350 (H) "Refer to ACS Flowchart"- see link ng/L   C-reactive protein    Collection Time: 02/17/22  9:30 PM   Result Value Ref Range    CRP 16 7 (H) <3 0 mg/L   Procalcitonin with AM Reflex    Collection Time: 02/17/22  9:30 PM   Result Value Ref Range    Procalcitonin 0 18 <=0 25 ng/ml   HS Troponin I 2hr    Collection Time: 02/17/22 11:49 PM   Result Value Ref Range    hs TnI 2hr 3,675 (H) "Refer to ACS Flowchart"- see link ng/L    Delta 2hr hsTnI 325 (H) <20 ng/L   Fingerstick Glucose (POCT)    Collection Time: 02/18/22  2:29 AM   Result Value Ref Range    POC Glucose 263 (H) 65 - 140 mg/dl   Urinalysis with microscopic    Collection Time: 02/18/22  4:04 AM   Result Value Ref Range    Clarity, UA Clear     Color, UA Yellow     Specific Gravity, UA 1 025 1 003 - 1 030    pH, UA 6 0 4 5, 5 0, 5 5, 6 0, 6 5, 7 0, 7 5, 8 0    Glucose,  (1/2%) (A) Negative mg/dl    Ketones, UA Trace (A) Negative mg/dl    Blood, UA Negative     Protein, UA Negative Negative mg/dl    Nitrite, UA Negative Negative    Bilirubin, UA Negative Negative    Urobilinogen, UA 1 0 0 2, 1 0 E U /dl E U /dl    Leukocytes, UA Negative Negative    WBC, UA None Seen None Seen, 2-4, 5-60 /hpf    RBC, UA None Seen None Seen, 2-4 /hpf    Hyaline Casts, UA None Seen None Seen /lpf    Bacteria, UA None Seen None Seen, Occasional /hpf    Epithelial Cells None Seen None Seen, Occasional /hpf   C-reactive protein    Collection Time: 02/18/22  4:04 AM   Result Value Ref Range    CRP 18 3 (H) <3 0 mg/L   Lactic acid, plasma    Collection Time: 02/18/22  4:04 AM   Result Value Ref Range    LACTIC ACID 2 7 (HH) 0 5 - 2 0 mmol/L   CBC (With Platelets)    Collection Time: 02/18/22  4:04 AM   Result Value Ref Range    WBC 10 37 (H) 4 31 - 10 16 Thousand/uL    RBC 3 99 3 88 - 5 62 Million/uL    Hemoglobin 13 6 12 0 - 17 0 g/dL    Hematocrit 40 2 36 5 - 49 3 %     (H) 82 - 98 fL    MCH 34 1 26 8 - 34 3 pg    MCHC 33 8 31 4 - 37 4 g/dL    RDW 13 3 11 6 - 15 1 %    Platelets 247 679 - 115 Thousands/uL    MPV 10 5 8 9 - 12 7 fL   D-dimer, quantitative    Collection Time: 02/18/22  4:05 AM   Result Value Ref Range    D-Dimer, Quant 1 49 (H) <0 50 ug/ml FEU   Comprehensive metabolic panel    Collection Time: 02/18/22  4:05 AM   Result Value Ref Range    Sodium 132 (L) 136 - 145 mmol/L    Potassium 4 3 3 5 - 5 3 mmol/L    Chloride 105 100 - 108 mmol/L    CO2 18 (L) 21 - 32 mmol/L    ANION GAP 9 4 - 13 mmol/L    BUN 25 5 - 25 mg/dL    Creatinine 0 89 0 60 - 1 30 mg/dL    Glucose 226 (H) 65 - 140 mg/dL    Calcium 8 2 (L) 8 3 - 10 1 mg/dL    Corrected Calcium 9 6 8 3 - 10 1 mg/dL    AST 74 (H) 5 - 45 U/L    ALT 75 12 - 78 U/L    Alkaline Phosphatase 98 46 - 116 U/L    Total Protein 6 2 (L) 6 4 - 8 2 g/dL    Albumin 2 3 (L) 3 5 - 5 0 g/dL    Total Bilirubin 0 76 0 20 - 1 00 mg/dL    eGFR 89 ml/min/1 73sq m   APTT    Collection Time: 02/18/22  4:05 AM   Result Value Ref Range    PTT 46 (H) 23 - 37 seconds   Procalcitonin with AM Reflex    Collection Time: 02/18/22  4:06 AM   Result Value Ref Range    Procalcitonin 0 32 (H) <=0 25 ng/ml   Lactic acid 2 Hours    Collection Time: 02/18/22  6:33 AM   Result Value Ref Range    LACTIC ACID 2 5 (HH) 0 5 - 2 0 mmol/L   Fingerstick Glucose (POCT)    Collection Time: 02/18/22  6:33 AM   Result Value Ref Range    POC Glucose 229 (H) 65 - 140 mg/dl Imaging: I have personally reviewed pertinent reports  EKG: NSR RBBB  VTE Prophylaxis: Heparin    Code Status: Level 1 - Full Code  Advance Directive and Living Will:      Power of :    POLST:      Counseling / Coordination of Care  Total floor / unit time spent today 45 minutes  Greater than 50% of total time was spent with the patient and / or family counseling and / or coordination of care

## 2022-02-18 NOTE — ASSESSMENT & PLAN NOTE
Lab Results   Component Value Date    HGBA1C 7 7 (H) 12/06/2021       Recent Labs     02/17/22  1004 02/18/22  0229 02/18/22  0633 02/18/22  1105   POCGLU 213* 263* 229* 334*       Blood Sugar Average: Last 72 hrs:  (P) 321 [id number]   · Hold home oral medications, start SSI with Accu-Cheks while here  · Carb controlled diet  · Initiate hypoglycemia protocol  · Start basal bolus insulin regimen while inpatient

## 2022-02-18 NOTE — H&P
[address] [facility]  H&P- Lolly Calhoun 1955, 73 y o  male MRN: [id number]  Unit/Bed#: [facility] 502-01 Encounter: [id number]  Primary Care Provider: Alphonso Strickland MD   Date and time admitted to hospital: 2/18/2022  2:15 AM    * Acute respiratory failure due to COVID-19 [facility])  Assessment & Plan  · Admitted 02/08/2022-02/17/2022 with acute respiratory failure secondary to COVID-19 at the [facility], unfortunately left AMA on date of discharge  · Presented again to the [facility] ED with shortness of breath, noted with hypoxia requiring 7L [address]  · Currently requiring 10 L [address] at time of admission here  · Obtain updated CRP, D-dimer  · To receive 1 further dose dexamethasone to complete 10 day course, and patient completed 5 days of remdesivir at  [facility]  · Patient refusing Baricitinib as it is not FDA approved  · Continue supplemental oxygen and titrate as needed to maintain SaO2 greater than 90%  · Respiratory protocol, incentive spirometry, pulmonary toileting    Elevated troponin  Assessment & Plan  · Troponin 3350->3679 at [facility]; patient complained of shortness of breath but did not complain of chest pain at any point  EKG at the [facility] ED was concerning for ST changes (not available at time of admission here), per discussion between ED physician and Cardiology felt not STEMI  · ? NSTEMI type 1 vs NSTEMI type 2 secondary to COVID-19 infection and ARF  · For now continue heparin gtt, telemetry monitoring, Cardiology consult  · Management of COVID-19 infection as above    Acute renal failure (ARF) (HCC)  Assessment & Plan  · POA, baseline approximately 0 8  · Suspect secondary to covid-19 infection, ?dehydration  · Received IVF prior to transfer, recheck BMP now  · Measure PVR/bladder scan  · Avoid nephrotoxins and avoid hypotension    Primary hypertension  Assessment & Plan  · Holding home lisinopril secondary to acute renal failure    Type 2 diabetes mellitus [facility])  Assessment & Plan  Lab Results   Component Value Date    HGBA1C 7 7 (H) 12/06/2021       Recent Labs     02/17/22  0535 02/17/22  0759 02/17/22  1004 02/18/22  0229   POCGLU 151* 114 213* 263*       Blood Sugar Average: Last 72 hrs:  (P) 263   · Hold home oral medications, start SSI with Accu-Cheks while here  · Carb controlled diet  · Initiate hypoglycemia protocol      VTE Prophylaxis: Heparin Drip  / sequential compression device   Code Status: Level 1 - Full Code  POLST: POLST form is not discussed and not completed at this time  Discussion with family: Offered however patient declines at this time    Anticipated Length of Stay:  Patient will be admitted on an Inpatient basis with an anticipated length of stay of  Greater than 2 midnights  Justification for Hospital Stay: Please see detailed plans noted above  Chief Complaint:     Shortness of breath, elevated troponin  History of Present Illness:  Joselo Stewart is a 73 y o  male who [facility] with complaints of worsening shortness of breath in setting of known COVID-19 infection  He was hospitalized 02/08/2022-02/17/2022 at the [facility] with acute respiratory failure secondary to COVID-19 treated with Decadron and remdesivir ultimately requiring up to 7L [address]  Unfortunately on 02/17/2022 patient became frustrated with his cares and elected to leave against medical advice  Several hours after he left, he developed a recurrence of his shortness of breath as noted by family and patient which prompted call to EMS  He was noted hypoxic into the SaO2 70s requiring 4 L NC to maintain appropriate saturations via EMS transport, however required up to 7 L NC during ED evaluation at [address]  He was noted with elevated troponin up to 3350 and EKG at that [facility] was reportedly with concerning changes (this was not available at the time of admission)    The patient himself complained only of shortness breath and denies any chest pain/pressure, dizziness/lightheadedness, or palpitations  Case was discussed between ED physician and Cardiology on-call, with Cardiology feeling changes were not consistent with STEMI but advised initiation of heparin GTT and transferred to this [facility] for cardiology evaluation along with further management of acute respiratory failure secondary to COVID-19 infection  Currently, patient is lying in bed in no acute distress, stating he feels improved with supplemental oxygen and denies any chest pain at this time  Needs frequent redirecting as he frequently complains of cares at his prior hospitalization      Review of Systems:    Constitutional:  Denies fever or chills   Eyes:  Denies change in visual acuity   HENT:  Denies nasal congestion or sore throat   Respiratory:  Endorses cough and shortness of breath  Cardiovascular:  Denies chest pain or edema   GI:  Denies abdominal pain, nausea, vomiting, bloody stools or diarrhea   :  Denies dysuria   Musculoskeletal:  Denies back pain or joint pain   Integument:  Denies rash   Neurologic:  Denies headache, focal weakness or sensory changes   Endocrine:  Denies polyuria or polydipsia   Lymphatic:  Denies swollen glands   Psychiatric:  Denies depression or anxiety     Past Medical and Surgical History:   Past Medical History:   Diagnosis Date    Diabetes mellitus ([facility] )      Past Surgical History:   Procedure Laterality Date    ANKLE FRACTURE SURGERY Left     FOOT FRACTURE SURGERY      KNEE SURGERY Right     VASECTOMY         Meds/Allergies:  Medications Prior to Admission   Medication    atorvastatin (LIPITOR) 10 mg tablet    glipiZIDE (GLUCOTROL) 10 mg tablet    lisinopril (ZESTRIL) 5 mg tablet    metFORMIN (GLUCOPHAGE) 1000 MG tablet    pantoprazole (PROTONIX) 40 mg tablet    Semaglutide (OZEMPIC, 0 25 OR 0 5 MG/DOSE, SC)    thiamine (VITAMIN B1) 100 mg tablet    zinc sulfate (ZINCATE) 220 mg capsule       Allergies: No Known Allergies  History:  Marital Status: /Civil Union     Substance Use History:   Social History     Substance and Sexual Activity   Alcohol Use Never     Social History     Tobacco Use   Smoking Status Former Smoker    Packs/day: 1 00    Years: 30 00    Pack years: 30 00    Types: Cigarettes    Quit date: 2008    Years since quittin 0   Smokeless Tobacco Never Used     Social History     Substance and Sexual Activity   Drug Use Never       Family History:  Family History   Problem Relation Age of Onset    Diabetes Mother     Alcohol abuse Father        Physical Exam:     Vitals:   Blood Pressure: 125/60 (22)  Pulse: 84 (22)  Temperature: (!) 97 2 °F (36 2 °C) (22)  Temp Source: Tympanic (22)  Respirations: 18 (22)  SpO2: (!) 88 % (22)    Constitutional:  Well developed, well nourished, no acute distress, non-toxic appearance   Eyes:  PERRL, conjunctiva normal   HENT:  Atraumatic, external ears normal, nose normal, oropharynx moist, no pharyngeal exudates  Neck- normal range of motion, no tenderness, supple   Respiratory:  No respiratory distress, bilateral rhonchi, no wheezing   Cardiovascular:  Normal rate, normal rhythm, no murmurs, no gallops, no rubs   GI:  Soft, nondistended, normal bowel sounds, nontender, no organomegaly, no mass, no rebound, no guarding   :  No costovertebral angle tenderness   Musculoskeletal:  No edema, no tenderness, no deformities  Back- no tenderness  Integument:  Well hydrated, no rash   Lymphatic:  No lymphadenopathy noted   Neurologic:  Alert &awake, communicative, CN 2-12 normal, normal motor function, normal sensory function, no focal deficits noted   Psychiatric:  Speech and behavior appropriate       Lab Results: I have personally reviewed pertinent reports        Results from last 7 days   Lab Units 22  2130   WBC Thousand/uL 14 90*   HEMOGLOBIN g/dL 14 5   HEMATOCRIT % 42 3 PLATELETS Thousands/uL 319   NEUTROS PCT % 78*   LYMPHS PCT % 11*   MONOS PCT % 9   EOS PCT % 1     Results from last 7 days   Lab Units 02/17/22  2130   POTASSIUM mmol/L 4 3   CHLORIDE mmol/L 98*   CO2 mmol/L 18*   BUN mg/dL 28*   CREATININE mg/dL 1 31*   CALCIUM mg/dL 8 5   ALK PHOS U/L 110   ALT U/L 88*   AST U/L 92*     Results from last 7 days   Lab Units 02/17/22  2130   INR  1 12       Imaging: I have personally reviewed pertinent reports  CXR 2/17/2022:  Personally reviewed, bilateral ground-glass opacities consistent with COVID 19 infection    XR chest 1 view portable    Result Date: 2/9/2022  Narrative: CHEST INDICATION:   Shortness of breath  Patient has confirmed COVID-19  COMPARISON:  None EXAM PERFORMED/VIEWS:  XR CHEST PORTABLE FINDINGS: Cardiomediastinal silhouette appears unremarkable  Bilateral multifocal groundglass opacities are present  There is no pleural effusion or pneumothorax  Osseous structures appear within normal limits for patient age  Impression: Bilateral multifocal groundglass opacities are present  In the setting of COVID-19 infection, this is most in keeping with COVID 19 pneumonia  Workstation performed: [id number]         ** Please Note: Dragon 360 Dictation voice to text software was used in the creation of this document   **

## 2022-02-18 NOTE — ASSESSMENT & PLAN NOTE
· POA, baseline approximately 0 8  · Suspect secondary to covid-19 infection, ?dehydration  · Received IVF prior to transfer, recheck BMP now  · Measure PVR/bladder scan  · Avoid nephrotoxins and avoid hypotension

## 2022-02-18 NOTE — CASE MANAGEMENT
Case Management Assessment    Patient name Yasir Riddle  Location [facility] 502/PPHP 893-57 MRN [id number]  : 1955 Date 2022       Current Admission Date: 2022  Current Admission Diagnosis:Acute respiratory failure due to COVID-19 [facility])   Patient Active Problem List    Diagnosis Date Noted    [facility]F) ([facility] ) 2022    Acute respiratory failure due to COVID-19 ([facility] ) 2022    Non MI elevated troponin 2022    Type 2 diabetes mellitus ([facility] ) 2022    Primary hypertension 2022      LOS (days): 0  Geometric Mean LOS (GMLOS) (days): 5 40  Days to GMLOS:4 8     OBJECTIVE:  PATIENT READMITTED TO HOSPITAL  Risk of Unplanned Readmission Score: 13         Current admission status: Inpatient       Preferred Pharmacy:    [address] [address]  Phone: [phone] Fax: [phone] [address] [place] [address]  Phone: [phone] Fax: [phone]    Primary Care Provider: Fariba Herrera MD    Primary Insurance: MEDICARE  Secondary Insurance: Juanita Martinez 20:  Active Health Care Agents    There are no active Health Care Agents on file         Advance Directives  Does patient have a [address] [place] [address] Avenue?: No  Does patient currently have a Health Care decision maker?: Yes, please see Health Care Proxy section  Does patient have Advance Directives?: No  Primary Contact: Raegan Pierce-wife    Readmission Root Cause  30 Day Readmission: Yes  Who directed you to return to the hospital?: Family  Did you understand whom to contact if you had questions or problems?: Yes  Did you get your prescriptions before you left the hospital?:  (left AMA)  Were you able to get to your follow-up appointments?: No  Reason[de-identified] Readmitted prior to appointment    Patient Information  Admitted from[de-identified] Home  Mental Status: Alert  During Assessment patient was accompanied by: Not accompanied during assessment  Assessment information provided by[de-identified] Spouse (Left message for wife to call CM)  Primary Caregiver: Self  Support Systems: Spouse/significant other  [place] of Residence: [address] do you live in?: [address] entry access options   Select all that apply : Stairs  Number of steps to enter home : 6  Do the steps have railings?: Yes  Type of Current Residence: 2 story home  Upon entering residence, is there a bedroom on the main floor (no further steps)?: No  A bedroom is located on the following floor levels of residence (select all that apply):: 2nd Floor  Upon entering residence, is there a bathroom on the main floor (no further steps)?: No  Indicate which floors of current residence have a bathroom (select all the apply):: 2nd Floor  Number of steps to 2nd floor from main floor: One Flight  In the last 12 months, was there a time when you were not able to pay the mortgage or rent on time?: No  In the last 12 months, how many places have you lived?: 1  In the last 12 months, was there a time when you did not have a steady place to sleep or slept in a shelter (including now)?: No  Homeless/housing insecurity resource given?: N/A  Living Arrangements: Lives w/ Spouse/significant other  Is patient a ?: No    Activities of Daily Living Prior to Admission  Functional Status: Independent  Completes ADLs independently?: Yes  Ambulates independently?: Yes  Does patient use assisted devices?: No  Does patient currently own DME?: No  Does patient have a history of Outpatient Therapy (PT/OT)?: No  Does the patient have a history of Short-Term Rehab?: No  Does patient have a history of HHC?: No  Does patient currently have [address]?: No      Patient Information Continued  Income Source: Employed  Does patient have prescription coverage?: Yes  Within the past 12 months, you worried that your food would run out before you got the money to buy more : Never true  Within the past 12 months, the food you bought just didnt last and you didnt have money to get more : Never true  Food insecurity resource given?: N/A  Does patient receive dialysis treatments?: No  Does patient have a history of substance abuse?: No  Does patient have a history of Mental Health Diagnosis?: No    Means of Transportation  [facility] D[facility] Self  In the past 12 months, has lack of transportation kept you from medical appointments or from getting medications?: No  In the past 12 months, has lack of transportation kept you from meetings, work, or from getting things needed for daily living?: No  Was application for public transport provided?: N/A

## 2022-02-18 NOTE — ASSESSMENT & PLAN NOTE
Lab Results   Component Value Date    HGBA1C 7 7 (H) 12/06/2021       Recent Labs     02/17/22  0535 02/17/22  0759 02/17/22  1004 02/18/22  0229   POCGLU 151* 114 213* 263*       Blood Sugar Average: Last 72 hrs:  (P) 263   · Hold home oral medications, start SSI with Accu-Cheks while here  · Carb controlled diet  · Initiate hypoglycemia protocol

## 2022-02-19 PROBLEM — N17.9 ACUTE RENAL FAILURE (ARF) (HCC): Status: RESOLVED | Noted: 2022-02-18 | Resolved: 2022-02-19

## 2022-02-19 LAB
ANION GAP SERPL CALCULATED.3IONS-SCNC: 5 MMOL/L (ref 4–13)
APTT PPP: 47 SECONDS (ref 23–37)
APTT PPP: 52 SECONDS (ref 23–37)
APTT PPP: 55 SECONDS (ref 23–37)
BUN SERPL-MCNC: 18 MG/DL (ref 5–25)
CALCIUM SERPL-MCNC: 8.7 MG/DL (ref 8.3–10.1)
CHLORIDE SERPL-SCNC: 105 MMOL/L (ref 100–108)
CO2 SERPL-SCNC: 24 MMOL/L (ref 21–32)
CREAT SERPL-MCNC: 0.77 MG/DL (ref 0.6–1.3)
ERYTHROCYTE [DISTWIDTH] IN BLOOD BY AUTOMATED COUNT: 12.9 % (ref 11.6–15.1)
GFR SERPL CREATININE-BSD FRML MDRD: 95 ML/MIN/1.73SQ M
GLUCOSE SERPL-MCNC: 177 MG/DL (ref 65–140)
GLUCOSE SERPL-MCNC: 179 MG/DL (ref 65–140)
GLUCOSE SERPL-MCNC: 204 MG/DL (ref 65–140)
GLUCOSE SERPL-MCNC: 248 MG/DL (ref 65–140)
GLUCOSE SERPL-MCNC: 315 MG/DL (ref 65–140)
HCT VFR BLD AUTO: 37 % (ref 36.5–49.3)
HGB BLD-MCNC: 12.7 G/DL (ref 12–17)
MCH RBC QN AUTO: 33.7 PG (ref 26.8–34.3)
MCHC RBC AUTO-ENTMCNC: 34.3 G/DL (ref 31.4–37.4)
MCV RBC AUTO: 98 FL (ref 82–98)
PLATELET # BLD AUTO: 215 THOUSANDS/UL (ref 149–390)
PMV BLD AUTO: 9.9 FL (ref 8.9–12.7)
POTASSIUM SERPL-SCNC: 4.8 MMOL/L (ref 3.5–5.3)
PROCALCITONIN SERPL-MCNC: 0.25 NG/ML
RBC # BLD AUTO: 3.77 MILLION/UL (ref 3.88–5.62)
SODIUM SERPL-SCNC: 134 MMOL/L (ref 136–145)
WBC # BLD AUTO: 9.95 THOUSAND/UL (ref 4.31–10.16)

## 2022-02-19 PROCEDURE — 85027 COMPLETE CBC AUTOMATED: CPT | Performed by: INTERNAL MEDICINE

## 2022-02-19 PROCEDURE — 85730 THROMBOPLASTIN TIME PARTIAL: CPT | Performed by: INTERNAL MEDICINE

## 2022-02-19 PROCEDURE — 80048 BASIC METABOLIC PNL TOTAL CA: CPT | Performed by: INTERNAL MEDICINE

## 2022-02-19 PROCEDURE — 99233 SBSQ HOSP IP/OBS HIGH 50: CPT | Performed by: INTERNAL MEDICINE

## 2022-02-19 PROCEDURE — 84145 PROCALCITONIN (PCT): CPT | Performed by: INTERNAL MEDICINE

## 2022-02-19 PROCEDURE — 82948 REAGENT STRIP/BLOOD GLUCOSE: CPT

## 2022-02-19 RX ORDER — INSULIN GLARGINE 100 [IU]/ML
20 INJECTION, SOLUTION SUBCUTANEOUS
Status: DISCONTINUED | OUTPATIENT
Start: 2022-02-19 | End: 2022-02-21

## 2022-02-19 RX ORDER — BENZONATATE 100 MG/1
100 CAPSULE ORAL 3 TIMES DAILY
Status: DISCONTINUED | OUTPATIENT
Start: 2022-02-19 | End: 2022-02-28

## 2022-02-19 RX ORDER — LISINOPRIL 5 MG/1
5 TABLET ORAL DAILY
Status: DISCONTINUED | OUTPATIENT
Start: 2022-02-19 | End: 2022-03-04 | Stop reason: HOSPADM

## 2022-02-19 RX ORDER — ECHINACEA PURPUREA EXTRACT 125 MG
1 TABLET ORAL
Status: DISCONTINUED | OUTPATIENT
Start: 2022-02-19 | End: 2022-03-04 | Stop reason: HOSPADM

## 2022-02-19 RX ADMIN — ZINC SULFATE 220 MG (50 MG) CAPSULE 220 MG: CAPSULE at 09:38

## 2022-02-19 RX ADMIN — THIAMINE HCL TAB 100 MG 100 MG: 100 TAB at 09:37

## 2022-02-19 RX ADMIN — HEPARIN SODIUM 14.8 UNITS/KG/HR: 10000 INJECTION, SOLUTION INTRAVENOUS; SUBCUTANEOUS at 13:36

## 2022-02-19 RX ADMIN — BENZONATATE 100 MG: 100 CAPSULE ORAL at 17:20

## 2022-02-19 RX ADMIN — INSULIN LISPRO 2 UNITS: 100 INJECTION, SOLUTION INTRAVENOUS; SUBCUTANEOUS at 17:20

## 2022-02-19 RX ADMIN — ASPIRIN 81 MG CHEWABLE TABLET 81 MG: 81 TABLET CHEWABLE at 09:37

## 2022-02-19 RX ADMIN — INSULIN GLARGINE 20 UNITS: 100 INJECTION, SOLUTION SUBCUTANEOUS at 21:33

## 2022-02-19 RX ADMIN — INSULIN LISPRO 1 UNITS: 100 INJECTION, SOLUTION INTRAVENOUS; SUBCUTANEOUS at 09:38

## 2022-02-19 RX ADMIN — BENZONATATE 100 MG: 100 CAPSULE ORAL at 21:33

## 2022-02-19 RX ADMIN — LISINOPRIL 5 MG: 5 TABLET ORAL at 12:23

## 2022-02-19 RX ADMIN — ATORVASTATIN CALCIUM 10 MG: 10 TABLET, FILM COATED ORAL at 09:37

## 2022-02-19 RX ADMIN — PANTOPRAZOLE SODIUM 40 MG: 40 TABLET, DELAYED RELEASE ORAL at 05:40

## 2022-02-19 RX ADMIN — INSULIN LISPRO 3 UNITS: 100 INJECTION, SOLUTION INTRAVENOUS; SUBCUTANEOUS at 21:34

## 2022-02-19 RX ADMIN — INSULIN LISPRO 1 UNITS: 100 INJECTION, SOLUTION INTRAVENOUS; SUBCUTANEOUS at 12:22

## 2022-02-19 NOTE — UTILIZATION REVIEW
MEDICARE PART  A   ONLY  -  [facility] SECONDARY      Initial Clinical Review    Admission: Date/Time/Statement:   Admission Orders (From admission, onward)     Ordered        02/18/22 0317  Inpatient Admission  Once                      Orders Placed This Encounter   Procedures    Inpatient Admission     Standing Status:   Standing     Number of Occurrences:   1     Order Specific Question:   Level of Care     Answer:   Med Surg [16]     Order Specific Question:   Bed request comments     Answer:   Tele     Order Specific Question:   Estimated length of stay     Answer:   More than 2 Midnights     Order Specific Question:   Certification     Answer:   I certify that inpatient services are medically necessary for this patient for a duration of greater than two midnights  See H&P and MD Progress Notes for additional information about the patient's course of treatment  74 yo male, Pt hospitalized [facility]  2/8 - 2/17  For  Acute resp failure 2/2 COVID 19:  Completed remdesivir, decadron, (declined baricitinib)  Required insulin gtt for steroid induced hyperglycemia  Signed out AMA on  2/17  Returned to [facility] ER on  2/18 w/c/o  Ongoing SOB  In resp distress,  Tachypnea but able to speak full sentences, lung sounds w/bibasilar crackles  Sat 88% on 6L NC ,  90% on 7 L midflow  Trops, glucose, lactic acid, d-dimer, crp and wbc all elevated  Na corrects to 131     CXR showed continued bibasilar ground glass opacities    Denied chest pain  But initial EKG indicated possible ischemia  (MI ALERT called)   Repeat EKG showed improved STEPHEN inferiorly, continued coving in inferior leads; delta trop up by 325   (deferred CTA chest to avoid dye overload in case pt requires heart cath)     PER CARDIOLOGY -  Needs transfer to Higher Level of Care ([address] [place] [address]) which includes  cardiology & cardiothoracic services  For  Further workup of  Ischemia (r/o ACS, PE? )    in setting of  COVID 19 PNA  w/acute resp failure  CLARIBEL, lactic acidosis, transaminitis,  Hyperglycemia  [address],  2/18  @  0317,  IP status,  medsurg level of care:   ? NSTEMI type 1 vs NSTEMI type 2 secondary to COVID-19 infection and ARF:  Suspect CLARIBEL 2/2 dehydration   Will cont telemetry,  Supplemental oxygen prn  (requiring 10 L [address] on admission)   Trend inflammatory markers and troponins,  Give  1 dose steroid (complete 10 D course)  Pulmonary toileting   Cont heparin gtt, consult cardiology   Trend renal indices,  Check PVR w/bladder scan,  HOLD home lisinopril    2/18  CARDIOLOGY:   Non-MI troponin elevation  2/2  acute hypoxic respiratory failure  No ischemic symptoms or ECG changes  Suspect Elevated troponin 2/2 hypoxia/resp distress  No further cardiac workup needed  EKG- NSR RBBB    2/18  @  1400    crt at baseline  Start basal bolus insulin regimen + accucks / SSI  Breathing improved    Date:   2/19     Day 2:   Currently requiring 12L Midflow oxygen -  Encouraged [facility] [facility] spirometry, ambulation  Restart lisinopril     Increase basal/prandial insulin today  ===========================================================    Additional Vital Signs:      02/16/22 88 3 kg (194 lb 10 7 oz)     02/19/22 14:05:40 97 9 °F (36 6 °C) 85 -- 121/62 82 91 % -- -- -- -- --   02/19/22 14:05:09 97 9 °F (36 6 °C) 85 -- 121/62 82 91 % -- -- -- -- --   02/19/22 12:22:57 -- 82 -- 116/61 79 88 % Abnormal  -- -- -- -- --   02/19/22 09:40:49 97 4 °F (36 3 °C) Abnormal  84 -- 116/60 79 84 % Abnormal  -- -- -- -- --   02/19/22 0800 -- -- -- -- -- -- -- -- -- Mid flow nasal cannula --   02/19/22 03:51:33 97 9 °F (36 6 °C) 62 17 104/57 73 94 % -- -- -- -- Lying   02/18/22 2300 -- 60 -- -- -- 94 % -- 12 L/min -- Mid flow nasal cannula --   02/18/22 2207 97 8 °F (36 6 °C) 78 20 128/67 87 96 % -- -- -- Mid flow nasal cannula Lying   02/18/22 2100 -- -- -- -- -- 93 % -- 12 L/min -- Mid flow nasal cannula --   02/18/22 18:56:24 97 8 °F (36 6 °C) 66 20 130/67 88 90 % -- -- -- -- Lying   02/18/22 11:02:14 97 7 °F (36 5 °C) 81 -- 138/69 92 88 % Abnormal  -- -- -- -- --   02/18/22 0815 -- -- -- -- -- 90 % -- 12 L/min -- Mid flow nasal cannula --   02/18/22 06:13:38 -- 77 -- 126/57 -- 86 % Abnormal  -- -- -- -- --   02/18/22 0235 97 2 °F (36 2 °C) Abnormal  84 18 125/60 -- 88 % Abnormal  60 -- 10 L/min Mid flow nasal cannula Sitting     Pertinent Labs/Diagnostic Test Results:   2/18  cXR -  Continued bibasilar ground-glass opacities no evidence of effusion no cardiomegaly  2/18  EKG -   nsr -  LAFB  RBBB - STEPHEN inferiorly coving V3-v6 new vs prev; STD 1mm 1 and AVL          Results from last 7 days   Lab Units 02/19/22 0549 02/18/22 0404 02/17/22 2130 02/17/22  0541 02/17/22  0541 02/16/22 0445 02/16/22 0445   WBC Thousand/uL 9 95 10 37* 14 90*   < > 10 15   < > 10 37*   HEMOGLOBIN g/dL 12 7 13 6 14 5  --  13 3  --  12 8   HEMATOCRIT % 37 0 40 2 42 3  --  39 0  --  37 2   PLATELETS Thousands/uL 215 230 319   < > 219   < > 176   NEUTROS ABS Thousands/µL  --   --  11 59*  --  8 63*  --  8 89*    < > = values in this interval not displayed  Results from last 7 days   Lab Units 02/19/22  0549 02/18/22 0405 02/17/22 2130 02/17/22  0540 02/16/22 0445 02/15/22  2253 02/15/22  2253   SODIUM mmol/L 134* 132* 128* 130* 133*   < > 129*   POTASSIUM mmol/L 4 8 4 3 4 3 4 9 4 5   < > 4 4   CHLORIDE mmol/L 105 105 98* 103 107   < > 101   CO2 mmol/L 24 18* 18* 21 20*   < > 22   ANION GAP mmol/L 5 9 12 6 6   < > 6   BUN mg/dL 18 25 28* 18 18   < > 22   CREATININE mg/dL 0 77 0 89 1 31* 0 80 0 74   < > 0 95   EGFR ml/min/1 73sq m 95 89 56 93 96   < > 83   CALCIUM mg/dL 8 7 8 2* 8 5 8 0* 7 1*   < > 8 3*   MAGNESIUM mg/dL  --   --  1 9  --   --   --  1 8*   PHOSPHORUS mg/dL  --   --   --   --   --   --  3 2    < > = values in this interval not displayed       Results from last 7 days   Lab Units 02/18/22  0405 02/17/22  2130   AST U/L 74* 92*   ALT U/L 75 88*   ALK PHOS U/L 98 110 TOTAL PROTEIN g/dL 6 2* 6 4   ALBUMIN g/dL 2 3* 2 7*   TOTAL BILIRUBIN mg/dL 0 76 0 65   BILIRUBIN DIRECT mg/dL  --  0 16     Results from last 7 days   Lab Units 02/19/22  1155 02/19/22  0609 02/18/22  2037 02/18/22  1725 02/18/22  1105 02/18/22  6390 02/18/22  0229 02/17/22  1004 02/17/22  0759 02/17/22  0535 02/17/22  0338 02/17/22  0144   POC GLUCOSE mg/dl 204* 177* 270* 292* 334* 229* 263* 213* 114 151* 206* 173*     Results from last 7 days   Lab Units 02/19/22  0549 02/18/22  0405 02/17/22  2130 02/17/22  0540 02/16/22  0445 02/15/22  2253 02/15/22  0538 02/14/22  0426 02/13/22  0445   GLUCOSE RANDOM mg/dL 179* 226* 302* 150* 168* 151* 109 141* 145*     Results from last 7 days   Lab Units 02/17/22  2349 02/17/22  2130   HS TNI 0HR ng/L  --  3,350*   HS TNI 2HR ng/L 3,675*  --    HSTNI D2 ng/L 325*  --      Results from last 7 days   Lab Units 02/18/22  0405 02/17/22  2130   D-DIMER QUANTITATIVE ug/ml FEU 1 49* 1 35*     Results from last 7 days   Lab Units 02/19/22  1422 02/19/22  0550 02/18/22  2028 02/18/22  0405 02/17/22  2130   PROTIME seconds  --   --   --   --  13 8   INR   --   --   --   --  1 12   PTT seconds 52* 55* >210*   < > 28    < > = values in this interval not displayed           Results from last 7 days   Lab Units 02/19/22  0550 02/18/22  0406 02/17/22  2130   PROCALCITONIN ng/ml 0 25 0 32* 0 18     Results from last 7 days   Lab Units 02/18/22  0633 02/18/22  0404 02/17/22  2130   LACTIC ACID mmol/L 2 5* 2 7* 4 0*     Results from last 7 days   Lab Units 02/17/22  2130   NT-PRO BNP pg/mL 441*     Results from last 7 days   Lab Units 02/18/22  0404 02/17/22  2130   CRP mg/L 18 3* 16 7*     Results from last 7 days   Lab Units 02/18/22  0404   CLARITY UA  Clear   COLOR UA  Yellow   SPEC GRAV UA  1 025   PH UA  6 0   GLUCOSE UA mg/dl 500 (1/2%)*   KETONES UA mg/dl Trace*   BLOOD UA  Negative   PROTEIN UA mg/dl Negative   NITRITE UA  Negative   BILIRUBIN UA  Negative   UROBILINOGEN UA E U /dl 1  0   LEUKOCYTES UA  Negative   WBC UA /hpf None Seen   RBC UA /hpf None Seen   BACTERIA UA /hpf None Seen   EPITHELIAL CELLS WET PREP /hpf None Seen     Results from last 7 days   Lab Units 02/17/22 2130   BLOOD CULTURE  No Growth at 24 hrs  GRAM STAIN RESULT  Gram positive cocci in clusters*         Past Medical History:   Diagnosis Date    Diabetes mellitus ([facility] )      Present on Admission:   Acute respiratory failure due to COVID-19 ([facility] )   Non MI elevated troponin   Primary hypertension   Type 2 diabetes mellitus (HCC)   (Resolved) Acute renal failure (ARF) (HCC)      Admitting Diagnosis: Abnormal ECG [R94 31]  Age/Sex: 73 y o  male  Admission Orders:   Consult cardiology,  Check post void residual (bladder scan) cath prn;  Daily wgt;  I/O q shift;  VS q 4 hr;  Telemetry - continuous pulse oximetry w/supplemental oxygen prn ;  Hourly inc spirometry;  accucks qid w/SSI; Ambulate q shift; Lo carb diet    Scheduled Medications:  aspirin, 81 mg, Oral, Daily  atorvastatin, 10 mg, Oral, Daily  benzonatate, 100 mg, Oral, TID  insulin glargine, 20 Units, Subcutaneous, HS  insulin lispro, 1-5 Units, Subcutaneous, TID AC  insulin lispro, 1-5 Units, Subcutaneous, HS  insulin lispro, 7 Units, Subcutaneous, TID With Meals  lisinopril, 5 mg, Oral, Daily  pantoprazole, 40 mg, Oral, Early Morning  thiamine, 100 mg, Oral, Daily  zinc sulfate, 220 mg, Oral, Daily      Continuous IV Infusions:  heparin (porcine), 3-20 Units/kg/hr (Order-Specific), Intravenous, Titrated      PRN Meds:  acetaminophen, 650 mg, Oral, Q4H PRN  ondansetron, 4 mg, Intravenous, Q6H PRN    Network Utilization Review Department  ATTENTION: Please call with any questions or concerns to [phone] and carefully listen to the prompts so that you are directed to the right person   All voicemails are confidential   Farrel Bodily all requests for admission clinical reviews, approved or denied determinations and any other requests to dedicated fax number below belonging to the campus where the patient is receiving treatment   List of dedicated fax numbers for the Facilities:  [address] DENIALS (Administrative/Medical Necessity) [phone]   [address]  (Maternity/NICU/Pediatrics) [phone]   [address]  [address] [facility] [place] [address] [id number] [address] [facility] [place] [address] [phone]

## 2022-02-19 NOTE — PLAN OF CARE
Problem: PAIN - ADULT  Goal: Verbalizes/displays adequate comfort level or baseline comfort level  Description: Interventions:  - Encourage patient to monitor pain and request assistance  - Assess pain using appropriate pain scale  - Administer analgesics based on type and severity of pain and evaluate response  - Implement non-pharmacological measures as appropriate and evaluate response  - Consider cultural and social influences on pain and pain management  - Notify physician/advanced practitioner if interventions unsuccessful or patient reports new pain  Outcome: Progressing     Problem: INFECTION - ADULT  Goal: Absence or prevention of progression during hospitalization  Description: INTERVENTIONS:  - Assess and monitor for signs and symptoms of infection  - Monitor lab/diagnostic results  - Monitor all insertion sites, i e  indwelling lines, tubes, and drains  - Monitor endotracheal if appropriate and nasal secretions for changes in amount and color  - [place] appropriate cooling/warming therapies per order  - Administer medications as ordered  - Instruct and encourage patient and family to use good hand hygiene technique  - Identify and instruct in appropriate isolation precautions for identified infection/condition  Outcome: Progressing  Goal: Absence of fever/infection during neutropenic period  Description: INTERVENTIONS:  - Monitor WBC    Outcome: Progressing     Problem: SAFETY ADULT  Goal: Patient will remain free of falls  Description: INTERVENTIONS:  - Educate patient/family on patient safety including physical limitations  - Instruct patient to call for assistance with activity   - Consult OT/PT to assist with strengthening/mobility   - Keep Call bell within reach  - Keep bed low and locked with side rails adjusted as appropriate  - Keep care items and personal belongings within reach  - Initiate and maintain comfort rounds  - Make Fall Risk Sign visible to staff  - Offer Toileting every 2 Hours, in advance of need  - Initiate/Maintain alarm  - Obtain necessary fall risk management equipment:   - Apply yellow socks and bracelet for high fall risk patients  - Consider moving patient to room near nurses station  Outcome: Progressing  Goal: Maintain or return to baseline ADL function  Description: INTERVENTIONS:  -  Assess patient's ability to carry out ADLs; assess patient's baseline for ADL function and identify physical deficits which impact ability to perform ADLs (bathing, care of mouth/teeth, toileting, grooming, dressing, etc )  - Assess/evaluate cause of self-care deficits   - Assess range of motion  - Assess patient's mobility; develop plan if impaired  - Assess patient's need for assistive devices and provide as appropriate  - Encourage maximum independence but intervene and supervise when necessary  - Involve family in performance of ADLs  - Assess for home care needs following discharge   - Consider OT consult to assist with ADL evaluation and planning for discharge  - Provide patient education as appropriate  Outcome: Progressing  Goal: Maintains/Returns to pre admission functional level  Description: INTERVENTIONS:  - Perform BMAT or MOVE assessment daily    - Set and communicate daily mobility goal to care team and patient/family/caregiver  - Collaborate with rehabilitation services on mobility goals if consulted  - Perform Range of Motion 4 times a day  - Reposition patient every 2 hours    - Dangle patient 4 times a day  - Stand patient 4 times a day  - Ambulate patient 4 times a day  - Out of bed to chair 3 times a day   - Out of bed for meals 3 times a day  - Out of bed for toileting  - Record patient progress and toleration of activity level   Outcome: Progressing     Problem: DISCHARGE PLANNING  Goal: Discharge to home or other facility with appropriate resources  Description: INTERVENTIONS:  - Identify barriers to discharge w/patient and caregiver  - Arrange for needed discharge resources and transportation as appropriate  - Identify discharge learning needs (meds, wound care, etc )  - Arrange for interpretive services to assist at discharge as needed  - Refer to Case Management Department for coordinating discharge planning if the patient needs post-hospital services based on physician/advanced practitioner order or complex needs related to functional status, cognitive ability, or social support system  Outcome: Progressing     Problem: Knowledge Deficit  Goal: Patient/family/caregiver demonstrates understanding of disease process, treatment plan, medications, and discharge instructions  Description: Complete learning assessment and assess knowledge base    Interventions:  - Provide teaching at level of understanding  - Provide teaching via preferred learning methods  Outcome: Progressing

## 2022-02-19 NOTE — ASSESSMENT & PLAN NOTE
Lab Results   Component Value Date    HGBA1C 7 7 (H) 12/06/2021       Recent Labs     02/18/22  1725 02/18/22  2037 02/19/22  0609 02/19/22  1155   POCGLU 292* 270* 177* 204*       Blood Sugar Average: Last 72 hrs:  (P) 252 [id number]   · Hold home oral medications, start SSI with Accu-Cheks while here  · Carb controlled diet  · Initiate hypoglycemia protocol  · Increase basal/prandial insulin today

## 2022-02-19 NOTE — ASSESSMENT & PLAN NOTE
· Admitted 02/08/2022-02/17/2022 with acute respiratory failure secondary to COVID-19 at the [facility], unfortunately left AMA on date of discharge  · [facility] with shortness of breath, noted with hypoxia requiring 7L [address]  · Completed dexamethasone 2/18  · Patient refusing Baricitinib as it is not FDA approved  · Continue supplemental oxygen and titrate as needed to maintain SaO2 greater than 90%  · Respiratory protocol, incentive spirometry, pulmonary toileting  · Currently on 12L midlfow  · Incentive spirometry encouraged

## 2022-02-19 NOTE — PROGRESS NOTES
[address] [facility]  Progress Note - Yasir Riddle 1955, 73 y o  male MRN: [id number]  Unit/Bed#: [address] Encounter: [id number]  Primary Care Provider: Fariba Herrera MD   Date and time admitted to hospital: 2/18/2022  2:15 AM    * Acute respiratory failure due to COVID-19 [facility])  Assessment & Plan  · Admitted 02/08/2022-02/17/2022 with acute respiratory failure secondary to COVID-19 at the [facility], unfortunately left AMA on date of discharge  · [facility] with shortness of breath, noted with hypoxia requiring 7L [address]  · Completed dexamethasone 2/18  · Patient refusing Baricitinib as it is not FDA approved  · Continue supplemental oxygen and titrate as needed to maintain SaO2 greater than 90%  · Respiratory protocol, incentive spirometry, pulmonary toileting  · Currently on 12L midlfow  · Incentive spirometry encouraged    Non MI elevated troponin  Assessment & Plan  · Likely due to prolonged hypoxia from covid-19  · No ischemic symptoms  · No further workup at this time per cardiology  · Outpatient cardiology follow up    Primary hypertension  Assessment & Plan  · Restart lisinopril now    Type 2 diabetes mellitus [facility])  Assessment & Plan  Lab Results   Component Value Date    HGBA1C 7 7 (H) 12/06/2021       Recent Labs     02/18/22  1725 02/18/22  2037 02/19/22  0609 02/19/22  1155   POCGLU 292* 270* 177* 204*       Blood Sugar Average: Last 72 hrs:  (P) 252 [id number]   · Hold home oral medications, start SSI with Accu-Cheks while here  · Carb controlled diet  · Initiate hypoglycemia protocol  · Increase basal/prandial insulin today        VTE Pharmacologic Prophylaxis:   Pharmacologic: Heparin Drip  Mechanical VTE Prophylaxis in Place: Yes    Patient Centered Rounds: I have performed bedside rounds with nursing staff today      Discussions with Specialists or Other Care Team Provider: CM, nurse    Education and Discussions with Family / Patient: patient, plan of care    Time Spent for Care: 20 minutes  More than 50% of total time spent on counseling and coordination of care as described above  Current Length of Stay: 1 day(s)    Current Patient Status: Inpatient   Certification Statement: The patient will continue to require additional inpatient hospital stay due to hypoxia    Discharge Plan: TBD    Code Status: Level 1 - Full Code      Subjective:   Report that his breathing is stable, has mild dry cough  Objective:     Vitals:   Temp (24hrs), Av 7 °F (36 5 °C), Min:97 4 °F (36 3 °C), Max:97 9 °F (36 6 °C)    Temp:  [97 4 °F (36 3 °C)-97 9 °F (36 6 °C)] 97 4 °F (36 3 °C)  HR:  [60-84] 82  Resp:  [17-20] 17  BP: (104-130)/(57-67) 116/61  SpO2:  [84 %-96 %] 88 %  There is no height or weight on file to calculate BMI  Input and Output Summary (last 24 hours): Intake/Output Summary (Last 24 hours) at 2022 1249  Last data filed at 2022 0351  Gross per 24 hour   Intake 712 66 ml   Output 540 ml   Net 172 66 ml       Physical Exam:     Physical Exam  Constitutional:       Appearance: Normal appearance  HENT:      Head: Normocephalic and atraumatic  Mouth/Throat:      Mouth: Mucous membranes are moist       Pharynx: Oropharynx is clear  Eyes:      Extraocular Movements: Extraocular movements intact  Cardiovascular:      Rate and Rhythm: Normal rate and regular rhythm  Pulmonary:      Effort: Pulmonary effort is normal       Breath sounds: Rales present  Abdominal:      Palpations: Abdomen is soft  Skin:     General: Skin is warm and dry  Neurological:      General: No focal deficit present  Mental Status: He is alert and oriented to person, place, and time     Psychiatric:         Mood and Affect: Mood normal          Behavior: Behavior normal            Additional Data:     Labs:    Results from last 7 days   Lab Units 22  0549 22  0404 22  2130   WBC Thousand/uL 9 95   < > 14 90* HEMOGLOBIN g/dL 12 7   < > 14 5   HEMATOCRIT % 37 0   < > 42 3   PLATELETS Thousands/uL 215   < > 319   NEUTROS PCT %  --   --  78*   LYMPHS PCT %  --   --  11*   MONOS PCT %  --   --  9   EOS PCT %  --   --  1    < > = values in this interval not displayed  Results from last 7 days   Lab Units 02/19/22  0549 02/18/22  0405 02/18/22  0405   SODIUM mmol/L 134*   < > 132*   POTASSIUM mmol/L 4 8   < > 4 3   CHLORIDE mmol/L 105   < > 105   CO2 mmol/L 24   < > 18*   BUN mg/dL 18   < > 25   CREATININE mg/dL 0 77   < > 0 89   ANION GAP mmol/L 5   < > 9   CALCIUM mg/dL 8 7   < > 8 2*   ALBUMIN g/dL  --   --  2 3*   TOTAL BILIRUBIN mg/dL  --   --  0 76   ALK PHOS U/L  --   --  98   ALT U/L  --   --  75   AST U/L  --   --  74*   GLUCOSE RANDOM mg/dL 179*   < > 226*    < > = values in this interval not displayed  Results from last 7 days   Lab Units 02/17/22  2130   INR  1 12     Results from last 7 days   Lab Units 02/19/22  1155 02/19/22  0609 02/18/22  2037 02/18/22  1725 02/18/22  1105 02/18/22  8118 02/18/22  0229 02/17/22  1004 02/17/22  0759 02/17/22  0535 02/17/22  0338 02/17/22  0144   POC GLUCOSE mg/dl 204* 177* 270* 292* 334* 229* 263* 213* 114 151* 206* 173*         Results from last 7 days   Lab Units 02/19/22  0550 02/18/22  0633 02/18/22  0406 02/18/22  0404 02/17/22  2130   LACTIC ACID mmol/L  --  2 5*  --  2 7* 4 0*   PROCALCITONIN ng/ml 0 25  --  0 32*  --  0 18           * I Have Reviewed All Lab Data Listed Above  * Additional Pertinent Lab Tests Reviewed: All Labs Within Last 24 Hours Reviewed      Recent Cultures (last 7 days):     Results from last 7 days   Lab Units 02/17/22 2130   BLOOD CULTURE  No Growth at 24 hrs     GRAM STAIN RESULT  Gram positive cocci in clusters*       Last 24 Hours Medication List:   Current Facility-Administered Medications   Medication Dose Route Frequency Provider Last Rate    acetaminophen  650 mg Oral Q4H PRN Desi Alonzo DO      aspirin  81 mg Oral Daily Steven Dux, DO      atorvastatin  10 mg Oral Daily Steven Dux, DO      benzonatate  100 mg Oral TID PRN Steven Dux, DO      heparin (porcine)  3-20 Units/kg/hr (Order-Specific) Intravenous Titrated Steven Dux, DO 14 8 Units/kg/hr (02/19/22 0801)    insulin glargine  20 Units Subcutaneous HS Otto Garza MD      insulin lispro  1-5 Units Subcutaneous TID AC Otto Garza MD      insulin lispro  1-5 Units Subcutaneous HS Otto Garza MD      insulin lispro  7 Units Subcutaneous TID With Meals Otto Garza MD      lisinopril  5 mg Oral Daily Otto Garza MD      ondansetron  4 mg Intravenous Q6H PRN Steven Dux, DO      pantoprazole  40 mg Oral Early Morning Steven Dux, DO      thiamine  100 mg Oral Daily Steven Dux, DO      zinc sulfate  220 mg Oral Daily Steven Dux, DO          Today, Patient Was Seen By: Óscar Hunter MD    ** Please Note: Dictation voice to text software may have been used in the creation of this document   **

## 2022-02-20 ENCOUNTER — APPOINTMENT (INPATIENT)
Dept: RADIOLOGY | Facility: HOSPITAL | Age: 67
DRG: 177 | End: 2022-02-20
Payer: COMMERCIAL

## 2022-02-20 LAB
ALBUMIN SERPL BCP-MCNC: 2.3 G/DL (ref 3.5–5)
ALP SERPL-CCNC: 113 U/L (ref 46–116)
ALT SERPL W P-5'-P-CCNC: 104 U/L (ref 12–78)
ANION GAP SERPL CALCULATED.3IONS-SCNC: 7 MMOL/L (ref 4–13)
APTT PPP: 56 SECONDS (ref 23–37)
APTT PPP: 71 SECONDS (ref 23–37)
AST SERPL W P-5'-P-CCNC: 63 U/L (ref 5–45)
BASOPHILS # BLD AUTO: 0.02 THOUSANDS/ΜL (ref 0–0.1)
BASOPHILS NFR BLD AUTO: 0 % (ref 0–1)
BILIRUB SERPL-MCNC: 0.99 MG/DL (ref 0.2–1)
BUN SERPL-MCNC: 16 MG/DL (ref 5–25)
CALCIUM ALBUM COR SERPL-MCNC: 9.5 MG/DL (ref 8.3–10.1)
CALCIUM SERPL-MCNC: 8.1 MG/DL (ref 8.3–10.1)
CHLORIDE SERPL-SCNC: 104 MMOL/L (ref 100–108)
CO2 SERPL-SCNC: 21 MMOL/L (ref 21–32)
CREAT SERPL-MCNC: 0.72 MG/DL (ref 0.6–1.3)
CRP SERPL QL: 59.6 MG/L
EOSINOPHIL # BLD AUTO: 0.13 THOUSAND/ΜL (ref 0–0.61)
EOSINOPHIL NFR BLD AUTO: 1 % (ref 0–6)
ERYTHROCYTE [DISTWIDTH] IN BLOOD BY AUTOMATED COUNT: 12.8 % (ref 11.6–15.1)
GFR SERPL CREATININE-BSD FRML MDRD: 97 ML/MIN/1.73SQ M
GLUCOSE SERPL-MCNC: 132 MG/DL (ref 65–140)
GLUCOSE SERPL-MCNC: 141 MG/DL (ref 65–140)
GLUCOSE SERPL-MCNC: 160 MG/DL (ref 65–140)
GLUCOSE SERPL-MCNC: 183 MG/DL (ref 65–140)
GLUCOSE SERPL-MCNC: 365 MG/DL (ref 65–140)
HCT VFR BLD AUTO: 37.5 % (ref 36.5–49.3)
HGB BLD-MCNC: 13 G/DL (ref 12–17)
IMM GRANULOCYTES # BLD AUTO: 0.14 THOUSAND/UL (ref 0–0.2)
IMM GRANULOCYTES NFR BLD AUTO: 1 % (ref 0–2)
LYMPHOCYTES # BLD AUTO: 1.39 THOUSANDS/ΜL (ref 0.6–4.47)
LYMPHOCYTES NFR BLD AUTO: 11 % (ref 14–44)
MCH RBC QN AUTO: 33.6 PG (ref 26.8–34.3)
MCHC RBC AUTO-ENTMCNC: 34.7 G/DL (ref 31.4–37.4)
MCV RBC AUTO: 97 FL (ref 82–98)
MONOCYTES # BLD AUTO: 0.66 THOUSAND/ΜL (ref 0.17–1.22)
MONOCYTES NFR BLD AUTO: 5 % (ref 4–12)
NEUTROPHILS # BLD AUTO: 10.25 THOUSANDS/ΜL (ref 1.85–7.62)
NEUTS SEG NFR BLD AUTO: 82 % (ref 43–75)
NRBC BLD AUTO-RTO: 0 /100 WBCS
PLATELET # BLD AUTO: 206 THOUSANDS/UL (ref 149–390)
PMV BLD AUTO: 9.8 FL (ref 8.9–12.7)
POTASSIUM SERPL-SCNC: 4 MMOL/L (ref 3.5–5.3)
PROCALCITONIN SERPL-MCNC: 0.28 NG/ML
PROT SERPL-MCNC: 6.2 G/DL (ref 6.4–8.2)
RBC # BLD AUTO: 3.87 MILLION/UL (ref 3.88–5.62)
SODIUM SERPL-SCNC: 132 MMOL/L (ref 136–145)
WBC # BLD AUTO: 12.59 THOUSAND/UL (ref 4.31–10.16)

## 2022-02-20 PROCEDURE — 80053 COMPREHEN METABOLIC PANEL: CPT | Performed by: EMERGENCY MEDICINE

## 2022-02-20 PROCEDURE — 94760 N-INVAS EAR/PLS OXIMETRY 1: CPT

## 2022-02-20 PROCEDURE — 71045 X-RAY EXAM CHEST 1 VIEW: CPT

## 2022-02-20 PROCEDURE — 99233 SBSQ HOSP IP/OBS HIGH 50: CPT | Performed by: INTERNAL MEDICINE

## 2022-02-20 PROCEDURE — 82948 REAGENT STRIP/BLOOD GLUCOSE: CPT

## 2022-02-20 PROCEDURE — 84145 PROCALCITONIN (PCT): CPT | Performed by: EMERGENCY MEDICINE

## 2022-02-20 PROCEDURE — 85730 THROMBOPLASTIN TIME PARTIAL: CPT | Performed by: INTERNAL MEDICINE

## 2022-02-20 PROCEDURE — 86140 C-REACTIVE PROTEIN: CPT | Performed by: STUDENT IN AN ORGANIZED HEALTH CARE EDUCATION/TRAINING PROGRAM

## 2022-02-20 PROCEDURE — 85025 COMPLETE CBC W/AUTO DIFF WBC: CPT | Performed by: EMERGENCY MEDICINE

## 2022-02-20 RX ORDER — POLYETHYLENE GLYCOL 3350 17 G/17G
17 POWDER, FOR SOLUTION ORAL DAILY PRN
Status: DISCONTINUED | OUTPATIENT
Start: 2022-02-20 | End: 2022-02-25

## 2022-02-20 RX ORDER — METHYLPREDNISOLONE SODIUM SUCCINATE 125 MG/2ML
80 INJECTION, POWDER, LYOPHILIZED, FOR SOLUTION INTRAMUSCULAR; INTRAVENOUS DAILY
Status: DISCONTINUED | OUTPATIENT
Start: 2022-02-20 | End: 2022-02-22

## 2022-02-20 RX ORDER — GUAIFENESIN/DEXTROMETHORPHAN 100-10MG/5
10 SYRUP ORAL EVERY 4 HOURS PRN
Status: DISCONTINUED | OUTPATIENT
Start: 2022-02-20 | End: 2022-02-26

## 2022-02-20 RX ORDER — FUROSEMIDE 10 MG/ML
20 INJECTION INTRAMUSCULAR; INTRAVENOUS ONCE
Status: COMPLETED | OUTPATIENT
Start: 2022-02-20 | End: 2022-02-20

## 2022-02-20 RX ORDER — DOXYCYCLINE HYCLATE 100 MG/1
100 CAPSULE ORAL EVERY 12 HOURS
Status: DISCONTINUED | OUTPATIENT
Start: 2022-02-20 | End: 2022-02-20

## 2022-02-20 RX ADMIN — FUROSEMIDE 20 MG: 10 INJECTION, SOLUTION INTRAVENOUS at 05:35

## 2022-02-20 RX ADMIN — ENOXAPARIN SODIUM 40 MG: 40 INJECTION SUBCUTANEOUS at 15:00

## 2022-02-20 RX ADMIN — INSULIN LISPRO 4 UNITS: 100 INJECTION, SOLUTION INTRAVENOUS; SUBCUTANEOUS at 21:39

## 2022-02-20 RX ADMIN — ZINC SULFATE 220 MG (50 MG) CAPSULE 220 MG: CAPSULE at 08:40

## 2022-02-20 RX ADMIN — PANTOPRAZOLE SODIUM 40 MG: 40 TABLET, DELAYED RELEASE ORAL at 05:35

## 2022-02-20 RX ADMIN — DOXYCYCLINE 100 MG: 100 CAPSULE ORAL at 05:35

## 2022-02-20 RX ADMIN — BENZONATATE 100 MG: 100 CAPSULE ORAL at 15:03

## 2022-02-20 RX ADMIN — ATORVASTATIN CALCIUM 10 MG: 10 TABLET, FILM COATED ORAL at 08:40

## 2022-02-20 RX ADMIN — BENZONATATE 100 MG: 100 CAPSULE ORAL at 21:41

## 2022-02-20 RX ADMIN — INSULIN LISPRO 1 UNITS: 100 INJECTION, SOLUTION INTRAVENOUS; SUBCUTANEOUS at 10:57

## 2022-02-20 RX ADMIN — GUAIFENESIN AND DEXTROMETHORPHAN 10 ML: 100; 10 SYRUP ORAL at 04:19

## 2022-02-20 RX ADMIN — BENZONATATE 100 MG: 100 CAPSULE ORAL at 08:40

## 2022-02-20 RX ADMIN — THIAMINE HCL TAB 100 MG 100 MG: 100 TAB at 08:40

## 2022-02-20 RX ADMIN — INSULIN GLARGINE 20 UNITS: 100 INJECTION, SOLUTION SUBCUTANEOUS at 21:40

## 2022-02-20 RX ADMIN — METHYLPREDNISOLONE SODIUM SUCCINATE 80 MG: 125 INJECTION, POWDER, FOR SOLUTION INTRAMUSCULAR; INTRAVENOUS at 15:00

## 2022-02-20 RX ADMIN — ASPIRIN 81 MG CHEWABLE TABLET 81 MG: 81 TABLET CHEWABLE at 08:40

## 2022-02-20 RX ADMIN — SALINE NASAL SPRAY 1 SPRAY: 1.5 SOLUTION NASAL at 03:50

## 2022-02-20 RX ADMIN — DOXYCYCLINE 100 MG: 100 CAPSULE ORAL at 16:53

## 2022-02-20 RX ADMIN — INSULIN LISPRO 1 UNITS: 100 INJECTION, SOLUTION INTRAVENOUS; SUBCUTANEOUS at 15:36

## 2022-02-20 RX ADMIN — LISINOPRIL 5 MG: 5 TABLET ORAL at 08:40

## 2022-02-20 RX ADMIN — DEXTROSE 1000 MG: 50 INJECTION, SOLUTION INTRAVENOUS at 05:53

## 2022-02-20 NOTE — RESPIRATORY THERAPY NOTE
RT Protocol Note  Sunita Castelan 73 y o  male MRN: [id number]  Unit/Bed#: [facility] 208-01 Encounter: [id number]    Assessment    Principal Problem:    Acute respiratory failure due to COVID-19 [facility])  Active Problems:    Non MI elevated troponin    Type 2 diabetes mellitus ([place] [address] )    Primary hypertension      Home Pulmonary Medications:  None per patient  Home Devices/Therapy: Other (Comment) (none)    Past Medical History:   Diagnosis Date    Diabetes mellitus ([place] [address] )      Social History     Socioeconomic History    Marital status: /Civil Union     Spouse name: Not on file    Number of children: Not on file    Years of education: Not on file    Highest education level: Not on file   Occupational History    Not on file   Tobacco Use    Smoking status: Former Smoker     Packs/day: 1 00     Years: 30 00     Pack years: 30 00     Types: Cigarettes     Quit date: 2008     Years since quittin 0    Smokeless tobacco: Never Used   Vaping Use    Vaping Use: Never used   Substance and Sexual Activity    Alcohol use: Never    Drug use: Never    Sexual activity: Not Currently   Other Topics Concern    Not on file   Social History Narrative    Not on file     Social Determinants of Health     Financial Resource Strain: Not on file   Food Insecurity: No Food Insecurity    Worried About Running Out of Food in the Last Year: Never true    [address] in the Last Year: Never true   Transportation Needs: No Transportation Needs    Lack of Transportation (Medical): No    Lack of Transportation (Non-Medical):  No   Physical Activity: Not on file   Stress: Not on file   Social Connections: Not on file   Intimate Partner Violence: Not on file   Housing Stability: Low Risk     Unable to Pay for Housing in the Last Year: No    Number of Places Lived in the Last Year: 1    Unstable Housing in the Last Year: No       Subjective         Objective    Physical Exam:   Assessment Type: Assess only  General Appearance: Sleeping  Respiratory Pattern: Dyspnea with exertion  Bilateral Breath Sounds: Diminished  O2 Device: HFNC    Vitals:  Blood pressure 123/74, pulse 98, temperature (!) 97 2 °F (36 2 °C), temperature source Oral, resp  rate (!) 24, height 5' 8" (1 727 m), weight 84 9 kg (187 lb 1 6 oz), SpO2 96 %  Imaging and other studies: I have personally reviewed pertinent reports  O2 Device: HFNC FIO2 90%     Plan    Respiratory Plan: Vent/NIV/HFNC        Resp Comments: Patient found on high flow nasal cannula at FIO2 90% and flow at 60 lpm   SpO2 96% and patient sleeping  Pt states he gets sob with movement  No changes made at this time, will continue to monitor patient

## 2022-02-20 NOTE — PROGRESS NOTES
Progress Note - Critical Care   Meri Burkitt 73 y o  male MRN: [id number]  Unit/Bed#: [facility] 502-01 Encounter: [id number]    Impression:  Principal Problem:    Acute respiratory failure due to COVID-19 [facility])  Active Problems:    Non MI elevated troponin    Type 2 diabetes mellitus ([facility] [address] )    Primary hypertension      Plan:    Neuro:   · Pain controlled with: Tylenol PRN  · Regulate sleep/wake cycle  · Delirium precautions  · CAM-ICU daily  · Trend neuro exam    CV:   · Hypertension  · Home lisinopril  · MAP goal > 65  · Rhythm: NSR  · Follow rhythm on telemetry    Lung:   · Covid-19 pneumonia  · Completed Decadron 2/18   · Complete remdesivir  · Refused baricitinib  · Was on mid flow now requiring high-flow nasal cannula  · Transferred to ICU for close monitoring for increase oxygen requirements  · Follow-up chest x-ray  · IV lasix provided  · May consider pulse dose steroids if needed  · Incentive spirometry  · Self proning  · SpO2 goal >90%  · Pulmonary toileting     GI:   · Stress ulcer prophylaxis: Protonix PO  · Bowel regimen: Miralax prn  · zofran PRN for nausea    FEN:   · Nutrition/diet plan: Tano/cho diet  · Replete electrolytes with goals: K >4 0, Mag >2 0, and Phos >3 0  :   · Indwelling Masterson present: no   · Trend UOP and BUN/creat  · Strict I and O    ID:   · Source of infection: covid pneumonia  · Abx ordered: ceftriaxone and doxycycline  · Day # 1   · Trend temps and WBC count  · Maintain normothermia    Heme:   · Trend hgb and plts  · Transfuse as needed for goal hgb >7    Endo:   · Glycemic control plan: maintain normoglycemia    MSK/Skin:  · Frequent turning and pressure off-loading  · Local wound care as needed  ·     VTE Prophylaxis:  · Pharmacologic Prophylaxis: Heparin Drip  · Mechanical Prophylaxis: sequential compression device    Disposition: Continue ICU care    Treatment Team/Consultants: Cardiology    Date of admission: 2/18/2022    Reason for Admission: Covid pneumonia    HPI/24hr events: Increased O2 requirements  Transferred to ICCU for close monitoring  Patient states he has worsening dyspnea  States he left AMA previously because he was not receiving enough information about his treatment plan  ROS: 14 point ROS is negative and/or as stated in the HPI  Physical Exam:    Physical Exam  Vitals and nursing note reviewed  Constitutional:       Appearance: He is well-developed  He is not diaphoretic  HENT:      Head: Normocephalic and atraumatic  Right Ear: External ear normal       Left Ear: External ear normal    Eyes:      General:         Right eye: No discharge  Left eye: No discharge  Conjunctiva/sclera: Conjunctivae normal    Neck:      Vascular: No JVD  Trachea: No tracheal deviation  Cardiovascular:      Rate and Rhythm: Normal rate and regular rhythm  Heart sounds: Normal heart sounds  Pulmonary:      Effort: Respiratory distress present  Breath sounds: No wheezing  Comments: tachypnea  Abdominal:      General: There is no distension  Musculoskeletal:         General: Normal range of motion  Cervical back: Normal range of motion  Skin:     General: Skin is warm and dry  Neurological:      Mental Status: He is alert and oriented to person, place, and time  Psychiatric:         Mood and Affect: Mood normal          Speech: Speech normal          Behavior: Behavior normal          Vitals:   Vitals:    22 0230 22 0348 22 0432 22 0434   BP:   113/60    BP Location:       Pulse:       Resp:       Temp:       TempSrc:       SpO2: 97%   94%   Weight:  84 9 kg (187 lb 1 6 oz)     Height:           Arterial Line:          Temperature: Temp (24hrs), Av 6 °F (36 4 °C), Min:97 2 °F (36 2 °C), Max:97 9 °F (36 6 °C)  Current: Temperature: (!) 97 2 °F (36 2 °C)    Weights: IBW (Ideal Body Weight): 68 4 kg  Body mass index is 28 45 kg/m²       Hemodynamic Monitoring:  N/A       Respiratory:  SpO2: SpO2: 94 %  O2 Flow Rate (L/min): 12 L/min    Intake and Outputs:    Intake/Output Summary (Last 24 hours) at 2/20/2022 0619  Last data filed at 2/20/2022 0401  Gross per 24 hour   Intake 830 31 ml   Output 675 ml   Net 155 31 ml     I/O last 24 hours: In: 943 5 [P O :480; I V :463 5]  Out: 1115 [Urine:1115]        Nutrition:        Diet Orders   (From admission, onward)             Start     Ordered    02/18/22 1340  Diet Tano/CHO Controlled; Consistent Carbohydrate Diet Level 2 (5 carb servings/75 grams CHO/meal)  Diet effective now        References:    Nutrtion Support Algorithm Enteral vs  Parenteral   Question Answer Comment   Diet Type Tano/CHO Controlled    Tano/CHO Controlled Consistent Carbohydrate Diet Level 2 (5 carb servings/75 grams CHO/meal)    RD to adjust diet per protocol? Yes        02/18/22 1339                Labs:   Results from last 7 days   Lab Units 02/20/22  0538 02/19/22  0549 02/18/22  0404 02/17/22  2130 02/17/22  2130 02/17/22  0541 02/17/22  0541   WBC Thousand/uL 12 59* 9 95 10 37*   < > 14 90*   < > 10 15   HEMOGLOBIN g/dL 13 0 12 7 13 6   < > 14 5   < > 13 3   HEMATOCRIT % 37 5 37 0 40 2   < > 42 3   < > 39 0   PLATELETS Thousands/uL 206 215 230   < > 319   < > 219   NEUTROS PCT % 82*  --   --   --  78*  --  86*   MONOS PCT % 5  --   --   --  9  --  5    < > = values in this interval not displayed       Results from last 7 days   Lab Units 02/19/22  0549 02/18/22  0405 02/17/22 2130   SODIUM mmol/L 134* 132* 128*   POTASSIUM mmol/L 4 8 4 3 4 3   CHLORIDE mmol/L 105 105 98*   CO2 mmol/L 24 18* 18*   BUN mg/dL 18 25 28*   CREATININE mg/dL 0 77 0 89 1 31*   CALCIUM mg/dL 8 7 8 2* 8 5   ALK PHOS U/L  --  98 110   ALT U/L  --  75 88*   AST U/L  --  74* 92*     Results from last 7 days   Lab Units 02/17/22  2130 02/15/22  2253   MAGNESIUM mg/dL 1 9 1 8*     Results from last 7 days   Lab Units 02/15/22  2253   PHOSPHORUS mg/dL 3 2      Results from last 7 days   Lab Units 02/20/22 0332 02/19/22 2041 02/19/22  1422 02/18/22  0405 02/17/22  2130   INR   --   --   --   --  1 12   PTT seconds 56* 47* 52*   < > 28    < > = values in this interval not displayed  Results from last 7 days   Lab Units 02/18/22  0633 02/18/22  0404 02/17/22  2130   LACTIC ACID mmol/L 2 5* 2 7* 4 0*             Results from last 7 days   Lab Units 02/19/22  0550 02/18/22  0406 02/17/22  2130   PROCALCITONIN ng/ml 0 25 0 32* 0 18     No results found for: [facility]             Imaging:    CXR:  I have personally reviewed pertinent reports  Micro:   Blood Culture:   Lab Results   Component Value Date    BLOODCX No Growth at 24 hrs  02/17/2022    BLOODCX No Growth After 5 Days  02/08/2022    BLOODCX No Growth After 5 Days  02/08/2022     Urine Culture: No results found for: URINECX  Sputum Culture: No components found for: SPUTUMCX  Wound Culure: No results found for: WOUNDCULT  Results from last 7 days   Lab Units 02/17/22  2130   BLOOD CULTURE  No Growth at 24 hrs     GRAM STAIN RESULT  Gram positive cocci in clusters*       Allergies: No Known Allergies    Medications:   Scheduled Meds:  Current Facility-Administered Medications   Medication Dose Route Frequency Provider Last Rate    acetaminophen  650 mg Oral Q4H PRN Jose Hails, DO      aspirin  81 mg Oral Daily Jose Hails, DO      atorvastatin  10 mg Oral Daily Jose Hails, DO      benzonatate  100 mg Oral TID Rodolfo Guadalupe MD      cefTRIAXone  1,000 mg Intravenous Q24H Alberto A Paster, DO 1,000 mg (02/20/22 0553)    dextromethorphan-guaiFENesin  10 mL Oral Q4H PRN Cleve Davis PA-C      doxycycline hyclate  100 mg Oral Q12H Alberto A Paster, DO      heparin (porcine)  3-20 Units/kg/hr (Order-Specific) Intravenous Titrated Jose Hails, DO 18 8 Units/kg/hr (02/20/22 0433)    insulin glargine  20 Units Subcutaneous HS Rdoolfo Guadalupe MD      insulin lispro  1-5 Units Subcutaneous TID AC Rodolfo Guadalupe MD      insulin lispro  1-5 Units Subcutaneous HS Svetlana Burnett MD      insulin lispro  7 Units Subcutaneous TID With Meals Svetlana Burnett MD      lisinopril  5 mg Oral Daily Svetlana Burnett MD      ondansetron  4 mg Intravenous Q6H PRN Daylene Falling, DO      pantoprazole  40 mg Oral Early Morning Daylene Falling, DO      polyethylene glycol  17 g Oral Daily PRN Tiffany Pinedo DO      sodium chloride  1 spray Each Nare Q1H PRN Svetlana Burnett MD      thiamine  100 mg Oral Daily Daylene Falling, DO      zinc sulfate  220 mg Oral Daily Daylene Falling, DO       Continuous Infusions:heparin (porcine), 3-20 Units/kg/hr (Order-Specific), Last Rate: 18 8 Units/kg/hr (02/20/22 0433)      PRN Meds:  acetaminophen, 650 mg, Q4H PRN  dextromethorphan-guaiFENesin, 10 mL, Q4H PRN  ondansetron, 4 mg, Q6H PRN  polyethylene glycol, 17 g, Daily PRN  sodium chloride, 1 spray, Q1H PRN        Invasive lines and devices: Invasive Devices  Report    Peripheral Intravenous Line            Peripheral IV 02/17/22 Left Antecubital 2 days    Peripheral IV 02/18/22 Dorsal (posterior); Right Hand 2 days                Code Status: Level 1 - Full Code    Counseling / Coordination of Care  Total Critical Care time spent 35 minutes excluding procedures, teaching and family updates        SIGNATURE: Tiffany Pinedo DO  DATE: February 20, 2022  TIME: 6:19 AM

## 2022-02-20 NOTE — PLAN OF CARE
Answers for HPI/ROS submitted by the patient on 5/12/2017   activity change: No  appetite change : No  fever: No  congestion: No  mouth sores: No  eye discharge: No  eye redness: No  cough: No  wheezing: No  cyanosis: No  constipation: No  diarrhea: No  vomiting: Yes  urine decreased: No  hematuria: No  leg swelling: No  extremity weakness: No  rash: No  wound: No     Problem: PAIN - ADULT  Goal: Verbalizes/displays adequate comfort level or baseline comfort level  Description: Interventions:  - Encourage patient to monitor pain and request assistance  - Assess pain using appropriate pain scale  - Administer analgesics based on type and severity of pain and evaluate response  - Implement non-pharmacological measures as appropriate and evaluate response  - Consider cultural and social influences on pain and pain management  - Notify physician/advanced practitioner if interventions unsuccessful or patient reports new pain  Outcome: Progressing     Problem: INFECTION - ADULT  Goal: Absence or prevention of progression during hospitalization  Description: INTERVENTIONS:  - Assess and monitor for signs and symptoms of infection  - Monitor lab/diagnostic results  - Monitor all insertion sites, i e  indwelling lines, tubes, and drains  - Monitor endotracheal if appropriate and nasal secretions for changes in amount and color  - [place] appropriate cooling/warming therapies per order  - Administer medications as ordered  - Instruct and encourage patient and family to use good hand hygiene technique  - Identify and instruct in appropriate isolation precautions for identified infection/condition  Outcome: Progressing  Goal: Absence of fever/infection during neutropenic period  Description: INTERVENTIONS:  - Monitor WBC    Outcome: Progressing     Problem: SAFETY ADULT  Goal: Patient will remain free of falls  Description: INTERVENTIONS:  - Educate patient/family on patient safety including physical limitations  - Instruct patient to call for assistance with activity   - Consult OT/PT to assist with strengthening/mobility   - Keep Call bell within reach  - Keep bed low and locked with side rails adjusted as appropriate  - Keep care items and personal belongings within reach  - Initiate and maintain comfort rounds  - Apply yellow socks and bracelet for high fall risk patients  - Consider moving patient to room near nurses station  Outcome: Progressing  Goal: Maintain or return to baseline ADL function  Description: INTERVENTIONS:  -  Assess patient's ability to carry out ADLs; assess patient's baseline for ADL function and identify physical deficits which impact ability to perform ADLs (bathing, care of mouth/teeth, toileting, grooming, dressing, etc )  - Assess/evaluate cause of self-care deficits   - Assess range of motion  - Assess patient's mobility; develop plan if impaired  - Assess patient's need for assistive devices and provide as appropriate  - Encourage maximum independence but intervene and supervise when necessary  - Involve family in performance of ADLs  - Assess for home care needs following discharge   - Consider OT consult to assist with ADL evaluation and planning for discharge  - Provide patient education as appropriate  Outcome: Progressing  Goal: Maintains/Returns to pre admission functional level  Description: INTERVENTIONS:  - Perform BMAT or MOVE assessment daily    - Set and communicate daily mobility goal to care team and patient/family/caregiver  - Collaborate with rehabilitation services on mobility goals if consulted  - Perform Range of Motion 3 times a day  - Reposition patient every 3 hours    - Dangle patient 3 times a day  - Stand patient 3 times a day  - Ambulate patient 3 times a day  - Out of bed to chair 3 times a day   - Out of bed for meals 3 times a day  - Out of bed for toileting  - Record patient progress and toleration of activity level   Outcome: Progressing     Problem: DISCHARGE PLANNING  Goal: Discharge to home or other facility with appropriate resources  Description: INTERVENTIONS:  - Identify barriers to discharge w/patient and caregiver  - Arrange for needed discharge resources and transportation as appropriate  - Identify discharge learning needs (meds, wound care, etc )  - Arrange for interpretive services to assist at discharge as needed  - Refer to Case Management Department for coordinating discharge planning if the patient needs post-hospital services based on physician/advanced practitioner order or complex needs related to functional status, cognitive ability, or social support system  Outcome: Progressing     Problem: Knowledge Deficit  Goal: Patient/family/caregiver demonstrates understanding of disease process, treatment plan, medications, and discharge instructions  Description: Complete learning assessment and assess knowledge base    Interventions:  - Provide teaching at level of understanding  - Provide teaching via preferred learning methods  Outcome: Progressing

## 2022-02-21 ENCOUNTER — APPOINTMENT (INPATIENT)
Dept: NON INVASIVE DIAGNOSTICS | Facility: HOSPITAL | Age: 67
DRG: 177 | End: 2022-02-21
Payer: COMMERCIAL

## 2022-02-21 LAB
ANION GAP SERPL CALCULATED.3IONS-SCNC: 6 MMOL/L (ref 4–13)
BACTERIA BLD CULT: ABNORMAL
BASOPHILS # BLD AUTO: 0.01 THOUSANDS/ΜL (ref 0–0.1)
BASOPHILS NFR BLD AUTO: 0 % (ref 0–1)
BUN SERPL-MCNC: 20 MG/DL (ref 5–25)
CALCIUM SERPL-MCNC: 8.7 MG/DL (ref 8.3–10.1)
CHLORIDE SERPL-SCNC: 105 MMOL/L (ref 100–108)
CO2 SERPL-SCNC: 22 MMOL/L (ref 21–32)
CREAT SERPL-MCNC: 0.95 MG/DL (ref 0.6–1.3)
EOSINOPHIL # BLD AUTO: 0 THOUSAND/ΜL (ref 0–0.61)
EOSINOPHIL NFR BLD AUTO: 0 % (ref 0–6)
ERYTHROCYTE [DISTWIDTH] IN BLOOD BY AUTOMATED COUNT: 12.8 % (ref 11.6–15.1)
GFR SERPL CREATININE-BSD FRML MDRD: 83 ML/MIN/1.73SQ M
GLUCOSE SERPL-MCNC: 238 MG/DL (ref 65–140)
GLUCOSE SERPL-MCNC: 267 MG/DL (ref 65–140)
GLUCOSE SERPL-MCNC: 295 MG/DL (ref 65–140)
GLUCOSE SERPL-MCNC: 358 MG/DL (ref 65–140)
GLUCOSE SERPL-MCNC: 394 MG/DL (ref 65–140)
GRAM STN SPEC: ABNORMAL
HCT VFR BLD AUTO: 39.6 % (ref 36.5–49.3)
HGB BLD-MCNC: 13.8 G/DL (ref 12–17)
IMM GRANULOCYTES # BLD AUTO: 0.12 THOUSAND/UL (ref 0–0.2)
IMM GRANULOCYTES NFR BLD AUTO: 1 % (ref 0–2)
LYMPHOCYTES # BLD AUTO: 1.25 THOUSANDS/ΜL (ref 0.6–4.47)
LYMPHOCYTES NFR BLD AUTO: 12 % (ref 14–44)
MCH RBC QN AUTO: 33.6 PG (ref 26.8–34.3)
MCHC RBC AUTO-ENTMCNC: 34.8 G/DL (ref 31.4–37.4)
MCV RBC AUTO: 96 FL (ref 82–98)
MONOCYTES # BLD AUTO: 0.24 THOUSAND/ΜL (ref 0.17–1.22)
MONOCYTES NFR BLD AUTO: 2 % (ref 4–12)
NEUTROPHILS # BLD AUTO: 9.27 THOUSANDS/ΜL (ref 1.85–7.62)
NEUTS SEG NFR BLD AUTO: 85 % (ref 43–75)
NRBC BLD AUTO-RTO: 0 /100 WBCS
PLATELET # BLD AUTO: 234 THOUSANDS/UL (ref 149–390)
PMV BLD AUTO: 10.1 FL (ref 8.9–12.7)
POTASSIUM SERPL-SCNC: 4.8 MMOL/L (ref 3.5–5.3)
PROCALCITONIN SERPL-MCNC: 0.18 NG/ML
RBC # BLD AUTO: 4.11 MILLION/UL (ref 3.88–5.62)
SODIUM SERPL-SCNC: 133 MMOL/L (ref 136–145)
WBC # BLD AUTO: 10.89 THOUSAND/UL (ref 4.31–10.16)

## 2022-02-21 PROCEDURE — 94760 N-INVAS EAR/PLS OXIMETRY 1: CPT

## 2022-02-21 PROCEDURE — 84145 PROCALCITONIN (PCT): CPT | Performed by: EMERGENCY MEDICINE

## 2022-02-21 PROCEDURE — 93970 EXTREMITY STUDY: CPT

## 2022-02-21 PROCEDURE — 82948 REAGENT STRIP/BLOOD GLUCOSE: CPT

## 2022-02-21 PROCEDURE — 99233 SBSQ HOSP IP/OBS HIGH 50: CPT | Performed by: INTERNAL MEDICINE

## 2022-02-21 PROCEDURE — 80048 BASIC METABOLIC PNL TOTAL CA: CPT

## 2022-02-21 PROCEDURE — 85025 COMPLETE CBC W/AUTO DIFF WBC: CPT

## 2022-02-21 PROCEDURE — 93970 EXTREMITY STUDY: CPT | Performed by: SURGERY

## 2022-02-21 RX ORDER — FUROSEMIDE 10 MG/ML
20 INJECTION INTRAMUSCULAR; INTRAVENOUS ONCE
Status: COMPLETED | OUTPATIENT
Start: 2022-02-21 | End: 2022-02-21

## 2022-02-21 RX ORDER — INSULIN GLARGINE 100 [IU]/ML
25 INJECTION, SOLUTION SUBCUTANEOUS
Status: DISCONTINUED | OUTPATIENT
Start: 2022-02-21 | End: 2022-02-22

## 2022-02-21 RX ADMIN — ASPIRIN 81 MG CHEWABLE TABLET 81 MG: 81 TABLET CHEWABLE at 08:40

## 2022-02-21 RX ADMIN — ATORVASTATIN CALCIUM 10 MG: 10 TABLET, FILM COATED ORAL at 08:41

## 2022-02-21 RX ADMIN — BENZONATATE 100 MG: 100 CAPSULE ORAL at 08:48

## 2022-02-21 RX ADMIN — INSULIN LISPRO 6 UNITS: 100 INJECTION, SOLUTION INTRAVENOUS; SUBCUTANEOUS at 17:55

## 2022-02-21 RX ADMIN — ENOXAPARIN SODIUM 40 MG: 40 INJECTION SUBCUTANEOUS at 08:44

## 2022-02-21 RX ADMIN — ZINC SULFATE 220 MG (50 MG) CAPSULE 220 MG: CAPSULE at 08:40

## 2022-02-21 RX ADMIN — BENZONATATE 100 MG: 100 CAPSULE ORAL at 21:14

## 2022-02-21 RX ADMIN — INSULIN LISPRO 6 UNITS: 100 INJECTION, SOLUTION INTRAVENOUS; SUBCUTANEOUS at 13:08

## 2022-02-21 RX ADMIN — BENZONATATE 100 MG: 100 CAPSULE ORAL at 17:50

## 2022-02-21 RX ADMIN — INSULIN LISPRO 5 UNITS: 100 INJECTION, SOLUTION INTRAVENOUS; SUBCUTANEOUS at 21:15

## 2022-02-21 RX ADMIN — PANTOPRAZOLE SODIUM 40 MG: 40 TABLET, DELAYED RELEASE ORAL at 06:06

## 2022-02-21 RX ADMIN — METHYLPREDNISOLONE SODIUM SUCCINATE 80 MG: 125 INJECTION, POWDER, FOR SOLUTION INTRAMUSCULAR; INTRAVENOUS at 08:43

## 2022-02-21 RX ADMIN — FUROSEMIDE 20 MG: 10 INJECTION, SOLUTION INTRAVENOUS at 17:51

## 2022-02-21 RX ADMIN — INSULIN GLARGINE 25 UNITS: 100 INJECTION, SOLUTION SUBCUTANEOUS at 21:13

## 2022-02-21 RX ADMIN — THIAMINE HCL TAB 100 MG 100 MG: 100 TAB at 08:43

## 2022-02-21 RX ADMIN — INSULIN LISPRO 3 UNITS: 100 INJECTION, SOLUTION INTRAVENOUS; SUBCUTANEOUS at 06:09

## 2022-02-21 NOTE — CASE MANAGEMENT
Case Management Discharge Planning Note    [facility] Connie Denson  [facility] [id number]  : 1955 Date 2022       Current Admission Date: 2022  Current Admission Diagnosis:Acute respiratory failure due to [address])   Patient Active Problem List    Diagnosis Date Noted    Acute respiratory failure due to [address]) 2022    Non MI elevated troponin 2022    Type 2 diabetes mellitus ([address] ) 2022    Primary hypertension 2022      LOS (days): 3  Geometric Mean LOS (GMLOS) (days): 5 40  Days to GMLOS:2 1     OBJECTIVE:  Risk of Unplanned Readmission Score: 12         Current admission status: Inpatient   Preferred Pharmacy:    [address] [place] [address]  Phone: [phone] Fax: [phone], [place] [address]  Phone: [phone] Fax: [phone]    Primary Care Provider: Nitza Cullen MD    Primary Insurance: MEDICARE  Secondary Insurance: [address] DETAILS:  Additional Comments: Pt is not medically stable for DC per medical team  CM will continue to follow Pt for DC planning/support

## 2022-02-21 NOTE — PLAN OF CARE
Problem: PAIN - ADULT  Goal: Verbalizes/displays adequate comfort level or baseline comfort level  Description: Interventions:  - Encourage patient to monitor pain and request assistance  - Assess pain using appropriate pain scale  - Administer analgesics based on type and severity of pain and evaluate response  - Implement non-pharmacological measures as appropriate and evaluate response  - Consider cultural and social influences on pain and pain management  - Notify physician/advanced practitioner if interventions unsuccessful or patient reports new pain  Outcome: Progressing     Problem: INFECTION - ADULT  Goal: Absence or prevention of progression during hospitalization  Description: INTERVENTIONS:  - Assess and monitor for signs and symptoms of infection  - Monitor lab/diagnostic results  - Monitor all insertion sites, i e  indwelling lines, tubes, and drains  - Monitor endotracheal if appropriate and nasal secretions for changes in amount and color  - [place] appropriate cooling/warming therapies per order  - Administer medications as ordered  - Instruct and encourage patient and family to use good hand hygiene technique  - Identify and instruct in appropriate isolation precautions for identified infection/condition  Outcome: Progressing  Goal: Absence of fever/infection during neutropenic period  Description: INTERVENTIONS:  - Monitor WBC    Outcome: Progressing     Problem: SAFETY ADULT  Goal: Patient will remain free of falls  Description: INTERVENTIONS:  - Educate patient/family on patient safety including physical limitations  - Instruct patient to call for assistance with activity   - Consult OT/PT to assist with strengthening/mobility   - Keep Call bell within reach  - Keep bed low and locked with side rails adjusted as appropriate  - Keep care items and personal belongings within reach  - Initiate and maintain comfort rounds  - Make Fall Risk Sign visible to staff  - Offer Toileting every  Hours, in advance of need  - Initiate/Maintain alarm  - Obtain necessary fall risk management equipment:   - Apply yellow socks and bracelet for high fall risk patients  - Consider moving patient to room near nurses station  Outcome: Progressing  Goal: Maintain or return to baseline ADL function  Description: INTERVENTIONS:  -  Assess patient's ability to carry out ADLs; assess patient's baseline for ADL function and identify physical deficits which impact ability to perform ADLs (bathing, care of mouth/teeth, toileting, grooming, dressing, etc )  - Assess/evaluate cause of self-care deficits   - Assess range of motion  - Assess patient's mobility; develop plan if impaired  - Assess patient's need for assistive devices and provide as appropriate  - Encourage maximum independence but intervene and supervise when necessary  - Involve family in performance of ADLs  - Assess for home care needs following discharge   - Consider OT consult to assist with ADL evaluation and planning for discharge  - Provide patient education as appropriate  Outcome: Progressing  Goal: Maintains/Returns to pre admission functional level  Description: INTERVENTIONS:  - Perform BMAT or MOVE assessment daily    - Set and communicate daily mobility goal to care team and patient/family/caregiver  - Collaborate with rehabilitation services on mobility goals if consulted  - Perform Range of Motion  times a day  - Reposition patient every  hours    - Dangle patient  times a day  - Stand patient  times a day  - Ambulate patient  times a day  - Out of bed to chair  times a day   - Out of bed for meals  times a day  - Out of bed for toileting  - Record patient progress and toleration of activity level   Outcome: Progressing     Problem: DISCHARGE PLANNING  Goal: Discharge to home or other facility with appropriate resources  Description: INTERVENTIONS:  - Identify barriers to discharge w/patient and caregiver  - Arrange for needed discharge resources and transportation as appropriate  - Identify discharge learning needs (meds, wound care, etc )  - Arrange for interpretive services to assist at discharge as needed  - Refer to Case Management Department for coordinating discharge planning if the patient needs post-hospital services based on physician/advanced practitioner order or complex needs related to functional status, cognitive ability, or social support system  Outcome: Progressing     Problem: Knowledge Deficit  Goal: Patient/family/caregiver demonstrates understanding of disease process, treatment plan, medications, and discharge instructions  Description: Complete learning assessment and assess knowledge base    Interventions:  - Provide teaching at level of understanding  - Provide teaching via preferred learning methods  Outcome: Progressing     Problem: Potential for Falls  Goal: Patient will remain free of falls  Description: INTERVENTIONS:  - Educate patient/family on patient safety including physical limitations  - Instruct patient to call for assistance with activity   - Consult OT/PT to assist with strengthening/mobility   - Keep Call bell within reach  - Keep bed low and locked with side rails adjusted as appropriate  - Keep care items and personal belongings within reach  - Initiate and maintain comfort rounds  - Make Fall Risk Sign visible to staff  - Offer Toileting every  Hours, in advance of need  - Initiate/Maintain alarm  - Obtain necessary fall risk management equipment:   - Apply yellow socks and bracelet for high fall risk patients  - Consider moving patient to room near nurses station  Outcome: Progressing

## 2022-02-21 NOTE — UTILIZATION REVIEW
MEDICARE PART  A   ONLY  -  [facility] SECONDARY       Initial Clinical Review     Admission: Date/Time/Statement:       Admission Orders (From admission, onward)              Ordered          02/18/22 0317   Inpatient Admission  Once                                Orders Placed This Encounter   Procedures    Inpatient Admission       Standing Status:   Standing       Number of Occurrences:   1       Order Specific Question:   Level of Care       Answer:   Med Surg [16]       Order Specific Question:   Bed request comments       Answer:   Tele       Order Specific Question:   Estimated length of stay       Answer:   More than 2 Midnights       Order Specific Question:   Certification       Answer:   I certify that inpatient services are medically necessary for this patient for a duration of greater than two midnights  See H&P and MD Progress Notes for additional information about the patient's course of treatment          72 yo male, Pt hospitalized [email or web address]  2/8 - 2/17  For  Acute resp failure 2/2 COVID 19:  Completed remdesivir, decadron, (declined baricitinib)  Required insulin gtt for steroid induced hyperglycemia  Signed out [facility] on  2/17        Returned to [facility] ER on  2/18 w/c/o  Ongoing SOB  In resp distress,  Tachypnea but able to speak full sentences, lung sounds w/bibasilar crackles  Sat 88% on 6L NC ,  90% on 7 L midflow  Trops, glucose, lactic acid, d-dimer, crp and wbc all elevated  Na corrects to 131     CXR showed continued bibasilar ground glass opacities    Denied chest pain  But initial EKG indicated possible ischemia  (MI ALERT called)   Repeat EKG showed improved STEPHEN inferiorly, continued coving in inferior leads; delta trop up by 325   (deferred CTA chest to avoid dye overload in case pt requires heart cath)     PER CARDIOLOGY -  Needs transfer to Higher Level of Care ([facility]) which includes  cardiology & cardiothoracic services  For  Further workup of  Ischemia (r/o ACS, PE? )    in setting of  COVID 19 PNA  w/acute resp failure  CLARIBEL, lactic acidosis, transaminitis,  Hyperglycemia      Admit  [place],  2/18  @  0317,  IP status,  medsur level of care:   for management of   Acute Resp Failure ,  R/o NSTEMI type 1  In setting of  COVID-19 infection,  CLARIBEL  (Suspect 2/2 dehydration)     Will cont telemetry,  Supplemental oxygen prn  (requiring 10 L MFNC on admission)   Trend inflammatory markers and troponins,  Give  1 dose steroid (complete 10 D course)  Pulmonary toileting   Cont heparin gtt, consult cardiology   Trend renal indices,  Check PVR w/bladder scan,  HOLD home lisinopril     2/18  CARDIOLOGY:   Non-MI troponin elevation  2/2  acute hypoxic respiratory failure  No ischemic symptoms or ECG changes  Suspect Elevated troponin 2/2 hypoxia/resp distress  No further cardiac workup needed  EKG- NSR RBBB     2/18  @  1400    crt at baseline  Start basal bolus insulin regimen + accucks / SSI  Breathing improved     Date:   2/19     Day 2:   Currently requiring 12L Midflow oxygen -  Encouraged [address] spirometry, ambulation  Restart lisinopril     Increase basal/prandial insulin today       Additional Vital Signs:      02/16/22 88 3 kg (194 lb 10 7 oz)      02/19/22 14:05:40 97 9 °F (36 6 °C) 85 -- 121/62 82 91 % -- -- -- -- --   02/19/22 14:05:09 97 9 °F (36 6 °C) 85 -- 121/62 82 91 % -- -- -- -- --   02/19/22 12:22:57 -- 82 -- 116/61 79 88 % Abnormal  -- -- -- -- --   02/19/22 09:40:49 97 4 °F (36 3 °C) Abnormal  84 -- 116/60 79 84 % Abnormal  -- -- -- -- --   02/19/22 0800 -- -- -- -- -- -- -- -- -- Mid flow nasal cannula --   02/19/22 03:51:33 97 9 °F (36 6 °C) 62 17 104/57 73 94 % -- -- -- -- Lying   02/18/22 2300 -- 60 -- -- -- 94 % -- 12 L/min -- Mid flow nasal cannula --   02/18/22 2207 97 8 °F (36 6 °C) 78 20 128/67 87 96 % -- -- -- Mid flow nasal cannula Lying   02/18/22 2100 -- -- -- -- -- 93 % -- 12 L/min -- Mid flow nasal cannula --   02/18/22 18:56:24 97 8 °F (36 6 °C) 66 20 130/67 88 90 % -- -- -- -- Lying   02/18/22 11:02:14 97 7 °F (36 5 °C) 81 -- 138/69 92 88 % Abnormal  -- -- -- -- --   02/18/22 0815 -- -- -- -- -- 90 % -- 12 L/min -- Mid flow nasal cannula --   02/18/22 06:13:38 -- 77 -- 126/57 -- 86 % Abnormal  -- -- -- -- --   02/18/22 0235 97 2 °F (36 2 °C) Abnormal  84 18 125/60 -- 88 % Abnormal  60 -- 10 L/min Mid flow nasal cannula Sitting      Pertinent Labs/Diagnostic Test Results:   2/18  cXR -  Continued bibasilar ground-glass opacities no evidence of effusion no cardiomegaly  2/18  EKG -   nsr -  LAFB   RBBB - STEPHEN inferiorly coving V3-v6 new vs prev; STD 1mm 1 and AVL                       Results from last 7 days   Lab Units 02/19/22 0549 02/18/22 0404 02/17/22 2130 02/17/22  0541 02/17/22  0541 02/16/22 0445 02/16/22 0445   WBC Thousand/uL 9 95 10 37* 14 90*   < > 10 15   < > 10 37*   HEMOGLOBIN g/dL 12 7 13 6 14 5  --  13 3  --  12 8   HEMATOCRIT % 37 0 40 2 42 3  --  39 0  --  37 2   PLATELETS Thousands/uL 215 230 319   < > 219   < > 176   NEUTROS ABS Thousands/µL  --   --  11 59*  --  8 63*  --  8 89*    < > = values in this interval not displayed                      Results from last 7 days   Lab Units 02/19/22 0549 02/18/22 0405 02/17/22 2130 02/17/22  0540 02/16/22 0445 02/15/22  2253 02/15/22  2253   SODIUM mmol/L 134* 132* 128* 130* 133*   < > 129*   POTASSIUM mmol/L 4 8 4 3 4 3 4 9 4 5   < > 4 4   CHLORIDE mmol/L 105 105 98* 103 107   < > 101   CO2 mmol/L 24 18* 18* 21 20*   < > 22   ANION GAP mmol/L 5 9 12 6 6   < > 6   BUN mg/dL 18 25 28* 18 18   < > 22   CREATININE mg/dL 0 77 0 89 1 31* 0 80 0 74   < > 0 95   EGFR ml/min/1 73sq m 95 89 56 93 96   < > 83   CALCIUM mg/dL 8 7 8 2* 8 5 8 0* 7 1*   < > 8 3*   MAGNESIUM mg/dL  --   --  1 9  --   --   --  1 8*   PHOSPHORUS mg/dL  --   --   --   --   --   --  3 2    < > = values in this interval not displayed             Results from last 7 days   Lab Units 02/18/22  6872 02/17/22  2130   AST U/L 74* 92*   ALT U/L 75 88*   ALK PHOS U/L 98 110   TOTAL PROTEIN g/dL 6 2* 6 4   ALBUMIN g/dL 2 3* 2 7*   TOTAL BILIRUBIN mg/dL 0 76 0 65   BILIRUBIN DIRECT mg/dL  --  0 16                      Results from last 7 days   Lab Units 02/19/22  1155 02/19/22  0609 02/18/22  2037 02/18/22  1725 02/18/22  1105 02/18/22  3886 02/18/22  0229 02/17/22  1004 02/17/22  0759 02/17/22  0535 02/17/22  0338 02/17/22  0144   POC GLUCOSE mg/dl 204* 177* 270* 292* 334* 229* 263* 213* 114 151* 206* 173*                   Results from last 7 days   Lab Units 02/19/22  0549 02/18/22  0405 02/17/22  2130 02/17/22  0540 02/16/22  0445 02/15/22  2253 02/15/22  0538 02/14/22  0426 02/13/22  0445   GLUCOSE RANDOM mg/dL 179* 226* 302* 150* 168* 151* 109 141* 145*            Results from last 7 days   Lab Units 02/17/22  2349 02/17/22  2130   HS TNI 0HR ng/L  --  3,350*   HS TNI 2HR ng/L 3,675*  --    HSTNI D2 ng/L 325*  --             Results from last 7 days   Lab Units 02/18/22  0405 02/17/22  2130   D-DIMER QUANTITATIVE ug/ml FEU 1 49* 1 35*               Results from last 7 days   Lab Units 02/19/22  1422 02/19/22  0550 02/18/22  2028 02/18/22  0405 02/17/22  2130   PROTIME seconds  --   --   --   --  13 8   INR    --   --   --   --  1 12   PTT seconds 52* 55* >210*   < > 28    < > = values in this interval not displayed                  Results from last 7 days   Lab Units 02/19/22  0550 02/18/22  0406 02/17/22  2130   PROCALCITONIN ng/ml 0 25 0 32* 0 18             Results from last 7 days   Lab Units 02/18/22  0633 02/18/22  0404 02/17/22  2130   LACTIC ACID mmol/L 2 5* 2 7* 4 0*           Results from last 7 days   Lab Units 02/17/22  2130   NT-PRO BNP pg/mL 441*            Results from last 7 days   Lab Units 02/18/22  0404 02/17/22  2130   CRP mg/L 18 3* 16 7*           Results from last 7 days   Lab Units 02/18/22  0404   CLARITY UA   Clear   COLOR UA   Yellow   SPEC GRAV UA   1 025   PH UA   6 0   GLUCOSE UA mg/dl 500 (1/2%)*   KETONES UA mg/dl Trace*   BLOOD UA   Negative   PROTEIN UA mg/dl Negative   NITRITE UA   Negative   BILIRUBIN UA   Negative   UROBILINOGEN UA E U /dl 1 0   LEUKOCYTES UA   Negative   WBC UA /hpf None Seen   RBC UA /hpf None Seen   BACTERIA UA /hpf None Seen   EPITHELIAL CELLS WET PREP /hpf None Seen           Results from last 7 days   Lab Units 02/17/22  2130   BLOOD CULTURE   No Growth at 24 hrs  GRAM STAIN RESULT   Gram positive cocci in clusters*          Medical History        Past Medical History:   Diagnosis Date    Diabetes mellitus ([facility] )           Present on Admission:   [facility] )   Non MI elevated troponin   Primary hypertension   Type 2 diabetes mellitus (HCC)   (Resolved) Acute renal failure (ARF) (HCC)        Admitting Diagnosis: Abnormal ECG [R94 31]  Age/Sex: 73 y o  male  Admission Orders:   Consult cardiology,  Check post void residual (bladder scan) cath prn;  Daily wgt;  I/O q shift;  VS q 4 hr;  Telemetry - continuous pulse oximetry w/supplemental oxygen prn ;  Hourly inc spirometry;  accucks qid w/SSI; Ambulate q shift;   Lo carb diet     Scheduled Medications:  aspirin, 81 mg, Oral, Daily  atorvastatin, 10 mg, Oral, Daily  benzonatate, 100 mg, Oral, TID  insulin glargine, 20 Units, Subcutaneous, HS  insulin lispro, 1-5 Units, Subcutaneous, TID AC  insulin lispro, 1-5 Units, Subcutaneous, HS  insulin lispro, 7 Units, Subcutaneous, TID With Meals  lisinopril, 5 mg, Oral, Daily  pantoprazole, 40 mg, Oral, Early Morning  thiamine, 100 mg, Oral, Daily  zinc sulfate, 220 mg, Oral, Daily        Continuous IV Infusions:  heparin (porcine), 3-20 Units/kg/hr (Order-Specific), Intravenous, Titrated    -----------------------------------------------------------------------------------------------------------------------------------------------------------------------  -----------------------------------------------------------------------------------------------------------------------------------------------------------------    Continued Stay Review    Date: 2/21                       Current Patient Class:  IP    Level of Care:  2/18 - 2/20  Level 2 stepdown  2/20 -  Current    Level 2 Stepdown         HPI:66 y o  male  admitted on  2/18  For management of    Acute Resp Failure ,  R/o NSTEMI type 1  In setting of  COVID-19 PNA and  CLARIBEL  (Suspect 2/2 dehydration)       2/18  Per cardiology,  Non mi trop elevation  (2/2 significant hypoxia)  No cardiac workup at this time    2/19  Increasing supplemental oxygen requirement     2/20  Last night his oxygen requirement worsened;  Started on antibiotics (po doxy)   and given lasix (20 mg IV x1)     CRP spiked up today, started  IV SOLUMEDROL      DC'ed heparin gtt,  Initiated lovenox sq          On HFNT   @  55 LPM       2/21  -    O2 Device: HFNC FIO2 90% and flow at 60 lpm while sleeping    Adjusting Sliding scale insulin to accommodate for steroid induced hyperglycemia    =======================================================    Vital Signs:   02/21/22 1047 -- 62 -- 89/42 Abnormal  56 99 % -- -- -- -- -- -- --   02/21/22 1030 -- 62 -- 79/42 Abnormal  58 98 % -- -- -- -- -- -- --   02/21/22 0830 -- 62 -- 92/53 67 94 % -- -- -- -- -- -- --   02/21/22 0743 -- -- -- -- -- 91 % -- -- -- -- -- HFNC prongs --   02/21/22 0616 98 2 °F (36 8 °C) 64 18 113/62 80 88 % Abnormal  75 -- 55 L/min -- High flow nasal cannula --      Pertinent Labs/Diagnostic Results:       Results from last 7 days   Lab Units 02/21/22  0454 02/20/22  0538 02/19/22  0549 02/18/22  0404 02/18/22  0404 02/17/22  2130 02/17/22  2130   WBC Thousand/uL 10 89* 12 59* 9 95   < > 10 37*   < > 14 90*   HEMOGLOBIN g/dL 13 8 13 0 12 7  --  13 6  --  14 5   HEMATOCRIT % 39 6 37 5 37 0  --  40 2  --  42 3   PLATELETS Thousands/uL 234 206 215   < > 230   < > 319   NEUTROS ABS Thousands/µL 9 27* 10 25*  --   --   --   --  11 59*    < > = values in this interval not displayed  Results from last 7 days   Lab Units 02/21/22  0454 02/20/22  0538 02/19/22  0549 02/18/22  0405 02/17/22  2130 02/16/22  0445 02/15/22  2253   SODIUM mmol/L 133* 132* 134* 132* 128*   < > 129*   POTASSIUM mmol/L 4 8 4 0 4 8 4 3 4 3   < > 4 4   CHLORIDE mmol/L 105 104 105 105 98*   < > 101   CO2 mmol/L 22 21 24 18* 18*   < > 22   ANION GAP mmol/L 6 7 5 9 12   < > 6   BUN mg/dL 20 16 18 25 28*   < > 22   CREATININE mg/dL 0 95 0 72 0 77 0 89 1 31*   < > 0 95   EGFR ml/min/1 73sq m 83 97 95 89 56   < > 83   CALCIUM mg/dL 8 7 8 1* 8 7 8 2* 8 5   < > 8 3*   MAGNESIUM mg/dL  --   --   --   --  1 9  --  1 8*   PHOSPHORUS mg/dL  --   --   --   --   --   --  3 2    < > = values in this interval not displayed       Results from last 7 days   Lab Units 02/20/22  0538 02/18/22  0405 02/17/22  2130   AST U/L 63* 74* 92*   ALT U/L 104* 75 88*   ALK PHOS U/L 113 98 110   TOTAL PROTEIN g/dL 6 2* 6 2* 6 4   ALBUMIN g/dL 2 3* 2 3* 2 7*   TOTAL BILIRUBIN mg/dL 0 99 0 76 0 65   BILIRUBIN DIRECT mg/dL  --   --  0 16     Results from last 7 days   Lab Units 02/21/22  0857 02/21/22  0608 02/20/22  2134 02/20/22  1529 02/20/22  1014 02/20/22  0548 02/19/22  2031 02/19/22  1658 02/19/22  1155 02/19/22  0609 02/18/22  2037 02/18/22  1725   POC GLUCOSE mg/dl 238* 267* 365* 183* 160* 141* 315* 248* 204* 177* 270* 292*     Results from last 7 days   Lab Units 02/21/22  0454 02/20/22  0538 02/19/22  0549 02/18/22  0405 02/17/22  2130 02/17/22  0540 02/16/22  0445 02/15/22  2253 02/15/22  0538   GLUCOSE RANDOM mg/dL 295* 132 179* 226* 302* 150* 168* 151* 109     Results from last 7 days   Lab Units 02/20/22  1111 02/20/22  0332 02/19/22  2041 02/18/22  0405 02/17/22 2130   PROTIME seconds  --   --   --   --  13 8   INR   --   --   --   --  1 12   PTT seconds 71* 56* 47*   < > 28    < > = values in this interval not displayed  Results from last 7 days   Lab Units 02/21/22  0454 02/20/22  0538 02/19/22  0550 02/18/22  0406 02/17/22  2130   PROCALCITONIN ng/ml 0 18 0 28* 0 25 0 32* 0 18     Results from last 7 days   Lab Units 02/18/22  7510 02/18/22  0404 02/17/22 2130   LACTIC ACID mmol/L 2 5* 2 7* 4 0*     Results from last 7 days   Lab Units 02/20/22  0457 02/18/22  0404 02/17/22 2130   CRP mg/L 59 6* 18 3* 16 7*             Results from last 7 days   Lab Units 02/18/22  0404   CLARITY UA  Clear   COLOR UA  Yellow   SPEC GRAV UA  1 025   PH UA  6 0   GLUCOSE UA mg/dl 500 (1/2%)*   KETONES UA mg/dl Trace*   BLOOD UA  Negative   PROTEIN UA mg/dl Negative   NITRITE UA  Negative   BILIRUBIN UA  Negative   UROBILINOGEN UA E U /dl 1 0   LEUKOCYTES UA  Negative   WBC UA /hpf None Seen   RBC UA /hpf None Seen   BACTERIA UA /hpf None Seen   EPITHELIAL CELLS WET PREP /hpf None Seen     Results from last 7 days   Lab Units 02/17/22 2130   BLOOD CULTURE  Staphylococcus coagulase negative*  No Growth at 72 hrs     GRAM STAIN RESULT  Gram positive cocci in clusters*         2/21     Medications:   Scheduled Medications:  aspirin, 81 mg, Oral, Daily  atorvastatin, 10 mg, Oral, Daily  benzonatate, 100 mg, Oral, TID  enoxaparin, 40 mg, Subcutaneous, Q24H CATHLEEN  insulin glargine, 25 Units, Subcutaneous, HS  insulin lispro, 1-6 Units, Subcutaneous, TID AC  insulin lispro, 1-6 Units, Subcutaneous, HS  insulin lispro, 7 Units, Subcutaneous, TID With Meals  lisinopril, 5 mg, Oral, Daily  methylPREDNISolone sodium succinate, 80 mg, Intravenous, Daily  pantoprazole, 40 mg, Oral, Early Morning  thiamine, 100 mg, Oral, Daily  zinc sulfate, 220 mg, Oral, Daily      Continuous IV Infusions:     PRN Meds:  acetaminophen, 650 mg, Oral, Q4H PRN  dextromethorphan-guaiFENesin, 10 mL, Oral, Q4H PRN  ondansetron, 4 mg, Intravenous, Q6H PRN  polyethylene glycol, 17 g, Oral, Daily PRN  sodium chloride, 1 spray, Each Nare, Q1H PRN        Discharge Plan:  d      Network Utilization Review Department  ATTENTION: Please call with any questions or concerns to [phone] and carefully listen to the prompts so that you are directed to the right person  All voicemails are confidential   Valeta Pastel all requests for admission clinical reviews, approved or denied determinations and any other requests to dedicated fax number below belonging to the campus where the patient is receiving treatment   List of dedicated fax numbers for the Facilities:  [address] DENIALS (Administrative/Medical Necessity) [phone]   [address] (Maternity/NICU/Pediatrics) [phone]   [address] [place] [address] [place] [address] [id number] [place] [address] [address] [place] [address] [facility] [place] [address] [phone]

## 2022-02-22 LAB
ANION GAP SERPL CALCULATED.3IONS-SCNC: 7 MMOL/L (ref 4–13)
BASOPHILS # BLD AUTO: 0.02 THOUSANDS/ΜL (ref 0–0.1)
BASOPHILS NFR BLD AUTO: 0 % (ref 0–1)
BUN SERPL-MCNC: 23 MG/DL (ref 5–25)
CALCIUM SERPL-MCNC: 8.6 MG/DL (ref 8.3–10.1)
CHLORIDE SERPL-SCNC: 104 MMOL/L (ref 100–108)
CO2 SERPL-SCNC: 20 MMOL/L (ref 21–32)
CREAT SERPL-MCNC: 0.84 MG/DL (ref 0.6–1.3)
CRP SERPL QL: 27.6 MG/L
EOSINOPHIL # BLD AUTO: 0.03 THOUSAND/ΜL (ref 0–0.61)
EOSINOPHIL NFR BLD AUTO: 0 % (ref 0–6)
ERYTHROCYTE [DISTWIDTH] IN BLOOD BY AUTOMATED COUNT: 12.8 % (ref 11.6–15.1)
GFR SERPL CREATININE-BSD FRML MDRD: 91 ML/MIN/1.73SQ M
GLUCOSE SERPL-MCNC: 199 MG/DL (ref 65–140)
GLUCOSE SERPL-MCNC: 220 MG/DL (ref 65–140)
GLUCOSE SERPL-MCNC: 337 MG/DL (ref 65–140)
GLUCOSE SERPL-MCNC: 363 MG/DL (ref 65–140)
GLUCOSE SERPL-MCNC: 372 MG/DL (ref 65–140)
GLUCOSE SERPL-MCNC: 383 MG/DL (ref 65–140)
GLUCOSE SERPL-MCNC: 408 MG/DL (ref 65–140)
GLUCOSE SERPL-MCNC: 459 MG/DL (ref 65–140)
HCT VFR BLD AUTO: 37.2 % (ref 36.5–49.3)
HGB BLD-MCNC: 12.8 G/DL (ref 12–17)
IMM GRANULOCYTES # BLD AUTO: 0.14 THOUSAND/UL (ref 0–0.2)
IMM GRANULOCYTES NFR BLD AUTO: 1 % (ref 0–2)
LYMPHOCYTES # BLD AUTO: 1.61 THOUSANDS/ΜL (ref 0.6–4.47)
LYMPHOCYTES NFR BLD AUTO: 10 % (ref 14–44)
MCH RBC QN AUTO: 33.8 PG (ref 26.8–34.3)
MCHC RBC AUTO-ENTMCNC: 34.4 G/DL (ref 31.4–37.4)
MCV RBC AUTO: 98 FL (ref 82–98)
MONOCYTES # BLD AUTO: 0.87 THOUSAND/ΜL (ref 0.17–1.22)
MONOCYTES NFR BLD AUTO: 5 % (ref 4–12)
NEUTROPHILS # BLD AUTO: 14.1 THOUSANDS/ΜL (ref 1.85–7.62)
NEUTS SEG NFR BLD AUTO: 84 % (ref 43–75)
NRBC BLD AUTO-RTO: 0 /100 WBCS
PLATELET # BLD AUTO: 235 THOUSANDS/UL (ref 149–390)
PMV BLD AUTO: 10 FL (ref 8.9–12.7)
POTASSIUM SERPL-SCNC: 4.6 MMOL/L (ref 3.5–5.3)
RBC # BLD AUTO: 3.79 MILLION/UL (ref 3.88–5.62)
SODIUM SERPL-SCNC: 131 MMOL/L (ref 136–145)
WBC # BLD AUTO: 16.77 THOUSAND/UL (ref 4.31–10.16)

## 2022-02-22 PROCEDURE — 85025 COMPLETE CBC W/AUTO DIFF WBC: CPT | Performed by: STUDENT IN AN ORGANIZED HEALTH CARE EDUCATION/TRAINING PROGRAM

## 2022-02-22 PROCEDURE — 86140 C-REACTIVE PROTEIN: CPT | Performed by: STUDENT IN AN ORGANIZED HEALTH CARE EDUCATION/TRAINING PROGRAM

## 2022-02-22 PROCEDURE — 94760 N-INVAS EAR/PLS OXIMETRY 1: CPT

## 2022-02-22 PROCEDURE — 99233 SBSQ HOSP IP/OBS HIGH 50: CPT | Performed by: INTERNAL MEDICINE

## 2022-02-22 PROCEDURE — 82948 REAGENT STRIP/BLOOD GLUCOSE: CPT

## 2022-02-22 PROCEDURE — 80048 BASIC METABOLIC PNL TOTAL CA: CPT | Performed by: STUDENT IN AN ORGANIZED HEALTH CARE EDUCATION/TRAINING PROGRAM

## 2022-02-22 RX ORDER — INSULIN GLARGINE 100 [IU]/ML
25 INJECTION, SOLUTION SUBCUTANEOUS EVERY 12 HOURS SCHEDULED
Status: DISCONTINUED | OUTPATIENT
Start: 2022-02-22 | End: 2022-02-22

## 2022-02-22 RX ORDER — METHYLPREDNISOLONE SODIUM SUCCINATE 125 MG/2ML
60 INJECTION, POWDER, LYOPHILIZED, FOR SOLUTION INTRAMUSCULAR; INTRAVENOUS DAILY
Status: DISCONTINUED | OUTPATIENT
Start: 2022-02-23 | End: 2022-02-26

## 2022-02-22 RX ORDER — INSULIN GLARGINE 100 [IU]/ML
25 INJECTION, SOLUTION SUBCUTANEOUS ONCE
Status: COMPLETED | OUTPATIENT
Start: 2022-02-22 | End: 2022-02-22

## 2022-02-22 RX ADMIN — ENOXAPARIN SODIUM 40 MG: 40 INJECTION SUBCUTANEOUS at 08:51

## 2022-02-22 RX ADMIN — PANTOPRAZOLE SODIUM 40 MG: 40 TABLET, DELAYED RELEASE ORAL at 05:27

## 2022-02-22 RX ADMIN — INSULIN LISPRO 2 UNITS: 100 INJECTION, SOLUTION INTRAVENOUS; SUBCUTANEOUS at 06:49

## 2022-02-22 RX ADMIN — INSULIN LISPRO 6 UNITS: 100 INJECTION, SOLUTION INTRAVENOUS; SUBCUTANEOUS at 12:37

## 2022-02-22 RX ADMIN — INSULIN GLARGINE 25 UNITS: 100 INJECTION, SOLUTION SUBCUTANEOUS at 14:19

## 2022-02-22 RX ADMIN — ASPIRIN 81 MG CHEWABLE TABLET 81 MG: 81 TABLET CHEWABLE at 08:51

## 2022-02-22 RX ADMIN — ATORVASTATIN CALCIUM 10 MG: 10 TABLET, FILM COATED ORAL at 08:51

## 2022-02-22 RX ADMIN — BENZONATATE 100 MG: 100 CAPSULE ORAL at 22:10

## 2022-02-22 RX ADMIN — BENZONATATE 100 MG: 100 CAPSULE ORAL at 18:23

## 2022-02-22 RX ADMIN — ZINC SULFATE 220 MG (50 MG) CAPSULE 220 MG: CAPSULE at 08:51

## 2022-02-22 RX ADMIN — SODIUM CHLORIDE 9 UNITS/HR: 9 INJECTION, SOLUTION INTRAVENOUS at 18:42

## 2022-02-22 RX ADMIN — METHYLPREDNISOLONE SODIUM SUCCINATE 80 MG: 125 INJECTION, POWDER, FOR SOLUTION INTRAMUSCULAR; INTRAVENOUS at 08:52

## 2022-02-22 RX ADMIN — THIAMINE HCL TAB 100 MG 100 MG: 100 TAB at 12:38

## 2022-02-22 NOTE — PROGRESS NOTES
Progress Note - Critical Care   Conni Hodgkin 73 y o  male MRN: [id number]  [facility] Encounter: [id number]    SUBJECTIVE:  Feels the same as yesterday  Bothered by dry cough  HPI/24HR Event:  No overnight events  Medications      ROS  Denies fever, chills, abdominal pain, nausea, vomiting, headache, difficulty urinating, wheezing, orthopnea  Invasive lines and devices: Invasive Devices  Report    Peripheral Intravenous Line            Peripheral IV 22 Dorsal (posterior); Right Hand 4 days                   Physical exam  General:  Resting comfortably in bed in no acute distress  Neuro: Awake, alert  HENT: No scleral icterus, Normocephalic and atraumatic, PERRL  Heart: RRR, pulses intact  Lungs: CTA, Bilateral breath sounds present  Abdomen: Nontender, Bowel sounds present, soft  Skin:  No leg edema, Warm, dry skin/Incision site clean dry and intact    Vitals  Temperature:   Temp (24hrs), Av 2 °F (36 8 °C), Min:97 9 °F (36 6 °C), Max:98 6 °F (37 °C)    Current: Temperature: 98 6 °F (37 °C)    Vitals:    22 0429 22 0430 22 0500 22 0543   BP: 117/79      BP Location:       Pulse:  62     Resp:       Temp:   98 6 °F (37 °C)    TempSrc:       SpO2:  91%     Weight:    84 5 kg (186 lb 4 6 oz)   Height:                 Labs:   Results from last 7 days   Lab Units 22  0540 22  0454 22  0538   WBC Thousand/uL 16 77* 10 89* 12 59*   HEMOGLOBIN g/dL 12 8 13 8 13 0   HEMATOCRIT % 37 2 39 6 37 5   PLATELETS Thousands/uL 235 234 206   NEUTROS PCT % 84* 85* 82*   MONOS PCT % 5 2* 5      Results from last 7 days   Lab Units 22  0540 22  0454 22  0538 22  0549 22  0405 22  2130 22  2130   SODIUM mmol/L 131* 133* 132*   < > 132*   < > 128*   POTASSIUM mmol/L 4 6 4 8 4 0   < > 4 3   < > 4 3   CHLORIDE mmol/L 104 105 104   < > 105   < > 98*   CO2 mmol/L 20* 22 21   < > 18*   < > 18*   BUN mg/dL 23 20 16   < > 25   < > 28* CREATININE mg/dL 0 84 0 95 0 72   < > 0 89   < > 1 31*   CALCIUM mg/dL 8 6 8 7 8 1*   < > 8 2*   < > 8 5   ALK PHOS U/L  --   --  113  --  98  --  110   ALT U/L  --   --  104*  --  75  --  88*   AST U/L  --   --  63*  --  74*  --  92*    < > = values in this interval not displayed  Results from last 7 days   Lab Units 02/17/22  2130 02/15/22  2253   MAGNESIUM mg/dL 1 9 1 8*     Results from last 7 days   Lab Units 02/15/22  2253   PHOSPHORUS mg/dL 3 2      Results from last 7 days   Lab Units 02/20/22  1111 02/20/22  0332 02/19/22  2041 02/18/22  0405 02/17/22 2130   INR   --   --   --   --  1 12   PTT seconds 71* 56* 47*   < > 28    < > = values in this interval not displayed  Results from last 7 days   Lab Units 02/18/22  0633   LACTIC ACID mmol/L 2 5*       Other Labs    Intake and Outputs:  I/O       02/20 0701  02/21 0700 02/21 0701  02/22 0700    P  O  480     Total Intake(mL/kg) 480 (5 7)     Urine (mL/kg/hr) 1625 (0 8) 2500 (1 2)    Emesis/NG output 0     Stool 0     Total Output 1625 2500    Net -1145 -2500          Unmeasured Urine Occurrence 1 x     Unmeasured Stool Occurrence 1 x           Imaging Studies      Assessment & Plan:   -Neuro:   · Pain controlled with: Tylenol PRN  · Regulate sleep/wake cycle  · Delirium precautions  · CAM-ICU daily  - Delirium ppx: CAM-ICU, sleep hygiene      CV:   HTN   · Continue lisinopril 5 mg  Elevated tropnonin  · Continue ASA 81 mg  · [id number]  · Per cards - no ischemic symptoms or EKG changes, outpt follow up to consider ischemic workup     Hyperlipidemia  · Continue statin          Lung:   Covid-19 pneumonia  § Completed Decadron 2/18   § Complete remdesivir  § Refused baricitinib  § Chest x-ray 2/20 - worsened b/l opacities  · Continue Solumedrol 80 mg (day 3)  · Currently on saturating 95% of 70% FiO2 on 55 L HFNC, continue to wean  · IV Lasix 20 yesterday, net -12 5 L  · Incentive spirometry  · Self proning  · SpO2 goal >90%  · Pulmonary toileting -   Respiratory  Report   Lab Data (Last 4 hours)    None         O2/Vent Data (Last 4 hours)    None               - Continue Respiratory Protocol and Airway Clearance Protocol      GI:   · Stress ulcer prophylaxis: Protonix PO  · Bowel regimen: Miralax prn  · zofran PRN for nausea         FEN:   - Fluids: no maintenance   - Electrolytes: trend and replete as needed  - Nutrition: CCD     :   No acute issues  Continue to monitor I/O, BUN, Cr  Received Lasix 20 yesterday, urine output -2 5 yesterday  - I/O last 24 hours: In: 240 [P O :240]  Out: 3200 [Urine:3200]    ID:   Source of infection: covid pneumonia  · Blood Cx 2/17 - CoNS x 1 - likely contaminant  · Procal 0 18 downtrending  · CRP 27, downtrending  · D/c ceftriaxone and doxycycline on 2/20  ? Continue to observe off Abx   · Trend temps and WBC count  - Monitor WBC/temp curve    Heme:   No acute issues  - DVT ppx: heparin SC, SCDs     Endo:   - Dx: type 2 diabetes  - BG yesterday [phone]  - Increased Lantus from 25 to 30 U HS   - Continue Lispro 7 U with meals, SSI  - Goal -180    Msk/Skin:   No acute issues  - PT/OT  - Turning/repositioning    Disposition: Continue current level of care and wean O2 requirements        Non-Invasive/Invasive Ventilation Settings:  Respiratory  Report   Lab Data (Last 4 hours)    None         O2/Vent Data (Last 4 hours)    None              No results found for: PHART, AFN9HSJ, PO2ART, ODQ9EUT, I8SJRTTC, BEART, SOURCE  SpO2: SpO2: 95 %    Imaging:   VAS lower limb venous duplex study, complete bilateral   Final Result by Myriam Ventura MD (02/21 1744)      XR chest portable   Final Result by Eun Skaggs MD (02/20 2204)      Worsened peripheral pulmonary opacities  Workstation performed: [id number]           I have personally reviewed pertinent reports        Micro:  Lab Results   Component Value Date    BLOODCX No Growth at 72 hrs  02/17/2022    BLOODCX Staphylococcus coagulase negative (A) 02/17/2022    BLOODCX No Growth After 5 Days  02/08/2022    BLOODCX No Growth After 5 Days  02/08/2022         Allergies: No Known Allergies      Medications:   Scheduled Meds:  Current Facility-Administered Medications   Medication Dose Route Frequency Provider Last Rate    acetaminophen  650 mg Oral Q4H PRN Daylene Falling, DO      aspirin  81 mg Oral Daily Daylene Falling, DO      atorvastatin  10 mg Oral Daily Daylene Falling, DO      benzonatate  100 mg Oral TID Svetlana Burnett MD      dextromethorphan-guaiFENesin  10 mL Oral Q4H PRN Shea Patel PA-C      enoxaparin  40 mg Subcutaneous Q24H [facility] [place], DO      insulin glargine  25 Units Subcutaneous HS Alberto A Paster, DO      insulin lispro  1-6 Units Subcutaneous TID AC Alberto A Paster, DO      insulin lispro  1-6 Units Subcutaneous HS Alberto A Paster, DO      insulin lispro  7 Units Subcutaneous TID With Meals Svetlana Burnett MD      lisinopril  5 mg Oral Daily Svetlana Burnett MD      methylPREDNISolone sodium succinate  80 mg Intravenous Daily Na Smith, DO      ondansetron  4 mg Intravenous Q6H PRN Daylene Falling, DO      pantoprazole  40 mg Oral Early Morning Daylene Falling, DO      polyethylene glycol  17 g Oral Daily PRN Tiffany Pinedo, DO      sodium chloride  1 spray Each Nare Q1H PRN Svetlana Burnett MD      thiamine  100 mg Oral Daily Daylene Falling, DO      zinc sulfate  220 mg Oral Daily Daylene Falling, DO       Continuous Infusions:   PRN Meds:  acetaminophen, 650 mg, Q4H PRN  dextromethorphan-guaiFENesin, 10 mL, Q4H PRN  ondansetron, 4 mg, Q6H PRN  polyethylene glycol, 17 g, Daily PRN  sodium chloride, 1 spray, Q1H PRN        Counseling / Coordination of Care  Total Critical Care time spent 20 minutes excluding procedures, teaching and family updates  Code Status: Level 1 - Full Code     Portions of the record may have been created with voice recognition software    Occasional wrong word or "sound a like" substitutions may have occurred due to the inherent limitations of voice recognition software  Read the chart carefully and recognize, using context, where substitutions have occurred       Isreal Narvaez DO  Internal Medicine Resident, PGY1  6:52 AM

## 2022-02-22 NOTE — NURSING NOTE
Pt  Requesting more treatment for covid yet refused barcitinib  Pt  Also states he left [facility] as "no doctor saw him for 5 days"  There is not from medical service on every day of previous admission with notes regarding discussions with patient

## 2022-02-23 LAB
ANION GAP SERPL CALCULATED.3IONS-SCNC: 8 MMOL/L (ref 4–13)
BACTERIA BLD CULT: NORMAL
BASOPHILS # BLD AUTO: 0.02 THOUSANDS/ΜL (ref 0–0.1)
BASOPHILS NFR BLD AUTO: 0 % (ref 0–1)
BUN SERPL-MCNC: 24 MG/DL (ref 5–25)
CALCIUM SERPL-MCNC: 9.2 MG/DL (ref 8.3–10.1)
CHLORIDE SERPL-SCNC: 103 MMOL/L (ref 100–108)
CO2 SERPL-SCNC: 23 MMOL/L (ref 21–32)
CREAT SERPL-MCNC: 0.8 MG/DL (ref 0.6–1.3)
EOSINOPHIL # BLD AUTO: 0.03 THOUSAND/ΜL (ref 0–0.61)
EOSINOPHIL NFR BLD AUTO: 0 % (ref 0–6)
ERYTHROCYTE [DISTWIDTH] IN BLOOD BY AUTOMATED COUNT: 12.5 % (ref 11.6–15.1)
GFR SERPL CREATININE-BSD FRML MDRD: 93 ML/MIN/1.73SQ M
GLUCOSE SERPL-MCNC: 117 MG/DL (ref 65–140)
GLUCOSE SERPL-MCNC: 137 MG/DL (ref 65–140)
GLUCOSE SERPL-MCNC: 145 MG/DL (ref 65–140)
GLUCOSE SERPL-MCNC: 158 MG/DL (ref 65–140)
GLUCOSE SERPL-MCNC: 205 MG/DL (ref 65–140)
GLUCOSE SERPL-MCNC: 206 MG/DL (ref 65–140)
GLUCOSE SERPL-MCNC: 228 MG/DL (ref 65–140)
GLUCOSE SERPL-MCNC: 288 MG/DL (ref 65–140)
GLUCOSE SERPL-MCNC: 294 MG/DL (ref 65–140)
GLUCOSE SERPL-MCNC: 308 MG/DL (ref 65–140)
GLUCOSE SERPL-MCNC: 318 MG/DL (ref 65–140)
GLUCOSE SERPL-MCNC: 365 MG/DL (ref 65–140)
GLUCOSE SERPL-MCNC: 97 MG/DL (ref 65–140)
HCT VFR BLD AUTO: 38.5 % (ref 36.5–49.3)
HGB BLD-MCNC: 13.5 G/DL (ref 12–17)
IMM GRANULOCYTES # BLD AUTO: 0.15 THOUSAND/UL (ref 0–0.2)
IMM GRANULOCYTES NFR BLD AUTO: 1 % (ref 0–2)
LYMPHOCYTES # BLD AUTO: 1.95 THOUSANDS/ΜL (ref 0.6–4.47)
LYMPHOCYTES NFR BLD AUTO: 13 % (ref 14–44)
MCH RBC QN AUTO: 33.7 PG (ref 26.8–34.3)
MCHC RBC AUTO-ENTMCNC: 35.1 G/DL (ref 31.4–37.4)
MCV RBC AUTO: 96 FL (ref 82–98)
MONOCYTES # BLD AUTO: 1.05 THOUSAND/ΜL (ref 0.17–1.22)
MONOCYTES NFR BLD AUTO: 7 % (ref 4–12)
NEUTROPHILS # BLD AUTO: 12.3 THOUSANDS/ΜL (ref 1.85–7.62)
NEUTS SEG NFR BLD AUTO: 79 % (ref 43–75)
NRBC BLD AUTO-RTO: 0 /100 WBCS
PLATELET # BLD AUTO: 267 THOUSANDS/UL (ref 149–390)
PMV BLD AUTO: 9.8 FL (ref 8.9–12.7)
POTASSIUM SERPL-SCNC: 4.4 MMOL/L (ref 3.5–5.3)
RBC # BLD AUTO: 4.01 MILLION/UL (ref 3.88–5.62)
SODIUM SERPL-SCNC: 134 MMOL/L (ref 136–145)
WBC # BLD AUTO: 15.5 THOUSAND/UL (ref 4.31–10.16)

## 2022-02-23 PROCEDURE — 85025 COMPLETE CBC W/AUTO DIFF WBC: CPT | Performed by: STUDENT IN AN ORGANIZED HEALTH CARE EDUCATION/TRAINING PROGRAM

## 2022-02-23 PROCEDURE — 99233 SBSQ HOSP IP/OBS HIGH 50: CPT | Performed by: INTERNAL MEDICINE

## 2022-02-23 PROCEDURE — 80048 BASIC METABOLIC PNL TOTAL CA: CPT | Performed by: STUDENT IN AN ORGANIZED HEALTH CARE EDUCATION/TRAINING PROGRAM

## 2022-02-23 PROCEDURE — 94760 N-INVAS EAR/PLS OXIMETRY 1: CPT

## 2022-02-23 PROCEDURE — 82948 REAGENT STRIP/BLOOD GLUCOSE: CPT

## 2022-02-23 RX ADMIN — LISINOPRIL 5 MG: 5 TABLET ORAL at 08:01

## 2022-02-23 RX ADMIN — BENZONATATE 100 MG: 100 CAPSULE ORAL at 18:23

## 2022-02-23 RX ADMIN — PANTOPRAZOLE SODIUM 40 MG: 40 TABLET, DELAYED RELEASE ORAL at 06:01

## 2022-02-23 RX ADMIN — BENZONATATE 100 MG: 100 CAPSULE ORAL at 08:12

## 2022-02-23 RX ADMIN — ZINC SULFATE 220 MG (50 MG) CAPSULE 220 MG: CAPSULE at 08:01

## 2022-02-23 RX ADMIN — ASPIRIN 81 MG CHEWABLE TABLET 81 MG: 81 TABLET CHEWABLE at 08:01

## 2022-02-23 RX ADMIN — ENOXAPARIN SODIUM 40 MG: 40 INJECTION SUBCUTANEOUS at 08:01

## 2022-02-23 RX ADMIN — ATORVASTATIN CALCIUM 10 MG: 10 TABLET, FILM COATED ORAL at 08:01

## 2022-02-23 RX ADMIN — SODIUM CHLORIDE 13 UNITS/HR: 9 INJECTION, SOLUTION INTRAVENOUS at 22:46

## 2022-02-23 RX ADMIN — METHYLPREDNISOLONE SODIUM SUCCINATE 60 MG: 125 INJECTION, POWDER, FOR SOLUTION INTRAMUSCULAR; INTRAVENOUS at 08:01

## 2022-02-23 RX ADMIN — THIAMINE HCL TAB 100 MG 100 MG: 100 TAB at 08:01

## 2022-02-23 RX ADMIN — BENZONATATE 100 MG: 100 CAPSULE ORAL at 22:07

## 2022-02-23 NOTE — PROGRESS NOTES
Progress Note - Critical Care   Jewels Flores 73 y o  male MRN: [id number]  Unit/Bed#: [facility] 208-01 Encounter: [id number]    SUBJECTIVE:  Dry cough  Some shortness of breath if he stands up/walks around  Eating well  HPI/24HR Event:  No overnight events  Medications      ROS  Denies fever, chills, abdominal pain, nausea, difficulty urinating, headache  Invasive lines and devices: Invasive Devices  Report    Peripheral Intravenous Line            Peripheral IV 22 Dorsal (posterior); Right Hand 5 days                   Physical exam  General:  Resting comfortably in bed in no acute distress  Neuro: Awake, alert, no focal deficits  HENT: No scleral icterus, Normocephalic and atraumatic  Heart: No murmur, RRR, pulses intact  Lungs: FiO2 85% on 55L saturating 96%, CTA, Bilateral breath sounds present,   Abdomen: nontender, Bowel sounds present, soft  Skin:  No LE edema, Warm, dry skin/Incision site clean dry and intact    Vitals  Temperature:   Temp (24hrs), Av 9 °F (36 6 °C), Min:97 1 °F (36 2 °C), Max:98 5 °F (36 9 °C)    Current: Temperature: (!) 97 1 °F (36 2 °C)    Vitals:    22 0029 22 0156 22 0229 22 0400   BP: (!) 107/42  (!) 100/41 123/65   BP Location: Left arm  Left arm    Pulse: (!) 54  (!) 54 60   Resp: 20  20 20   Temp:   98 4 °F (36 9 °C) (!) 97 1 °F (36 2 °C)   TempSrc:   Oral Oral   SpO2: 98% 98% 98% 95%   Weight:       Height:                 Labs:   Results from last 7 days   Lab Units 22  0435 22  0540 22  0454   WBC Thousand/uL 15 50* 16 77* 10 89*   HEMOGLOBIN g/dL 13 5 12 8 13 8   HEMATOCRIT % 38 5 37 2 39 6   PLATELETS Thousands/uL 267 235 234   NEUTROS PCT % 79* 84* 85*   MONOS PCT % 7 5 2*      Results from last 7 days   Lab Units 22  0435 22  0540 22  0454 22  0538 22  0538 22  0549 22  0405 22  2130 22   SODIUM mmol/L 134* 131* 133*   < > 132*   < > 132*   < > 128*   POTASSIUM mmol/L 4 4 4 6 4 8   < > 4 0   < > 4 3   < > 4 3   CHLORIDE mmol/L 103 104 105   < > 104   < > 105   < > 98*   CO2 mmol/L 23 20* 22   < > 21   < > 18*   < > 18*   BUN mg/dL 24 23 20   < > 16   < > 25   < > 28*   CREATININE mg/dL 0 80 0 84 0 95   < > 0 72   < > 0 89   < > 1 31*   CALCIUM mg/dL 9 2 8 6 8 7   < > 8 1*   < > 8 2*   < > 8 5   ALK PHOS U/L  --   --   --   --  113  --  98  --  110   ALT U/L  --   --   --   --  104*  --  75  --  88*   AST U/L  --   --   --   --  63*  --  74*  --  92*    < > = values in this interval not displayed  Results from last 7 days   Lab Units 02/17/22  2130   MAGNESIUM mg/dL 1 9          Results from last 7 days   Lab Units 02/20/22  1111 02/20/22  0332 02/19/22  2041 02/18/22  0405 02/17/22  2130   INR   --   --   --   --  1 12   PTT seconds 71* 56* 47*   < > 28    < > = values in this interval not displayed  Results from last 7 days   Lab Units 02/18/22  0633   LACTIC ACID mmol/L 2 5*       Other Labs    Intake and Outputs:  I/O       02/21 0701  02/22 0700 02/22 0701  02/23 0700 02/23 0701  02/24 0700    P  O   1560     I V  (mL/kg)  68 (0 8)     Total Intake(mL/kg)  1628 (19 3)     Urine (mL/kg/hr) 2500 (1 2) 4150 (2)     Emesis/NG output  0     Stool  0     Total Output 2500 4150     Net -2500 -2522            Unmeasured Stool Occurrence  1 x           Imaging Studies      Assessment & Plan:   -Neuro:   ? Pain controlled with: Tylenol PRN  ? Regulate sleep/wake cycle  ? Delirium precautions  · CAM-ICU daily  - Delirium ppx: CAM-ICU, sleep hygiene      CV:   HTN   ? Continue lisinopril 5 mg  Elevated tropnonin  ? Continue ASA 81 mg  ? [id number]  ? Per cards - no ischemic symptoms or EKG changes, outpt follow up to consider ischemic workup         Lung:   Covid-19 pneumonia  § Completed Decadron 2/18   § Complete remdesivir  § Refused baricitinib  § Chest x-ray 2/20 - worsened b/l opacities  ? Continue Solumedrol 80 mg (day 4)  ?  Currently on saturating 95% of 70% FiO2 on 55 L HFNC, continue to wean  ? Incentive spirometry  ? Self proning  ? SpO2 goal >90%  ? Pulmonary toileting    -   Respiratory  Report   Lab Data (Last 4 hours)    None         O2/Vent Data (Last 4 hours)    None               - Continue Respiratory Protocol and Airway Clearance Protocol      GI:   ? Stress ulcer prophylaxis: Protonix PO  ? Bowel regimen: Miralax prn  ? zofran PRN for nausea        FEN:   - Fluids: no maintenance   - Electrolytes: trend and replete as needed  - Nutrition: CCD     :   No acute issues  Continue to monitor I/O, BUN, Cr  - I/O last 24 hours: In: 1628 [P O :1560; I V :68]  Out: 80 [Urine:4150]    ID:   Source of infection: covid pneumonia  ? Blood Cx 2/17 - CoNS x 1 - likely contaminant  ? Procal 0 18 downtrending  ? CRP 27, downtrending  ? D/c ceftriaxone and doxycycline on 2/20  § Continue to observe off Abx   ? Trend temps and WBC count  - Monitor WBC/temp curve  - Monitor WBC/temp curve    Heme:   No acute issues  Continue to trend Hg  - DVT ppx: heparin SC, SCDs    Endo:   - Dx: type 2 diabetes  - On insulin drip due to hyperglycemia secondary to steroid  - holding home metformin 1000 mg BID, glipizode 10 mg BID, Ozempic  - Goal -180    Msk/Skin:   No acute issues  - PT/OT  - Turning/repositioning    Disposition: Continue current level of care and wean O2 requirements        Non-Invasive/Invasive Ventilation Settings:  Respiratory  Report   Lab Data (Last 4 hours)    None         O2/Vent Data (Last 4 hours)    None              No results found for: PHART, YSB8GMI, PO2ART, VHZ0VWO, F7YXMMGD, BEART, SOURCE  SpO2: SpO2: 95 %    Imaging:   VAS lower limb venous duplex study, complete bilateral   Final Result by Janneth Conte MD (02/21 7151)      XR chest portable   Final Result by Mary Yeh MD (02/20 2917)      Worsened peripheral pulmonary opacities  Workstation performed: [id number]           I have personally reviewed pertinent reports  Micro:  Lab Results   Component Value Date    BLOODCX No Growth After 4 Days  02/17/2022    BLOODCX Staphylococcus coagulase negative (A) 02/17/2022    BLOODCX No Growth After 5 Days  02/08/2022    BLOODCX No Growth After 5 Days  02/08/2022         Allergies: No Known Allergies      Medications:   Scheduled Meds:  Current Facility-Administered Medications   Medication Dose Route Frequency Provider Last Rate    acetaminophen  650 mg Oral Q4H PRN Layo Eliza, DO      aspirin  81 mg Oral Daily Layo Eliza, DO      atorvastatin  10 mg Oral Daily Layo Eliza, DO      benzonatate  100 mg Oral TID Alex Morrell MD      dextromethorphan-guaiFENesin  10 mL Oral Q4H PRN Joshua Costello PA-C      enoxaparin  40 mg Subcutaneous Q24H [address] Lexi Cordero,       insulin regular (HumuLIN R,NovoLIN R) infusion  0 3-21 Units/hr Intravenous Titrated Boris Becker MD 1 5 Units/hr (02/23/22 0636)    lisinopril  5 mg Oral Daily Alex Morrell MD      methylPREDNISolone sodium succinate  60 mg Intravenous Daily Jane Beckham MD      ondansetron  4 mg Intravenous Q6H PRN Layo Eliza, DO      pantoprazole  40 mg Oral Early Morning Layo Eliza, DO      polyethylene glycol  17 g Oral Daily PRN Rocio Hart, DO      sodium chloride  1 spray Each Nare Q1H PRN Alex Morrell MD      thiamine  100 mg Oral Daily Layo Eliza, DO      zinc sulfate  220 mg Oral Daily Layo Eliza, DO       Continuous Infusions:insulin regular (HumuLIN R,NovoLIN R) infusion, 0 3-21 Units/hr, Last Rate: 1 5 Units/hr (02/23/22 0636)      PRN Meds:  acetaminophen, 650 mg, Q4H PRN  dextromethorphan-guaiFENesin, 10 mL, Q4H PRN  ondansetron, 4 mg, Q6H PRN  polyethylene glycol, 17 g, Daily PRN  sodium chloride, 1 spray, Q1H PRN        Counseling / Coordination of Care  Total Critical Care time spent 20 minutes excluding procedures, teaching and family updates         Code Status: Level 1 - Full Code     Portions of the record may have been created with voice recognition software  Occasional wrong word or "sound a like" substitutions may have occurred due to the inherent limitations of voice recognition software  Read the chart carefully and recognize, using context, where substitutions have occurred       Rocio Chaudhry DO  Internal Medicine Resident, PGY1  7:54 AM

## 2022-02-23 NOTE — CASE MANAGEMENT
Case Management Discharge Planning Note    Patient name Vaishali Alan  [facility] 434-16 MRN [id number]  : 1955 Date 2022       Current Admission Date: 2022  Current Admission Diagnosis:Acute respiratory failure due to [address])   Patient Active Problem List    Diagnosis Date Noted    Acute respiratory failure due to [address]) 2022    Non MI elevated troponin 2022    Type 2 diabetes mellitus ([address] ) 2022    Primary hypertension 2022      LOS (days): 5  Geometric Mean LOS (GMLOS) (days): 5 40  Days to GMLOS:0     OBJECTIVE:  Risk of Unplanned Readmission Score: 12         Current admission status: Inpatient   [facility] Pharm[facility]    [address] [facility]  [facility] [address] [place] [address]  Phone: [phone] Fax: [phone] [address]  Phone: [phone] Fax: [phone]    Primary Care Provider: Roney Washington MD    Primary Insurance: [place]  Secondary Insurance: MEDICARE    DISCHARGE DETAILS:  Additional Comments: Pt is not medically stable for DC per medical team  CM will continue to follow Pt for DC planning/support

## 2022-02-24 LAB
ANION GAP SERPL CALCULATED.3IONS-SCNC: 7 MMOL/L (ref 4–13)
BASOPHILS # BLD AUTO: 0.02 THOUSANDS/ΜL (ref 0–0.1)
BASOPHILS NFR BLD AUTO: 0 % (ref 0–1)
BUN SERPL-MCNC: 21 MG/DL (ref 5–25)
CALCIUM SERPL-MCNC: 8.4 MG/DL (ref 8.3–10.1)
CHLORIDE SERPL-SCNC: 106 MMOL/L (ref 100–108)
CO2 SERPL-SCNC: 23 MMOL/L (ref 21–32)
CREAT SERPL-MCNC: 0.82 MG/DL (ref 0.6–1.3)
EOSINOPHIL # BLD AUTO: 0.09 THOUSAND/ΜL (ref 0–0.61)
EOSINOPHIL NFR BLD AUTO: 1 % (ref 0–6)
ERYTHROCYTE [DISTWIDTH] IN BLOOD BY AUTOMATED COUNT: 12.6 % (ref 11.6–15.1)
GFR SERPL CREATININE-BSD FRML MDRD: 92 ML/MIN/1.73SQ M
GLUCOSE SERPL-MCNC: 102 MG/DL (ref 65–140)
GLUCOSE SERPL-MCNC: 106 MG/DL (ref 65–140)
GLUCOSE SERPL-MCNC: 106 MG/DL (ref 65–140)
GLUCOSE SERPL-MCNC: 109 MG/DL (ref 65–140)
GLUCOSE SERPL-MCNC: 146 MG/DL (ref 65–140)
GLUCOSE SERPL-MCNC: 167 MG/DL (ref 65–140)
GLUCOSE SERPL-MCNC: 177 MG/DL (ref 65–140)
GLUCOSE SERPL-MCNC: 186 MG/DL (ref 65–140)
GLUCOSE SERPL-MCNC: 211 MG/DL (ref 65–140)
GLUCOSE SERPL-MCNC: 288 MG/DL (ref 65–140)
GLUCOSE SERPL-MCNC: 314 MG/DL (ref 65–140)
GLUCOSE SERPL-MCNC: 353 MG/DL (ref 65–140)
GLUCOSE SERPL-MCNC: 374 MG/DL (ref 65–140)
HCT VFR BLD AUTO: 35.4 % (ref 36.5–49.3)
HGB BLD-MCNC: 12.8 G/DL (ref 12–17)
IMM GRANULOCYTES # BLD AUTO: 0.12 THOUSAND/UL (ref 0–0.2)
IMM GRANULOCYTES NFR BLD AUTO: 1 % (ref 0–2)
LYMPHOCYTES # BLD AUTO: 1.87 THOUSANDS/ΜL (ref 0.6–4.47)
LYMPHOCYTES NFR BLD AUTO: 17 % (ref 14–44)
MCH RBC QN AUTO: 33.5 PG (ref 26.8–34.3)
MCHC RBC AUTO-ENTMCNC: 36.2 G/DL (ref 31.4–37.4)
MCV RBC AUTO: 93 FL (ref 82–98)
MONOCYTES # BLD AUTO: 0.94 THOUSAND/ΜL (ref 0.17–1.22)
MONOCYTES NFR BLD AUTO: 8 % (ref 4–12)
NEUTROPHILS # BLD AUTO: 8.31 THOUSANDS/ΜL (ref 1.85–7.62)
NEUTS SEG NFR BLD AUTO: 73 % (ref 43–75)
NRBC BLD AUTO-RTO: 0 /100 WBCS
PLATELET # BLD AUTO: 208 THOUSANDS/UL (ref 149–390)
PMV BLD AUTO: 10.2 FL (ref 8.9–12.7)
POTASSIUM SERPL-SCNC: 4.2 MMOL/L (ref 3.5–5.3)
RBC # BLD AUTO: 3.82 MILLION/UL (ref 3.88–5.62)
SODIUM SERPL-SCNC: 136 MMOL/L (ref 136–145)
WBC # BLD AUTO: 11.35 THOUSAND/UL (ref 4.31–10.16)

## 2022-02-24 PROCEDURE — 82948 REAGENT STRIP/BLOOD GLUCOSE: CPT

## 2022-02-24 PROCEDURE — 80048 BASIC METABOLIC PNL TOTAL CA: CPT | Performed by: STUDENT IN AN ORGANIZED HEALTH CARE EDUCATION/TRAINING PROGRAM

## 2022-02-24 PROCEDURE — 85025 COMPLETE CBC W/AUTO DIFF WBC: CPT | Performed by: STUDENT IN AN ORGANIZED HEALTH CARE EDUCATION/TRAINING PROGRAM

## 2022-02-24 PROCEDURE — 99233 SBSQ HOSP IP/OBS HIGH 50: CPT | Performed by: INTERNAL MEDICINE

## 2022-02-24 PROCEDURE — 94760 N-INVAS EAR/PLS OXIMETRY 1: CPT

## 2022-02-24 RX ADMIN — BENZONATATE 100 MG: 100 CAPSULE ORAL at 16:06

## 2022-02-24 RX ADMIN — METHYLPREDNISOLONE SODIUM SUCCINATE 60 MG: 125 INJECTION, POWDER, FOR SOLUTION INTRAMUSCULAR; INTRAVENOUS at 08:19

## 2022-02-24 RX ADMIN — ENOXAPARIN SODIUM 40 MG: 40 INJECTION SUBCUTANEOUS at 08:22

## 2022-02-24 RX ADMIN — PANTOPRAZOLE SODIUM 40 MG: 40 TABLET, DELAYED RELEASE ORAL at 06:02

## 2022-02-24 RX ADMIN — BENZONATATE 100 MG: 100 CAPSULE ORAL at 21:12

## 2022-02-24 RX ADMIN — BENZONATATE 100 MG: 100 CAPSULE ORAL at 08:26

## 2022-02-24 RX ADMIN — ZINC SULFATE 220 MG (50 MG) CAPSULE 220 MG: CAPSULE at 10:24

## 2022-02-24 RX ADMIN — THIAMINE HCL TAB 100 MG 100 MG: 100 TAB at 08:18

## 2022-02-24 RX ADMIN — ASPIRIN 81 MG CHEWABLE TABLET 81 MG: 81 TABLET CHEWABLE at 08:22

## 2022-02-24 RX ADMIN — ATORVASTATIN CALCIUM 10 MG: 10 TABLET, FILM COATED ORAL at 08:18

## 2022-02-24 RX ADMIN — LISINOPRIL 5 MG: 5 TABLET ORAL at 10:24

## 2022-02-24 NOTE — PROGRESS NOTES
Progress Note - Critical Care   Yasir Riddle 73 y o  male MRN: [id number]  [facility] En[facility]: [id number]    SUBJECTIVE:  Has a dry cough  Some shortness of breath when getting up to use the bedside commode  Feels constipated  HPI/24HR Event:  No overnight events  Medications      ROS  Denies fever, chills, headache, decreased appetite, nausea, abdominal pain, difficulty urinating  Invasive lines and devices: Invasive Devices  Report    Peripheral Intravenous Line            Peripheral IV 22 Dorsal (posterior); Right Hand 6 days                   Physical exam  General: Sitting up in bed in no acute distress watching tv  Neuro: Awake, alert, moves all 4 extremities freely  HENT: No scleral icterus, Normocephalic and atraumatic, PERRL  Heart: No murmur, RRR, pulses intact  Lungs: Slight wheezes bilaterally, no rales or rhonchi  Abdomen: Nontender, Bowel sounds present, soft  Skin:  No LLE, Warm, dry skin/Incision site clean dry and intact    Vitals  Temperature:   Temp (24hrs), Av 6 °F (36 4 °C), Min:97 4 °F (36 3 °C), Max:97 8 °F (36 6 °C)    Current: Temperature: 97 8 °F (36 6 °C)    Vitals:    22 2100 22 0000 22 0102 22 0400   BP: 124/66 106/68  103/55   BP Location:       Pulse:  72     Resp:  20  17   Temp: (!) 97 4 °F (36 3 °C) 97 6 °F (36 4 °C)  97 8 °F (36 6 °C)   TempSrc: Oral Oral     SpO2:   98%    Weight:       Height:                 Labs:   Results from last 7 days   Lab Units 22  0630 22  0435 22  0540   WBC Thousand/uL 11 35* 15 50* 16 77*   HEMOGLOBIN g/dL 12 8 13 5 12 8   HEMATOCRIT % 35 4* 38 5 37 2   PLATELETS Thousands/uL 208 267 235   NEUTROS PCT % 73 79* 84*   MONOS PCT % 8 7 5      Results from last 7 days   Lab Units 22  0435 22  0540 22  0454 22  0538 22  0538 22  0549 22  0405 22  2130 22   SODIUM mmol/L 134* 131* 133*   < > 132*   < > 132*   < > 128* POTASSIUM mmol/L 4 4 4 6 4 8   < > 4 0   < > 4 3   < > 4 3   CHLORIDE mmol/L 103 104 105   < > 104   < > 105   < > 98*   CO2 mmol/L 23 20* 22   < > 21   < > 18*   < > 18*   BUN mg/dL 24 23 20   < > 16   < > 25   < > 28*   CREATININE mg/dL 0 80 0 84 0 95   < > 0 72   < > 0 89   < > 1 31*   CALCIUM mg/dL 9 2 8 6 8 7   < > 8 1*   < > 8 2*   < > 8 5   ALK PHOS U/L  --   --   --   --  113  --  98  --  110   ALT U/L  --   --   --   --  104*  --  75  --  88*   AST U/L  --   --   --   --  63*  --  74*  --  92*    < > = values in this interval not displayed  Results from last 7 days   Lab Units 02/17/22  2130   MAGNESIUM mg/dL 1 9          Results from last 7 days   Lab Units 02/20/22  1111 02/20/22  0332 02/19/22  2041 02/18/22  0405 02/17/22  2130   INR   --   --   --   --  1 12   PTT seconds 71* 56* 47*   < > 28    < > = values in this interval not displayed  Results from last 7 days   Lab Units 02/18/22  0633   LACTIC ACID mmol/L 2 5*       Other Labs    Intake and Outputs:  I/O       02/22 0701  02/23 0700 02/23 0701  02/24 0700    P  O  1560 960    I V  (mL/kg) 68 (0 8) 23 8 (0 3)    Total Intake(mL/kg) 1628 (19 3) 983 8 (11 6)    Urine (mL/kg/hr) 4150 (2) 600 (0 3)    Emesis/NG output 0     Stool 0 0    Total Output 4150 600    Net -2522 +383 8          Unmeasured Stool Occurrence 1 x 1 x          Imaging Studies      Assessment & Plan:   -Neuro:   ? Pain controlled with: Tylenol PRN  ? Regulate sleep/wake cycle  ? Delirium precautions  · CAM-ICU daily  - Delirium ppx: CAM-ICU, sleep hygiene        CV:   HTN   ? Continue lisinopril 5 mg  Elevated tropnonin  ? Continue ASA 81 mg  ? [id number]  ?  Per cards - no ischemic symptoms or EKG changes, outpt follow up to consider ischemic workup           Lung:   Covid-19 pneumonia  § Completed Decadron 2/18   § Complete remdesivir  § Refused baricitinib  § Chest x-ray 2/20 - worsened b/l opacities  § Continue steroid taper   § Solumedrol 60 mg (day 1)  · Completed Solumedrol 80 mg x 5 days  § Currently on saturating 95% of 52%% FiO2 on 50 L HFNC, continue to wean  § Incentive spirometry  § Self proning  § SpO2 goal >90%  § Pulmonary toileting    -   Respiratory  Report   Lab Data (Last 4 hours)    None         O2/Vent Data (Last 4 hours)    None               - Continue Respiratory Protocol and Airway Clearance Protocol      GI:   ? Stress ulcer prophylaxis: Protonix PO  ? Bowel regimen: change Miralax prn to scheduled  ? Zofran PRN for nausea           FEN:   - Fluids: no maintenance   - Electrolytes: trend and replete as needed  - Nutrition: CCD     :   No acute issues  Continue to monitor I/O, BUN, Cr  - I/O last 24 hours: In: 1161 8 [P O :1080; I V :81 8]  Out: 3500 [Urine:3500]      ID:   Source of infection: covid pneumonia  § Blood Cx 2/17 - CoNS x 1 - likely contaminant  § Procal 0 18 downtrending  § CRP 27, downtrending  § D/c ceftriaxone and doxycycline on 2/20  § Continue to observe off Abx   § Trend temps and WBC count  - Monitor WBC/temp curve    Heme:   No acute issues  Continue to trend Hg  - DVT ppx: heparin SC, SCDs     Endo:   - Dx: type 2 diabetes  - On insulin drip due to hyperglycemia secondary to steroid  - holding home metformin 1000 mg BID, glipizode 10 mg BID, Ozempic  - Goal -180     Msk/Skin:   No acute issues  - PT/OT  - Turning/repositioning     Disposition: Continue current level of care and wean O2 requirements        Non-Invasive/Invasive Ventilation Settings:  Respiratory  Report   Lab Data (Last 4 hours)    None         O2/Vent Data (Last 4 hours)    None              No results found for: PHART, WJD1CRR, PO2ART, IRR4SYU, I2MGJLIE, BEART, SOURCE  SpO2: SpO2: 98 %    Imaging:   VAS lower limb venous duplex study, complete bilateral   Final Result by Faisal Mcdermott MD (02/21 8100)      XR chest portable   Final Result by Melissa Rivera MD (02/20 0174)      Worsened peripheral pulmonary opacities              Workstation performed: [id number]           I have personally reviewed pertinent reports  Micro:  Lab Results   Component Value Date    BLOODCX No Growth After 5 Days  02/17/2022    BLOODCX Staphylococcus coagulase negative (A) 02/17/2022    BLOODCX No Growth After 5 Days  02/08/2022    BLOODCX No Growth After 5 Days   02/08/2022         Allergies: No Known Allergies      Medications:   Scheduled Meds:  Current Facility-Administered Medications   Medication Dose Route Frequency Provider Last Rate    acetaminophen  650 mg Oral Q4H PRN Selin Hoff, DO      aspirin  81 mg Oral Daily Selin Hoff, DO      atorvastatin  10 mg Oral Daily Ivatul Hoff, DO      benzonatate  100 mg Oral TID Judge Jose MD      dextromethorphan-guaiFENesin  10 mL Oral Q4H PRN Clenaseem Ling PA-C      enoxaparin  40 mg Subcutaneous Q24H [address] Negro, DO      insulin regular (HumuLIN R,NovoLIN R) infusion  0 3-21 Units/hr Intravenous Titrated Terese Gloria MD 1 Units/hr (02/24/22 0420)    lisinopril  5 mg Oral Daily Judge Jose MD      methylPREDNISolone sodium succinate  60 mg Intravenous Daily Issac Alaniz MD      ondansetron  4 mg Intravenous Q6H PRN Selin Hoff, DO      pantoprazole  40 mg Oral Early Morning Selin Hoff, DO      polyethylene glycol  17 g Oral Daily PRN Meredith Gabriela, DO      sodium chloride  1 spray Each Nare Q1H PRN Judge Jose MD      thiamine  100 mg Oral Daily Ivatul Hoff, DO      zinc sulfate  220 mg Oral Daily Selin Hoff, DO       Continuous Infusions:insulin regular (HumuLIN R,NovoLIN R) infusion, 0 3-21 Units/hr, Last Rate: 1 Units/hr (02/24/22 0420)      PRN Meds:  acetaminophen, 650 mg, Q4H PRN  dextromethorphan-guaiFENesin, 10 mL, Q4H PRN  ondansetron, 4 mg, Q6H PRN  polyethylene glycol, 17 g, Daily PRN  sodium chloride, 1 spray, Q1H PRN        Counseling / Coordination of Care  Total Critical Care time spent 15 minutes excluding procedures, teaching and family updates  Code Status: Level 1 - Full Code     Portions of the record may have been created with voice recognition software  Occasional wrong word or "sound a like" substitutions may have occurred due to the inherent limitations of voice recognition software  Read the chart carefully and recognize, using context, where substitutions have occurred       Dora Cervantes DO  Internal Medicine Resident, PGY1  6:56 AM

## 2022-02-25 PROBLEM — J12.82 PNEUMONIA DUE TO COVID-19 VIRUS: Status: ACTIVE | Noted: 2022-02-08

## 2022-02-25 LAB
ANION GAP SERPL CALCULATED.3IONS-SCNC: 4 MMOL/L (ref 4–13)
BASOPHILS # BLD AUTO: 0.02 THOUSANDS/ΜL (ref 0–0.1)
BASOPHILS NFR BLD AUTO: 0 % (ref 0–1)
BUN SERPL-MCNC: 24 MG/DL (ref 5–25)
CALCIUM SERPL-MCNC: 8.5 MG/DL (ref 8.3–10.1)
CHLORIDE SERPL-SCNC: 106 MMOL/L (ref 100–108)
CO2 SERPL-SCNC: 23 MMOL/L (ref 21–32)
CREAT SERPL-MCNC: 0.84 MG/DL (ref 0.6–1.3)
EOSINOPHIL # BLD AUTO: 0.1 THOUSAND/ΜL (ref 0–0.61)
EOSINOPHIL NFR BLD AUTO: 1 % (ref 0–6)
ERYTHROCYTE [DISTWIDTH] IN BLOOD BY AUTOMATED COUNT: 12.8 % (ref 11.6–15.1)
GFR SERPL CREATININE-BSD FRML MDRD: 91 ML/MIN/1.73SQ M
GLUCOSE SERPL-MCNC: 115 MG/DL (ref 65–140)
GLUCOSE SERPL-MCNC: 121 MG/DL (ref 65–140)
GLUCOSE SERPL-MCNC: 132 MG/DL (ref 65–140)
GLUCOSE SERPL-MCNC: 173 MG/DL (ref 65–140)
GLUCOSE SERPL-MCNC: 188 MG/DL (ref 65–140)
GLUCOSE SERPL-MCNC: 204 MG/DL (ref 65–140)
GLUCOSE SERPL-MCNC: 210 MG/DL (ref 65–140)
GLUCOSE SERPL-MCNC: 224 MG/DL (ref 65–140)
GLUCOSE SERPL-MCNC: 225 MG/DL (ref 65–140)
GLUCOSE SERPL-MCNC: 274 MG/DL (ref 65–140)
GLUCOSE SERPL-MCNC: 285 MG/DL (ref 65–140)
GLUCOSE SERPL-MCNC: 347 MG/DL (ref 65–140)
GLUCOSE SERPL-MCNC: 81 MG/DL (ref 65–140)
HCT VFR BLD AUTO: 38.4 % (ref 36.5–49.3)
HGB BLD-MCNC: 13.6 G/DL (ref 12–17)
IMM GRANULOCYTES # BLD AUTO: 0.12 THOUSAND/UL (ref 0–0.2)
IMM GRANULOCYTES NFR BLD AUTO: 1 % (ref 0–2)
LYMPHOCYTES # BLD AUTO: 2.15 THOUSANDS/ΜL (ref 0.6–4.47)
LYMPHOCYTES NFR BLD AUTO: 18 % (ref 14–44)
MCH RBC QN AUTO: 33.7 PG (ref 26.8–34.3)
MCHC RBC AUTO-ENTMCNC: 35.4 G/DL (ref 31.4–37.4)
MCV RBC AUTO: 95 FL (ref 82–98)
MONOCYTES # BLD AUTO: 1.01 THOUSAND/ΜL (ref 0.17–1.22)
MONOCYTES NFR BLD AUTO: 9 % (ref 4–12)
NEUTROPHILS # BLD AUTO: 8.43 THOUSANDS/ΜL (ref 1.85–7.62)
NEUTS SEG NFR BLD AUTO: 71 % (ref 43–75)
NRBC BLD AUTO-RTO: 0 /100 WBCS
PLATELET # BLD AUTO: 215 THOUSANDS/UL (ref 149–390)
PMV BLD AUTO: 10.2 FL (ref 8.9–12.7)
POTASSIUM SERPL-SCNC: 4.9 MMOL/L (ref 3.5–5.3)
RBC # BLD AUTO: 4.03 MILLION/UL (ref 3.88–5.62)
SODIUM SERPL-SCNC: 133 MMOL/L (ref 136–145)
WBC # BLD AUTO: 11.83 THOUSAND/UL (ref 4.31–10.16)

## 2022-02-25 PROCEDURE — 82948 REAGENT STRIP/BLOOD GLUCOSE: CPT

## 2022-02-25 PROCEDURE — 94760 N-INVAS EAR/PLS OXIMETRY 1: CPT

## 2022-02-25 PROCEDURE — 85025 COMPLETE CBC W/AUTO DIFF WBC: CPT | Performed by: STUDENT IN AN ORGANIZED HEALTH CARE EDUCATION/TRAINING PROGRAM

## 2022-02-25 PROCEDURE — 99233 SBSQ HOSP IP/OBS HIGH 50: CPT | Performed by: INTERNAL MEDICINE

## 2022-02-25 PROCEDURE — NC001 PR NO CHARGE: Performed by: INTERNAL MEDICINE

## 2022-02-25 PROCEDURE — 80048 BASIC METABOLIC PNL TOTAL CA: CPT | Performed by: STUDENT IN AN ORGANIZED HEALTH CARE EDUCATION/TRAINING PROGRAM

## 2022-02-25 RX ORDER — POLYETHYLENE GLYCOL 3350 17 G/17G
17 POWDER, FOR SOLUTION ORAL DAILY
Status: DISCONTINUED | OUTPATIENT
Start: 2022-02-25 | End: 2022-02-26

## 2022-02-25 RX ORDER — AMOXICILLIN 250 MG
1 CAPSULE ORAL
Status: DISCONTINUED | OUTPATIENT
Start: 2022-02-25 | End: 2022-02-26

## 2022-02-25 RX ADMIN — THIAMINE HCL TAB 100 MG 100 MG: 100 TAB at 08:39

## 2022-02-25 RX ADMIN — BENZONATATE 100 MG: 100 CAPSULE ORAL at 21:16

## 2022-02-25 RX ADMIN — METHYLPREDNISOLONE SODIUM SUCCINATE 60 MG: 125 INJECTION, POWDER, FOR SOLUTION INTRAMUSCULAR; INTRAVENOUS at 08:40

## 2022-02-25 RX ADMIN — ATORVASTATIN CALCIUM 10 MG: 10 TABLET, FILM COATED ORAL at 08:39

## 2022-02-25 RX ADMIN — PANTOPRAZOLE SODIUM 40 MG: 40 TABLET, DELAYED RELEASE ORAL at 06:20

## 2022-02-25 RX ADMIN — SODIUM CHLORIDE 9 UNITS/HR: 9 INJECTION, SOLUTION INTRAVENOUS at 02:32

## 2022-02-25 RX ADMIN — SODIUM CHLORIDE 18 UNITS/HR: 9 INJECTION, SOLUTION INTRAVENOUS at 17:47

## 2022-02-25 RX ADMIN — ASPIRIN 81 MG CHEWABLE TABLET 81 MG: 81 TABLET CHEWABLE at 08:40

## 2022-02-25 RX ADMIN — ZINC SULFATE 220 MG (50 MG) CAPSULE 220 MG: CAPSULE at 08:39

## 2022-02-25 RX ADMIN — POLYETHYLENE GLYCOL 3350 17 G: 17 POWDER, FOR SOLUTION ORAL at 09:00

## 2022-02-25 RX ADMIN — BENZONATATE 100 MG: 100 CAPSULE ORAL at 08:43

## 2022-02-25 RX ADMIN — ENOXAPARIN SODIUM 40 MG: 40 INJECTION SUBCUTANEOUS at 08:43

## 2022-02-25 RX ADMIN — BENZONATATE 100 MG: 100 CAPSULE ORAL at 16:18

## 2022-02-25 NOTE — PROGRESS NOTES
[facility]  Transfer Note - Connie Denson 1955, 73 y o  male MRN: [id number]  Unit/Bed#: [facility] 208-01 Encounter: [id number]  Primary Care Provider: Nitza Cullen MD   Date and time admitted to hospital: 2/18/2022  2:15 AM    * Pneumonia due to COVID-19 virus  Assessment & Plan  Pt was diagnosed with Covid on 2/8 and was at [address] [place] [address] from 2/8-2/17  Refused baricitinib  Treated with baricitinib and remdesivir but left AMA  Presented here on 2/17 with hypoxia requiring midflow  CXR showed Peripheral pulmonary opacities most prominent at the lung bases indicating Covid pneumonia in this patient with recently confirmed Covid 23   1/2 blood cultures positive for Staph aureus  On 2/20 he required HFNC 70% FiO2 at 55L  CXR showed worsening bilateral opacities  Started on Solu-Medrol  Weaned oxygen requirements  Remains afebrile with dry cough  · Continue steroid taper - decrease by 20 mg every 3 days  § Solumedrol 60 mg (day 3)  § Decrease Solu-Medrol to 40 mg tomorrow  § Completed Solumedrol 80 mg x 5 days  · Currently on 15 L midflow, continue to wean oxygen requirements  · Incentive spirometry  · Self proning  · SpO2 goal >90%  · Pulmonary toileting      Type 2 diabetes mellitus [facility])  Assessment & Plan  Lab Results   Component Value Date    HGBA1C 7 7 (H) 12/06/2021       Recent Labs     02/25/22  0850 02/25/22  1100 02/25/22  1313 02/25/22  1442   POCGLU 81 274* 285* 204*       Blood Sugar Average: Last 72 hrs:  (P) 234 [id number]     A1c 7 7 on 12/2021  Takes metformin 1000 b i d , glipizide 10 mg b i d , semaglutide at home  Started on insulin drip for steroid induced hyperglycemia      · Continue insulin drip - switch to subQ insulin when appropriate, as steroid dose decreases  · Continue to monitor blood glucose  · Holding home medications    Primary hypertension  Assessment & Plan  Continue home lisinopril 10 mg    Non MI elevated troponin  Assessment & Plan  Likely due to hypoxia from COVID  Initial troponins 3350, 3675  Per Cardiology, no further workup  Patient denies chest pain  · Consider outpatient cardiology follow-up  · Continue aspirin 80, atorvastatin          Code Status: Level 1 - Full Code  POA:    POLST:      Reason for ICU admission:   Acute respiratory failure with hypoxia    Active problems:   Principal Problem:    Pneumonia due to COVID-19 virus  Active Problems:    Type 2 diabetes mellitus ([facility] [address] )    Non MI elevated troponin    Primary hypertension  Resolved Problems:    Acute renal failure (ARF) ([facility] [address] )      Consultants:   Cardiology    History of Present Illness:   73 y o  male who initially presented to the [facility] ED with complaints of worsening shortness of breath in setting of known COVID-19 infection  He was hospitalized 02/08/2022-02/17/2022 at the [facility] with acute respiratory failure secondary to COVID-19 treated with Decadron and remdesivir (refused baricitinib due to lack of FDA approval) ultimately requiring up to 7L [address]   02/17/2022 elected to leave against medical advice  Several hours after he left, he developed a recurrence of his shortness of breath as noted by family and patient which prompted call to EMS  He was noted hypoxic into the SaO2 70s requiring 4 L NC with EMS transport, however required up to 7 L NC during ED evaluation at [facility]  He was noted with elevated troponin up to 3350 and EKG reportedly with concerning changes  The patient himself complained only of shortness breath and denies any chest pain/pressure, dizziness/lightheadedness, or palpitations  Case was discussed between ED physician and Cardiology on-call, with Cardiology feeling changes were not consistent with STEMI but advised initiation of heparin GTT and transferred to [facility] for cardiology evaluation along with further management of acute respiratory failure secondary to COVID-19 infection   Admitted to medicine  Overnight on 2/20 worsening respiratory status up to max HFNC  Transferred to SD1 under CCM  Started ctx/doxy and given dose of lasix  Summary of clinical course:   CXR on 2/17 showed Peripheral pulmonary opacities most prominent at the lung bases indicating Covid pneumonia in this patient with recently confirmed Covid 23  Antibiotics were discontinued due to low suspicion of bacterial pneumonia  1 of 2 blood cultures drawn on 02/17 grew CoNS  Chest x-ray on 02/20 showed worsened peripiheral pulmonary opacities  Patient received diuresis with IV Lasix and started on Solu-Medrol 80 mg taper  Oxygen requirements were weaned  Patient was switched from subcutaneous insulin to insulin drip due to steroid induced hyperglycemia  Recent or scheduled procedures:   N/a    Outstanding/pending diagnostics:   N/A    Cultures:   Blood culture 2/17:  1 of 2 positive for coagulase-negative Staph       Mobilization Plan:   OOB    Nutrition Plan:   CCD2    Invasive Devices Review  Invasive Devices  Report    Peripheral Intravenous Line            Peripheral IV 02/18/22 Dorsal (posterior); Right Hand 7 days                Rationale for remaining devices: N/A    VTE Pharmacologic Prophylaxis: Enoxaparin (Lovenox)  VTE Mechanical Prophylaxis: sequential compression device    Discharge Plan:   Patient should be ready for discharge to home on after wean off supplement oxygen  Initial Physical Therapy Recommendations:  No rehab necessary  Initial Occupational Therapy Recommendations: No rehab necessary  Initial /Plan: Following    Home medications that are not reordered and reason why:   Glipizide, metformin, semaglutide     Spoke with Dr Ken Xie  regarding transfer  Please contact critical care via [employer] with any questions or concerns  Portions of the record may have been created with voice recognition software    Occasional wrong word or "sound a like" substitutions may have occurred due to the inherent limitations of voice recognition software    Read the chart carefully and recognize, using context, where substitutions have occurred    Gonzalo Monday, DO  Internal Medicine Resident, PGY1  4:33 PM

## 2022-02-25 NOTE — ASSESSMENT & PLAN NOTE
Pt was diagnosed with Covid on 2/8 and was at [address] [place] [address] from 2/8-2/17  Refused baricitinib  Treated with baricitinib and remdesivir but left AMA  Presented here on 2/17 with hypoxia requiring midflow  CXR showed Peripheral pulmonary opacities most prominent at the lung bases indicating Covid pneumonia in this patient with recently confirmed Covid 23   1/2 blood cultures positive for Staph aureus  On 2/20 he required HFNC 70% FiO2 at 55L  CXR showed worsening bilateral opacities  Started on Solu-Medrol  Weaned oxygen requirements  Remains afebrile with dry cough        · Continue steroid taper - decrease by 20 mg every 3 days  § Solumedrol 60 mg (day 3)  § Decrease Solu-Medrol to 40 mg tomorrow  § Completed Solumedrol 80 mg x 5 days  · Currently on 15 L midflow, continue to wean oxygen requirements  · Incentive spirometry  · Self proning  · SpO2 goal >90%  · Pulmonary toileting

## 2022-02-25 NOTE — ASSESSMENT & PLAN NOTE
Lab Results   Component Value Date    HGBA1C 7 7 (H) 12/06/2021       Recent Labs     02/25/22  0850 02/25/22  1100 02/25/22  1313 02/25/22  1442   POCGLU 81 274* 285* 204*       Blood Sugar Average: Last 72 hrs:  (P) 234 [id number]     A1c 7 7 on 12/2021  Takes metformin 1000 b i d , glipizide 10 mg b i d , semaglutide at home  Started on insulin drip for steroid induced hyperglycemia      · Continue insulin drip - switch to subQ insulin when appropriate, as steroid dose decreases  · Continue to monitor blood glucose  · Holding home medications

## 2022-02-25 NOTE — UTILIZATION REVIEW
Continued Stay Review    Date: 2- 25-22                       Current Patient Class: inpatient  Current Level of Care: med surg    HPI:66 y o  male initially admitted on  2-18-22 for covid 19  § Completed Decadron 2/18   § Complete remdesivir  § Refused baricitinib  § Chest x-ray 2/20 - worsened b/l opacities  § D/c ceftriaxone and doxycycline on 2/20    Assessment/Plan:       Continue iv solumedrol  Continue  iv insulin gtt for management of insulin while on steroid  Glucose 81 to 285  Continues  To require 50 mg oxygen via high flow nasal cannula to maintain O2 sats at least 91%  Vital Signs:     Date/  Time Temp Pulse Resp BP MAP SpO2 O2 Flow Rate  O2 Device O2 Interface Device   02/25/22 0900 -- 72 -- 99/63 84 91 % -- -- --   02/25/22 0800 -- 76 -- -- -- 95 % -- -- --   02/25/22 0757 -- -- -- -- -- 92 % -- -- HFNC prongs   02/25/22 0720 97 6 °F   (36 4 °C) 60 -- 97/58 70 93 % -- -- --   02/25/22 0400 98 4 °F   (36 9 °C) -- -- 106/50 61 -- -- -- --   02/25/22 0358 -- -- -- -- -- 98 % 50 L/min High flow nasal cannula HFNC prongs   02/25/22 0100 98 4 °F   (36 9 °C) -- 20 114/58 71 -- -- -- --               Pertinent Labs/Diagnostic Results:       Results from last 7 days   Lab Units 02/25/22  0646 02/24/22  0630 02/23/22  0435 02/22/22  0540 02/22/22  0540 02/21/22  0454 02/21/22  0454   WBC Thousand/uL 11 83* 11 35* 15 50*   < > 16 77*   < > 10 89*   HEMOGLOBIN g/dL 13 6 12 8 13 5  --  12 8  --  13 8   HEMATOCRIT % 38 4 35 4* 38 5  --  37 2  --  39 6   PLATELETS Thousands/uL 215 208 267   < > 235   < > 234   NEUTROS ABS Thousands/µL 8 43* 8 31* 12 30*   < > 14 10*   < > 9 27*    < > = values in this interval not displayed           Results from last 7 days   Lab Units 02/25/22  0646 02/24/22  0629 02/23/22  0435 02/22/22  0540 02/21/22  0454   SODIUM mmol/L 133* 136 134* 131* 133*   POTASSIUM mmol/L 4 9 4 2 4 4 4 6 4 8   CHLORIDE mmol/L 106 106 103 104 105   CO2 mmol/L 23 23 23 20* 22   ANION GAP mmol/L 4 7 8 7 6   BUN mg/dL 24 21 24 23 20   CREATININE mg/dL 0 84 0 82 0 80 0 84 0 95   EGFR ml/min/1 73sq m 91 92 93 91 83   CALCIUM mg/dL 8 5 8 4 9 2 8 6 8 7     Results from last 7 days   Lab Units 02/20/22  0538   AST U/L 63*   ALT U/L 104*   ALK PHOS U/L 113   TOTAL PROTEIN g/dL 6 2*   ALBUMIN g/dL 2 3*   TOTAL BILIRUBIN mg/dL 0 99     Results from last 7 days   Lab Units 02/25/22  1313 02/25/22  1100 02/25/22  0850 02/25/22  0621 02/25/22  0416 02/25/22  0208 02/25/22  0048 02/24/22  2222 02/24/22 2011 02/24/22  1741 02/24/22  1606 02/24/22  1421   POC GLUCOSE mg/dl 285* 274* 81 121 132 188* 173* 211* 167* 353* 314* 186*     Results from last 7 days   Lab Units 02/25/22  0646 02/24/22  0629 02/23/22  0435 02/22/22  0540 02/21/22  0454 02/20/22  0538 02/19/22  0549   GLUCOSE RANDOM mg/dL 115 102 137 220* 295* 132 179*       Results from last 7 days   Lab Units 02/20/22  1111 02/20/22  0332 02/19/22  2041   PTT seconds 71* 56* 47*         Results from last 7 days   Lab Units 02/21/22  0454 02/20/22  0538 02/19/22  0550   PROCALCITONIN ng/ml 0 18 0 28* 0 25       Results from last 7 days   Lab Units 02/22/22  0931 02/20/22  0457   CRP mg/L 27 6* 59 6*       Scheduled Medications:    aspirin, 81 mg, Oral, Daily  atorvastatin, 10 mg, Oral, Daily  benzonatate, 100 mg, Oral, TID  enoxaparin, 40 mg, Subcutaneous, Q24H CATHLEEN  lisinopril, 5 mg, Oral, Daily  methylPREDNISolone sodium succinate, 60 mg, Intravenous, Daily  pantoprazole, 40 mg, Oral, Early Morning  polyethylene glycol, 17 g, Oral, Daily  senna-docusate sodium, 1 tablet, Oral, HS  thiamine, 100 mg, Oral, Daily  zinc sulfate, 220 mg, Oral, Daily      Continuous IV Infusions:  insulin regular (HumuLIN R,NovoLIN R) infusion, 0 3-21 Units/hr, Intravenous, Titrated      PRN Meds:  acetaminophen, 650 mg, Oral, Q4H PRN  dextromethorphan-guaiFENesin, 10 mL, Oral, Q4H PRN  ondansetron, 4 mg, Intravenous, Q6H PRN  sodium chloride, 1 spray, Each Nare, Q1H PRN        Discharge Plan: to be determined     Network Utilization Review Department  ATTENTION: Please call with any questions or concerns to [phone] and carefully listen to the prompts so that you are directed to the right person  All voicemails are confidential   Candia Siemens all requests for admission clinical reviews, approved or denied determinations and any other requests to dedicated fax number below belonging to the campus where the patient is receiving treatment   List of dedicated fax numbers for the Facilities:  [address] DENIALS (Administrative/Medical Necessity) [phone]   [address] (Maternity/NICU/Pediatrics) [phone]   [address]  [address] [facility] [place] [address] [address] [place] [address] [address] [facility] [place] [address] [phone]

## 2022-02-25 NOTE — ASSESSMENT & PLAN NOTE
Likely due to hypoxia from COVID  Initial troponins 3350, 3675  Per Cardiology, no further workup  Patient denies chest pain      · Consider outpatient cardiology follow-up  · Continue aspirin 80, atorvastatin

## 2022-02-25 NOTE — PROGRESS NOTES
Progress Note - Critical Care   Shahida Day 73 y o  male MRN: [id number]  Unit/Bed#: ICCU 208-01 Encounter: [id number]    SUBJECTIVE:  Has a dry cough  Feels like he strains when trying to a bowel movement  Breathing is the same  HPI/24HR Event:  No overnight events  Medications      ROS  Denies fever, chills, chest pain, orthopnea, abdominal pain, nausea difficulty urinating  Invasive lines and devices: Invasive Devices  Report    Peripheral Intravenous Line            Peripheral IV 22 Dorsal (posterior); Right Hand 7 days                   Physical exam  General:  Resting comfortably in bed in no acute distress  Neuro:  Awake, alert, moves all 4 extremities  HENT:  No scleral icterus, no JVD, Normocephalic and atraumatic  Heart: RRR, pulses intact  Lungs:  Saturating 92% on 40% FiO2 on 40 L, CTA, Bilateral breath sounds present  Abdomen:  Nontender, Bowel sounds present, soft  Skin:  No lower leg edema, Warm, dry skin/Incision site clean dry and intact    Vitals  Temperature:   Temp (24hrs), Av 4 °F (36 9 °C), Min:98 °F (36 7 °C), Max:98 7 °F (37 1 °C)    Current: Temperature: 98 4 °F (36 9 °C)    Vitals:    22 2025 22 0100 22 0358 22 0400   BP:  114/58  106/50   BP Location:       Pulse:       Resp:  20     Temp:  98 4 °F (36 9 °C)  98 4 °F (36 9 °C)   TempSrc:  Oral  Oral   SpO2: 94%  98%    Weight:       Height:                 Labs:   Results from last 7 days   Lab Units 22  0630 22  0435 22  0540   WBC Thousand/uL 11 35* 15 50* 16 77*   HEMOGLOBIN g/dL 12 8 13 5 12 8   HEMATOCRIT % 35 4* 38 5 37 2   PLATELETS Thousands/uL 208 267 235   NEUTROS PCT % 73 79* 84*   MONOS PCT % 8 7 5      Results from last 7 days   Lab Units 22  0629 22  0435 22  0540 22  0454 22  0538   SODIUM mmol/L 136 134* 131*   < > 132*   POTASSIUM mmol/L 4 2 4 4 4 6   < > 4 0   CHLORIDE mmol/L 106 103 104   < > 104   CO2 mmol/L 23 23 20*   < > 21 BUN mg/dL 21 24 23   < > 16   CREATININE mg/dL 0 82 0 80 0 84   < > 0 72   CALCIUM mg/dL 8 4 9 2 8 6   < > 8 1*   ALK PHOS U/L  --   --   --   --  113   ALT U/L  --   --   --   --  104*   AST U/L  --   --   --   --  63*    < > = values in this interval not displayed  Results from last 7 days   Lab Units 02/20/22  1111 02/20/22  0332 02/19/22  2041   PTT seconds 71* 56* 47*     Results from last 7 days   Lab Units 02/18/22  0633   LACTIC ACID mmol/L 2 5*       Other Labs    Intake and Outputs:  I/O       02/23 0701  02/24 0700 02/24 0701  02/25 0700    P  O  960 500    I V  (mL/kg) 23 8 (0 3) 200 (2 4)    Total Intake(mL/kg) 983 8 (11 6) 700 (8 3)    Urine (mL/kg/hr) 600 (0 3) 500 (0 2)    Stool 0     Total Output 600 500    Net +383 8 +200          Unmeasured Stool Occurrence 1 x           Imaging Studies      Assessment & Plan:   Covid-19 pneumonia  § Completed Decadron 2/18   § Complete remdesivir  § Refused baricitinib  § Chest x-ray 2/20 - worsened b/l opacities  § Continue steroid taper   § Solumedrol 60 mg (day 3)  § Decrease Solu-Medrol to 40 mg tomorrow  § Completed Solumedrol 80 mg x 5 days  § Currently on saturating 92 % of  40 %FiO2 on  40  L HFNC, continue to wean  § Incentive spirometry  § Self proning  § SpO2 goal >90%  § Pulmonary toileting    -   Respiratory  Report   Lab Data (Last 4 hours)    None         O2/Vent Data (Last 4 hours)    None               - Continue Respiratory Protocol and Airway Clearance Protocol      GI:   ? Stress ulcer prophylaxis: Protonix PO  ? Bowel regimen: change Miralax prn to scheduled, added Senokot S  ? Zofran PRN for nausea           FEN:   - Fluids: no maintenance   - Electrolytes: trend and replete as needed  - Nutrition: CCD     :   No acute issues  Continue to monitor I/O, BUN, Cr  - I/O last 24 hours: In: 700 [P O :500;  I V :200]  Out: 500 [Urine:500]      ID:   Source of infection: covid pneumonia  § Blood Cx 2/17 - CoNS x 1 - likely contaminant  § Procal 0 18 downtrending  § CRP 27, downtrending  § D/c ceftriaxone and doxycycline on 2/20  § Continue to observe off Abx   § Trend temps and WBC count  - Monitor WBC/temp curve     Heme:   No acute issues  Continue to trend Hg  - DVT ppx: heparin SC, SCDs     Endo:   - Dx: type 2 diabetes  - On insulin drip due to hyperglycemia secondary to steroid  - holding home metformin 1000 mg BID, glipizode 10 mg BID, Ozempic  - -374  - Goal -180     Msk/Skin:   No acute issues  - PT/OT  - Turning/repositioning     Disposition: Continue current level of care and wean O2 requirements        Non-Invasive/Invasive Ventilation Settings:  Respiratory  Report   Lab Data (Last 4 hours)    None         O2/Vent Data (Last 4 hours)    None              No results found for: PHART, OSB4VOV, PO2ART, OHV2CJJ, Q5CUEOWA, BEART, SOURCE  SpO2: SpO2: 98 %    Imaging:   VAS lower limb venous duplex study, complete bilateral   Final Result by Rosaura Mcdermott MD (02/21 6664)      XR chest portable   Final Result by Holger Hussein MD (02/20 0739)      Worsened peripheral pulmonary opacities  Workstation performed: [id number]           I have personally reviewed pertinent reports  Micro:  Lab Results   Component Value Date    BLOODCX No Growth After 5 Days  02/17/2022    BLOODCX Staphylococcus coagulase negative (A) 02/17/2022    BLOODCX No Growth After 5 Days  02/08/2022    BLOODCX No Growth After 5 Days   02/08/2022         Allergies: No Known Allergies      Medications:   Scheduled Meds:  Current Facility-Administered Medications   Medication Dose Route Frequency Provider Last Rate    acetaminophen  650 mg Oral Q4H PRN Mare Trenton, DO      aspirin  81 mg Oral Daily Mare Trenton, DO      atorvastatin  10 mg Oral Daily Mare Trenton, DO      benzonatate  100 mg Oral TID Vandana Fitch MD      dextromethorphan-guaiFENesin  10 mL Oral Q4H PRN Anali Hernadez PA-C      enoxaparin  40 mg Subcutaneous Q24H [address] [facility],       insulin regular (HumuLIN R,NovoLIN R) infusion  0 3-21 Units/hr Intravenous Titrated Sylvia Champion MD 3 Units/hr (02/25/22 0422)    lisinopril  5 mg Oral Daily Armando Grant MD      methylPREDNISolone sodium succinate  60 mg Intravenous Daily Miki Azul MD      ondansetron  4 mg Intravenous Q6H PRN Evlyn Salle, DO      pantoprazole  40 mg Oral Early Morning Evlyn Salle, DO      polyethylene glycol  17 g Oral Daily PRN Bonnielee Dills, DO      sodium chloride  1 spray Each Nare Q1H PRN Armando Grant MD      thiamine  100 mg Oral Daily Evlyn Salle, DO      zinc sulfate  220 mg Oral Daily Evlyn Salle, DO       Continuous Infusions:insulin regular (HumuLIN R,NovoLIN R) infusion, 0 3-21 Units/hr, Last Rate: 3 Units/hr (02/25/22 0422)      PRN Meds:  acetaminophen, 650 mg, Q4H PRN  dextromethorphan-guaiFENesin, 10 mL, Q4H PRN  ondansetron, 4 mg, Q6H PRN  polyethylene glycol, 17 g, Daily PRN  sodium chloride, 1 spray, Q1H PRN        Counseling / Coordination of Care  Total Critical Care time spent 15 minutes excluding procedures, teaching and family updates  Code Status: Level 1 - Full Code     Portions of the record may have been created with voice recognition software  Occasional wrong word or "sound a like" substitutions may have occurred due to the inherent limitations of voice recognition software  Read the chart carefully and recognize, using context, where substitutions have occurred       Jose Enrique Galarza DO  Internal Medicine Resident, PGY1  6:22 AM

## 2022-02-26 PROBLEM — J96.01 ACUTE RESPIRATORY FAILURE WITH HYPOXIA (HCC): Status: ACTIVE | Noted: 2022-02-26

## 2022-02-26 LAB
ANION GAP SERPL CALCULATED.3IONS-SCNC: 6 MMOL/L (ref 4–13)
BASOPHILS # BLD AUTO: 0.01 THOUSANDS/ΜL (ref 0–0.1)
BASOPHILS NFR BLD AUTO: 0 % (ref 0–1)
BUN SERPL-MCNC: 19 MG/DL (ref 5–25)
CALCIUM SERPL-MCNC: 8.4 MG/DL (ref 8.3–10.1)
CHLORIDE SERPL-SCNC: 107 MMOL/L (ref 100–108)
CO2 SERPL-SCNC: 25 MMOL/L (ref 21–32)
CREAT SERPL-MCNC: 0.71 MG/DL (ref 0.6–1.3)
EOSINOPHIL # BLD AUTO: 0.07 THOUSAND/ΜL (ref 0–0.61)
EOSINOPHIL NFR BLD AUTO: 1 % (ref 0–6)
ERYTHROCYTE [DISTWIDTH] IN BLOOD BY AUTOMATED COUNT: 12.9 % (ref 11.6–15.1)
GFR SERPL CREATININE-BSD FRML MDRD: 98 ML/MIN/1.73SQ M
GLUCOSE SERPL-MCNC: 103 MG/DL (ref 65–140)
GLUCOSE SERPL-MCNC: 105 MG/DL (ref 65–140)
GLUCOSE SERPL-MCNC: 108 MG/DL (ref 65–140)
GLUCOSE SERPL-MCNC: 146 MG/DL (ref 65–140)
GLUCOSE SERPL-MCNC: 147 MG/DL (ref 65–140)
GLUCOSE SERPL-MCNC: 219 MG/DL (ref 65–140)
GLUCOSE SERPL-MCNC: 274 MG/DL (ref 65–140)
GLUCOSE SERPL-MCNC: 344 MG/DL (ref 65–140)
GLUCOSE SERPL-MCNC: 363 MG/DL (ref 65–140)
GLUCOSE SERPL-MCNC: 87 MG/DL (ref 65–140)
HCT VFR BLD AUTO: 36.2 % (ref 36.5–49.3)
HGB BLD-MCNC: 12.9 G/DL (ref 12–17)
IMM GRANULOCYTES # BLD AUTO: 0.1 THOUSAND/UL (ref 0–0.2)
IMM GRANULOCYTES NFR BLD AUTO: 1 % (ref 0–2)
LYMPHOCYTES # BLD AUTO: 1.75 THOUSANDS/ΜL (ref 0.6–4.47)
LYMPHOCYTES NFR BLD AUTO: 17 % (ref 14–44)
MCH RBC QN AUTO: 34 PG (ref 26.8–34.3)
MCHC RBC AUTO-ENTMCNC: 35.6 G/DL (ref 31.4–37.4)
MCV RBC AUTO: 96 FL (ref 82–98)
MONOCYTES # BLD AUTO: 0.76 THOUSAND/ΜL (ref 0.17–1.22)
MONOCYTES NFR BLD AUTO: 7 % (ref 4–12)
NEUTROPHILS # BLD AUTO: 7.58 THOUSANDS/ΜL (ref 1.85–7.62)
NEUTS SEG NFR BLD AUTO: 74 % (ref 43–75)
NRBC BLD AUTO-RTO: 0 /100 WBCS
PLATELET # BLD AUTO: 185 THOUSANDS/UL (ref 149–390)
PMV BLD AUTO: 10.1 FL (ref 8.9–12.7)
POTASSIUM SERPL-SCNC: 4 MMOL/L (ref 3.5–5.3)
RBC # BLD AUTO: 3.79 MILLION/UL (ref 3.88–5.62)
SODIUM SERPL-SCNC: 138 MMOL/L (ref 136–145)
WBC # BLD AUTO: 10.27 THOUSAND/UL (ref 4.31–10.16)

## 2022-02-26 PROCEDURE — 94760 N-INVAS EAR/PLS OXIMETRY 1: CPT

## 2022-02-26 PROCEDURE — 82948 REAGENT STRIP/BLOOD GLUCOSE: CPT

## 2022-02-26 PROCEDURE — 85025 COMPLETE CBC W/AUTO DIFF WBC: CPT | Performed by: STUDENT IN AN ORGANIZED HEALTH CARE EDUCATION/TRAINING PROGRAM

## 2022-02-26 PROCEDURE — 80048 BASIC METABOLIC PNL TOTAL CA: CPT | Performed by: STUDENT IN AN ORGANIZED HEALTH CARE EDUCATION/TRAINING PROGRAM

## 2022-02-26 PROCEDURE — 99233 SBSQ HOSP IP/OBS HIGH 50: CPT | Performed by: INTERNAL MEDICINE

## 2022-02-26 RX ORDER — INSULIN GLARGINE 100 [IU]/ML
10 INJECTION, SOLUTION SUBCUTANEOUS
Status: DISCONTINUED | OUTPATIENT
Start: 2022-02-26 | End: 2022-02-27

## 2022-02-26 RX ORDER — INSULIN GLARGINE 100 [IU]/ML
5 INJECTION, SOLUTION SUBCUTANEOUS
Status: DISCONTINUED | OUTPATIENT
Start: 2022-02-26 | End: 2022-02-26

## 2022-02-26 RX ORDER — METHYLPREDNISOLONE SODIUM SUCCINATE 40 MG/ML
40 INJECTION, POWDER, LYOPHILIZED, FOR SOLUTION INTRAMUSCULAR; INTRAVENOUS DAILY
Status: DISCONTINUED | OUTPATIENT
Start: 2022-02-27 | End: 2022-02-27

## 2022-02-26 RX ORDER — GUAIFENESIN 600 MG
600 TABLET, EXTENDED RELEASE 12 HR ORAL EVERY 12 HOURS SCHEDULED
Status: DISCONTINUED | OUTPATIENT
Start: 2022-02-26 | End: 2022-03-04 | Stop reason: HOSPADM

## 2022-02-26 RX ADMIN — ENOXAPARIN SODIUM 40 MG: 40 INJECTION SUBCUTANEOUS at 08:11

## 2022-02-26 RX ADMIN — ZINC SULFATE 220 MG (50 MG) CAPSULE 220 MG: CAPSULE at 08:11

## 2022-02-26 RX ADMIN — ATORVASTATIN CALCIUM 10 MG: 10 TABLET, FILM COATED ORAL at 08:11

## 2022-02-26 RX ADMIN — INSULIN LISPRO 5 UNITS: 100 INJECTION, SOLUTION INTRAVENOUS; SUBCUTANEOUS at 16:57

## 2022-02-26 RX ADMIN — BENZONATATE 100 MG: 100 CAPSULE ORAL at 22:00

## 2022-02-26 RX ADMIN — INSULIN LISPRO 5 UNITS: 100 INJECTION, SOLUTION INTRAVENOUS; SUBCUTANEOUS at 22:00

## 2022-02-26 RX ADMIN — BENZONATATE 100 MG: 100 CAPSULE ORAL at 16:55

## 2022-02-26 RX ADMIN — INSULIN GLARGINE 10 UNITS: 100 INJECTION, SOLUTION SUBCUTANEOUS at 22:00

## 2022-02-26 RX ADMIN — LISINOPRIL 5 MG: 5 TABLET ORAL at 08:11

## 2022-02-26 RX ADMIN — INSULIN GLARGINE 5 UNITS: 100 INJECTION, SOLUTION SUBCUTANEOUS at 10:23

## 2022-02-26 RX ADMIN — THIAMINE HCL TAB 100 MG 100 MG: 100 TAB at 08:11

## 2022-02-26 RX ADMIN — SODIUM CHLORIDE 6 UNITS/HR: 9 INJECTION, SOLUTION INTRAVENOUS at 03:14

## 2022-02-26 RX ADMIN — PANTOPRAZOLE SODIUM 40 MG: 40 TABLET, DELAYED RELEASE ORAL at 06:10

## 2022-02-26 RX ADMIN — BENZONATATE 100 MG: 100 CAPSULE ORAL at 08:11

## 2022-02-26 RX ADMIN — METHYLPREDNISOLONE SODIUM SUCCINATE 60 MG: 125 INJECTION, POWDER, FOR SOLUTION INTRAMUSCULAR; INTRAVENOUS at 08:15

## 2022-02-26 RX ADMIN — ASPIRIN 81 MG CHEWABLE TABLET 81 MG: 81 TABLET CHEWABLE at 08:10

## 2022-02-26 RX ADMIN — GUAIFENESIN 600 MG: 600 TABLET, EXTENDED RELEASE ORAL at 22:00

## 2022-02-26 NOTE — PROGRESS NOTES
[facility] [facility]  Progress Note - Jose Founds 1955, 73 y o  male MRN: [id number]  Unit/Bed#: ICCU 208-01 Encounter: [id number]  Primary Care Provider: Hugh Hernandez MD   Date and time admitted to hospital: 2/18/2022  2:15 AM    * Pneumonia due to COVID-19 virus  Assessment & Plan  "Admitted 02/08/2022-02/17/2022 with acute respiratory failure secondary to COVID-19 at the [facility], unfortunately left AMA on date of discharge  · Presented [facility] with shortness of breath, noted with hypoxia requiring 7L [address]  · Completed dexamethasone 2/18  · Patient refusing Baricitinib as it is not FDA approved  · Continue supplemental oxygen and titrate as needed to maintain SaO2 greater than 90%"    Patient transferred to ICU  Most recent CXR showed worsening bilateral opacities  Continue Solumedrol with plan to transition to PO Prednisone in 24-48 hours  Patient currently hemodynamically stable on HFNC  SpO2 goal >89% with pulmonary toileting    Acute respiratory failure with hypoxia [facility])  Assessment & Plan  Patient transferred from [facility] due to worsening dyspnea  Etiology: COVID PNA  Patient presented with worsening SOB following hospitalization on 02/08-02/17 at [place] after leaving AMA  Patient currently on HFNC and transferred to [facility] under [facility]    Primary hypertension  Assessment & Plan  Well controlled  Continue home Lisinopril  Continue to monitor BP    Type 2 diabetes mellitus [facility])  Assessment & Plan  Lab Results   Component Value Date    HGBA1C 7 7 (H) 12/06/2021       Recent Labs     02/26/22  0806 02/26/22  1018 02/26/22  1206 02/26/22  1655   POCGLU 146* 274* 219* 344*       Blood Sugar Average: Last 72 hrs:  (P) 487 [id number]   Hyperglycemia relatively well controlled  Patient started on insulin gtt in ICU due to steroid-induced hyperglycemia  Started Lantus and SSIP with discontinuation of insulin gtt  Continue Diabetic Diet and Hypoglycemia protocol    Non MI elevated troponin  Assessment & Plan  Likely due to prolonged hypoxia 2/2 COVID  Cardiology consulted; appreciate assistance  Recommendation include no further work-up and outpatient follow-up  Continue ASA and Lipitor          VTE Pharmacologic Prophylaxis: VTE Score: 5 Moderate Risk (Score 3-4) - Pharmacological DVT Prophylaxis Ordered: enoxaparin (Lovenox)  Patient Centered Rounds: I performed bedside rounds with nursing staff today  Discussions with Specialists or Other Care Team Provider: Nurse and      Education and Discussions with Family / Patient: Updated  (wife) via phone  Time Spent for Care: 30 minutes  More than 50% of total time spent on counseling and coordination of care as described above  Current Length of Stay: 8 day(s)  Current Patient Status: Inpatient   Certification Statement: The patient will continue to require additional inpatient hospital stay due to acute respiratory failure with hypoxia   Discharge Plan: Anticipate discharge in 48-72 hrs to home with home services  Code Status: Level 1 - Full Code    Subjective:   Patient states that he was not vaccination because he does not believe in the science  His wife also got COVID but did not require hospitalization  He is feeling better and denies any complaints  He worked as a   Per nursing staff, the patient has a bowel movement this morning and has been refusing Miralax and Senokot  Objective:     Vitals:   Temp (24hrs), Av 1 °F (36 7 °C), Min:97 9 °F (36 6 °C), Max:98 2 °F (36 8 °C)    Temp:  [97 9 °F (36 6 °C)-98 2 °F (36 8 °C)] 98 2 °F (36 8 °C)  HR:  [] 108  Resp:  [18-20] 19  BP: (112-143)/(62-71) 143/67  SpO2:  [91 %-97 %] 91 %  Body mass index is 28 39 kg/m²  Input and Output Summary (last 24 hours):      Intake/Output Summary (Last 24 hours) at 2022 1837  Last data filed at 2022 1825  Gross per 24 hour   Intake 540 ml   Output 2750 ml Net -2210 ml       Physical Exam:   Physical Exam  Vitals and nursing note reviewed  Constitutional:       General: He is not in acute distress  Appearance: Normal appearance  He is normal weight  He is not toxic-appearing  HENT:      Head: Normocephalic and atraumatic  Right Ear: External ear normal       Left Ear: External ear normal       Nose: Nose normal       Mouth/Throat:      Mouth: Mucous membranes are moist    Eyes:      Extraocular Movements: Extraocular movements intact  Conjunctiva/sclera: Conjunctivae normal    Cardiovascular:      Rate and Rhythm: Normal rate and regular rhythm  Pulses: Normal pulses  Heart sounds: Normal heart sounds  Pulmonary:      Effort: Pulmonary effort is normal  HFNC in place  Breath sounds: Normal breath sounds  Abdominal:      General: Bowel sounds are normal  There is no distension  Palpations: Abdomen is soft  Tenderness: There is no abdominal tenderness  Genitourinary:     Comments: No Masterson present  Musculoskeletal:         General: No swelling  Cervical back: Normal range of motion  Right lower leg: No edema  Left lower leg: No edema  Skin:     General: Skin is warm and dry  Coloration: Skin is not jaundiced  Findings: No bruising  Neurological:      General: No focal deficit present  Mental Status: He is alert and oriented to person, place, and time  Mental status is at baseline  Cranial Nerves: No cranial nerve deficit  Psychiatric:         Mood and Affect: Mood normal          Behavior: Behavior normal          Thought Content:  Thought content normal         Additional Data:     Labs:  Results from last 7 days   Lab Units 02/26/22  0609   WBC Thousand/uL 10 27*   HEMOGLOBIN g/dL 12 9   HEMATOCRIT % 36 2*   PLATELETS Thousands/uL 185   NEUTROS PCT % 74   LYMPHS PCT % 17   MONOS PCT % 7   EOS PCT % 1     Results from last 7 days   Lab Units 02/26/22  0609 02/21/22  0454 02/20/22  0538   SODIUM mmol/L 138   < > 132*   POTASSIUM mmol/L 4 0   < > 4 0   CHLORIDE mmol/L 107   < > 104   CO2 mmol/L 25   < > 21   BUN mg/dL 19   < > 16   CREATININE mg/dL 0 71   < > 0 72   ANION GAP mmol/L 6   < > 7   CALCIUM mg/dL 8 4   < > 8 1*   ALBUMIN g/dL  --   --  2 3*   TOTAL BILIRUBIN mg/dL  --   --  0 99   ALK PHOS U/L  --   --  113   ALT U/L  --   --  104*   AST U/L  --   --  63*   GLUCOSE RANDOM mg/dL 87   < > 132    < > = values in this interval not displayed           Results from last 7 days   Lab Units 02/26/22  1655 02/26/22  1206 02/26/22  1018 02/26/22  0806 02/26/22  0606 02/26/22  0428 02/26/22  0204 02/26/22  0012 02/25/22  2209 02/25/22 2007 02/25/22  1756 02/25/22  1621   POC GLUCOSE mg/dl 344* 219* 274* 146* 103 108 147* 105 224* 225* 210* 347*         Results from last 7 days   Lab Units 02/21/22  0454 02/20/22  0538   PROCALCITONIN ng/ml 0 18 0 28*       Lines/Drains:  Invasive Devices  Report    Peripheral Intravenous Line            Peripheral IV 02/25/22 Left Hand <1 day                  Imaging: Reviewed radiology reports from this admission including: chest xray    Recent Cultures (last 7 days):         Last 24 Hours Medication List:   Current Facility-Administered Medications   Medication Dose Route Frequency Provider Last Rate    acetaminophen  650 mg Oral Q4H PRN Ivar Chi, DO      aspirin  81 mg Oral Daily Ivar Chi, DO      atorvastatin  10 mg Oral Daily Ivar Chi, DO      benzonatate  100 mg Oral TID Annalisa Price MD      dextromethorphan-guaiFENesin  10 mL Oral Q4H PRN Horace Pierce PA-C      enoxaparin  40 mg Subcutaneous Q24H [facility] Matthias Alvarenga DO      insulin glargine  5 Units Subcutaneous HS Belinda Townsend MD      insulin lispro  1-6 Units Subcutaneous TID [facility] Belinda Townsend MD      lisinopril  5 mg Oral Daily Annalisa Price MD      [START ON 2/27/2022] methylPREDNISolone sodium succinate  40 mg Intravenous Daily [facility] Payton Lopez MD      ondansetron  4 mg Intravenous Q6H PRN Isidoro Poplin, DO      pantoprazole  40 mg Oral Early Morning Isidoro Poplin, DO      sodium chloride  1 spray Each Nare Q1H PRN Almita Pinon MD      thiamine  100 mg Oral Daily Isidoro Poplin, DO      zinc sulfate  220 mg Oral Daily Isidoro Poplin, DO          Today, Patient Was Seen By: John Bustillo MD    **Please Note: This note may have been constructed using a voice recognition system  **

## 2022-02-26 NOTE — ASSESSMENT & PLAN NOTE
Likely due to prolonged hypoxia 2/2 Shubham  Cardiology consulted; appreciate assistance  Recommendation include no further work-up and outpatient follow-up  Continue ASA and Lipitor

## 2022-02-26 NOTE — ASSESSMENT & PLAN NOTE
Patient transferred from [facility] due to worsening dyspnea  Etiology: COVID PNA  Patient presented with worsening SOB following hospitalization on 02/08-02/17 at [place] after leaving AMA  Patient currently on HFNC and transferred to [facility] under [facility]

## 2022-02-26 NOTE — ASSESSMENT & PLAN NOTE
"Admitted 02/08/2022-02/17/2022 with acute respiratory failure secondary to COVID-19 at the [facility], unfortunately left AMA on date of discharge  · Presented again to the [facility] ED with shortness of breath, noted with hypoxia requiring 7L [address]  · Completed dexamethasone 2/18  · Patient refusing Baricitinib as it is not FDA approved  · Continue supplemental oxygen and titrate as needed to maintain SaO2 greater than 90%"    Patient transferred to ICU  Most recent CXR showed worsening bilateral opacities  Continue Solumedrol with plan to transition to PO Prednisone in 24-48 hours  Patient currently hemodynamically stable on HFNC  SpO2 goal >89% with pulmonary toileting

## 2022-02-26 NOTE — ASSESSMENT & PLAN NOTE
Lab Results   Component Value Date    HGBA1C 7 7 (H) 12/06/2021       Recent Labs     02/26/22  0806 02/26/22  1018 02/26/22  1206 02/26/22  1655   POCGLU 146* 274* 219* 344*       Blood Sugar Average: Last 72 hrs:  (P) 986 [id number]   Hyperglycemia relatively well controlled  Patient started on insulin gtt in ICU due to steroid-induced hyperglycemia  Started Lantus and SSIP with discontinuation of insulin gtt  Continue Diabetic Diet and Hypoglycemia protocol

## 2022-02-27 LAB
GLUCOSE SERPL-MCNC: 143 MG/DL (ref 65–140)
GLUCOSE SERPL-MCNC: 270 MG/DL (ref 65–140)
GLUCOSE SERPL-MCNC: 286 MG/DL (ref 65–140)
GLUCOSE SERPL-MCNC: 304 MG/DL (ref 65–140)

## 2022-02-27 PROCEDURE — 99233 SBSQ HOSP IP/OBS HIGH 50: CPT | Performed by: INTERNAL MEDICINE

## 2022-02-27 PROCEDURE — 94760 N-INVAS EAR/PLS OXIMETRY 1: CPT

## 2022-02-27 PROCEDURE — 82948 REAGENT STRIP/BLOOD GLUCOSE: CPT

## 2022-02-27 RX ORDER — PREDNISONE 20 MG/1
40 TABLET ORAL DAILY
Status: DISCONTINUED | OUTPATIENT
Start: 2022-02-28 | End: 2022-03-02

## 2022-02-27 RX ORDER — INSULIN GLARGINE 100 [IU]/ML
15 INJECTION, SOLUTION SUBCUTANEOUS
Status: DISCONTINUED | OUTPATIENT
Start: 2022-02-27 | End: 2022-02-28

## 2022-02-27 RX ADMIN — LISINOPRIL 5 MG: 5 TABLET ORAL at 08:24

## 2022-02-27 RX ADMIN — ASPIRIN 81 MG CHEWABLE TABLET 81 MG: 81 TABLET CHEWABLE at 08:24

## 2022-02-27 RX ADMIN — INSULIN LISPRO 5 UNITS: 100 INJECTION, SOLUTION INTRAVENOUS; SUBCUTANEOUS at 11:37

## 2022-02-27 RX ADMIN — THIAMINE HCL TAB 100 MG 100 MG: 100 TAB at 08:24

## 2022-02-27 RX ADMIN — ZINC SULFATE 220 MG (50 MG) CAPSULE 220 MG: CAPSULE at 08:24

## 2022-02-27 RX ADMIN — INSULIN LISPRO 4 UNITS: 100 INJECTION, SOLUTION INTRAVENOUS; SUBCUTANEOUS at 16:58

## 2022-02-27 RX ADMIN — GUAIFENESIN 600 MG: 600 TABLET, EXTENDED RELEASE ORAL at 20:30

## 2022-02-27 RX ADMIN — INSULIN LISPRO 5 UNITS: 100 INJECTION, SOLUTION INTRAVENOUS; SUBCUTANEOUS at 11:41

## 2022-02-27 RX ADMIN — INSULIN LISPRO 5 UNITS: 100 INJECTION, SOLUTION INTRAVENOUS; SUBCUTANEOUS at 08:39

## 2022-02-27 RX ADMIN — INSULIN GLARGINE 15 UNITS: 100 INJECTION, SOLUTION SUBCUTANEOUS at 22:36

## 2022-02-27 RX ADMIN — GUAIFENESIN 600 MG: 600 TABLET, EXTENDED RELEASE ORAL at 08:24

## 2022-02-27 RX ADMIN — PANTOPRAZOLE SODIUM 40 MG: 40 TABLET, DELAYED RELEASE ORAL at 05:55

## 2022-02-27 RX ADMIN — BENZONATATE 100 MG: 100 CAPSULE ORAL at 16:57

## 2022-02-27 RX ADMIN — BENZONATATE 100 MG: 100 CAPSULE ORAL at 20:32

## 2022-02-27 RX ADMIN — METHYLPREDNISOLONE SODIUM SUCCINATE 40 MG: 40 INJECTION, POWDER, FOR SOLUTION INTRAMUSCULAR; INTRAVENOUS at 08:23

## 2022-02-27 RX ADMIN — ATORVASTATIN CALCIUM 10 MG: 10 TABLET, FILM COATED ORAL at 08:24

## 2022-02-27 RX ADMIN — ENOXAPARIN SODIUM 40 MG: 40 INJECTION SUBCUTANEOUS at 08:23

## 2022-02-27 RX ADMIN — BENZONATATE 100 MG: 100 CAPSULE ORAL at 09:58

## 2022-02-27 NOTE — ASSESSMENT & PLAN NOTE
Was improving however on 3/1 had worsening oxygen requirements again, back to 12L [place]   wean oxygen as able  · Pulm consulted  · CTA negative for PE, does note paraseptal emphysema and ?early fibrosis  · Mildly elevated BNP, s/p dose of IV lasix 3/1  · Suspect will definitely require oxygen on discharge, will need ambulatory pulse ox once more stable and on less oxygen

## 2022-02-27 NOTE — ASSESSMENT & PLAN NOTE
Admitted 02/08/2022-02/17/2022 with acute respiratory failure secondary to COVID-19 at the [facility], unfortunately left AMA on date of discharge  Presented again to the [facility] ED with shortness of breath, noted with hypoxia requiring 7L [address]  Transferred to [facility], required ICU stay  Transferred to floor on 2/25  · Completed IV steroids-now on PO prednisone, refusing Baricitinib as it is not FDA approved  · Oxygen requirements noted to increase up to 12L midflow on 3/1, pulmonology consulted  · Monitor CRP/BNP/CBC/CMP   Check procal in AM  · CTA negative for PE  · Giving dose of IV lasix 3/1 given mildly elevated BNP   · Encourage OOB, ambulation, pulmonary toileting  · D/w pulm on 3/1--no need for further isolation

## 2022-02-27 NOTE — ASSESSMENT & PLAN NOTE
Likely due to prolonged hypoxia 2/2 COVID  · Cardiology input appreciated   · Recommended outpatient f/u  · Continue ASA and Lipitor

## 2022-02-27 NOTE — PROGRESS NOTES
[facility]  Progress Note - Alexis Mejia 1955, 73 y o  male MRN: [id number]  Unit/Bed#: ICCU 208-01 Encounter: [id number]  Primary Care Provider: Annamarie Monreal MD   Date and time admitted to hospital: 2/18/2022  2:15 AM    * Pneumonia due to COVID-19 virus  Assessment & Plan  "Admitted 02/08/2022-02/17/2022 with acute respiratory failure secondary to COVID-19 at the [facility], unfortunately left AMA on date of discharge  · [facility] with shortness of breath, noted with hypoxia requiring 7L [address]  · Completed dexamethasone 2/18  · Patient refusing Baricitinib as it is not FDA approved  · Continue supplemental oxygen and titrate as needed to maintain SaO2 greater than 90%"    Patient transferred to ICU  Most recent CXR showed worsening bilateral opacities  Patient previously treated on Solu-Medrol  Patient transition to PO Prednisone tomorrow AM  Patient currently hemodynamically stable on HFNC  SpO2 goal >89% with pulmonary toileting with goal of weaning to midflow    Acute respiratory failure with hypoxia [facility])  Assessment & Plan  Patient transferred from [facility] due to worsening dyspnea  Etiology: COVID PNA  Patient presented with worsening SOB following hospitalization on 02/08-02/17 at [place] after leaving AMA  Patient currently on HFNC and transferred to [facility] under [facility]    Primary hypertension  Assessment & Plan  Well controlled  Continue home Lisinopril 5 mg daily  Continue to monitor BP    Type 2 diabetes mellitus [facility])  Assessment & Plan  Lab Results   Component Value Date    HGBA1C 7 7 (H) 12/06/2021       Recent Labs     02/26/22  1655 02/26/22  2034 02/27/22  0558 02/27/22  1139   POCGLU 344* 363* 143* 304*       Blood Sugar Average: Last 72 hrs:  (P) 207 [id number]   Hyperglycemia relatively well controlled  Patient started on insulin gtt in ICU due to steroid-induced hyperglycemia  Increased Lantus from 10 to 15 Units and Humalog 10 Units TID  Continue SSIP and FSBS  Continue Diabetic Diet and Hypoglycemia protocol    Non MI elevated troponin  Assessment & Plan  Likely due to prolonged hypoxia 2/2 COVID  Cardiology consulted; appreciate assistance  Recommendation include no further work-up and outpatient follow-up  Continue ASA and Lipitor        VTE Pharmacologic Prophylaxis: VTE Score: 5 Moderate Risk (Score 3-4) - Pharmacological DVT Prophylaxis Ordered: enoxaparin (Lovenox)  Patient Centered Rounds: I performed bedside rounds with nursing staff today  Discussions with Specialists or Other Care Team Provider: Nurse and      Education and Discussions with Family / Patient: Updated  (wife) via phone  Time Spent for Care: 30 minutes  More than 50% of total time spent on counseling and coordination of care as described above  Current Length of Stay: 9 day(s)  Current Patient Status: Inpatient   Certification Statement: The patient will continue to require additional inpatient hospital stay due to acute respiratory failure with hypoxia   Discharge Plan: Anticipate discharge in 48-72 hrs to home with home services  Code Status: Level 1 - Full Code    Subjective:   Patient denies recent smoking history; he quit smoking 14 years ago  Objective:     Vitals:   Temp (24hrs), Av °F (36 7 °C), Min:97 6 °F (36 4 °C), Max:98 2 °F (36 8 °C)    Temp:  [97 6 °F (36 4 °C)-98 2 °F (36 8 °C)] 97 6 °F (36 4 °C)  HR:  [] 60  Resp:  [23-24] 23  BP: ()/(50-72) 99/50  SpO2:  [86 %-98 %] 98 %  Body mass index is 27 99 kg/m²  Input and Output Summary (last 24 hours): Intake/Output Summary (Last 24 hours) at 2022 1540  Last data filed at 2022 1504  Gross per 24 hour   Intake 3180 ml   Output 4975 ml   Net -1795 ml       Physical Exam:   Physical Exam  Vitals and nursing note reviewed  Constitutional:       General: He is not in acute distress while on HFNC       Appearance: Normal appearance  He is normal weight  He is not toxic-appearing  HENT:      Head: Normocephalic and atraumatic  Right Ear: External ear normal       Left Ear: External ear normal       Nose: Nose normal       Mouth/Throat:      Mouth: Mucous membranes are moist    Eyes:      Extraocular Movements: Extraocular movements intact  Conjunctiva/sclera:  No scleral icterus  Cardiovascular:      Rate and Rhythm: Normal rate and regular rhythm  Pulses: Normal pulses  Heart sounds: Normal heart sounds  No rubs or gallops  Pulmonary:      Effort: Pulmonary effort is normal  NC in place  Breath sounds:  Reduce bibasilar breath sounds  Abdominal:      General: Bowel sounds are normal  There is no distension  Palpations: Abdomen is soft  Tenderness: There is no abdominal tenderness  Genitourinary:     Comments: No Masterson present  Musculoskeletal:         General: No swelling  Cervical back: Normal range of motion  Right lower leg: No edema  Left lower leg: No edema  Skin:     General: Skin is warm and dry  Coloration: Skin is not jaundiced  Findings: No bruising  Neurological:      General: No focal deficit present  Mental Status: He is alert and oriented to person, place, and time  Mental status is at baseline  Cranial Nerves: No cranial nerve deficit  Psychiatric:         Mood and Affect: Mood normal          Behavior: Behavior normal          Thought Content:  Thought content normal         Additional Data  Labs:  Results from last 7 days   Lab Units 02/26/22  0609   WBC Thousand/uL 10 27*   HEMOGLOBIN g/dL 12 9   HEMATOCRIT % 36 2*   PLATELETS Thousands/uL 185   NEUTROS PCT % 74   LYMPHS PCT % 17   MONOS PCT % 7   EOS PCT % 1     Results from last 7 days   Lab Units 02/26/22  0609   SODIUM mmol/L 138   POTASSIUM mmol/L 4 0   CHLORIDE mmol/L 107   CO2 mmol/L 25   BUN mg/dL 19   CREATININE mg/dL 0 71   ANION GAP mmol/L 6   CALCIUM mg/dL 8 4   GLUCOSE RANDOM mg/dL 87         Results from last 7 days   Lab Units 02/27/22  1139 02/27/22  0558 02/26/22  2034 02/26/22  1655 02/26/22  1206 02/26/22  1018 02/26/22  0806 02/26/22  0606 02/26/22  0428 02/26/22  0204 02/26/22  0012 02/25/22  2209   POC GLUCOSE mg/dl 304* 143* 363* 344* 219* 274* 146* 103 108 147* 105 224*         Results from last 7 days   Lab Units 02/21/22  0454   PROCALCITONIN ng/ml 0 18       Lines/Drains:  Invasive Devices  Report    Peripheral Intravenous Line            Peripheral IV 02/25/22 Left Hand 1 day                Imaging: No pertinent imaging reviewed  Last 24 Hours Medication List:   Current Facility-Administered Medications   Medication Dose Route Frequency Provider Last Rate    acetaminophen  650 mg Oral Q4H PRN Min Rob, DO      aspirin  81 mg Oral Daily Min Rob, DO      atorvastatin  10 mg Oral Daily Min Rob, DO      benzonatate  100 mg Oral TID Frannie Forrester MD      enoxaparin  40 mg Subcutaneous Q24H [facility],       guaiFENesin  600 mg Oral Q12H [facility] Lacy Carney PA-C      insulin glargine  15 Units Subcutaneous HS Wilkins Hodgkin, MD      insulin lispro  1-6 Units Subcutaneous TID [facility] Wilkins Hodgkin, MD      insulin lispro  10 Units Subcutaneous TID With Meals Wilkins Hodgkin, MD      lisinopril  5 mg Oral Daily Frannie Forrester MD      ondansetron  4 mg Intravenous Q6H PRN Min Rob, DO      pantoprazole  40 mg Oral Early Morning Min Rob, DO      [START ON 2/28/2022] predniSONE  40 mg Oral Daily Wilkins Hodgkin, MD      sodium chloride  1 spray Each Nare Q1H PRN Frannie Forrester MD      thiamine  100 mg Oral Daily Min Rob, DO      zinc sulfate  220 mg Oral Daily Min Rob, DO          Today, Patient Was Seen By: Wilkins Hodgkin, MD    **Please Note: This note may have been constructed using a voice recognition system  **

## 2022-02-27 NOTE — ASSESSMENT & PLAN NOTE
Lab Results   Component Value Date    HGBA1C 7 7 (H) 12/06/2021       Recent Labs     02/26/22  1655 02/26/22 2034 02/27/22  0558 02/27/22  1139   POCGLU 344* 363* 143* 304*       Blood Sugar Average: Last 72 hrs:  (P) 306 [id number]     Hold home oral regimen while admitted  · On basal/bolus insulin here 2/2 steroids  · SSI  · Adjust PRN

## 2022-02-28 LAB
GLUCOSE SERPL-MCNC: 139 MG/DL (ref 65–140)
GLUCOSE SERPL-MCNC: 199 MG/DL (ref 65–140)
GLUCOSE SERPL-MCNC: 209 MG/DL (ref 65–140)

## 2022-02-28 PROCEDURE — 82948 REAGENT STRIP/BLOOD GLUCOSE: CPT

## 2022-02-28 PROCEDURE — 99233 SBSQ HOSP IP/OBS HIGH 50: CPT | Performed by: INTERNAL MEDICINE

## 2022-02-28 RX ORDER — INSULIN GLARGINE 100 [IU]/ML
20 INJECTION, SOLUTION SUBCUTANEOUS
Status: DISCONTINUED | OUTPATIENT
Start: 2022-02-28 | End: 2022-03-04 | Stop reason: HOSPADM

## 2022-02-28 RX ORDER — HYDROCODONE POLISTIREX AND CHLORPHENIRAMINE POLISTIREX 10; 8 MG/5ML; MG/5ML
5 SUSPENSION, EXTENDED RELEASE ORAL EVERY 12 HOURS PRN
Status: DISCONTINUED | OUTPATIENT
Start: 2022-02-28 | End: 2022-03-01

## 2022-02-28 RX ADMIN — BENZONATATE 100 MG: 100 CAPSULE ORAL at 08:08

## 2022-02-28 RX ADMIN — ATORVASTATIN CALCIUM 10 MG: 10 TABLET, FILM COATED ORAL at 08:05

## 2022-02-28 RX ADMIN — PANTOPRAZOLE SODIUM 40 MG: 40 TABLET, DELAYED RELEASE ORAL at 05:08

## 2022-02-28 RX ADMIN — THIAMINE HCL TAB 100 MG 100 MG: 100 TAB at 08:05

## 2022-02-28 RX ADMIN — ASPIRIN 81 MG CHEWABLE TABLET 81 MG: 81 TABLET CHEWABLE at 08:05

## 2022-02-28 RX ADMIN — ENOXAPARIN SODIUM 40 MG: 40 INJECTION SUBCUTANEOUS at 08:06

## 2022-02-28 RX ADMIN — GUAIFENESIN 600 MG: 600 TABLET, EXTENDED RELEASE ORAL at 22:02

## 2022-02-28 RX ADMIN — INSULIN LISPRO 2 UNITS: 100 INJECTION, SOLUTION INTRAVENOUS; SUBCUTANEOUS at 12:47

## 2022-02-28 RX ADMIN — INSULIN GLARGINE 20 UNITS: 100 INJECTION, SOLUTION SUBCUTANEOUS at 22:02

## 2022-02-28 RX ADMIN — ZINC SULFATE 220 MG (50 MG) CAPSULE 220 MG: CAPSULE at 08:05

## 2022-02-28 RX ADMIN — PREDNISONE 40 MG: 20 TABLET ORAL at 08:05

## 2022-02-28 RX ADMIN — GUAIFENESIN 600 MG: 600 TABLET, EXTENDED RELEASE ORAL at 08:05

## 2022-02-28 RX ADMIN — INSULIN LISPRO 2 UNITS: 100 INJECTION, SOLUTION INTRAVENOUS; SUBCUTANEOUS at 18:36

## 2022-02-28 NOTE — PROGRESS NOTES
[facility]  Progress Note - Ying Murguia 1955, 73 y o  male MRN: [id number]  Unit/Bed#: ICCU 208-01 Encounter: [id number]  Primary Care Provider: Rafael Youngblood MD   Date and time admitted to hospital: 2/18/2022  2:15 AM    * Pneumonia due to COVID-19 virus  Assessment & Plan  "Admitted 02/08/2022-02/17/2022 with acute respiratory failure secondary to COVID-19 at the [facility], unfortunately left AMA on date of discharge  · Presented again to [facility] ED with shortness of breath, noted with hypoxia requiring 7L [address]  · Completed dexamethasone 2/18  · Patient refusing Baricitinib as it is not FDA approved  · Continue supplemental oxygen and titrate as needed to maintain SaO2 greater than 90%"    Patient transferred to ICU  Most recent CXR showed worsening bilateral opacities  Patient previously treated on Solu-Medrol  Patient transitioned to 5-day course Prednisone this AM  Patient currently hemodynamically stable on 6 L  Air bone/contact precautions can be discontinued in March 1st  SpO2 goal >89% with pulmonary toileting with goal of weaning to goal oxygen    Acute respiratory failure with hypoxia [facility])  Assessment & Plan  Improving  Patient transferred from [facility] due to worsening dyspnea  Etiology: COVID PNA  Patient presented with worsening SOB following hospitalization on 02/08-02/17 at [place] after leaving AMA  Patient previously transition to [facility]  Patient currently hemodynamically stable on 6L  Patient will require ambulation with oxygen prior to home discharge in 24-48 hours    Primary hypertension  Assessment & Plan  Well controlled  Continue home Lisinopril 5 mg daily  Continue to monitor BP    Type 2 diabetes mellitus [facility])  Assessment & Plan  Lab Results   Component Value Date    HGBA1C 7 7 (H) 12/06/2021       Recent Labs     02/26/22  1655 02/26/22  2034 02/27/22  0558 02/27/22  1139   POCGLU 344* 363* 143* 304*       Blood Sugar Average: Last 72 hrs:  (P) 979 [id number]   Hyperglycemia relatively well controlled  Patient started on insulin gtt in ICU due to steroid-induced hyperglycemia  Increased Lantus from 15 to 20 Units qhs  Increased Humalog from 10 Units to 15 Units TID  Continue SSIP and FSBS  Continue Diabetic Diet and Hypoglycemia protocol    Non MI elevated troponin  Assessment & Plan  Likely due to prolonged hypoxia 2/2 COVID  Cardiology consulted; appreciate assistance  Recommendation include no further work-up and outpatient follow-up  Continue ASA and Lipitor        VTE Pharmacologic Prophylaxis: VTE Score: 5 Moderate Risk (Score 3-4) - Pharmacological DVT Prophylaxis Ordered: enoxaparin (Lovenox)  Patient Centered Rounds: I performed bedside rounds with nursing staff today  Discussions with Specialists or Other Care Team Provider: Nurse and      Education and Discussions with Family / Patient: Attempted to update  (wife) via phone  Left voicemail  Time Spent for Care: 30 minutes  More than 50% of total time spent on counseling and coordination of care as described above  Current Length of Stay: 10 day(s)  Current Patient Status: Inpatient   Certification Statement: The patient will continue to require additional inpatient hospital stay due to acute respiratory failure with hypoxia   Discharge Plan: Anticipate discharge in 24-48 hrs to home  Code Status: Level 1 - Full Code    Subjective:   Patient continues to have cough and shares his grievance about being discharged with oxygen  He has spoken to his boss and knows that he will not be able to return to work as a  while on oxygen  Patient feels that him being on oxygen is a conspiracy and that 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        Objective  Vitals:   Temp (24hrs), Av 2 °F (36 8 °C), Min:97 8 °F (36 6 °C), Max:98 6 °F (37 °C)    Temp:  [97 8 °F (36 6 °C)-98 6 °F (37 °C)] 98 6 °F (37 °C)  HR:  [56-80] 72  BP: ()/(44-68) 113/51  SpO2:  [86 %-100 %] 98 %  Body mass index is 27 99 kg/m²  Input and Output Summary (last 24 hours): Intake/Output Summary (Last 24 hours) at 2/28/2022 1437  Last data filed at 2/28/2022 1301  Gross per 24 hour   Intake 960 ml   Output 2700 ml   Net -1740 ml       Physical Exam:   Physical Exam  Vitals and nursing note reviewed  Constitutional:       General: He is not in acute distress  Appearance: Normal appearance  He is normal weight  He is not toxic-appearing  HENT:      Head: Normocephalic and atraumatic  Right Ear: External ear normal       Left Ear: External ear normal       Nose: Nose normal       Mouth/Throat:      Mouth: Mucous membranes are moist    Eyes:      Extraocular Movements: Extraocular movements intact  Conjunctiva/sclera:  No scleral icterus  No discharge  Cardiovascular:      Rate and Rhythm: Normal rate and regular rhythm  Pulses: Normal pulses  Heart sounds: Normal heart sounds  No gallops or rubs  Pulmonary:      Effort: Pulmonary effort is normal  NC in place  Breath sounds: Normal breath sounds  No stridor or rhonchi  Abdominal:      General: Bowel sounds are normal  There is no distension  Palpations: Abdomen is soft  Tenderness: There is no abdominal tenderness  Genitourinary:     Comments: No Masterson present  Musculoskeletal:         General: No swelling  Cervical back: Normal range of motion  Right lower leg: No edema  Left lower leg: No edema  Skin:     General: Skin is warm and dry  Coloration: Skin is not jaundiced  Findings: No bruising  Neurological:      General: No focal deficit present  Mental Status: He is alert and oriented to person, place, and time  Mental status is at baseline  Cranial Nerves: No cranial nerve deficit  Psychiatric:         Mood and Affect: Mood normal          Behavior: Behavior normal          Thought Content:  Thought content normal  Additional Data  Labs:  Results from last 7 days   Lab Units 02/26/22  0609   WBC Thousand/uL 10 27*   HEMOGLOBIN g/dL 12 9   HEMATOCRIT % 36 2*   PLATELETS Thousands/uL 185   NEUTROS PCT % 74   LYMPHS PCT % 17   MONOS PCT % 7   EOS PCT % 1     Results from last 7 days   Lab Units 02/26/22  0609   SODIUM mmol/L 138   POTASSIUM mmol/L 4 0   CHLORIDE mmol/L 107   CO2 mmol/L 25   BUN mg/dL 19   CREATININE mg/dL 0 71   ANION GAP mmol/L 6   CALCIUM mg/dL 8 4   GLUCOSE RANDOM mg/dL 87         Results from last 7 days   Lab Units 02/28/22  0512 02/27/22  2045 02/27/22  1701 02/27/22  1139 02/27/22  0558 02/26/22  2034 02/26/22  1655 02/26/22  1206 02/26/22  1018 02/26/22  0806 02/26/22  0606 02/26/22  0428   POC GLUCOSE mg/dl 139 286* 270* 304* 143* 363* 344* 219* 274* 146* 103 108               Lines/Drains:  Invasive Devices  Report    Peripheral Intravenous Line            Peripheral IV 02/25/22 Left Hand 2 days                  Imaging: No pertinent imaging reviewed        Last 24 Hours Medication List:   Current Facility-Administered Medications   Medication Dose Route Frequency Provider Last Rate    acetaminophen  650 mg Oral Q4H PRN Kecia Homer, DO      aspirin  81 mg Oral Daily Kecia Homer, DO      atorvastatin  10 mg Oral Daily Kecia Homer, DO      enoxaparin  40 mg Subcutaneous Q24H [facility], DO      guaiFENesin  600 mg Oral Q12H [facility] Lacy Carney PA-C      hydrocodone-chlorpheniramine polistirex  5 mL Oral Q12H PRN Veda Tapia MD      insulin glargine  20 Units Subcutaneous HS Veda Tapia MD      insulin lispro  1-6 Units Subcutaneous TID [facility] Veda Tapia MD      insulin lispro  15 Units Subcutaneous TID With Meals Veda Tapia MD      lisinopril  5 mg Oral Daily Ford Bridges MD      ondansetron  4 mg Intravenous Q6H PRN Kecia Homer, DO      pantoprazole  40 mg Oral Early Morning Kecia Homer, DO      predniSONE  40 mg Oral Daily Destini Ibarra MD      sodium chloride  1 spray Each Nare Q1H PRN Mukesh Archuleta MD      thiamine  100 mg Oral Daily Rosa Pillow, DO      zinc sulfate  220 mg Oral Daily Rosa Pillow, DO          Today, Patient Was Seen By: Destini Ibarra MD    **Please Note: This note may have been constructed using a voice recognition system  **

## 2022-03-01 ENCOUNTER — APPOINTMENT (INPATIENT)
Dept: RADIOLOGY | Facility: HOSPITAL | Age: 67
DRG: 177 | End: 2022-03-01
Payer: COMMERCIAL

## 2022-03-01 PROBLEM — B95.61 STAPHYLOCOCCUS AUREUS BACTEREMIA: Status: ACTIVE | Noted: 2022-03-01

## 2022-03-01 PROBLEM — R78.81 STAPHYLOCOCCUS AUREUS BACTEREMIA: Status: ACTIVE | Noted: 2022-03-01

## 2022-03-01 LAB
ANION GAP SERPL CALCULATED.3IONS-SCNC: 5 MMOL/L (ref 4–13)
BASOPHILS # BLD AUTO: 0.02 THOUSANDS/ΜL (ref 0–0.1)
BASOPHILS NFR BLD AUTO: 0 % (ref 0–1)
BUN SERPL-MCNC: 21 MG/DL (ref 5–25)
CALCIUM SERPL-MCNC: 8.4 MG/DL (ref 8.3–10.1)
CHLORIDE SERPL-SCNC: 105 MMOL/L (ref 100–108)
CO2 SERPL-SCNC: 27 MMOL/L (ref 21–32)
CREAT SERPL-MCNC: 0.98 MG/DL (ref 0.6–1.3)
CRP SERPL QL: 7.2 MG/L
EOSINOPHIL # BLD AUTO: 0.15 THOUSAND/ΜL (ref 0–0.61)
EOSINOPHIL NFR BLD AUTO: 2 % (ref 0–6)
ERYTHROCYTE [DISTWIDTH] IN BLOOD BY AUTOMATED COUNT: 12.8 % (ref 11.6–15.1)
GFR SERPL CREATININE-BSD FRML MDRD: 80 ML/MIN/1.73SQ M
GLUCOSE SERPL-MCNC: 128 MG/DL (ref 65–140)
GLUCOSE SERPL-MCNC: 128 MG/DL (ref 65–140)
GLUCOSE SERPL-MCNC: 150 MG/DL (ref 65–140)
GLUCOSE SERPL-MCNC: 268 MG/DL (ref 65–140)
GLUCOSE SERPL-MCNC: 317 MG/DL (ref 65–140)
HCT VFR BLD AUTO: 38.3 % (ref 36.5–49.3)
HGB BLD-MCNC: 13.2 G/DL (ref 12–17)
IMM GRANULOCYTES # BLD AUTO: 0.09 THOUSAND/UL (ref 0–0.2)
IMM GRANULOCYTES NFR BLD AUTO: 1 % (ref 0–2)
LYMPHOCYTES # BLD AUTO: 1.85 THOUSANDS/ΜL (ref 0.6–4.47)
LYMPHOCYTES NFR BLD AUTO: 20 % (ref 14–44)
MAGNESIUM SERPL-MCNC: 2.1 MG/DL (ref 1.6–2.6)
MCH RBC QN AUTO: 33.6 PG (ref 26.8–34.3)
MCHC RBC AUTO-ENTMCNC: 34.5 G/DL (ref 31.4–37.4)
MCV RBC AUTO: 98 FL (ref 82–98)
MONOCYTES # BLD AUTO: 0.68 THOUSAND/ΜL (ref 0.17–1.22)
MONOCYTES NFR BLD AUTO: 7 % (ref 4–12)
NEUTROPHILS # BLD AUTO: 6.53 THOUSANDS/ΜL (ref 1.85–7.62)
NEUTS SEG NFR BLD AUTO: 70 % (ref 43–75)
NRBC BLD AUTO-RTO: 0 /100 WBCS
NT-PROBNP SERPL-MCNC: 709 PG/ML
PHOSPHATE SERPL-MCNC: 4.1 MG/DL (ref 2.3–4.1)
PLATELET # BLD AUTO: 145 THOUSANDS/UL (ref 149–390)
PMV BLD AUTO: 9.7 FL (ref 8.9–12.7)
POTASSIUM SERPL-SCNC: 4 MMOL/L (ref 3.5–5.3)
PROCALCITONIN SERPL-MCNC: 0.11 NG/ML
RBC # BLD AUTO: 3.93 MILLION/UL (ref 3.88–5.62)
SODIUM SERPL-SCNC: 137 MMOL/L (ref 136–145)
WBC # BLD AUTO: 9.32 THOUSAND/UL (ref 4.31–10.16)

## 2022-03-01 PROCEDURE — 80048 BASIC METABOLIC PNL TOTAL CA: CPT | Performed by: INTERNAL MEDICINE

## 2022-03-01 PROCEDURE — 84145 PROCALCITONIN (PCT): CPT | Performed by: INTERNAL MEDICINE

## 2022-03-01 PROCEDURE — 71045 X-RAY EXAM CHEST 1 VIEW: CPT

## 2022-03-01 PROCEDURE — 84100 ASSAY OF PHOSPHORUS: CPT | Performed by: INTERNAL MEDICINE

## 2022-03-01 PROCEDURE — 82948 REAGENT STRIP/BLOOD GLUCOSE: CPT

## 2022-03-01 PROCEDURE — 85025 COMPLETE CBC W/AUTO DIFF WBC: CPT | Performed by: INTERNAL MEDICINE

## 2022-03-01 PROCEDURE — 99232 SBSQ HOSP IP/OBS MODERATE 35: CPT | Performed by: INTERNAL MEDICINE

## 2022-03-01 PROCEDURE — 99223 1ST HOSP IP/OBS HIGH 75: CPT | Performed by: INTERNAL MEDICINE

## 2022-03-01 PROCEDURE — 71275 CT ANGIOGRAPHY CHEST: CPT

## 2022-03-01 PROCEDURE — 83735 ASSAY OF MAGNESIUM: CPT | Performed by: INTERNAL MEDICINE

## 2022-03-01 PROCEDURE — 94760 N-INVAS EAR/PLS OXIMETRY 1: CPT

## 2022-03-01 PROCEDURE — 83880 ASSAY OF NATRIURETIC PEPTIDE: CPT | Performed by: INTERNAL MEDICINE

## 2022-03-01 PROCEDURE — 86140 C-REACTIVE PROTEIN: CPT | Performed by: INTERNAL MEDICINE

## 2022-03-01 RX ORDER — BENZONATATE 100 MG/1
100 CAPSULE ORAL 3 TIMES DAILY
Status: DISCONTINUED | OUTPATIENT
Start: 2022-03-01 | End: 2022-03-04 | Stop reason: HOSPADM

## 2022-03-01 RX ORDER — FUROSEMIDE 10 MG/ML
40 INJECTION INTRAMUSCULAR; INTRAVENOUS ONCE
Status: COMPLETED | OUTPATIENT
Start: 2022-03-01 | End: 2022-03-01

## 2022-03-01 RX ORDER — POLYETHYLENE GLYCOL 3350 17 G/17G
17 POWDER, FOR SOLUTION ORAL DAILY
Status: DISCONTINUED | OUTPATIENT
Start: 2022-03-01 | End: 2022-03-04 | Stop reason: HOSPADM

## 2022-03-01 RX ADMIN — ATORVASTATIN CALCIUM 10 MG: 10 TABLET, FILM COATED ORAL at 08:49

## 2022-03-01 RX ADMIN — INSULIN GLARGINE 20 UNITS: 100 INJECTION, SOLUTION SUBCUTANEOUS at 22:28

## 2022-03-01 RX ADMIN — IOHEXOL 85 ML: 350 INJECTION, SOLUTION INTRAVENOUS at 11:58

## 2022-03-01 RX ADMIN — GUAIFENESIN 600 MG: 600 TABLET, EXTENDED RELEASE ORAL at 08:49

## 2022-03-01 RX ADMIN — POLYETHYLENE GLYCOL 3350 17 G: 17 POWDER, FOR SOLUTION ORAL at 10:11

## 2022-03-01 RX ADMIN — ZINC SULFATE 220 MG (50 MG) CAPSULE 220 MG: CAPSULE at 08:49

## 2022-03-01 RX ADMIN — THIAMINE HCL TAB 100 MG 100 MG: 100 TAB at 08:48

## 2022-03-01 RX ADMIN — INSULIN LISPRO 1 UNITS: 100 INJECTION, SOLUTION INTRAVENOUS; SUBCUTANEOUS at 13:03

## 2022-03-01 RX ADMIN — ENOXAPARIN SODIUM 40 MG: 40 INJECTION SUBCUTANEOUS at 08:49

## 2022-03-01 RX ADMIN — GUAIFENESIN 600 MG: 600 TABLET, EXTENDED RELEASE ORAL at 22:28

## 2022-03-01 RX ADMIN — FUROSEMIDE 40 MG: 10 INJECTION, SOLUTION INTRAMUSCULAR; INTRAVENOUS at 13:03

## 2022-03-01 RX ADMIN — ASPIRIN 81 MG CHEWABLE TABLET 81 MG: 81 TABLET CHEWABLE at 08:48

## 2022-03-01 RX ADMIN — PANTOPRAZOLE SODIUM 40 MG: 40 TABLET, DELAYED RELEASE ORAL at 05:08

## 2022-03-01 RX ADMIN — INSULIN LISPRO 5 UNITS: 100 INJECTION, SOLUTION INTRAVENOUS; SUBCUTANEOUS at 16:21

## 2022-03-01 RX ADMIN — PREDNISONE 40 MG: 20 TABLET ORAL at 08:49

## 2022-03-01 RX ADMIN — BENZONATATE 100 MG: 100 CAPSULE ORAL at 23:29

## 2022-03-01 NOTE — PROGRESS NOTES
[facility]  Progress Note - Laurie Carr 1955, 73 y o  male MRN: [id number]  Unit/Bed#: -01 Encounter: [id number]  Primary Care Provider: Carmen Valente MD   Date and time admitted to hospital: 2/18/2022  2:15 AM    * Pneumonia due to COVID-19 virus  Assessment & Plan  Admitted 02/08/2022-02/17/2022 with acute respiratory failure secondary to COVID-19 at the [facility], unfortunately left AMA on date of discharge  [facility] with shortness of breath, noted with hypoxia requiring 7L [address]  Transferred to [facility], required ICU stay  Transferred to floor on 2/25  · Completed IV steroids-now on PO prednisone, refusing Baricitinib as it is not FDA approved  · Oxygen requirements noted to increase up to 12L midflow on 3/1, pulmonology consulted  · Monitor CRP/BNP/CBC/CMP   Check procal in AM  · CTA negative for PE  · Giving dose of IV lasix 3/1 given mildly elevated BNP   · Encourage OOB, ambulation, pulmonary toileting  · D/w pulm on 3/1--no need for further isolation     Acute respiratory failure with hypoxia (HCC)  Assessment & Plan  Was improving however on 3/1 had worsening oxygen requirements again, back to 12L Midflow   wean oxygen as able  · Pulm consulted  · CTA negative for PE, does note paraseptal emphysema and ?early fibrosis  · Mildly elevated BNP, s/p dose of IV lasix 3/1  · Suspect will definitely require oxygen on discharge, will need ambulatory pulse ox once more stable and on less oxygen    Non MI elevated troponin  Assessment & Plan  Likely due to prolonged hypoxia 2/2 COVID  · Cardiology input appreciated   · Recommended outpatient f/u  · Continue ASA and Lipitor    Staphylococcus aureus bacteremia  Assessment & Plan  1/2, other negative  · Likely contaminant     Primary hypertension  Assessment & Plan  Well controlled  · On home lisinopril 5 mg    Type 2 diabetes mellitus ([facility] [address] )  Assessment & Plan  Lab Results   Component Value Date    HGBA1C 7 7 (H) 2021       Recent Labs     22  1655 22  2034 22  0558 22  1139   POCGLU 344* 363* 143* 304*       Blood Sugar Average: Last 72 hrs:  (P) 002 [id number]     Hold home oral regimen while admitted  · On basal/bolus insulin here 2/2 steroids  · SSI  · Adjust PRN        VTE Pharmacologic Prophylaxis: VTE Score: 5 Moderate Risk (Score 3-4) - Pharmacological DVT Prophylaxis Ordered: enoxaparin (Lovenox)  Patient Centered Rounds: I performed bedside rounds with nursing staff today  Discussions with Specialists or Other Care Team Provider: d/w pulmonology    Education and Discussions with Family / Patient: Patient declined call to   Time Spent for Care: 30 minutes  More than 50% of total time spent on counseling and coordination of care as described above  Current Length of Stay: 11 day(s)  Current Patient Status: Inpatient   Certification Statement: The patient will continue to require additional inpatient hospital stay due to weaning oxygen   Discharge Plan: Anticipate discharge in >72 hrs to home  Code Status: Level 1 - Full Code    Subjective:   Pt seen ambulating around room  Back down to 6L via NC  Has ongoing nonproductive coughing fits  Otherwise feels well  Objective:     Vitals:   Temp (24hrs), Av 5 °F (36 4 °C), Min:97 4 °F (36 3 °C), Max:97 5 °F (36 4 °C)    Temp:  [97 4 °F (36 3 °C)-97 5 °F (36 4 °C)] 97 5 °F (36 4 °C)  HR:  [78-90] 90  Resp:  [24] 24  BP: (103-110)/(60-74) 103/74  SpO2:  [83 %-96 %] 90 %  Body mass index is 26 67 kg/m²  Input and Output Summary (last 24 hours):   No intake or output data in the 24 hours ending 22 1402    Physical Exam:   Physical Exam  Vitals and nursing note reviewed  Constitutional:       General: He is not in acute distress  Comments: On 6L   Cardiovascular:      Rate and Rhythm: Regular rhythm  Tachycardia present     Pulmonary:      Breath sounds: Rales (faint ) present  Abdominal:      General: There is no distension  Tenderness: There is no abdominal tenderness  Musculoskeletal:      Right lower leg: No edema  Left lower leg: No edema  Neurological:      Mental Status: He is oriented to person, place, and time     Psychiatric:         Mood and Affect: Mood normal           Additional Data:     Labs:  Results from last 7 days   Lab Units 03/01/22  0621   WBC Thousand/uL 9 32   HEMOGLOBIN g/dL 13 2   HEMATOCRIT % 38 3   PLATELETS Thousands/uL 145*   NEUTROS PCT % 70   LYMPHS PCT % 20   MONOS PCT % 7   EOS PCT % 2     Results from last 7 days   Lab Units 03/01/22  0621   SODIUM mmol/L 137   POTASSIUM mmol/L 4 0   CHLORIDE mmol/L 105   CO2 mmol/L 27   BUN mg/dL 21   CREATININE mg/dL 0 98   ANION GAP mmol/L 5   CALCIUM mg/dL 8 4   GLUCOSE RANDOM mg/dL 128         Results from last 7 days   Lab Units 03/01/22  1218 03/01/22  0614 02/28/22  1756 02/28/22  1239 02/28/22  0512 02/27/22  2045 02/27/22  1701 02/27/22  1139 02/27/22  0558 02/26/22  2034 02/26/22  1655 02/26/22  1206   POC GLUCOSE mg/dl 150* 128 209* 199* 139 286* 270* 304* 143* 363* 344* 219*         Results from last 7 days   Lab Units 03/01/22  0621   PROCALCITONIN ng/ml 0 11       Lines/Drains:  Invasive Devices  Report    Peripheral Intravenous Line            Peripheral IV 03/01/22 Right Antecubital <1 day                      Imaging: Reviewed radiology reports from this admission including: CTA PE Study     Recent Cultures (last 7 days):         Last 24 Hours Medication List:   Current Facility-Administered Medications   Medication Dose Route Frequency Provider Last Rate    acetaminophen  650 mg Oral Q4H PRN Sarita Cho MD      aspirin  81 mg Oral Daily Sarita Cho MD      atorvastatin  10 mg Oral Daily Sarita Cho MD      enoxaparin  40 mg Subcutaneous Q24H [address] Sarita Cho MD      guaiFENesin  600 mg Oral Q12H [address] MD Edith Knox hydrocodone-chlorpheniramine polistirex  5 mL Oral Q12H PRN John Bustillo MD      insulin glargine  20 Units Subcutaneous HS John Bustillo MD      insulin lispro  1-6 Units Subcutaneous TID [facility] John Bustillo MD      insulin lispro  15 Units Subcutaneous TID With Meals John Bustillo MD      lisinopril  5 mg Oral Daily John Bustillo MD      ondansetron  4 mg Intravenous Q6H PRN John Bustillo MD      pantoprazole  40 mg Oral Early Morning John Bustillo MD      polyethylene glycol  17 g Oral Daily Mitchell Neville PA-C      predniSONE  40 mg Oral Daily John Bustillo MD      sodium chloride  1 spray Each Nare Q1H PRN John Bustillo MD      thiamine  100 mg Oral Daily John Bustillo MD      zinc sulfate  220 mg Oral Daily John Bustillo MD          Today, Patient Was Seen By: Mitchell Neville PA-C    **Please Note: This note may have been constructed using a voice recognition system  **

## 2022-03-01 NOTE — CASE MANAGEMENT
Case Management Discharge Planning Note    Patient name Malik Gonzalez  Location [address] [id number] MRN [id number]  : 1955 Date 3/1/2022       Current Admission Date: 2022  Current Admission Diagnosis:Pneumonia due to COVID-19 virus   Patient Active Problem List    Diagnosis Date Noted    Acute respiratory failure with hypoxia ([facility] [address] ) 2022    Pneumonia due to COVID-19 virus 2022    Non MI elevated troponin 2022    Type 2 diabetes mellitus ([facility] ) 2022    Primary hypertension 2022      LOS (days): 11  Geometric Mean LOS (GMLOS) (days): 5 40  Days to GMLOS:-6     OBJECTIVE:  Risk of Unplanned Readmission Score: 15         Current admission status: Inpatient   Preferred Pharmacy:    [facility]  [address]  Phone: [phone] Fax: [phone] [address] [place] [address]  Phone: [phone] Fax: [phone]    Primary Care Provider: Dread Soriano MD    Primary Insurance: [facility]  Secondary Insurance: MEDICARE    DISCHARGE DETAILS:

## 2022-03-01 NOTE — PLAN OF CARE
Problem: PAIN - ADULT  Goal: Verbalizes/displays adequate comfort level or baseline comfort level  Description: Interventions:  - Encourage patient to monitor pain and request assistance  - Assess pain using appropriate pain scale  - Administer analgesics based on type and severity of pain and evaluate response  - Implement non-pharmacological measures as appropriate and evaluate response  - Consider cultural and social influences on pain and pain management  - Notify physician/advanced practitioner if interventions unsuccessful or patient reports new pain  Outcome: Progressing     Problem: INFECTION - ADULT  Goal: Absence or prevention of progression during hospitalization  Description: INTERVENTIONS:  - Assess and monitor for signs and symptoms of infection  - Monitor lab/diagnostic results  - Monitor all insertion sites, i e  indwelling lines, tubes, and drains  - Monitor endotracheal if appropriate and nasal secretions for changes in amount and color  - [place] appropriate cooling/warming therapies per order  - Administer medications as ordered  - Instruct and encourage patient and family to use good hand hygiene technique  - Identify and instruct in appropriate isolation precautions for identified infection/condition  Outcome: Progressing  Goal: Absence of fever/infection during neutropenic period  Description: INTERVENTIONS:  - Monitor WBC    Outcome: Progressing     Problem: DISCHARGE PLANNING  Goal: Discharge to home or other facility with appropriate resources  Description: INTERVENTIONS:  - Identify barriers to discharge w/patient and caregiver  - Arrange for needed discharge resources and transportation as appropriate  - Identify discharge learning needs (meds, wound care, etc )  - Arrange for interpretive services to assist at discharge as needed  - Refer to Case Management Department for coordinating discharge planning if the patient needs post-hospital services based on physician/advanced practitioner order or complex needs related to functional status, cognitive ability, or social support system  Outcome: Progressing     Problem: Knowledge Deficit  Goal: Patient/family/caregiver demonstrates understanding of disease process, treatment plan, medications, and discharge instructions  Description: Complete learning assessment and assess knowledge base    Interventions:  - Provide teaching at level of understanding  - Provide teaching via preferred learning methods  Outcome: Progressing

## 2022-03-01 NOTE — PLAN OF CARE
Problem: PAIN - ADULT  Goal: Verbalizes/displays adequate comfort level or baseline comfort level  Description: Interventions:  - Encourage patient to monitor pain and request assistance  - Assess pain using appropriate pain scale  - Administer analgesics based on type and severity of pain and evaluate response  - Implement non-pharmacological measures as appropriate and evaluate response  - Consider cultural and social influences on pain and pain management  - Notify physician/advanced practitioner if interventions unsuccessful or patient reports new pain  Outcome: Progressing     Problem: INFECTION - ADULT  Goal: Absence or prevention of progression during hospitalization  Description: INTERVENTIONS:  - Assess and monitor for signs and symptoms of infection  - Monitor lab/diagnostic results  - Monitor all insertion sites, i e  indwelling lines, tubes, and drains  - Monitor endotracheal if appropriate and nasal secretions for changes in amount and color  - [place] appropriate cooling/warming therapies per order  - Administer medications as ordered  - Instruct and encourage patient and family to use good hand hygiene technique  - Identify and instruct in appropriate isolation precautions for identified infection/condition  Outcome: Progressing  Goal: Absence of fever/infection during neutropenic period  Description: INTERVENTIONS:  - Monitor WBC    Outcome: Progressing     Problem: SAFETY ADULT  Goal: Patient will remain free of falls  Description: INTERVENTIONS:  - Educate patient/family on patient safety including physical limitations  - Instruct patient to call for assistance with activity   - Consult OT/PT to assist with strengthening/mobility   - Keep Call bell within reach  - Keep bed low and locked with side rails adjusted as appropriate  - Keep care items and personal belongings within reach  - Initiate and maintain comfort rounds  Outcome: Progressing  Goal: Maintain or return to baseline ADL function  Description: INTERVENTIONS:  -  Assess patient's ability to carry out ADLs; assess patient's baseline for ADL function and identify physical deficits which impact ability to perform ADLs (bathing, care of mouth/teeth, toileting, grooming, dressing, etc )  - Assess/evaluate cause of self-care deficits   - Assess range of motion  - Assess patient's mobility; develop plan if impaired  - Assess patient's need for assistive devices and provide as appropriate  - Encourage maximum independence but intervene and supervise when necessary  - Involve family in performance of ADLs  - Assess for home care needs following discharge   - Consider OT consult to assist with ADL evaluation and planning for discharge  - Provide patient education as appropriate  Outcome: Progressing  Goal: Maintains/Returns to pre admission functional level  Description: INTERVENTIONS:  - Perform BMAT or MOVE assessment daily    - Set and communicate daily mobility goal to care team and patient/family/caregiver  - Collaborate with rehabilitation services on mobility goals if consulted  - Perform Range of Motion 3 times a day  - Reposition patient every 3 hours    - Dangle patient 3 times a day  - Stand patient 3 times a day  - Ambulate patient 3 times a day  - Out of bed to chair 3 times a day   - Out of bed for meals 3 times a day  - Out of bed for toileting  - Record patient progress and toleration of activity level   Outcome: Progressing     Problem: DISCHARGE PLANNING  Goal: Discharge to home or other facility with appropriate resources  Description: INTERVENTIONS:  - Identify barriers to discharge w/patient and caregiver  - Arrange for needed discharge resources and transportation as appropriate  - Identify discharge learning needs (meds, wound care, etc )  - Arrange for interpretive services to assist at discharge as needed  - Refer to Case Management Department for coordinating discharge planning if the patient needs post-hospital services based on physician/advanced practitioner order or complex needs related to functional status, cognitive ability, or social support system  Outcome: Progressing     Problem: Knowledge Deficit  Goal: Patient/family/caregiver demonstrates understanding of disease process, treatment plan, medications, and discharge instructions  Description: Complete learning assessment and assess knowledge base    Interventions:  - Provide teaching at level of understanding  - Provide teaching via preferred learning methods  Outcome: Progressing     Problem: Potential for Falls  Goal: Patient will remain free of falls  Description: INTERVENTIONS:  - Educate patient/family on patient safety including physical limitations  - Instruct patient to call for assistance with activity   - Consult OT/PT to assist with strengthening/mobility   - Keep Call bell within reach  - Keep bed low and locked with side rails adjusted as appropriate  - Keep care items and personal belongings within reach  - Initiate and maintain comfort rounds  Outcome: Progressing     Problem: MOBILITY - ADULT  Goal: Maintain or return to baseline ADL function  Description: INTERVENTIONS:  -  Assess patient's ability to carry out ADLs; assess patient's baseline for ADL function and identify physical deficits which impact ability to perform ADLs (bathing, care of mouth/teeth, toileting, grooming, dressing, etc )  - Assess/evaluate cause of self-care deficits   - Assess range of motion  - Assess patient's mobility; develop plan if impaired  - Assess patient's need for assistive devices and provide as appropriate  - Encourage maximum independence but intervene and supervise when necessary  - Involve family in performance of ADLs  - Assess for home care needs following discharge   - Consider OT consult to assist with ADL evaluation and planning for discharge  - Provide patient education as appropriate  Outcome: Progressing  Goal: Maintains/Returns to pre admission functional level  Description: INTERVENTIONS:  - Perform BMAT or MOVE assessment daily    - Set and communicate daily mobility goal to care team and patient/family/caregiver  - Collaborate with rehabilitation services on mobility goals if consulted  - Perform Range of Motion 3 times a day  - Reposition patient every 3 hours    - Dangle patient 3 times a day  - Stand patient 3 times a day  - Ambulate patient 3 times a day  - Out of bed to chair 3 times a day   - Out of bed for meals 3 times a day  - Out of bed for toileting  - Record patient progress and toleration of activity level   Outcome: Progressing

## 2022-03-01 NOTE — CONSULTS
PULMONOLOGY CONSULT NOTE     Name: Ten Núñez   Age & Sex: 73 y o  male   MRN: [id number]  Unit/Bed#: -01   Encounter: [id number]        Reason for consultation:  Hypoxic respiratory failure and COVID    Requesting provider: Brooks Santiago PA-C    Assessment:     1  Acute hypoxic respiratory failure  -secondary to COVID-19 pneumonia  Consider volume overload and heart failure    2  Moderate to severe COVID-19 pneumonia  -positive 2/8/22  -transferred out intensive care unit 2/25/22  -received course of ceftriaxone, doxycycline, Decadron, Solu-Medrol, remdesivir  Patient refused to baricitinib    3  Non MI troponin elevation  -likely secondary to COVID and hypoxia  -cardiology consulted and following    4  Hypertension    5  Type 2 diabetes    6  History of nicotine dependence    Plan:    -decreased oxygen to 5-6 L nasal cannula  Patient currently saturating over 91% on 5-6 L nasal cannula  Patient reported he became hypoxic to the 70s and 80s when he was going to the bathroom and straining  Suspect component of Valsalva causing decrease in oxygen  Patient is recovering COVID so his oxygenation is slow to recover  Have to give patient time to recover prior to increasing supplemental oxygen significantly/reflexively  -continue supplemental oxygen and titrate as tolerated; goal SpO2 > 85%  -follow-up CRP, D-dimer obtained by primary   -obtain CTA  -obtain BNP  -continue prednisone taper; may need to really administer higher doses of steroids   -IV Lasix p r n  for hypoxia, shortness of breath, volume management  Keep on drier side  -hypertension and diabetes management per primary team  -counseled on continued abstinence from tobacco  -consider echocardiogram    -discontinue contact precautions; patient is outside of window    Over 21 days since testing positive    History of Present Illness   HPI:  Ten Núñez is a 73 y o  male with non-insulin-dependent type 2 diabetes, hypertension, GERD, hyperlipidemia and presented to the hospital 2/18/22 for shortness of breath hypoxic with recent COVID infection  That was initially hospitalized between 2/8/22 to 2/17/22 at [facility] with hypoxic respiratory failure secondary to [place]  Pressure with Decadron and remdesivir  He refused baricitinib  He required 7 L of mid flow nasal cannula  Patient left against medical advice on 2/17/22  After leaving developed consult shortness of breath and was brought back to the hospital for desaturations  On readmission he required 4-7 L of nasal cannula oxygen  He also had elevated troponin in the 3000s without EKG changes for STEMI  Started on heparin GTT and transferred to [address] [place] [address] for cardiology evaluation  Throughout his hospitalization patient had increasing oxygen requirements and required placement in the medical intensive care unit  He required high-flow nasal cannula oxygen the intensive care unit  In the intensive care unit patient received IV Solu-Medrol and was eventually placed on a prednisone taper  She was eventually titrated off high-flow nasal cannula to 6 L nasal cannula oxygen  Patient was transferred out of the just the intensive care unit on 2/25/22  Pulmonology consulted for worsening oxygen requirements  Patient went from requiring 3 L nasal cannula oxygen 12 L nasal cannula  Review of systems:  12 point review of systems was completed and was otherwise negative except as listed in HPI  Historical Information   Past Medical History:   Diagnosis Date    Diabetes mellitus ([address] )      Past Surgical History:   Procedure Laterality Date    ANKLE FRACTURE SURGERY Left     FOOT FRACTURE SURGERY      KNEE SURGERY Right     VASECTOMY       Family History   Problem Relation Age of Onset    Diabetes Mother     Alcohol abuse Father        Occupational History:  Noncontributory    Social History: Former smoker  Quit February 2008   1 pack per day for 30 years    Meds/Allergies   Current Facility-Administered Medications   Medication Dose Route Frequency    acetaminophen (TYLENOL) tablet 650 mg  650 mg Oral Q4H PRN    aspirin chewable tablet 81 mg  81 mg Oral Daily    atorvastatin (LIPITOR) tablet 10 mg  10 mg Oral Daily    enoxaparin (LOVENOX) subcutaneous injection 40 mg  40 mg Subcutaneous Q24H CATHLEEN    guaiFENesin (MUCINEX) 12 hr tablet 600 mg  600 mg Oral Q12H [address]    hydrocodone-chlorpheniramine polistirex (TUSSIONEX) ER suspension 5 mL  5 mL Oral Q12H PRN    insulin glargine (LANTUS) subcutaneous injection 20 Units 0 2 mL  20 Units Subcutaneous HS    insulin lispro (HumaLOG) 100 units/mL subcutaneous injection 1-6 Units  1-6 Units Subcutaneous TID AC    insulin lispro (HumaLOG) 100 units/mL subcutaneous injection 15 Units  15 Units Subcutaneous TID With Meals    lisinopril (ZESTRIL) tablet 5 mg  5 mg Oral Daily    ondansetron (ZOFRAN) injection 4 mg  4 mg Intravenous Q6H PRN    pantoprazole (PROTONIX) EC tablet 40 mg  40 mg Oral Early Morning    polyethylene glycol (MIRALAX) packet 17 g  17 g Oral Daily    predniSONE tablet 40 mg  40 mg Oral Daily    sodium chloride (OCEAN) 0 65 % nasal spray 1 spray  1 spray Each Nare Q1H PRN    thiamine tablet 100 mg  100 mg Oral Daily    zinc sulfate (ZINCATE) capsule 220 mg  220 mg Oral Daily     Medications Prior to Admission   Medication    atorvastatin (LIPITOR) 10 mg tablet    glipiZIDE (GLUCOTROL) 10 mg tablet    lisinopril (ZESTRIL) 5 mg tablet    metFORMIN (GLUCOPHAGE) 1000 MG tablet    pantoprazole (PROTONIX) 40 mg tablet    Semaglutide (OZEMPIC, 0 25 OR 0 5 MG/DOSE, SC)    thiamine (VITAMIN B1) 100 mg tablet    zinc sulfate (ZINCATE) 220 mg capsule     No Known Allergies    Vitals: Blood pressure 103/74, pulse 90, temperature 97 5 °F (36 4 °C), resp  rate (!) 23, height 5' 8" (1 727 m), weight 79 6 kg (175 lb 6 4 oz), SpO2 (!) 83 %  , Body mass index is 26 67 kg/m²        Intake/Output Summary (Last 24 hours) at 3/1/2022 1000  Last data filed at 2/28/2022 1301  Gross per 24 hour   Intake --   Output 575 ml   Net -575 ml       Physical Exam  Vitals and nursing note reviewed  Constitutional:       General: He is not in acute distress  Appearance: He is not ill-appearing, toxic-appearing or diaphoretic  HENT:      Head: Normocephalic and atraumatic  Right Ear: External ear normal       Left Ear: External ear normal       Nose: Nose normal    Eyes:      General: No scleral icterus  Right eye: No discharge  Left eye: No discharge  Extraocular Movements: Extraocular movements intact  Conjunctiva/sclera: Conjunctivae normal    Cardiovascular:      Rate and Rhythm: Normal rate and regular rhythm  Pulses: Normal pulses  Heart sounds: Normal heart sounds  No murmur heard  No friction rub  No gallop  Pulmonary:      Effort: Pulmonary effort is normal  No respiratory distress  Breath sounds: No stridor  Rales present  No wheezing or rhonchi  Comments: Fine Rales/crackles at bases  Chest:      Chest wall: No tenderness  Abdominal:      General: Bowel sounds are normal  There is no distension  Palpations: Abdomen is soft  Tenderness: There is no abdominal tenderness  There is no guarding or rebound  Musculoskeletal:      Right lower leg: No edema  Left lower leg: No edema  Skin:     General: Skin is warm and dry  Neurological:      Mental Status: He is alert and oriented to person, place, and time  Labs: I have personally reviewed pertinent lab results    Laboratory and Diagnostics  Results from last 7 days   Lab Units 03/01/22  0621 02/26/22  0609 02/25/22  0646 02/24/22  0630 02/23/22  0435   WBC Thousand/uL 9 32 10 27* 11 83* 11 35* 15 50*   HEMOGLOBIN g/dL 13 2 12 9 13 6 12 8 13 5   HEMATOCRIT % 38 3 36 2* 38 4 35 4* 38 5   PLATELETS Thousands/uL 145* 185 215 208 267   NEUTROS PCT % 70 74 71 73 79*   MONOS PCT % 7 7 9 8 7     Results from last 7 days   Lab Units 03/01/22  0621 02/26/22  0609 02/25/22  0646 02/24/22  0629 02/23/22  0435   SODIUM mmol/L 137 138 133* 136 134*   POTASSIUM mmol/L 4 0 4 0 4 9 4 2 4 4   CHLORIDE mmol/L 105 107 106 106 103   CO2 mmol/L 27 25 23 23 23   ANION GAP mmol/L 5 6 4 7 8   BUN mg/dL 21 19 24 21 24   CREATININE mg/dL 0 98 0 71 0 84 0 82 0 80   CALCIUM mg/dL 8 4 8 4 8 5 8 4 9 2   GLUCOSE RANDOM mg/dL 128 87 115 102 137     Results from last 7 days   Lab Units 03/01/22  0621   MAGNESIUM mg/dL 2 1   PHOSPHORUS mg/dL 4 1          Imaging and other studies: I have personally reviewed pertinent reports  and I have personally reviewed pertinent films in PACS    Chest x-ray 2/20/22: Worsening bilateral airspace opacities compared to previous chest x-rays in February 2022    Pulmonary function testing: n/a    EKG, Pathology, and Other Studies: I have personally reviewed pertinent reports  and I have personally reviewed pertinent films in PACS    Lower extremity duplex 2/21/22:  Negative for DVT    Code Status: Level 1 - Full Code      Portions of the record may have been created with voice recognition software  Occasional wrong word or "sound a like" substitutions may have occurred due to the inherent limitations of voice recognition software  Read the chart carefully and recognize, using context, where substitutions have occurred      DO [facility]  Pulmonary/Critical Care Fellowship, PGY-5

## 2022-03-02 LAB
ALBUMIN SERPL BCP-MCNC: 2.8 G/DL (ref 3.5–5)
ALP SERPL-CCNC: 82 U/L (ref 46–116)
ALT SERPL W P-5'-P-CCNC: 73 U/L (ref 12–78)
ANION GAP SERPL CALCULATED.3IONS-SCNC: 5 MMOL/L (ref 4–13)
AST SERPL W P-5'-P-CCNC: 24 U/L (ref 5–45)
BASOPHILS # BLD AUTO: 0.03 THOUSANDS/ΜL (ref 0–0.1)
BASOPHILS NFR BLD AUTO: 0 % (ref 0–1)
BILIRUB SERPL-MCNC: 0.99 MG/DL (ref 0.2–1)
BUN SERPL-MCNC: 19 MG/DL (ref 5–25)
CALCIUM ALBUM COR SERPL-MCNC: 9.7 MG/DL (ref 8.3–10.1)
CALCIUM SERPL-MCNC: 8.7 MG/DL (ref 8.3–10.1)
CHLORIDE SERPL-SCNC: 105 MMOL/L (ref 100–108)
CO2 SERPL-SCNC: 27 MMOL/L (ref 21–32)
CREAT SERPL-MCNC: 0.8 MG/DL (ref 0.6–1.3)
CRP SERPL QL: 21.7 MG/L
D DIMER PPP FEU-MCNC: 0.31 UG/ML FEU
EOSINOPHIL # BLD AUTO: 0.13 THOUSAND/ΜL (ref 0–0.61)
EOSINOPHIL NFR BLD AUTO: 1 % (ref 0–6)
ERYTHROCYTE [DISTWIDTH] IN BLOOD BY AUTOMATED COUNT: 12.9 % (ref 11.6–15.1)
GFR SERPL CREATININE-BSD FRML MDRD: 93 ML/MIN/1.73SQ M
GLUCOSE SERPL-MCNC: 126 MG/DL (ref 65–140)
GLUCOSE SERPL-MCNC: 130 MG/DL (ref 65–140)
GLUCOSE SERPL-MCNC: 229 MG/DL (ref 65–140)
GLUCOSE SERPL-MCNC: 261 MG/DL (ref 65–140)
GLUCOSE SERPL-MCNC: 267 MG/DL (ref 65–140)
HCT VFR BLD AUTO: 39.8 % (ref 36.5–49.3)
HGB BLD-MCNC: 13.7 G/DL (ref 12–17)
IMM GRANULOCYTES # BLD AUTO: 0.11 THOUSAND/UL (ref 0–0.2)
IMM GRANULOCYTES NFR BLD AUTO: 1 % (ref 0–2)
LYMPHOCYTES # BLD AUTO: 2.42 THOUSANDS/ΜL (ref 0.6–4.47)
LYMPHOCYTES NFR BLD AUTO: 25 % (ref 14–44)
MCH RBC QN AUTO: 33.3 PG (ref 26.8–34.3)
MCHC RBC AUTO-ENTMCNC: 34.4 G/DL (ref 31.4–37.4)
MCV RBC AUTO: 97 FL (ref 82–98)
MONOCYTES # BLD AUTO: 0.77 THOUSAND/ΜL (ref 0.17–1.22)
MONOCYTES NFR BLD AUTO: 8 % (ref 4–12)
NEUTROPHILS # BLD AUTO: 6.24 THOUSANDS/ΜL (ref 1.85–7.62)
NEUTS SEG NFR BLD AUTO: 65 % (ref 43–75)
NRBC BLD AUTO-RTO: 0 /100 WBCS
PLATELET # BLD AUTO: 159 THOUSANDS/UL (ref 149–390)
PMV BLD AUTO: 10.3 FL (ref 8.9–12.7)
POTASSIUM SERPL-SCNC: 4 MMOL/L (ref 3.5–5.3)
PROCALCITONIN SERPL-MCNC: 0.14 NG/ML
PROT SERPL-MCNC: 6.4 G/DL (ref 6.4–8.2)
RBC # BLD AUTO: 4.12 MILLION/UL (ref 3.88–5.62)
SODIUM SERPL-SCNC: 137 MMOL/L (ref 136–145)
WBC # BLD AUTO: 9.7 THOUSAND/UL (ref 4.31–10.16)

## 2022-03-02 PROCEDURE — 99232 SBSQ HOSP IP/OBS MODERATE 35: CPT | Performed by: PHYSICIAN ASSISTANT

## 2022-03-02 PROCEDURE — 85379 FIBRIN DEGRADATION QUANT: CPT | Performed by: INTERNAL MEDICINE

## 2022-03-02 PROCEDURE — 82948 REAGENT STRIP/BLOOD GLUCOSE: CPT

## 2022-03-02 PROCEDURE — 94760 N-INVAS EAR/PLS OXIMETRY 1: CPT

## 2022-03-02 PROCEDURE — 86140 C-REACTIVE PROTEIN: CPT | Performed by: INTERNAL MEDICINE

## 2022-03-02 PROCEDURE — 99232 SBSQ HOSP IP/OBS MODERATE 35: CPT | Performed by: INTERNAL MEDICINE

## 2022-03-02 PROCEDURE — 85025 COMPLETE CBC W/AUTO DIFF WBC: CPT | Performed by: INTERNAL MEDICINE

## 2022-03-02 PROCEDURE — 84145 PROCALCITONIN (PCT): CPT | Performed by: INTERNAL MEDICINE

## 2022-03-02 PROCEDURE — 80053 COMPREHEN METABOLIC PANEL: CPT | Performed by: INTERNAL MEDICINE

## 2022-03-02 RX ORDER — METHYLPREDNISOLONE SODIUM SUCCINATE 40 MG/ML
40 INJECTION, POWDER, LYOPHILIZED, FOR SOLUTION INTRAMUSCULAR; INTRAVENOUS EVERY 12 HOURS SCHEDULED
Status: DISCONTINUED | OUTPATIENT
Start: 2022-03-02 | End: 2022-03-04

## 2022-03-02 RX ADMIN — POLYETHYLENE GLYCOL 3350 17 G: 17 POWDER, FOR SOLUTION ORAL at 09:07

## 2022-03-02 RX ADMIN — PREDNISONE 40 MG: 20 TABLET ORAL at 09:06

## 2022-03-02 RX ADMIN — INSULIN LISPRO 2 UNITS: 100 INJECTION, SOLUTION INTRAVENOUS; SUBCUTANEOUS at 11:59

## 2022-03-02 RX ADMIN — BENZONATATE 100 MG: 100 CAPSULE ORAL at 16:52

## 2022-03-02 RX ADMIN — METHYLPREDNISOLONE SODIUM SUCCINATE 40 MG: 40 INJECTION, POWDER, FOR SOLUTION INTRAMUSCULAR; INTRAVENOUS at 21:51

## 2022-03-02 RX ADMIN — INSULIN GLARGINE 20 UNITS: 100 INJECTION, SOLUTION SUBCUTANEOUS at 21:50

## 2022-03-02 RX ADMIN — METHYLPREDNISOLONE SODIUM SUCCINATE 40 MG: 40 INJECTION, POWDER, FOR SOLUTION INTRAMUSCULAR; INTRAVENOUS at 10:53

## 2022-03-02 RX ADMIN — PANTOPRAZOLE SODIUM 40 MG: 40 TABLET, DELAYED RELEASE ORAL at 06:26

## 2022-03-02 RX ADMIN — BENZONATATE 100 MG: 100 CAPSULE ORAL at 09:07

## 2022-03-02 RX ADMIN — BENZONATATE 100 MG: 100 CAPSULE ORAL at 21:50

## 2022-03-02 RX ADMIN — ENOXAPARIN SODIUM 40 MG: 40 INJECTION SUBCUTANEOUS at 09:06

## 2022-03-02 RX ADMIN — GUAIFENESIN 600 MG: 600 TABLET, EXTENDED RELEASE ORAL at 09:07

## 2022-03-02 RX ADMIN — INSULIN LISPRO 3 UNITS: 100 INJECTION, SOLUTION INTRAVENOUS; SUBCUTANEOUS at 17:05

## 2022-03-02 RX ADMIN — THIAMINE HCL TAB 100 MG 100 MG: 100 TAB at 09:06

## 2022-03-02 RX ADMIN — ASPIRIN 81 MG CHEWABLE TABLET 81 MG: 81 TABLET CHEWABLE at 09:07

## 2022-03-02 RX ADMIN — ZINC SULFATE 220 MG (50 MG) CAPSULE 220 MG: CAPSULE at 09:07

## 2022-03-02 RX ADMIN — GUAIFENESIN 600 MG: 600 TABLET, EXTENDED RELEASE ORAL at 21:49

## 2022-03-02 RX ADMIN — ATORVASTATIN CALCIUM 10 MG: 10 TABLET, FILM COATED ORAL at 09:07

## 2022-03-02 NOTE — ASSESSMENT & PLAN NOTE
Patient was initially improving then up to 12L midflow 03/01, today at 6L   Patient was on 3L this AM however noted after walking to the bathroom he had to be put on 6L    · Wean oxygen as able  · Pulm following appreciate recs   · CTA negative for PE, does show evidence of COVID pneumonitis   · Mildly elevated BNP, s/p dose of IV lasix 3/1- patient states that he does not want to undergo echocardiogram    · Transitioned back to IV Solumedrol 40mg BID today by pulmonology   · Trend CRP   · Suspect will definitely require oxygen on discharge, will need ambulatory pulse ox once more stable and on less oxygen

## 2022-03-02 NOTE — ASSESSMENT & PLAN NOTE
Admitted 02/08/2022-02/17/2022 with acute respiratory failure secondary to COVID-19 at the [facility], unfortunately left AMA on date of discharge  Presented again to the [facility] with shortness of breath, noted with hypoxia requiring 7L [address]  Transferred to [facility], required ICU stay  Transferred to floor on 2/25  · Completed IV steroids-now on PO prednisone, refusing Baricitinib as it is not FDA approved  · Oxygen requirements noted to increase up to 12L midflow on 3/1- now on 3-6L via NC depending on exertional activity   · Pulmonology following appreciate input   · , CRP mildly elevated 21 7 from 7 2   · Monitor CRP/BNP/CBC/CMP   Check procal in AM  · CTA negative for PE, shows evidence of groundglass opacities with cystic vs  Bullous emphysematous changes bilateral   · Giving dose of IV lasix 3/1 given mildly elevated BNP  · Encourage OOB, ambulation, pulmonary toileting  · D/w pulm on 3/1--no need for further isolation

## 2022-03-02 NOTE — UTILIZATION REVIEW
Continued Stay Review    Date:    3/2/22                          Current Patient Class:    Inpatient  Current Level of Care:    Med surg    HPI:66 y o  male initially admitted on   2/18  With  Covid  19     3/1    Pulmonary consult  O2  Requirements  Increased from 6 L to  12 L  Suspect hypoxic respiratory failure likely multifactorial in the setting of resolving COVID pneumonitis, volume overload, possible PE  O2  Decreased to 6 L  On  Eval      X-ray findings reviewed, he has bilateral ground-glass opacities consistent with resolving COVID pneumonitis, he has completed COVID directed therapies with remdesivir, decadron, refused baracitib  He has been maintained on a prednisone taper  Need  CTA to R/O PE  Assessment/Plan:   3/2   Continue  IV  S/medrol  O2  Requirements  Remain  3-6  L  NC depending on  Exertional activity  CT scan which shows evidence of significant bilateral ground glass opacities consistent with COVID pneumonitis  CRP trended  Up slightly  Informed  He will need   O2  For some time  Continue  Current treatment plan and  Close monitoring         Vital Signs:   -- 99 -- 116/59 78 89 % Abnormal  -- -- -- --    03/02/22 09:08:18 -- 101 -- 85/59 Abnormal  68 89 % Abnormal  -- -- -- --   03/02/22 0905 -- -- -- -- -- 89 % Abnormal  44 6 L/min Nasal cannula --   03/02/22 08:00:23 -- 71 -- 100/57 71 92 % -- -- -- --   03/02/22 0800 -- -- -- -- -- -- 44 6 L/min Nasal cannula --   03/02/22 0147 -- -- -- -- -- 95 % 32 3 L/min Mid flow nasal cannula --   03/01/22 21:09:13 99 3 °F (37 4 °C) 68 -- 97/52 67 94 % -- -- -- --   03/01/22 2100 -- -- -- -- -- 94 % 32 3 L/min Nasal cannula --   03/01/22 1858 -- -- -- -- -- 92 % 44 6 L/min Mid flow nasal cannula --   03/01/22 14:57:08 -- 87 -- 88/48 Abnormal  61 92 % -- -- -- --   03/01/22 1322 -- -- -- -- -- 90 % 44 6 L/min Nasal cannula --         Pertinent Labs/Diagnostic Results:   CTA  Chest  ( 3/1)  No PE       Covid-19 pneumonia, progressed from chest x-rays in early February, now with developing cystic changes, mostly in a subpleural distribution, with traction bronchiectasis at the lung bases   This may represent early fibrosis though continued follow-up    is recommended  3   Paraseptal emphysema  Results from last 7 days   Lab Units 03/02/22  0637 03/01/22  0621 02/26/22  0609 02/25/22  0646 02/25/22  0646 02/24/22  0630 02/24/22  0630   WBC Thousand/uL 9 70 9 32 10 27*   < > 11 83*   < > 11 35*   HEMOGLOBIN g/dL 13 7 13 2 12 9  --  13 6  --  12 8   HEMATOCRIT % 39 8 38 3 36 2*  --  38 4  --  35 4*   PLATELETS Thousands/uL 159 145* 185   < > 215   < > 208   NEUTROS ABS Thousands/µL 6 24 6 53 7 58   < > 8 43*   < > 8 31*    < > = values in this interval not displayed           Results from last 7 days   Lab Units 03/02/22  0637 03/01/22  0621 02/26/22  0609 02/25/22  0646 02/24/22  0629   SODIUM mmol/L 137 137 138 133* 136   POTASSIUM mmol/L 4 0 4 0 4 0 4 9 4 2   CHLORIDE mmol/L 105 105 107 106 106   CO2 mmol/L 27 27 25 23 23   ANION GAP mmol/L 5 5 6 4 7   BUN mg/dL 19 21 19 24 21   CREATININE mg/dL 0 80 0 98 0 71 0 84 0 82   EGFR ml/min/1 73sq m 93 80 98 91 92   CALCIUM mg/dL 8 7 8 4 8 4 8 5 8 4   MAGNESIUM mg/dL  --  2 1  --   --   --    PHOSPHORUS mg/dL  --  4 1  --   --   --      Results from last 7 days   Lab Units 03/02/22  0637   AST U/L 24   ALT U/L 73   ALK PHOS U/L 82   TOTAL PROTEIN g/dL 6 4   ALBUMIN g/dL 2 8*   TOTAL BILIRUBIN mg/dL 0 99     Results from last 7 days   Lab Units 03/02/22  0642 03/01/22  2107 03/01/22  1531 03/01/22  1218 03/01/22  0614 02/28/22  1756 02/28/22  1239 02/28/22  0512 02/27/22  2045 02/27/22  1701 02/27/22  1139 02/27/22  0558   POC GLUCOSE mg/dl 126 268* 317* 150* 128 209* 199* 139 286* 270* 304* 143*     Results from last 7 days   Lab Units 03/02/22  0637 03/01/22  0621 02/26/22  0609 02/25/22  0646 02/24/22  0629   GLUCOSE RANDOM mg/dL 130 128 87 115 102               Results from last 7 days   Lab Units 03/02/22  8447   D-DIMER QUANTITATIVE ug/ml FEU 0 31             Results from last 7 days   Lab Units 03/02/22  0637 03/01/22  0621   PROCALCITONIN ng/ml 0 14 0 11                 Results from last 7 days   Lab Units 03/01/22  0621   NT-PRO BNP pg/mL 709*                 Results from last 7 days   Lab Units 03/02/22  0637 03/01/22  0621   CRP mg/L 21 7* 7 2*           Medications:   Scheduled Medications:  aspirin, 81 mg, Oral, Daily  atorvastatin, 10 mg, Oral, Daily  benzonatate, 100 mg, Oral, TID  enoxaparin, 40 mg, Subcutaneous, Q24H CATHLEEN  guaiFENesin, 600 mg, Oral, Q12H CATHLEEN  insulin glargine, 20 Units, Subcutaneous, HS  insulin lispro, 1-6 Units, Subcutaneous, TID AC  insulin lispro, 15 Units, Subcutaneous, TID With Meals  lisinopril, 5 mg, Oral, Daily  methylPREDNISolone sodium succinate, 40 mg, Intravenous, Q12H CATHLEEN  pantoprazole, 40 mg, Oral, Early Morning  polyethylene glycol, 17 g, Oral, Daily  thiamine, 100 mg, Oral, Daily  zinc sulfate, 220 mg, Oral, Daily      Continuous IV Infusions:     PRN Meds:  acetaminophen, 650 mg, Oral, Q4H PRN  ondansetron, 4 mg, Intravenous, Q6H PRN  sodium chloride, 1 spray, Each Nare, Q1H PRN        Discharge Plan:    D    Network Utilization Review Department  ATTENTION: Please call with any questions or concerns to [phone] and carefully listen to the prompts so that you are directed to the right person  All voicemails are confidential   Marisabel Chacko all requests for admission clinical reviews, approved or denied determinations and any other requests to dedicated fax number below belonging to the campus where the patient is receiving treatment   List of dedicated fax numbers for the Facilities:  [address] DENIALS (Administrative/Medical Necessity) [phone]   [address] (Maternity/NICU/Pediatrics) 261 [facility],[address] [phone]   [place] [address] [facility] [address] [facility] [place] [address] [address] [place] [address] [address] [place] [id number] [phone]

## 2022-03-02 NOTE — PROGRESS NOTES
PULMONOLOGY PROGRESS NOTE     Name: Faiza Cadena   Age & Sex: 73 y o  male   MRN: [id number]  Unit/Bed#: -01   Encounter: [id number]    PATIENT INFORMATION     Name: Faiza Cadena   Age & Sex: 73 y o  male   MRN: [id number]  Hospital Stay Days: 12    ASSESSMENT/PLAN     Principal Problem:    Pneumonia due to COVID-19 virus  Active Problems:    Non MI elevated troponin    Type 2 diabetes mellitus ([facility] [address] )    Primary hypertension    Acute respiratory failure with hypoxia ([facility] [address] )    Staphylococcus aureus bacteremia    Assessment:      1  Acute hypoxic respiratory failure  -secondary to COVID-19 pneumonia  Consider volume overload and heart failure  -CTA negative for pulmonary embolism     2  Moderate to severe COVID-19 pneumonia  -positive 2/8/22  -transferred out intensive care unit 2/25/22  -received course of ceftriaxone, doxycycline, Decadron, Solu-Medrol, remdesivir  Patient refused to baricitinib  -CRP mildly elevated to 21 7 from 7 2  -CTA 3/1/22:  Peripheral/subpleural and basilar predominant ground-glass opacities with cystic versus bullous emphysematous changes bilaterally      3  Non MI troponin elevation  -likely secondary to COVID and hypoxia  -cardiology consulted and following     4  Hypertension     5  Type 2 diabetes     6  History of nicotine dependence     Plan:     -decreased oxygen to 5-6 L nasal cannula  Patient currently saturating over 91% on 5-6 L nasal cannula  Patient reported he became hypoxic to the 70s and 80s when he was going to the bathroom and straining  Suspect component of Valsalva causing decrease in oxygen  Patient is recovering COVID so his oxygenation is slow to recover  Have to give patient time to recover prior to increasing supplemental oxygen significantly/reflexively  -Patient saturates over 92% on 3 L nasal cannula at rest but does desaturate with any movement requiring up to 6 L nasal cannula      -cannot call true fibrosis secondary to COVID until 6-8 months post COVID  Ground-glass opacities on CTA still suggests some degree of inflammation still present  Suspect patient will require supplemental oxygen indefinitely as of now      -continue supplemental oxygen and titrate as tolerated; goal SpO2 > 85%  -status post Lasix IV 40 mg x 1 on 3/1/22 with 1 7 L output  -increased steroids to IV Solu-Medrol 40 mg q 12 hours; will be discharged on a prolonged prednisone taper discharge   -IV Lasix p r n  for hypoxia, shortness of breath, volume management  Keep on drier side  -hypertension and diabetes management per primary team  -counseled on continued abstinence from tobacco  -consider echocardiogram  -patient should be prescribed at minimum albuterol rescue inhaler on discharge     Disposition:  Continue to monitor patient as he is on 3-6 L nasal cannula  Would ambulate patient within the next 24-48 hours to see if his oxygen saturations maintain on 6 L  If he does not require greater than 6 L with ambulation he is okay to discharge on supplemental oxygen  May require Oxymizer  SUBJECTIVE     Patient seen and examined  No acute events overnight  Resting comfortably no acute distress  Becomes short of breath with movement and exertion  Frustrated with his diagnosis    OBJECTIVE     Vitals:    22 0800 22 0905 22 0908 22 0910   BP: 100/57  (!) 85/59 116/59   BP Location:       Pulse: 71  101 99   Resp:       Temp:       TempSrc:       SpO2: 92% (!) 89% (!) 89% (!) 89%   Weight:       Height:          Temperature:   Temp (24hrs), Av 3 °F (37 4 °C), Min:99 3 °F (37 4 °C), Max:99 3 °F (37 4 °C)    Temperature: 99 3 °F (37 4 °C)  Intake & Output:  I/O        0701  03/ 0700  0701   0700  0701  / 0700    P  O   1200     Total Intake(mL/kg)  1200 (15 1)     Urine (mL/kg/hr) 850 (0 4) 1700 (0 9)     Stool       Total Output 850 1700     Net -850 -500            Unmeasured Urine Occurrence 1 x 2 x         Weights:   IBW (Ideal Body Weight): 68 4 kg    Body mass index is 26 67 kg/m²  Weight (last 2 days)     Date/Time Weight    03/01/22 0600 79 6 (175 4)    02/28/22 0600 83 5 (184 08)        Physical Exam  Vitals reviewed  Constitutional:       General: He is not in acute distress  Appearance: He is not ill-appearing, toxic-appearing or diaphoretic  HENT:      Head: Normocephalic and atraumatic  Right Ear: External ear normal       Left Ear: External ear normal       Nose: Nose normal    Eyes:      General: No scleral icterus  Right eye: No discharge  Left eye: No discharge  Extraocular Movements: Extraocular movements intact  Conjunctiva/sclera: Conjunctivae normal    Cardiovascular:      Rate and Rhythm: Normal rate and regular rhythm  Pulses: Normal pulses  Heart sounds: Normal heart sounds  No murmur heard  No friction rub  No gallop  Pulmonary:      Effort: No respiratory distress  Breath sounds: No stridor  Rales present  No wheezing or rhonchi  Comments: Fine Bibasilar rales/crackles  Chest:      Chest wall: No tenderness  Abdominal:      General: Bowel sounds are normal  There is no distension  Palpations: Abdomen is soft  Tenderness: There is no abdominal tenderness  There is no guarding or rebound  Musculoskeletal:         General: Normal range of motion  Right lower leg: No edema  Left lower leg: No edema  Skin:     General: Skin is warm and dry  Neurological:      Mental Status: He is alert and oriented to person, place, and time  LABORATORY DATA     Labs: I have personally reviewed pertinent reports    Results from last 7 days   Lab Units 03/02/22  0637 03/01/22  0621 02/26/22  0609   WBC Thousand/uL 9 70 9 32 10 27*   HEMOGLOBIN g/dL 13 7 13 2 12 9   HEMATOCRIT % 39 8 38 3 36 2*   PLATELETS Thousands/uL 159 145* 185   NEUTROS PCT % 65 70 74   MONOS PCT % 8 7 7      Results from last 7 days   Lab Units 03/02/22  0015 03/01/22  0621 02/26/22  0609   POTASSIUM mmol/L 4 0 4 0 4 0   CHLORIDE mmol/L 105 105 107   CO2 mmol/L 27 27 25   BUN mg/dL 19 21 19   CREATININE mg/dL 0 80 0 98 0 71   CALCIUM mg/dL 8 7 8 4 8 4   ALK PHOS U/L 82  --   --    ALT U/L 73  --   --    AST U/L 24  --   --      Results from last 7 days   Lab Units 03/01/22  0621   MAGNESIUM mg/dL 2 1     Results from last 7 days   Lab Units 03/01/22  0621   PHOSPHORUS mg/dL 4 1          ABG:       IMAGING & DIAGNOSTIC TESTING     Radiology Results: I have personally reviewed pertinent reports  XR chest portable    Result Date: 2/20/2022  Impression: Worsened peripheral pulmonary opacities  Workstation performed: [id number]     Other Diagnostic Testing: I have personally reviewed pertinent reports      ACTIVE MEDICATIONS     Current Facility-Administered Medications   Medication Dose Route Frequency    acetaminophen (TYLENOL) tablet 650 mg  650 mg Oral Q4H PRN    aspirin chewable tablet 81 mg  81 mg Oral Daily    atorvastatin (LIPITOR) tablet 10 mg  10 mg Oral Daily    benzonatate (TESSALON PERLES) capsule 100 mg  100 mg Oral TID    enoxaparin (LOVENOX) subcutaneous injection 40 mg  40 mg Subcutaneous Q24H CATHLEEN    guaiFENesin (MUCINEX) 12 hr tablet 600 mg  600 mg Oral Q12H CATHLEEN    insulin glargine (LANTUS) subcutaneous injection 20 Units 0 2 mL  20 Units Subcutaneous HS    insulin lispro (HumaLOG) 100 units/mL subcutaneous injection 1-6 Units  1-6 Units Subcutaneous TID AC    insulin lispro (HumaLOG) 100 units/mL subcutaneous injection 15 Units  15 Units Subcutaneous TID With Meals    lisinopril (ZESTRIL) tablet 5 mg  5 mg Oral Daily    methylPREDNISolone sodium succinate (Solu-MEDROL) injection 40 mg  40 mg Intravenous Q12H CATHLEEN    ondansetron (ZOFRAN) injection 4 mg  4 mg Intravenous Q6H PRN    pantoprazole (PROTONIX) EC tablet 40 mg  40 mg Oral Early Morning    polyethylene glycol (MIRALAX) packet 17 g  17 g Oral Daily    sodium chloride (OCEAN) 0 65 % nasal spray 1 spray  1 spray Each Nare Q1H PRN    thiamine tablet 100 mg  100 mg Oral Daily    zinc sulfate (ZINCATE) capsule 220 mg  220 mg Oral Daily         Portions of the record may have been created with voice recognition software  Occasional wrong word or "sound a like" substitutions may have occurred due to the inherent limitations of voice recognition software    Read the chart carefully and recognize, using context, where substitutions have occurred   ==  Jesus Nash, [address] [place] [address]  Pulmonary/[facility] PGY-5

## 2022-03-02 NOTE — ASSESSMENT & PLAN NOTE
Lab Results   Component Value Date    HGBA1C 7 7 (H) 12/06/2021       Recent Labs     03/01/22  1531 03/01/22  2107 03/02/22  0642 03/02/22  1036   POCGLU 317* 268* 126 229*       Blood Sugar Average: Last 72 hrs:  (P) 212 [id number]     Hold home oral regimen while admitted  · On basal/bolus insulin here 2/2 steroids  · SSI  · Adjust PRN

## 2022-03-02 NOTE — PLAN OF CARE
Problem: PAIN - ADULT  Goal: Verbalizes/displays adequate comfort level or baseline comfort level  Description: Interventions:  - Encourage patient to monitor pain and request assistance  - Assess pain using appropriate pain scale  - Administer analgesics based on type and severity of pain and evaluate response  - Implement non-pharmacological measures as appropriate and evaluate response  - Consider cultural and social influences on pain and pain management  - Notify physician/advanced practitioner if interventions unsuccessful or patient reports new pain  3/2/2022 1017 by Paul Queen RN  Outcome: Progressing  3/2/2022 1016 by Paul Queen RN  Outcome: Progressing     Problem: INFECTION - ADULT  Goal: Absence of fever/infection during neutropenic period  Description: INTERVENTIONS:  - Monitor WBC    Outcome: Progressing     Problem: SAFETY ADULT  Goal: Maintain or return to baseline ADL function  Description: INTERVENTIONS:  -  Assess patient's ability to carry out ADLs; assess patient's baseline for ADL function and identify physical deficits which impact ability to perform ADLs (bathing, care of mouth/teeth, toileting, grooming, dressing, etc )  - Assess/evaluate cause of self-care deficits   - Assess range of motion  - Assess patient's mobility; develop plan if impaired  - Assess patient's need for assistive devices and provide as appropriate  - Encourage maximum independence but intervene and supervise when necessary  - Involve family in performance of ADLs  - Assess for home care needs following discharge   - Consider OT consult to assist with ADL evaluation and planning for discharge  - Provide patient education as appropriate  3/2/2022 1017 by Paul Queen RN  Outcome: Progressing  3/2/2022 1016 by Paul Queen RN  Outcome: Progressing     Problem: DISCHARGE PLANNING  Goal: Discharge to home or other facility with appropriate resources  Description: INTERVENTIONS:  - Identify barriers to discharge w/patient and caregiver  - Arrange for needed discharge resources and transportation as appropriate  - Identify discharge learning needs (meds, wound care, etc )  - Arrange for interpretive services to assist at discharge as needed  - Refer to Case Management Department for coordinating discharge planning if the patient needs post-hospital services based on physician/advanced practitioner order or complex needs related to functional status, cognitive ability, or social support system  Outcome: Progressing     Problem: Knowledge Deficit  Goal: Patient/family/caregiver demonstrates understanding of disease process, treatment plan, medications, and discharge instructions  Description: Complete learning assessment and assess knowledge base    Interventions:  - Provide teaching at level of understanding  - Provide teaching via preferred learning methods  Outcome: Progressing     Problem: MOBILITY - ADULT  Goal: Maintain or return to baseline ADL function  Description: INTERVENTIONS:  -  Assess patient's ability to carry out ADLs; assess patient's baseline for ADL function and identify physical deficits which impact ability to perform ADLs (bathing, care of mouth/teeth, toileting, grooming, dressing, etc )  - Assess/evaluate cause of self-care deficits   - Assess range of motion  - Assess patient's mobility; develop plan if impaired  - Assess patient's need for assistive devices and provide as appropriate  - Encourage maximum independence but intervene and supervise when necessary  - Involve family in performance of ADLs  - Assess for home care needs following discharge   - Consider OT consult to assist with ADL evaluation and planning for discharge  - Provide patient education as appropriate  3/2/2022 1017 by Bob Restrepo RN  Outcome: Progressing  3/2/2022 1016 by Bob Restrepo RN  Outcome: Progressing

## 2022-03-02 NOTE — PROGRESS NOTES
[facility]  Progress Note - Tyrese Victor 1955, 73 y o  male MRN: [id number]  Unit/Bed#: -01 Encounter: [id number]  Primary Care Provider: Susan Hermosillo MD   Date and time admitted to hospital: 2/18/2022  2:15 AM    * Pneumonia due to COVID-19 virus  Assessment & Plan  Admitted 02/08/2022-02/17/2022 with acute respiratory failure secondary to COVID-19 at the [facility], unfortunately left AMA on date of discharge  [facility] with shortness of breath, noted with hypoxia requiring 7L [address]  Transferred to [facility], required ICU stay  Transferred to floor on 2/25  · Completed IV steroids-now on PO prednisone, refusing Baricitinib as it is not FDA approved  · Oxygen requirements noted to increase up to 12L midflow on 3/1- now on 3-6L via NC depending on exertional activity   · Pulmonology following appreciate input   · , CRP mildly elevated 21 7 from 7 2   · Monitor CRP/BNP/CBC/CMP  Check procal in AM  · CTA negative for PE, shows evidence of groundglass opacities with cystic vs  Bullous emphysematous changes bilateral   · Giving dose of IV lasix 3/1 given mildly elevated BNP  · Encourage OOB, ambulation, pulmonary toileting  · D/w pulm on 3/1--no need for further isolation     Acute respiratory failure with hypoxia [facility])  Assessment & Plan  Patient was initially improving then up to 12L midflow 03/01, today at 6L   Patient was on 3L this AM however noted after walking to the bathroom he had to be put on 6L    · Wean oxygen as able  · Pulm following appreciate recs   · CTA negative for PE, does show evidence of COVID pneumonitis   · Mildly elevated BNP, s/p dose of IV lasix 3/1- patient states that he does not want to undergo echocardiogram    · Transitioned back to IV Solumedrol 40mg BID today by pulmonology   · Trend CRP   · Suspect will definitely require oxygen on discharge, will need ambulatory pulse ox once more stable and on less oxygen    Non MI elevated troponin  Assessment & Plan  Likely due to prolonged hypoxia 2/2 COVID  · Cardiology input appreciated   · Recommended outpatient f/u  · Continue ASA and Lipitor    Staphylococcus aureus bacteremia  Assessment & Plan  1/2, other negative  · Likely contaminant     Primary hypertension  Assessment & Plan  Well controlled  · On home lisinopril 5 mg    Type 2 diabetes mellitus [facility])  Assessment & Plan  Lab Results   Component Value Date    HGBA1C 7 7 (H) 12/06/2021       Recent Labs     03/01/22  1531 03/01/22  2107 03/02/22  0642 03/02/22  1036   POCGLU 317* 268* 126 229*       Blood Sugar Average: Last 72 hrs:  (P) 212 [id number]     Hold home oral regimen while admitted  · On basal/bolus insulin here 2/2 steroids  · SSI  · Adjust PRN        VTE Pharmacologic Prophylaxis: VTE Score: 5 High Risk (Score >/= 5) - Pharmacological DVT Prophylaxis Ordered: enoxaparin (Lovenox)  Sequential Compression Devices Ordered  Patient Centered Rounds: I performed bedside rounds with nursing staff today  Discussions with Specialists or Other Care Team Provider: ADRIANNA,RN     Education and Discussions with Family / Patient: Patient declined call to   Time Spent for Care: 30 minutes  More than 50% of total time spent on counseling and coordination of care as described above  Current Length of Stay: 12 day(s)  Current Patient Status: Inpatient   Certification Statement: The patient will continue to require additional inpatient hospital stay due to acute hypoxic respiratory failure 2/2 covid pneumonitis on IV steroids   Discharge Plan: Anticipate discharge in 48-72 hrs to home  Code Status: Level 1 - Full Code    Subjective:     Patient was lying in bed after ambulating to the restroom  Patient was initially on 3 L of supplemental oxygen this morning however after ambulation now requiring 6 L    Patient overall seems frustrated that he is still in the hospital    Did discuss with patient that likely he will need to be discharged on oxygen however we want to make sure that his oxygen levels are stable and his needs do not continue to increased prior to discharge  Did discuss that patient's BNP was mildly elevated, patient has not had an echocardiogram done in the past   At this time, patient  Declines echocardiogram to further assess for possible CHF/valvular abnormality  Objective:     Vitals:   Temp (24hrs), Av 3 °F (37 4 °C), Min:99 3 °F (37 4 °C), Max:99 3 °F (37 4 °C)    Temp:  [99 3 °F (37 4 °C)] 99 3 °F (37 4 °C)  HR:  [] 99  BP: ()/(52-59) 116/59  SpO2:  [89 %-95 %] 89 %  Body mass index is 26 67 kg/m²  Input and Output Summary (last 24 hours): Intake/Output Summary (Last 24 hours) at 3/2/2022 1523  Last data filed at 3/1/2022 2051  Gross per 24 hour   Intake 1200 ml   Output 1700 ml   Net -500 ml       Physical Exam:   Physical Exam  Constitutional:       General: He is not in acute distress  HENT:      Mouth/Throat:      Mouth: Mucous membranes are moist    Eyes:      General: No scleral icterus  Cardiovascular:      Rate and Rhythm: Normal rate and regular rhythm  Heart sounds: Normal heart sounds  Pulmonary:      Breath sounds: Rales present  Abdominal:      General: Abdomen is flat  Bowel sounds are normal       Palpations: Abdomen is soft  Tenderness: There is no abdominal tenderness  Musculoskeletal:      Right lower leg: No edema  Left lower leg: No edema  Skin:     General: Skin is warm  Neurological:      General: No focal deficit present  Mental Status: He is alert     Psychiatric:         Mood and Affect: Mood normal           Additional Data:     Labs:  Results from last 7 days   Lab Units 22  0637   WBC Thousand/uL 9 70   HEMOGLOBIN g/dL 13 7   HEMATOCRIT % 39 8   PLATELETS Thousands/uL 159   NEUTROS PCT % 65   LYMPHS PCT % 25   MONOS PCT % 8   EOS PCT % 1     Results from last 7 days   Lab Units 03/02/22  0637   SODIUM mmol/L 137   POTASSIUM mmol/L 4 0   CHLORIDE mmol/L 105   CO2 mmol/L 27   BUN mg/dL 19   CREATININE mg/dL 0 80   ANION GAP mmol/L 5   CALCIUM mg/dL 8 7   ALBUMIN g/dL 2 8*   TOTAL BILIRUBIN mg/dL 0 99   ALK PHOS U/L 82   ALT U/L 73   AST U/L 24   GLUCOSE RANDOM mg/dL 130         Results from last 7 days   Lab Units 03/02/22  1036 03/02/22  0642 03/01/22  2107 03/01/22  1531 03/01/22  1218 03/01/22  0614 02/28/22  1756 02/28/22  1239 02/28/22  0512 02/27/22  2045 02/27/22  1701 02/27/22  1139   POC GLUCOSE mg/dl 229* 126 268* 317* 150* 128 209* 199* 139 286* 270* 304*         Results from last 7 days   Lab Units 03/02/22  0637 03/01/22  0621   PROCALCITONIN ng/ml 0 14 0 11       Lines/Drains:  Invasive Devices  Report    Peripheral Intravenous Line            Peripheral IV 03/01/22 Right Antecubital 1 day                      Imaging: Reviewed radiology reports from this admission including: chest CT scan    Recent Cultures (last 7 days):         Last 24 Hours Medication List:   Current Facility-Administered Medications   Medication Dose Route Frequency Provider Last Rate    acetaminophen  650 mg Oral Q4H PRN Amanda Mckee MD      aspirin  81 mg Oral Daily Amanda Mckee MD      atorvastatin  10 mg Oral Daily Amanda Mckee MD      benzonatate  100 mg Oral TID Lynjoellen Mendozaoked, NEGRITA      enoxaparin  40 mg Subcutaneous Q24H [address] Amanda Mckee MD      guaiFENesin  600 mg Oral Q12H [address] Amanda Mckee MD      insulin glargine  20 Units Subcutaneous HS Amanda Mckee MD      insulin lispro  1-6 Units Subcutaneous TID [facility] Amanda Mckee MD      insulin lispro  15 Units Subcutaneous TID With Meals Amanda Mckee MD      lisinopril  5 mg Oral Daily Amanda Mckee MD      methylPREDNISolone sodium succinate  40 mg Intravenous Q12H [address] Jesus Casanova DO      ondansetron  4 mg Intravenous Q6H PRN Amanda Mckee MD      pantoprazole  40 mg Oral Early Morning Isamar Ibarra MD      polyethylene glycol  17 g Oral Daily Lalito Ross PA-C      sodium chloride  1 spray Each Nare Q1H PRN Isamar Ibarra MD      thiamine  100 mg Oral Daily Isamar Ibarra MD      zinc sulfate  220 mg Oral Daily Isamar Ibarra MD          Today, Patient Was Seen By: Joy Troy PA-C    **Please Note: This note may have been constructed using a voice recognition system  **

## 2022-03-02 NOTE — PLAN OF CARE
Problem: PAIN - ADULT  Goal: Verbalizes/displays adequate comfort level or baseline comfort level  Description: Interventions:  - Encourage patient to monitor pain and request assistance  - Assess pain using appropriate pain scale  - Administer analgesics based on type and severity of pain and evaluate response  - Implement non-pharmacological measures as appropriate and evaluate response  - Consider cultural and social influences on pain and pain management  - Notify physician/advanced practitioner if interventions unsuccessful or patient reports new pain  Outcome: Progressing     Problem: INFECTION - ADULT  Goal: Absence or prevention of progression during hospitalization  Description: INTERVENTIONS:  - Assess and monitor for signs and symptoms of infection  - Monitor lab/diagnostic results  - Monitor all insertion sites, i e  indwelling lines, tubes, and drains  - Monitor endotracheal if appropriate and nasal secretions for changes in amount and color  - [place] appropriate cooling/warming therapies per order  - Administer medications as ordered  - Instruct and encourage patient and family to use good hand hygiene technique  - Identify and instruct in appropriate isolation precautions for identified infection/condition  Outcome: Progressing  Goal: Absence of fever/infection during neutropenic period  Description: INTERVENTIONS:  - Monitor WBC    Outcome: Progressing     Problem: SAFETY ADULT  Goal: Patient will remain free of falls  Description: INTERVENTIONS:  - Educate patient/family on patient safety including physical limitations  - Instruct patient to call for assistance with activity   - Consult OT/PT to assist with strengthening/mobility   - Keep Call bell within reach  - Keep bed low and locked with side rails adjusted as appropriate  - Keep care items and personal belongings within reach  - Initiate and maintain comfort rounds  Outcome: Progressing    Goal: Maintain or return to baseline ADL function  Description: INTERVENTIONS:  -  Assess patient's ability to carry out ADLs; assess patient's baseline for ADL function and identify physical deficits which impact ability to perform ADLs (bathing, care of mouth/teeth, toileting, grooming, dressing, etc )  - Assess/evaluate cause of self-care deficits   - Assess range of motion  - Assess patient's mobility; develop plan if impaired  - Assess patient's need for assistive devices and provide as appropriate  - Encourage maximum independence but intervene and supervise when necessary  - Involve family in performance of ADLs  - Assess for home care needs following discharge   - Consider OT consult to assist with ADL evaluation and planning for discharge  - Provide patient education as appropriate  Outcome: Progressing  Goal: Maintains/Returns to pre admission functional level  Description: INTERVENTIONS:  - Perform BMAT or MOVE assessment daily    - Set and communicate daily mobility goal to care team and patient/family/caregiver  - Collaborate with rehabilitation services on mobility goals if consulted  - Perform Range of Motion 3 times a day  - Reposition patient every 3 hours    - Dangle patient 3 times a day  - Stand patient 3 times a day  - Ambulate patient 3 times a day  - Out of bed to chair 3 times a day   - Out of bed for meals 3 times a day  - Out of bed for toileting  - Record patient progress and toleration of activity level   Outcome: Progressing     Problem: DISCHARGE PLANNING  Goal: Discharge to home or other facility with appropriate resources  Description: INTERVENTIONS:  - Identify barriers to discharge w/patient and caregiver  - Arrange for needed discharge resources and transportation as appropriate  - Identify discharge learning needs (meds, wound care, etc )  - Arrange for interpretive services to assist at discharge as needed  - Refer to Case Management Department for coordinating discharge planning if the patient needs post-hospital services based on physician/advanced practitioner order or complex needs related to functional status, cognitive ability, or social support system  Outcome: Progressing     Problem: Knowledge Deficit  Goal: Patient/family/caregiver demonstrates understanding of disease process, treatment plan, medications, and discharge instructions  Description: Complete learning assessment and assess knowledge base    Interventions:  - Provide teaching at level of understanding  - Provide teaching via preferred learning methods  Outcome: Progressing     Problem: Potential for Falls  Goal: Patient will remain free of falls  Description: INTERVENTIONS:  - Educate patient/family on patient safety including physical limitations  - Instruct patient to call for assistance with activity   - Consult OT/PT to assist with strengthening/mobility   - Keep Call bell within reach  - Keep bed low and locked with side rails adjusted as appropriate  - Keep care items and personal belongings within reach  - Initiate and maintain comfort rounds  Outcome: Progressing     Problem: MOBILITY - ADULT  Goal: Maintain or return to baseline ADL function  Description: INTERVENTIONS:  -  Assess patient's ability to carry out ADLs; assess patient's baseline for ADL function and identify physical deficits which impact ability to perform ADLs (bathing, care of mouth/teeth, toileting, grooming, dressing, etc )  - Assess/evaluate cause of self-care deficits   - Assess range of motion  - Assess patient's mobility; develop plan if impaired  - Assess patient's need for assistive devices and provide as appropriate  - Encourage maximum independence but intervene and supervise when necessary  - Involve family in performance of ADLs  - Assess for home care needs following discharge   - Consider OT consult to assist with ADL evaluation and planning for discharge  - Provide patient education as appropriate  Outcome: Progressing  Goal: Maintains/Returns to pre admission functional level  Description: INTERVENTIONS:  - Perform BMAT or MOVE assessment daily    - Set and communicate daily mobility goal to care team and patient/family/caregiver  - Collaborate with rehabilitation services on mobility goals if consulted  - Perform Range of Motion 3 times a day  - Reposition patient every 3 hours    - Dangle patient 3 times a day  - Stand patient 3 times a day  - Ambulate patient 3 times a day  - Out of bed to chair 3 times a day   - Out of bed for meals 3 times a day  - Out of bed for toileting  - Record patient progress and toleration of activity level   Outcome: Progressing

## 2022-03-03 LAB
BASOPHILS # BLD AUTO: 0.01 THOUSANDS/ΜL (ref 0–0.1)
BASOPHILS NFR BLD AUTO: 0 % (ref 0–1)
CRP SERPL QL: 18.7 MG/L
EOSINOPHIL # BLD AUTO: 0 THOUSAND/ΜL (ref 0–0.61)
EOSINOPHIL NFR BLD AUTO: 0 % (ref 0–6)
ERYTHROCYTE [DISTWIDTH] IN BLOOD BY AUTOMATED COUNT: 12.8 % (ref 11.6–15.1)
GLUCOSE SERPL-MCNC: 166 MG/DL (ref 65–140)
GLUCOSE SERPL-MCNC: 251 MG/DL (ref 65–140)
GLUCOSE SERPL-MCNC: 255 MG/DL (ref 65–140)
GLUCOSE SERPL-MCNC: 287 MG/DL (ref 65–140)
HCT VFR BLD AUTO: 40.5 % (ref 36.5–49.3)
HGB BLD-MCNC: 14 G/DL (ref 12–17)
IMM GRANULOCYTES # BLD AUTO: 0.1 THOUSAND/UL (ref 0–0.2)
IMM GRANULOCYTES NFR BLD AUTO: 1 % (ref 0–2)
LYMPHOCYTES # BLD AUTO: 1.44 THOUSANDS/ΜL (ref 0.6–4.47)
LYMPHOCYTES NFR BLD AUTO: 14 % (ref 14–44)
MCH RBC QN AUTO: 33.7 PG (ref 26.8–34.3)
MCHC RBC AUTO-ENTMCNC: 34.6 G/DL (ref 31.4–37.4)
MCV RBC AUTO: 97 FL (ref 82–98)
MONOCYTES # BLD AUTO: 0.39 THOUSAND/ΜL (ref 0.17–1.22)
MONOCYTES NFR BLD AUTO: 4 % (ref 4–12)
NEUTROPHILS # BLD AUTO: 8.46 THOUSANDS/ΜL (ref 1.85–7.62)
NEUTS SEG NFR BLD AUTO: 81 % (ref 43–75)
NRBC BLD AUTO-RTO: 0 /100 WBCS
PLATELET # BLD AUTO: 150 THOUSANDS/UL (ref 149–390)
PMV BLD AUTO: 10 FL (ref 8.9–12.7)
RBC # BLD AUTO: 4.16 MILLION/UL (ref 3.88–5.62)
WBC # BLD AUTO: 10.4 THOUSAND/UL (ref 4.31–10.16)

## 2022-03-03 PROCEDURE — 86140 C-REACTIVE PROTEIN: CPT | Performed by: PHYSICIAN ASSISTANT

## 2022-03-03 PROCEDURE — 99232 SBSQ HOSP IP/OBS MODERATE 35: CPT | Performed by: INTERNAL MEDICINE

## 2022-03-03 PROCEDURE — 99232 SBSQ HOSP IP/OBS MODERATE 35: CPT | Performed by: PHYSICIAN ASSISTANT

## 2022-03-03 PROCEDURE — 82948 REAGENT STRIP/BLOOD GLUCOSE: CPT

## 2022-03-03 PROCEDURE — 85025 COMPLETE CBC W/AUTO DIFF WBC: CPT | Performed by: PHYSICIAN ASSISTANT

## 2022-03-03 RX ORDER — FLUTICASONE FUROATE AND VILANTEROL 100; 25 UG/1; UG/1
1 POWDER RESPIRATORY (INHALATION) DAILY
Status: DISCONTINUED | OUTPATIENT
Start: 2022-03-03 | End: 2022-03-04 | Stop reason: HOSPADM

## 2022-03-03 RX ADMIN — GUAIFENESIN 600 MG: 600 TABLET, EXTENDED RELEASE ORAL at 08:01

## 2022-03-03 RX ADMIN — INSULIN LISPRO 3 UNITS: 100 INJECTION, SOLUTION INTRAVENOUS; SUBCUTANEOUS at 08:02

## 2022-03-03 RX ADMIN — BENZONATATE 100 MG: 100 CAPSULE ORAL at 08:02

## 2022-03-03 RX ADMIN — METHYLPREDNISOLONE SODIUM SUCCINATE 40 MG: 40 INJECTION, POWDER, FOR SOLUTION INTRAMUSCULAR; INTRAVENOUS at 22:01

## 2022-03-03 RX ADMIN — FLUTICASONE FUROATE AND VILANTEROL TRIFENATATE 1 PUFF: 100; 25 POWDER RESPIRATORY (INHALATION) at 11:46

## 2022-03-03 RX ADMIN — ZINC SULFATE 220 MG (50 MG) CAPSULE 220 MG: CAPSULE at 08:02

## 2022-03-03 RX ADMIN — INSULIN LISPRO 4 UNITS: 100 INJECTION, SOLUTION INTRAVENOUS; SUBCUTANEOUS at 11:47

## 2022-03-03 RX ADMIN — LISINOPRIL 5 MG: 5 TABLET ORAL at 08:01

## 2022-03-03 RX ADMIN — ASPIRIN 81 MG CHEWABLE TABLET 81 MG: 81 TABLET CHEWABLE at 08:02

## 2022-03-03 RX ADMIN — BENZONATATE 100 MG: 100 CAPSULE ORAL at 22:01

## 2022-03-03 RX ADMIN — BENZONATATE 100 MG: 100 CAPSULE ORAL at 17:22

## 2022-03-03 RX ADMIN — ATORVASTATIN CALCIUM 10 MG: 10 TABLET, FILM COATED ORAL at 08:02

## 2022-03-03 RX ADMIN — THIAMINE HCL TAB 100 MG 100 MG: 100 TAB at 08:01

## 2022-03-03 RX ADMIN — GUAIFENESIN 600 MG: 600 TABLET, EXTENDED RELEASE ORAL at 22:01

## 2022-03-03 RX ADMIN — INSULIN GLARGINE 20 UNITS: 100 INJECTION, SOLUTION SUBCUTANEOUS at 22:01

## 2022-03-03 RX ADMIN — INSULIN LISPRO 3 UNITS: 100 INJECTION, SOLUTION INTRAVENOUS; SUBCUTANEOUS at 17:22

## 2022-03-03 RX ADMIN — PANTOPRAZOLE SODIUM 40 MG: 40 TABLET, DELAYED RELEASE ORAL at 05:53

## 2022-03-03 RX ADMIN — METHYLPREDNISOLONE SODIUM SUCCINATE 40 MG: 40 INJECTION, POWDER, FOR SOLUTION INTRAMUSCULAR; INTRAVENOUS at 09:15

## 2022-03-03 RX ADMIN — POLYETHYLENE GLYCOL 3350 17 G: 17 POWDER, FOR SOLUTION ORAL at 08:02

## 2022-03-03 RX ADMIN — ENOXAPARIN SODIUM 40 MG: 40 INJECTION SUBCUTANEOUS at 08:02

## 2022-03-03 NOTE — PROGRESS NOTES
[address] [facility]  Progress Note - Shanita Mage 1955, 73 y o  male MRN: [id number]  Unit/Bed#: MS [place]7-[place] Encounter: [id number]  Primary Care Provider: Zara Bell MD   Date and time admitted to hospital: 2/18/2022  2:15 AM    * Pneumonia due to COVID-19 virus  Assessment & Plan  Admitted 02/08/2022-02/17/2022 with acute respiratory failure secondary to COVID-19 at the [facility], unfortunately left AMA on date of discharge  [facility] with shortness of breath, noted with hypoxia requiring 7L [address]  Transferred to hospitals, required ICU stay  Transferred to floor on 2/25  · Completed IV steroids-now on PO prednisone, refusing Baricitinib as it is not FDA approved  · Oxygen requirements noted to increase up to 12L midflow on 3/1- currently stable on 4L NC at rest however does desaturate with ambulation requiring 6L  · Pulmonology following appreciate input   · , CRP mildly elevated 21 7 from 7 2   · Monitor CRP/BNP/CBC/CMP  Check procal in AM  · CTA negative for PE, shows evidence of groundglass opacities with cystic vs  Bullous emphysematous changes bilateral   · Giving dose of IV lasix 3/1 given mildly elevated BNP  · Currently on IV steroids  · Encourage OOB, ambulation, pulmonary toileting  · D/w pulm on 3/1--no need for further isolation     Acute respiratory failure with hypoxia [facility])  Assessment & Plan  Patient was initially improving then up to 12L midflow 03/01, today at 6L   Patient was on 3L this AM however noted after walking to the bathroom he had to be put on 6L    · Wean oxygen as able  · Pulm following appreciate recs   · CTA negative for PE, does show evidence of COVID pneumonitis   · Mildly elevated BNP, s/p dose of IV lasix 3/1- patient states that he does not want to undergo echocardiogram    · Continue IV Solumedrol 40mg BID per pulmonology   · CRP downtrending   · Suspect will definitely require oxygen on discharge, order placed for home O2 study to be done tomorrow morning and hopeful discharge tomorrow pending results  Non MI elevated troponin  Assessment & Plan  Likely due to prolonged hypoxia 2/2 COVID  · Cardiology input appreciated   · Recommended outpatient f/u  · Continue ASA and Lipitor    Staphylococcus aureus bacteremia  Assessment & Plan  1/2, other negative  · Likely contaminant     Primary hypertension  Assessment & Plan  Well controlled  · On home lisinopril 5 mg    Type 2 diabetes mellitus [facility])  Assessment & Plan  Lab Results   Component Value Date    HGBA1C 7 7 (H) 12/06/2021       Recent Labs     03/02/22  1701 03/02/22  2058 03/03/22  0604 03/03/22  1044   POCGLU 267* 261* 255* 287*       Blood Sugar Average: Last 72 hrs:  (P) 418 [id number]     Hold home oral regimen while admitted  · On basal/bolus insulin here 2/2 steroids  · SSI  · Adjust PRN          VTE Pharmacologic Prophylaxis: VTE Score: 5 High Risk (Score >/= 5) - Pharmacological DVT Prophylaxis Ordered: enoxaparin (Lovenox)  Sequential Compression Devices Ordered  Patient Centered Rounds: I performed bedside rounds with nursing staff today  Discussions with Specialists or Other Care Team Provider: CM,RN , RT  Education and Discussions with Family / Patient: Patient declined call to   Time Spent for Care: 30 minutes  More than 50% of total time spent on counseling and coordination of care as described above  Current Length of Stay: 13 day(s)  Current Patient Status: Inpatient   Certification Statement: The patient will continue to require additional inpatient hospital stay due to IV steroids, acute hypoxic respiratory failure due to COVID   Discharge Plan: Anticipate discharge in 24-48 hrs to home  Code Status: Level 1 - Full Code    Subjective:     Patient was sitting up in bed getting ready to eat his lunch  Patient reports shortness of breath with ambulation and with straining on the toilet    However, he does feel like this has improved since admission  Patient is hopeful for discharge tomorrow  Objective:     Vitals:   Temp (24hrs), Av 9 °F (36 6 °C), Min:97 7 °F (36 5 °C), Max:98 °F (36 7 °C)    Temp:  [97 7 °F (36 5 °C)-98 °F (36 7 °C)] 98 °F (36 7 °C)  HR:  [64-96] 93  BP: ()/(55-72) 119/64  SpO2:  [90 %-95 %] 90 %  Body mass index is 26 67 kg/m²  Input and Output Summary (last 24 hours):   No intake or output data in the 24 hours ending 22 1230    Physical Exam:   Physical Exam  Constitutional:       General: He is not in acute distress  HENT:      Mouth/Throat:      Mouth: Mucous membranes are moist    Eyes:      General: No scleral icterus  Cardiovascular:      Rate and Rhythm: Normal rate and regular rhythm  Heart sounds: Normal heart sounds  Pulmonary:      Breath sounds: Rhonchi present  Comments: Coarse breath sounds bilateral lung base   Abdominal:      General: Abdomen is flat  Bowel sounds are normal       Palpations: Abdomen is soft  Musculoskeletal:      Right lower leg: No edema  Left lower leg: No edema  Skin:     General: Skin is warm  Neurological:      Mental Status: He is alert  Mental status is at baseline     Psychiatric:         Mood and Affect: Mood normal           Additional Data:     Labs:  Results from last 7 days   Lab Units 22  0601   WBC Thousand/uL 10 40*   HEMOGLOBIN g/dL 14 0   HEMATOCRIT % 40 5   PLATELETS Thousands/uL 150   NEUTROS PCT % 81*   LYMPHS PCT % 14   MONOS PCT % 4   EOS PCT % 0     Results from last 7 days   Lab Units 22  0637   SODIUM mmol/L 137   POTASSIUM mmol/L 4 0   CHLORIDE mmol/L 105   CO2 mmol/L 27   BUN mg/dL 19   CREATININE mg/dL 0 80   ANION GAP mmol/L 5   CALCIUM mg/dL 8 7   ALBUMIN g/dL 2 8*   TOTAL BILIRUBIN mg/dL 0 99   ALK PHOS U/L 82   ALT U/L 73   AST U/L 24   GLUCOSE RANDOM mg/dL 130         Results from last 7 days   Lab Units 22  1044 22  0604 22  2058 22  1701 03/02/22  1036 03/02/22  7612 03/01/22  2107 03/01/22  1531 03/01/22  1218 03/01/22  0614 02/28/22  1756 02/28/22  1239   POC GLUCOSE mg/dl 287* 255* 261* 267* 229* 126 268* 317* 150* 128 209* 199*         Results from last 7 days   Lab Units 03/02/22  0637 03/01/22  0621   PROCALCITONIN ng/ml 0 14 0 11       Lines/Drains:  Invasive Devices  Report    Peripheral Intravenous Line            Peripheral IV 03/01/22 Right Antecubital 2 days                      Imaging: No pertinent imaging reviewed      Recent Cultures (last 7 days):         Last 24 Hours Medication List:   Current Facility-Administered Medications   Medication Dose Route Frequency Provider Last Rate    acetaminophen  650 mg Oral Q4H PRN Baljinder Gupta MD      aspirin  81 mg Oral Daily Baljinder Gupta MD      atorvastatin  10 mg Oral Daily Baljinder Gupta MD      benzonatate  100 mg Oral TID NEGRITA Jacobsen      enoxaparin  40 mg Subcutaneous Q24H Idalia Nunez MD      fluticasone-vilanterol  1 puff Inhalation Daily Jesus Ponce, [address]      guaiFENesin  600 mg Oral Q12H Idalia Nunez MD      insulin glargine  20 Units Subcutaneous HS Baljinder Gupta MD      insulin lispro  1-6 Units Subcutaneous TID [facility] Baljinder Gupta MD      insulin lispro  15 Units Subcutaneous TID With Meals Baljinder Gupta MD      lisinopril  5 mg Oral Daily Baljinder Gupta MD      methylPREDNISolone sodium succinate  40 mg Intravenous Q12H [address] Jesus Casanova DO      ondansetron  4 mg Intravenous Q6H PRN Baljinder Gupta MD      pantoprazole  40 mg Oral Early Morning Baljinder Gupta MD      polyethylene glycol  17 g Oral Daily Gilda Ugalde PA-C      sodium chloride  1 spray Each Nare Q1H PRN Baljinder Gupta MD      thiamine  100 mg Oral Daily Baljinder Gupta MD      zinc sulfate  220 mg Oral Daily Baljinder Gupta MD          Today, Patient Was Seen By: Rashawn Sanches, [place]    **Please Note: This note may have been constructed using a voice recognition system  **

## 2022-03-03 NOTE — ASSESSMENT & PLAN NOTE
Lab Results   Component Value Date    HGBA1C 7 7 (H) 12/06/2021       Recent Labs     03/02/22  1701 03/02/22 2058 03/03/22  0604 03/03/22  1044   POCGLU 267* 261* 255* 287*       Blood Sugar Average: Last 72 hrs:  (P) 822 [id number]     Hold home oral regimen while admitted  · On basal/bolus insulin here 2/2 steroids  · SSI  · Adjust PRN

## 2022-03-03 NOTE — ASSESSMENT & PLAN NOTE
Patient was initially improving then up to 12L midflow 03/01, today at 6L  Patient was on 3L this AM however noted after walking to the bathroom he had to be put on 6L    · Wean oxygen as able  · Pulm following appreciate recs   · CTA negative for PE, does show evidence of COVID pneumonitis   · Mildly elevated BNP, s/p dose of IV lasix 3/1- patient states that he does not want to undergo echocardiogram    · Continue IV Solumedrol 40mg BID per pulmonology   · CRP downtrending   · Suspect will definitely require oxygen on discharge, order placed for home O2 study to be done tomorrow morning and hopeful discharge tomorrow pending results

## 2022-03-03 NOTE — ASSESSMENT & PLAN NOTE
In the setting of COVID pneumonia, see plan above   · Patient was initially improving, then up to 12L midflow 03/01  · Today,   · Pulm following appreciate recs   · CTA negative for PE, does show evidence of COVID pneumonitis   · Mildly elevated BNP, s/p dose of IV lasix 3/1  · Patient stated that he did not want to undergo echocardiogram    · Home oxygen evaluation today as outlined above

## 2022-03-03 NOTE — ASSESSMENT & PLAN NOTE
· 1/2 blood cultures grew staph coagulase negative, other culture with no growth    · Likely represents contaminant, no need for treatment

## 2022-03-03 NOTE — PLAN OF CARE
Problem: PAIN - ADULT  Goal: Verbalizes/displays adequate comfort level or baseline comfort level  Description: Interventions:  - Encourage patient to monitor pain and request assistance  - Assess pain using appropriate pain scale  - Administer analgesics based on type and severity of pain and evaluate response  - Implement non-pharmacological measures as appropriate and evaluate response  - Consider cultural and social influences on pain and pain management  - Notify physician/advanced practitioner if interventions unsuccessful or patient reports new pain  Outcome: Progressing     Problem: INFECTION - ADULT  Goal: Absence or prevention of progression during hospitalization  Description: INTERVENTIONS:  - Assess and monitor for signs and symptoms of infection  - Monitor lab/diagnostic results  - Monitor all insertion sites, i e  indwelling lines, tubes, and drains  - Monitor endotracheal if appropriate and nasal secretions for changes in amount and color  - [place] appropriate cooling/warming therapies per order  - Administer medications as ordered  - Instruct and encourage patient and family to use good hand hygiene technique  - Identify and instruct in appropriate isolation precautions for identified infection/condition  Outcome: Progressing  Goal: Absence of fever/infection during neutropenic period  Description: INTERVENTIONS:  - Monitor WBC    Outcome: Progressing     Problem: SAFETY ADULT  Goal: Patient will remain free of falls  Description: INTERVENTIONS:  - Educate patient/family on patient safety including physical limitations  - Instruct patient to call for assistance with activity   - Consult OT/PT to assist with strengthening/mobility   - Keep Call bell within reach  - Keep bed low and locked with side rails adjusted as appropriate  - Keep care items and personal belongings within reach  - Initiate and maintain comfort rounds  - Make Fall Risk Sign visible to staff  - Offer Toileting every Hours, in advance of need  - Initiate/Maintain alarm  - Obtain necessary fall risk management equipment:   - Apply yellow socks and bracelet for high fall risk patients  - Consider moving patient to room near nurses station  Outcome: Progressing  Goal: Maintain or return to baseline ADL function  Description: INTERVENTIONS:  -  Assess patient's ability to carry out ADLs; assess patient's baseline for ADL function and identify physical deficits which impact ability to perform ADLs (bathing, care of mouth/teeth, toileting, grooming, dressing, etc )  - Assess/evaluate cause of self-care deficits   - Assess range of motion  - Assess patient's mobility; develop plan if impaired  - Assess patient's need for assistive devices and provide as appropriate  - Encourage maximum independence but intervene and supervise when necessary  - Involve family in performance of ADLs  - Assess for home care needs following discharge   - Consider OT consult to assist with ADL evaluation and planning for discharge  - Provide patient education as appropriate  Outcome: Progressing  Goal: Maintains/Returns to pre admission functional level  Description: INTERVENTIONS:  - Perform BMAT or MOVE assessment daily    - Set and communicate daily mobility goal to care team and patient/family/caregiver  - Collaborate with rehabilitation services on mobility goals if consulted  - Perform Range of Motion times a day  - Reposition patient every  hours    - Dangle patient  times a day  - Stand patient  times a day  - Ambulate patient  times a day  - Out of bed to chair times a day   - Out of bed for meals  times a day  - Out of bed for toileting  - Record patient progress and toleration of activity level   Outcome: Progressing     Problem: DISCHARGE PLANNING  Goal: Discharge to home or other facility with appropriate resources  Description: INTERVENTIONS:  - Identify barriers to discharge w/patient and caregiver  - Arrange for needed discharge resources and transportation as appropriate  - Identify discharge learning needs (meds, wound care, etc )  - Arrange for interpretive services to assist at discharge as needed  - Refer to Case Management Department for coordinating discharge planning if the patient needs post-hospital services based on physician/advanced practitioner order or complex needs related to functional status, cognitive ability, or social support system  Outcome: Progressing     Problem: Knowledge Deficit  Goal: Patient/family/caregiver demonstrates understanding of disease process, treatment plan, medications, and discharge instructions  Description: Complete learning assessment and assess knowledge base    Interventions:  - Provide teaching at level of understanding  - Provide teaching via preferred learning methods  Outcome: Progressing     Problem: Potential for Falls  Goal: Patient will remain free of falls  Description: INTERVENTIONS:  - Educate patient/family on patient safety including physical limitations  - Instruct patient to call for assistance with activity   - Consult OT/PT to assist with strengthening/mobility   - Keep Call bell within reach  - Keep bed low and locked with side rails adjusted as appropriate  - Keep care items and personal belongings within reach  - Initiate and maintain comfort rounds  - Make Fall Risk Sign visible to staff  - Offer Toileting every  Hours, in advance of need  - Initiate/Maintainalarm  - Obtain necessary fall risk management equipment:   - Apply yellow socks and bracelet for high fall risk patients  - Consider moving patient to room near nurses station  Outcome: Progressing     Problem: MOBILITY - ADULT  Goal: Maintain or return to baseline ADL function  Description: INTERVENTIONS:  -  Assess patient's ability to carry out ADLs; assess patient's baseline for ADL function and identify physical deficits which impact ability to perform ADLs (bathing, care of mouth/teeth, toileting, grooming, dressing, etc )  - Assess/evaluate cause of self-care deficits   - Assess range of motion  - Assess patient's mobility; develop plan if impaired  - Assess patient's need for assistive devices and provide as appropriate  - Encourage maximum independence but intervene and supervise when necessary  - Involve family in performance of ADLs  - Assess for home care needs following discharge   - Consider OT consult to assist with ADL evaluation and planning for discharge  - Provide patient education as appropriate  Outcome: Progressing  Goal: Maintains/Returns to pre admission functional level  Description: INTERVENTIONS:  - Perform BMAT or MOVE assessment daily    - Set and communicate daily mobility goal to care team and patient/family/caregiver  - Collaborate with rehabilitation services on mobility goals if consulted  - Perform Range of Motion  times a day  - Reposition patient every  hours    - Dangle patient times a day  - Stand patient times a day  - Ambulate patient  times a day  - Out of bed to chair  times a day   - Out of bed for meals  times a day  - Out of bed for toileting  - Record patient progress and toleration of activity level   Outcome: Progressing

## 2022-03-03 NOTE — ASSESSMENT & PLAN NOTE
Admitted 02/08/2022-02/17/2022 with acute respiratory failure secondary to COVID-19 at the [facility] [place], [place], unfortunately left AMA on date of discharge  [facility] with shortness of breath, noted with hypoxia requiring 7L [address]  Transferred to hospitals, required ICU stay   Transferred to floor on 2/25  · Completed IV steroids-now on PO prednisone, refusing Baricitinib as it is not FDA approved  · Oxygen requirements noted to increase up to 12L midflow on 3/1- currently stable on 4L NC at rest however does desaturate with ambulation requiring 6L  · CTA negative for PE, shows evidence of groundglass opacities with cystic vs  Bullous emphysematous changes bilateral   · S/p dose of IV lasix 40 mg on 3/1 given mildly elevated BNP  · Pulmonology following appreciate ongoing recommendations   · Transitioned from IV Solumedrol to prolonged prednisone taper today - start prednisone 60 mg daily and decrease by 10 mg every 5 days   · Also discharged with script for Breo inhaler   · Outpatient follow-up as scheduled on 4/1  · Status post home oxygen evaluation today; requires 4-6 L NC at rest and 6 L with ambulation, will be provided with oxymizer   · No need for further isolation

## 2022-03-03 NOTE — PROGRESS NOTES
PULMONOLOGY PROGRESS NOTE     Name: Shanita Walden   Age & Sex: 73 y o  male   MRN: [id number]  Unit/Bed#: -01   Encounter: [id number]    PATIENT INFORMATION     Name: Shanita Walden   Age & Sex: 73 y o  male   MRN: [id number]  Hospital Stay Days: 13    ASSESSMENT/PLAN     Principal Problem:    Pneumonia due to COVID-19 virus  Active Problems:    Non MI elevated troponin    Type 2 diabetes mellitus ([facility] [address] )    Primary hypertension    Acute respiratory failure with hypoxia ([facility] )    Staphylococcus aureus bacteremia      Assessment:      1  Acute hypoxic respiratory failure  -secondary to COVID-19 pneumonia  Consider volume overload and heart failure  -CTA negative for pulmonary embolism     2  Moderate to severe COVID-19 pneumonia  -positive 2/8/22  -transferred out intensive care unit 2/25/22  -received course of ceftriaxone, doxycycline, Decadron, Solu-Medrol, remdesivir  Patient refused to baricitinib  -CRP decreased from 21 7 to 18 7  -CTA 3/1/22:  Peripheral/subpleural and basilar predominant ground-glass opacities with cystic versus bullous emphysematous changes bilaterally      3  Non MI troponin elevation  -likely secondary to COVID and hypoxia  -cardiology consulted and following     4  Hypertension     5  Type 2 diabetes     6  History of nicotine dependence     Plan:     -Patient currently saturating over 91% on 4 L nasal cannula  Patient saturates over 91% on 3-4 L nasal cannula at rest but does desaturate with any movement requiring up to 6 L nasal cannula  -Patient reported he became hypoxic to the 70s and 80s when he was going to the bathroom and straining  Suspect component of Valsalva causing decrease in oxygen  Patient is recovering COVID so his oxygenation is slow to recover  Have to give patient time to recover prior to increasing supplemental oxygen significantly/reflexively         -cannot call true fibrosis secondary to COVID until 6-8 months post COVID    Ground-glass opacities on CTA still suggests some degree of inflammation still present  Suspect patient will require supplemental oxygen indefinitely as of now      -continue supplemental oxygen and titrate as tolerated; goal SpO2 > 85%  -status post Lasix IV 40 mg x 1 on 3/1/22 with 1 7 L output  -continue steroids to IV Solu-Medrol 40 mg q 12 hours; will be discharged on a prolonged prednisone taper discharge   -IV Lasix p r n  for hypoxia, shortness of breath, volume management  Keep on drier side  -hypertension and diabetes management per primary team  -counseled on continued abstinence from tobacco  -consider echocardiogram  -patient should be prescribed at minimum albuterol rescue inhaler on discharge  -obtain ambulatory oxygen saturations     Disposition:  Continue to monitor patient as he is on 3-6 L nasal cannula  If he does not require greater than 6 L with ambulation he is okay to discharge on supplemental oxygen  May require Oxymizer  SUBJECTIVE     Patient seen and examined  No acute events overnight  Frustrated with this diagnosis  Resting comfortably in bed with no major complaints  OBJECTIVE     Vitals:    22 1711 22 2138 22 2202 22 0801   BP: 113/63 119/72  119/64   Pulse: 96 64 70 93   Resp:       Temp:  97 7 °F (36 5 °C)  98 °F (36 7 °C)   TempSrc:       SpO2: 93% 95% 95% 90%   Weight:       Height:          Temperature:   Temp (24hrs), Av 9 °F (36 6 °C), Min:97 7 °F (36 5 °C), Max:98 °F (36 7 °C)    Temperature: 98 °F (36 7 °C)  Intake & Output:  I/O        07 0700  0701   0700  0701  / 0700    P  O  1200      Total Intake(mL/kg) 1200 (15 1)      Urine (mL/kg/hr) 1700 (0 9)      Total Output 1700      Net -500             Unmeasured Urine Occurrence 2 x          Weights:   IBW (Ideal Body Weight): 68 4 kg    Body mass index is 26 67 kg/m²  Weight (last 2 days)     Date/Time Weight    22 0600 79 6 (175 4)        Physical Exam  Vitals reviewed  Constitutional:       General: He is not in acute distress  Appearance: He is not ill-appearing, toxic-appearing or diaphoretic  HENT:      Head: Normocephalic and atraumatic  Right Ear: External ear normal       Left Ear: External ear normal       Nose: Nose normal    Eyes:      General: No scleral icterus  Right eye: No discharge  Left eye: No discharge  Extraocular Movements: Extraocular movements intact  Conjunctiva/sclera: Conjunctivae normal    Cardiovascular:      Rate and Rhythm: Normal rate and regular rhythm  Pulses: Normal pulses  Heart sounds: Normal heart sounds  No murmur heard  No friction rub  No gallop  Pulmonary:      Effort: Pulmonary effort is normal  No respiratory distress  Breath sounds: No stridor  Rales present  No wheezing or rhonchi  Comments: Fine bibasilar rales/crackles  Chest:      Chest wall: No tenderness  Abdominal:      General: Bowel sounds are normal  There is no distension  Palpations: Abdomen is soft  Tenderness: There is no abdominal tenderness  There is no guarding or rebound  Musculoskeletal:      Right lower leg: No edema  Left lower leg: No edema  Skin:     General: Skin is warm and dry  Neurological:      Mental Status: He is alert and oriented to person, place, and time  LABORATORY DATA     Labs: I have personally reviewed pertinent reports    Results from last 7 days   Lab Units 03/03/22  0601 03/02/22  0637 03/01/22  0621   WBC Thousand/uL 10 40* 9 70 9 32   HEMOGLOBIN g/dL 14 0 13 7 13 2   HEMATOCRIT % 40 5 39 8 38 3   PLATELETS Thousands/uL 150 159 145*   NEUTROS PCT % 81* 65 70   MONOS PCT % 4 8 7      Results from last 7 days   Lab Units 03/02/22  0637 03/01/22  0621 02/26/22  0609   POTASSIUM mmol/L 4 0 4 0 4 0   CHLORIDE mmol/L 105 105 107   CO2 mmol/L 27 27 25   BUN mg/dL 19 21 19   CREATININE mg/dL 0 80 0 98 0 71   CALCIUM mg/dL 8 7 8 4 8 4   ALK PHOS U/L 82  --   -- ALT U/L 73  --   --    AST U/L 24  --   --      Results from last 7 days   Lab Units 03/01/22  0621   MAGNESIUM mg/dL 2 1     Results from last 7 days   Lab Units 03/01/22  0621   PHOSPHORUS mg/dL 4 1                      ABG:       IMAGING & DIAGNOSTIC TESTING     Radiology Results: I have personally reviewed pertinent reports  XR chest portable    Result Date: 2/20/2022  Impression: Worsened peripheral pulmonary opacities  Workstation performed: [id number]     Other Diagnostic Testing: I have personally reviewed pertinent reports      ACTIVE MEDICATIONS     Current Facility-Administered Medications   Medication Dose Route Frequency    acetaminophen (TYLENOL) tablet 650 mg  650 mg Oral Q4H PRN    aspirin chewable tablet 81 mg  81 mg Oral Daily    atorvastatin (LIPITOR) tablet 10 mg  10 mg Oral Daily    benzonatate (TESSALON PERLES) capsule 100 mg  100 mg Oral TID    enoxaparin (LOVENOX) subcutaneous injection 40 mg  40 mg Subcutaneous Q24H CATHLEEN    fluticasone-vilanterol (BREO ELLIPTA) 100-25 mcg/inh inhaler 1 puff  1 puff Inhalation Daily    guaiFENesin (MUCINEX) 12 hr tablet 600 mg  600 mg Oral Q12H CATHLEEN    insulin glargine (LANTUS) subcutaneous injection 20 Units 0 2 mL  20 Units Subcutaneous HS    insulin lispro (HumaLOG) 100 units/mL subcutaneous injection 1-6 Units  1-6 Units Subcutaneous TID AC    insulin lispro (HumaLOG) 100 units/mL subcutaneous injection 15 Units  15 Units Subcutaneous TID With Meals    lisinopril (ZESTRIL) tablet 5 mg  5 mg Oral Daily    methylPREDNISolone sodium succinate (Solu-MEDROL) injection 40 mg  40 mg Intravenous Q12H CATHLEEN    ondansetron (ZOFRAN) injection 4 mg  4 mg Intravenous Q6H PRN    pantoprazole (PROTONIX) EC tablet 40 mg  40 mg Oral Early Morning    polyethylene glycol (MIRALAX) packet 17 g  17 g Oral Daily    sodium chloride (OCEAN) 0 65 % nasal spray 1 spray  1 spray Each Nare Q1H PRN    thiamine tablet 100 mg  100 mg Oral Daily    zinc sulfate (ZINCATE) capsule 220 mg  220 mg Oral Daily           Portions of the record may have been created with voice recognition software  Occasional wrong word or "sound a like" substitutions may have occurred due to the inherent limitations of voice recognition software    Read the chart carefully and recognize, using context, where substitutions have occurred   ==  Jesus Petit, [address] [place] [address]  Pulmonary/Critical Care [facility] PGY-5

## 2022-03-03 NOTE — ASSESSMENT & PLAN NOTE
Admitted 02/08/2022-02/17/2022 with acute respiratory failure secondary to COVID-19 at the [facility], unfortunately left AMA on date of discharge  Presented again to the [facility] with shortness of breath, noted with hypoxia requiring 7L [address]  Transferred to [facility], required ICU stay  Transferred to floor on 2/25  · Completed IV steroids-now on PO prednisone, refusing Baricitinib as it is not FDA approved  · Oxygen requirements noted to increase up to 12L midflow on 3/1- currently stable on 4L NC at rest however does desaturate with ambulation requiring 6L  · Pulmonology following appreciate input   · , CRP mildly elevated 21 7 from 7 2   · Monitor CRP/BNP/CBC/CMP   Check procal in AM  · CTA negative for PE, shows evidence of groundglass opacities with cystic vs  Bullous emphysematous changes bilateral   · Giving dose of IV lasix 3/1 given mildly elevated BNP  · Currently on IV steroids  · Encourage OOB, ambulation, pulmonary toileting  · D/w pulm on 3/1--no need for further isolation

## 2022-03-03 NOTE — ASSESSMENT & PLAN NOTE
Lab Results   Component Value Date    HGBA1C 7 7 (H) 12/06/2021       Recent Labs     03/03/22  1044 03/03/22  1722 03/03/22  2122 03/04/22  0630   POCGLU 287* 251* 166* 256*       Blood Sugar Average: Last 72 hrs:  (P) 227 [id number]     · Hold home oral regimen while admitted  · Hyperglycemia likely steroid induced   · Previously on insulin gtt which was discontinued as of 2/26  · Current insulin regimen: Lantus 20 units qhs, Humalog 15 units TID with meals + SSI   · Given persistent hyperglycemia with plan for prolonged steroid taper, will plan to discharge with basal/bolus insulin   · Curbside d/w Endocrinology appreciated for discharge recommendations:  · Stop glipizide and continue current insulin regimen upon discharge  · As prednisone is being tapered patient may need to decrease insulin dosing; however, do not anticipate need to make adjustment for at least 10 days   · Encouraged ongoing discussion with PCP for management / dosing recommendations   · At this time, it is difficult to determine what his insulin requirements would be once off of steroid completely   · Diabetic diet

## 2022-03-04 VITALS
HEIGHT: 68 IN | SYSTOLIC BLOOD PRESSURE: 101 MMHG | TEMPERATURE: 98.6 F | RESPIRATION RATE: 20 BRPM | OXYGEN SATURATION: 91 % | DIASTOLIC BLOOD PRESSURE: 56 MMHG | WEIGHT: 175.8 LBS | BODY MASS INDEX: 26.64 KG/M2 | HEART RATE: 78 BPM

## 2022-03-04 PROBLEM — E11.9 TYPE 2 DIABETES MELLITUS, WITHOUT LONG-TERM CURRENT USE OF INSULIN (HCC): Status: ACTIVE | Noted: 2022-02-08

## 2022-03-04 LAB
BASOPHILS # BLD AUTO: 0.01 THOUSANDS/ΜL (ref 0–0.1)
BASOPHILS NFR BLD AUTO: 0 % (ref 0–1)
CRP SERPL QL: 7.4 MG/L
DME PARACHUTE DELIVERY DATE ACTUAL: NORMAL
DME PARACHUTE DELIVERY DATE EXPECTED: NORMAL
DME PARACHUTE DELIVERY DATE REQUESTED: NORMAL
DME PARACHUTE ITEM DESCRIPTION: NORMAL
DME PARACHUTE ORDER STATUS: NORMAL
DME PARACHUTE SUPPLIER NAME: NORMAL
DME PARACHUTE SUPPLIER PHONE: NORMAL
EOSINOPHIL # BLD AUTO: 0.01 THOUSAND/ΜL (ref 0–0.61)
EOSINOPHIL NFR BLD AUTO: 0 % (ref 0–6)
ERYTHROCYTE [DISTWIDTH] IN BLOOD BY AUTOMATED COUNT: 12.8 % (ref 11.6–15.1)
GLUCOSE SERPL-MCNC: 256 MG/DL (ref 65–140)
GLUCOSE SERPL-MCNC: 283 MG/DL (ref 65–140)
HCT VFR BLD AUTO: 38.9 % (ref 36.5–49.3)
HGB BLD-MCNC: 13.6 G/DL (ref 12–17)
IMM GRANULOCYTES # BLD AUTO: 0.14 THOUSAND/UL (ref 0–0.2)
IMM GRANULOCYTES NFR BLD AUTO: 1 % (ref 0–2)
LYMPHOCYTES # BLD AUTO: 1.51 THOUSANDS/ΜL (ref 0.6–4.47)
LYMPHOCYTES NFR BLD AUTO: 11 % (ref 14–44)
MCH RBC QN AUTO: 33.5 PG (ref 26.8–34.3)
MCHC RBC AUTO-ENTMCNC: 35 G/DL (ref 31.4–37.4)
MCV RBC AUTO: 96 FL (ref 82–98)
MONOCYTES # BLD AUTO: 0.44 THOUSAND/ΜL (ref 0.17–1.22)
MONOCYTES NFR BLD AUTO: 3 % (ref 4–12)
NEUTROPHILS # BLD AUTO: 11.41 THOUSANDS/ΜL (ref 1.85–7.62)
NEUTS SEG NFR BLD AUTO: 85 % (ref 43–75)
NRBC BLD AUTO-RTO: 0 /100 WBCS
PLATELET # BLD AUTO: 157 THOUSANDS/UL (ref 149–390)
PMV BLD AUTO: 10.5 FL (ref 8.9–12.7)
RBC # BLD AUTO: 4.06 MILLION/UL (ref 3.88–5.62)
WBC # BLD AUTO: 13.52 THOUSAND/UL (ref 4.31–10.16)

## 2022-03-04 PROCEDURE — 85025 COMPLETE CBC W/AUTO DIFF WBC: CPT | Performed by: PHYSICIAN ASSISTANT

## 2022-03-04 PROCEDURE — 99232 SBSQ HOSP IP/OBS MODERATE 35: CPT | Performed by: INTERNAL MEDICINE

## 2022-03-04 PROCEDURE — 99239 HOSP IP/OBS DSCHRG MGMT >30: CPT | Performed by: PHYSICIAN ASSISTANT

## 2022-03-04 PROCEDURE — 82948 REAGENT STRIP/BLOOD GLUCOSE: CPT

## 2022-03-04 PROCEDURE — 94761 N-INVAS EAR/PLS OXIMETRY MLT: CPT

## 2022-03-04 PROCEDURE — 86140 C-REACTIVE PROTEIN: CPT | Performed by: PHYSICIAN ASSISTANT

## 2022-03-04 RX ORDER — PREDNISONE 20 MG/1
60 TABLET ORAL DAILY
Status: DISCONTINUED | OUTPATIENT
Start: 2022-03-05 | End: 2022-03-04 | Stop reason: HOSPADM

## 2022-03-04 RX ORDER — FLUTICASONE FUROATE AND VILANTEROL TRIFENATATE 100; 25 UG/1; UG/1
1 POWDER RESPIRATORY (INHALATION) DAILY
Qty: 60 EACH | Refills: 2 | Status: SHIPPED | OUTPATIENT
Start: 2022-03-05 | End: 2022-05-06 | Stop reason: SDUPTHER

## 2022-03-04 RX ORDER — GLUCOSAMINE HCL/CHONDROITIN SU 500-400 MG
CAPSULE ORAL
Qty: 100 EACH | Refills: 0 | Status: SHIPPED | OUTPATIENT
Start: 2022-03-04

## 2022-03-04 RX ORDER — PREDNISONE 10 MG/1
50 TABLET ORAL DAILY
Qty: 25 TABLET | Refills: 0 | Status: SHIPPED | OUTPATIENT
Start: 2022-03-10 | End: 2022-03-15

## 2022-03-04 RX ORDER — PREDNISONE 20 MG/1
20 TABLET ORAL DAILY
Status: DISCONTINUED | OUTPATIENT
Start: 2022-03-25 | End: 2022-03-04 | Stop reason: HOSPADM

## 2022-03-04 RX ORDER — PREDNISONE 20 MG/1
40 TABLET ORAL DAILY
Status: DISCONTINUED | OUTPATIENT
Start: 2022-03-15 | End: 2022-03-04 | Stop reason: HOSPADM

## 2022-03-04 RX ORDER — INSULIN GLARGINE 100 [IU]/ML
20 INJECTION, SOLUTION SUBCUTANEOUS
Qty: 15 ML | Refills: 0 | Status: SHIPPED | OUTPATIENT
Start: 2022-03-04

## 2022-03-04 RX ORDER — PREDNISONE 20 MG/1
20 TABLET ORAL DAILY
Qty: 5 TABLET | Refills: 0 | Status: SHIPPED | OUTPATIENT
Start: 2022-03-25 | End: 2022-04-06 | Stop reason: HOSPADM

## 2022-03-04 RX ORDER — PREDNISONE 10 MG/1
30 TABLET ORAL DAILY
Qty: 15 TABLET | Refills: 0 | Status: SHIPPED | OUTPATIENT
Start: 2022-03-20 | End: 2022-03-25

## 2022-03-04 RX ORDER — BLOOD SUGAR DIAGNOSTIC
STRIP MISCELLANEOUS
Qty: 100 EACH | Refills: 0 | Status: SHIPPED | OUTPATIENT
Start: 2022-03-04

## 2022-03-04 RX ORDER — PREDNISONE 20 MG/1
40 TABLET ORAL DAILY
Qty: 10 TABLET | Refills: 0 | Status: SHIPPED | OUTPATIENT
Start: 2022-03-15 | End: 2022-03-20

## 2022-03-04 RX ORDER — PREDNISONE 10 MG/1
10 TABLET ORAL DAILY
Qty: 5 TABLET | Refills: 0 | Status: SHIPPED | OUTPATIENT
Start: 2022-03-30 | End: 2022-04-06 | Stop reason: HOSPADM

## 2022-03-04 RX ORDER — PREDNISONE 20 MG/1
60 TABLET ORAL DAILY
Qty: 15 TABLET | Refills: 0 | Status: SHIPPED | OUTPATIENT
Start: 2022-03-05 | End: 2022-03-10

## 2022-03-04 RX ORDER — PREDNISONE 10 MG/1
10 TABLET ORAL DAILY
Status: DISCONTINUED | OUTPATIENT
Start: 2022-03-30 | End: 2022-03-04 | Stop reason: HOSPADM

## 2022-03-04 RX ORDER — BLOOD-GLUCOSE METER
KIT MISCELLANEOUS
Qty: 1 KIT | Refills: 0 | Status: SHIPPED | OUTPATIENT
Start: 2022-03-04

## 2022-03-04 RX ORDER — PEN NEEDLE, DIABETIC 32GX 5/32"
NEEDLE, DISPOSABLE MISCELLANEOUS
Qty: 100 EACH | Refills: 0 | Status: SHIPPED | OUTPATIENT
Start: 2022-03-04

## 2022-03-04 RX ORDER — INSULIN LISPRO 100 [IU]/ML
15 INJECTION, SOLUTION INTRAVENOUS; SUBCUTANEOUS
Qty: 15 ML | Refills: 0 | Status: ON HOLD | OUTPATIENT
Start: 2022-03-04 | End: 2022-04-06 | Stop reason: SDUPTHER

## 2022-03-04 RX ORDER — LANCETS 33 GAUGE
EACH MISCELLANEOUS
Qty: 100 EACH | Refills: 0 | Status: SHIPPED | OUTPATIENT
Start: 2022-03-04

## 2022-03-04 RX ORDER — BENZONATATE 100 MG/1
100 CAPSULE ORAL 3 TIMES DAILY
Qty: 20 CAPSULE | Refills: 0 | Status: SHIPPED | OUTPATIENT
Start: 2022-03-04

## 2022-03-04 RX ADMIN — ZINC SULFATE 220 MG (50 MG) CAPSULE 220 MG: CAPSULE at 08:46

## 2022-03-04 RX ADMIN — POLYETHYLENE GLYCOL 3350 17 G: 17 POWDER, FOR SOLUTION ORAL at 08:46

## 2022-03-04 RX ADMIN — BENZONATATE 100 MG: 100 CAPSULE ORAL at 08:46

## 2022-03-04 RX ADMIN — ATORVASTATIN CALCIUM 10 MG: 10 TABLET, FILM COATED ORAL at 08:46

## 2022-03-04 RX ADMIN — PANTOPRAZOLE SODIUM 40 MG: 40 TABLET, DELAYED RELEASE ORAL at 05:37

## 2022-03-04 RX ADMIN — GUAIFENESIN 600 MG: 600 TABLET, EXTENDED RELEASE ORAL at 08:47

## 2022-03-04 RX ADMIN — METHYLPREDNISOLONE SODIUM SUCCINATE 40 MG: 40 INJECTION, POWDER, FOR SOLUTION INTRAMUSCULAR; INTRAVENOUS at 08:47

## 2022-03-04 RX ADMIN — LISINOPRIL 5 MG: 5 TABLET ORAL at 08:47

## 2022-03-04 RX ADMIN — INSULIN LISPRO 4 UNITS: 100 INJECTION, SOLUTION INTRAVENOUS; SUBCUTANEOUS at 11:37

## 2022-03-04 RX ADMIN — THIAMINE HCL TAB 100 MG 100 MG: 100 TAB at 08:47

## 2022-03-04 RX ADMIN — ASPIRIN 81 MG CHEWABLE TABLET 81 MG: 81 TABLET CHEWABLE at 08:46

## 2022-03-04 RX ADMIN — ENOXAPARIN SODIUM 40 MG: 40 INJECTION SUBCUTANEOUS at 08:46

## 2022-03-04 RX ADMIN — INSULIN LISPRO 3 UNITS: 100 INJECTION, SOLUTION INTRAVENOUS; SUBCUTANEOUS at 08:40

## 2022-03-04 RX ADMIN — FLUTICASONE FUROATE AND VILANTEROL TRIFENATATE 1 PUFF: 100; 25 POWDER RESPIRATORY (INHALATION) at 08:47

## 2022-03-04 NOTE — CASE MANAGEMENT
Case Management Discharge Planning Note    Patient name Ying Murguia  Location [address] [id number] MRN [id number]  : 1955 Date 3/4/2022       Current Admission Date: 2022  Current Admission Diagnosis:Pneumonia due to COVID-19 virus   Patient Active Problem List    Diagnosis Date Noted    Positive blood culture 2022    Acute respiratory failure with hypoxia ([facility] [address] ) 2022    Pneumonia due to COVID-19 virus 2022    Non MI elevated troponin 2022    Type 2 diabetes mellitus, without long-term current use of insulin ([facility] [address] ) 2022    Primary hypertension 2022      LOS (days): 14  Geometric Mean LOS (GMLOS) (days): 5 40  Days to GMLOS:-8 9     OBJECTIVE:  Risk of Unplanned Readmission Score: 15         Current admission status: Inpatient   Preferred Pharmacy:    [facility]  [facility] [address] [place] [address]  Phone: [phone] Fax: [phone] [address] [place] [address]  Phone: [phone] Fax: [phone]    Primary Care Provider: Rafael Youngblood MD    Primary Insurance: [address] [place] [address]  Secondary Insurance: MEDICARE    DISCHARGE DETAILS:    Discharge planning discussed with[de-identified] Pt  Freedom of Choice: Yes  Comments - Freedom of Choice: Cm discussed need for home oxygen and oxymizer   Pt agreeable to using [employer]         Contacts  Reason/Outcome: Discharge Planning         DME Referral Provided  Referral made for DME?: Yes  DME referral completed for the following items[de-identified] Portable Oxygen concentrator,Home Oxygen concentrator,Other (Oxymizer pendant)  DME Supplier Name[de-identified] [employer]    Other Referral/Resources/Interventions Provided:  Referral Comments: Referral made for Home oxygen and oxymizer pendant via parachute

## 2022-03-04 NOTE — PROGRESS NOTES
Reviewed use of insulin drawn up and also rotation of sites and use of insulin pen with pt and pt know how to use pen already, he was very receptive to teaching

## 2022-03-04 NOTE — DISCHARGE SUMMARY
[address] [facility]  Discharge- Ten Núñez 1955, 73 y o  male MRN: [id number]  Unit/Bed#: -01 Encounter: [id number]  Primary Care Provider: Sunitha Franklin MD   Date and time admitted to hospital: 2/18/2022  2:15 AM    * Pneumonia due to COVID-19 virus  Assessment & Plan  Admitted 02/08/2022-02/17/2022 with acute respiratory failure secondary to COVID-19 at the [facility], unfortunately left AMA on date of discharge  [facility] with shortness of breath, noted with hypoxia requiring 7L [facility]  Transferred to [facility], required ICU stay   Transferred to floor on 2/25  · Completed IV steroids-now on PO prednisone, refusing Baricitinib as it is not FDA approved  · Oxygen requirements noted to increase up to 12L midflow on 3/1- currently stable on 4L NC at rest however does desaturate with ambulation requiring 6L  · CTA negative for PE, shows evidence of groundglass opacities with cystic vs  Bullous emphysematous changes bilateral   · S/p dose of IV lasix 40 mg on 3/1 given mildly elevated BNP  · Pulmonology following appreciate ongoing recommendations   · Transitioned from IV Solumedrol to prolonged prednisone taper today - start prednisone 60 mg daily and decrease by 10 mg every 5 days   · Also discharged with script for Breo inhaler   · Outpatient follow-up as scheduled on 4/1  · Status post home oxygen evaluation today; requires 4-6 L NC at rest and 6 L with ambulation, will be provided with oxymizer   · No need for further isolation     Acute respiratory failure with hypoxia ([address] )  Assessment & Plan  In the setting of COVID pneumonia, see plan above   · Patient was initially improving, then up to 12L midflow 03/01  · Today,   · Pulm following appreciate recs   · CTA negative for PE, does show evidence of COVID pneumonitis   · Mildly elevated BNP, s/p dose of IV lasix 3/1  · Patient stated that he did not want to undergo echocardiogram    · Home oxygen evaluation today as outlined above     Type 2 diabetes mellitus, without long-term current use of insulin [facility])  Assessment & Plan  Lab Results   Component Value Date    HGBA1C 7 7 (H) 12/06/2021       Recent Labs     03/03/22  1044 03/03/22  1722 03/03/22  2122 03/04/22  0630   POCGLU 287* 251* 166* 256*       Blood Sugar Average: Last 72 hrs:  (P) 227 [id number]     · Hold home oral regimen while admitted  · Hyperglycemia likely steroid induced   · Previously on insulin gtt which was discontinued as of 2/26  · Current insulin regimen: Lantus 20 units qhs, Humalog 15 units TID with meals + SSI   · Given persistent hyperglycemia with plan for prolonged steroid taper, will plan to discharge with basal/bolus insulin   · Curbside d/w Endocrinology appreciated for discharge recommendations:  · Stop glipizide and continue current insulin regimen upon discharge  · As prednisone is being tapered patient may need to decrease insulin dosing; however, do not anticipate need to make adjustment for at least 10 days   · Encouraged ongoing discussion with PCP for management / dosing recommendations   · At this time, it is difficult to determine what his insulin requirements would be once off of steroid completely   · Diabetic diet     Positive blood culture  Assessment & Plan  · 1/2 blood cultures grew staph coagulase negative, other culture with no growth    · Likely represents contaminant, no need for treatment     Primary hypertension  Assessment & Plan  · BP acceptable, monitor routinely   · Continued on home lisinopril 5 mg    Non MI elevated troponin  Assessment & Plan  Likely due to prolonged hypoxia 2/2 COVID  · Cardiology input appreciated   · Recommended outpatient f/u  · Continue ASA and Lipitor    Medical Problems             Resolved Problems  Date Reviewed: 3/4/2022    None              Discharging Physician / Practitioner: Lane Ron PA-C  PCP: Ag Del Cid MD  Admission Date:   Admission Orders (From admission, onward)     Ordered        02/18/22 0058  Inpatient Admission  Once                      Discharge Date: 03/04/22    Consultations During Hospital Stay:  · Cardiology   · Pulmonology     Procedures Performed:   · none    Significant Findings / Test Results:   CTA chest pe study   Final Result by Jodi Lehman MD (03/01 1309)      1  No acute pulmonary embolism  2   Covid-19 pneumonia, progressed from chest x-rays in early February, now with developing cystic changes, mostly in a subpleural distribution, with traction bronchiectasis at the lung bases  This may represent early fibrosis though continued follow-up    is recommended  3   Paraseptal emphysema  Workstation performed: [id number]         XR chest portable   Final Result by Bree Perez MD (03/01 7378)      Extensive peripheral infiltrates again seen bilaterally, partially improved on the right, stable on the left  Findings again most suggestive of Covid 19 pneumonia  Workstation performed: [id number]         VAS lower limb venous duplex study, complete bilateral   Final Result by Valentino Slates, MD (02/21 6180)      XR chest portable   Final Result by Daija Horowitz MD (02/20 9500)      Worsened peripheral pulmonary opacities  Workstation performed: [id number]         ·   · Significant findings outlined above     Incidental Findings:   · none    Test Results Pending at Discharge (will require follow up):   · none     Outpatient Tests Requested:  · Follow-up with PCP and pulmonary   · Consider repeat CT chest  · Consider echocardiogram     Complications:  none    Reason for Admission: acute hypoxic respiratory failure due to COVID-19 pneumonia     Hospital Course:   Lex Dailey is a 73 y o  male patient who originally presented to the hospital on 2/18/2022 due to worsening SOB in the setting of known COVID infection   He had been hospitalized from 2/8/22-2/17/22 at [facility] but left AMA prior to being medically stable  Several hours later called EMS due to SOB and was hypoxic with sats in the 70s and was taken to [address]  He was found to have elevated troponin up to 3350 with reportedly concerning EKG changes and therefore was transferred to [facility] for cardiology evaluation and further management of respiratory failure  Cardiology felt troponin elevation was not due to MI but rather due to hypoxia/COVID pneumonia and did not recommend further workup  Patient had completed 5 days of remdesivir at previous campus  He refused Baricitinib  He was given 1 dose of dexamethasone on admission to complete total 10 day course  Due to worsening oxygen requirements, requiiring HFNC, patient was transferred to critical care 2/20  He was started on IV Solumedrol and noted to have improving inflammatory markers, so was recommended slow tapering  He eventually improved to [facility] and transferred out of ICU on 2/25  Of note, patient did require intermittent IV Lasix dosing, however he refused echocardiogram while admitted  Oxygen requirements continued to improve, at time of discharge he is requiring 4 L at rest and 6 L with exertion  He was provided oxymizer prior to discharge  He was transitioned from IV solumedrol to prolonged steroid taper at time of discharge  He should continue with PPI ppx while on prednisone, which he already has a prescription for  Pulmonary also started on Breo  He will follow-up with Pulmonary medicine on 4/1/22  Patient with steroid induced hyperglycemia during his admission  A1c shows poor control outpatient  He did require insulin infusion while in ICU and transitioned to basal/bolus insulin on 2/26  Given high dosing requirement with plan for prolonged steroid taper, patient was discharged with new start to basal/bolus insulin  Case was discussed with endocrine who recommended stopping glipizide and continuing with metformin and Ozempic   He will be discharged on current insulin regimen as outlined above  He was advised to follow closely with PCP to determine need for decreasing and/or discontinuing insulin therapy with ongoing steroid taper  Patient expressed understanding and is in agreement with this plan of care  Please see above list of diagnoses and related plan for additional information  Condition at Discharge: stable    Discharge Day Visit / Exam:   Subjective:  Patient offers no new complaints  Notes persistent, dry cough  Denies SOB  Katty Simpler for discharge  Discussed insulin upon discharge and need for blood sugar monitoring and possible decrease in dose and steroids are weaned  Advised him to keep in contact with his PCP in this regard for further recommendations as steroids are weaned  Patient expressed understanding  Vitals: Blood Pressure: 101/56 (03/04/22 0852)  Pulse: 78 (03/04/22 0900)  Temperature: 98 6 °F (37 °C) (03/04/22 0852)  Temp Source: Oral (03/01/22 0726)  Respirations: 20 (03/04/22 0900)  Height: 5' 8" (172 7 cm) (02/19/22 2225)  Weight - Scale: 79 7 kg (175 lb 12 8 oz) (03/04/22 0550)  SpO2: 91 % (03/04/22 1053)  Exam:   Physical Exam  Vitals and nursing note reviewed  Constitutional:       General: He is not in acute distress  Cardiovascular:      Rate and Rhythm: Regular rhythm  Tachycardia present  Comments: HR low 100s  Pulmonary:      Effort: Pulmonary effort is normal  No respiratory distress  Breath sounds: No wheezing, rhonchi or rales  Comments: SpO2 low 90s on 4-5 L NC  Neurological:      Mental Status: He is alert  Discussion with Family: Patient declined call to   Discharge instructions/Information to patient and family:   See after visit summary for information provided to patient and family  Provisions for Follow-Up Care:  See after visit summary for information related to follow-up care and any pertinent home health orders         Disposition:   Home    Planned Readmission: no     Discharge Statement:  I spent 45 minutes discharging the patient  This time was spent on the day of discharge  I had direct contact with the patient on the day of discharge  Greater than 50% of the total time was spent examining patient, answering all patient questions, arranging and discussing plan of care with patient as well as directly providing post-discharge instructions  Additional time then spent on discharge activities  Discharge Medications:  See after visit summary for reconciled discharge medications provided to patient and/or family        **Please Note: This note may have been constructed using a voice recognition system**

## 2022-03-04 NOTE — PROGRESS NOTES
PULMONOLOGY PROGRESS NOTE     Name: Cassia Rodriguez   Age & Sex: 73 y o  male   MRN: [id number]  Unit/Bed#: -01   Encounter: [id number]    PATIENT INFORMATION     Name: Cassia Rodriguez   Age & Sex: 73 y o  male   MRN: [id number]  Hospital Stay Days: 14    ASSESSMENT/PLAN     Principal Problem:    Pneumonia due to COVID-19 virus  Active Problems:    Non MI elevated troponin    Type 2 diabetes mellitus, without long-term current use of insulin ([facility] [address] )    Primary hypertension    Acute respiratory failure with hypoxia ([facility] [address] )    Positive blood culture      Assessment:      1  Acute hypoxic respiratory failure  -secondary to COVID-19 pneumonia  Consider volume overload and heart failure  -CTA negative for pulmonary embolism     2  Moderate to severe COVID-19 pneumonia  -positive 2/8/22  -transferred out intensive care unit 2/25/22  -received course of ceftriaxone, doxycycline, Decadron, Solu-Medrol, remdesivir  Patient refused to baricitinib  -CRP decreased from 21 7 to 7 4  -CTA 3/1/22:  Peripheral/subpleural and basilar predominant ground-glass opacities with cystic versus bullous emphysematous changes bilaterally      3  Non MI troponin elevation  -likely secondary to COVID and hypoxia  -cardiology consulted and following     4  Hypertension     5  Type 2 diabetes     6  History of nicotine dependence     Plan:     -Patient currently saturating over 91% on 4 L nasal cannula  Patient saturates over 91% on 3-4 L nasal cannula at rest but does desaturate with any movement requiring up to 6 L nasal cannula      -Patient reported he became hypoxic to the 70s and 80s when he was going to the bathroom and straining  Suspect component of Valsalva causing decrease in oxygen  Patient is recovering COVID so his oxygenation is slow to recover    Have to give patient time to recover prior to increasing supplemental oxygen significantly/reflexively         -cannot call true fibrosis secondary to COVID until 6-8 months post COVID  Ground-glass opacities on CTA still suggests some degree of inflammation still present  Suspect patient will require supplemental oxygen indefinitely as of now      -continue supplemental oxygen and titrate as tolerated; goal SpO2 > 85%  -status post Lasix IV 40 mg x 1 on 3/1/22 with 1 7 L output   -IV Lasix p r n  for hypoxia, shortness of breath, volume management  Keep on drier side  -hypertension and diabetes management per primary team  -counseled on continued abstinence from tobacco  -consider echocardiogram  -patient should be prescribed at minimum albuterol rescue inhaler on discharge  -discharge patient on supplemental oxygen; home oxygen with portability    -patient stable for discharge from pulmonary standpoint with supplemental oxygen  -placed order for Oxymizer in the discharge tab  -prescribed prednisone taper and Breo inhaler in the discharge tab; patient requested that his medications be sent to him by a home start pharmacy at [address] prior to discharge  -he has follow-up with Pulmonary at the [address] [place] [address] on 22 at 3:00 p m   -pulmonary will sign off; please tiger text with any questions or concerns    SUBJECTIVE     Patient seen and examined  No acute events overnight  Resting comfortably no acute distress  Anxious to return home  No major complaints  OBJECTIVE     Vitals:    22 0550 22 0852 22 0900 22 1053   BP:  101/56     Pulse:  72 78    Resp:   20    Temp:  98 6 °F (37 °C)     TempSrc:       SpO2:  91%  91%   Weight: 79 7 kg (175 lb 12 8 oz)      Height:          Temperature:   Temp (24hrs), Av 2 °F (36 8 °C), Min:97 7 °F (36 5 °C), Max:98 6 °F (37 °C)    Temperature: 98 6 °F (37 °C)  Intake & Output:  I/O       / 0701  / 0700  0701  / 0700 / 0701  / 0700    P  O   810     Total Intake(mL/kg)  810 (10 2)     Urine (mL/kg/hr)  1800 (0 9)     Total Output  1800     Net  -990            Unmeasured Urine Occurrence  2 x Unmeasured Stool Occurrence  1 x         Weights:   IBW (Ideal Body Weight): 68 4 kg    Body mass index is 26 73 kg/m²  Weight (last 2 days)     Date/Time Weight    03/04/22 0550 79 7 (175 8)    03/04/22 0500 79 7 (175 8)        Physical Exam  Vitals reviewed  Constitutional:       General: He is not in acute distress  Appearance: He is not ill-appearing, toxic-appearing or diaphoretic  HENT:      Head: Normocephalic and atraumatic  Right Ear: External ear normal       Left Ear: External ear normal       Nose: Nose normal    Eyes:      General: No scleral icterus  Right eye: No discharge  Left eye: No discharge  Extraocular Movements: Extraocular movements intact  Conjunctiva/sclera: Conjunctivae normal    Cardiovascular:      Rate and Rhythm: Normal rate and regular rhythm  Pulses: Normal pulses  Heart sounds: Normal heart sounds  No murmur heard  No friction rub  No gallop  Pulmonary:      Effort: Pulmonary effort is normal  No respiratory distress  Breath sounds: No stridor  Rales present  No wheezing or rhonchi  Comments: Bibasilar rales/crackles  Chest:      Chest wall: No tenderness  Abdominal:      General: Bowel sounds are normal  There is no distension  Palpations: Abdomen is soft  Tenderness: There is no abdominal tenderness  There is no guarding or rebound  Musculoskeletal:      Right lower leg: No edema  Left lower leg: No edema  Skin:     General: Skin is warm and dry  Neurological:      Mental Status: He is alert and oriented to person, place, and time  LABORATORY DATA     Labs: I have personally reviewed pertinent reports    Results from last 7 days   Lab Units 03/04/22  0510 03/03/22  0601 03/02/22  0637   WBC Thousand/uL 13 52* 10 40* 9 70   HEMOGLOBIN g/dL 13 6 14 0 13 7   HEMATOCRIT % 38 9 40 5 39 8   PLATELETS Thousands/uL 157 150 159   NEUTROS PCT % 85* 81* 65   MONOS PCT % 3* 4 8      Results from last 7 days   Lab Units 03/02/22  0637 03/01/22  0621 02/26/22  0609   POTASSIUM mmol/L 4 0 4 0 4 0   CHLORIDE mmol/L 105 105 107   CO2 mmol/L 27 27 25   BUN mg/dL 19 21 19   CREATININE mg/dL 0 80 0 98 0 71   CALCIUM mg/dL 8 7 8 4 8 4   ALK PHOS U/L 82  --   --    ALT U/L 73  --   --    AST U/L 24  --   --      Results from last 7 days   Lab Units 03/01/22  0621   MAGNESIUM mg/dL 2 1     Results from last 7 days   Lab Units 03/01/22  0621   PHOSPHORUS mg/dL 4 1                      ABG:       IMAGING & DIAGNOSTIC TESTING     Radiology Results: I have personally reviewed pertinent reports  XR chest portable    Result Date: 2/20/2022  Impression: Worsened peripheral pulmonary opacities  Workstation performed: [id number]     Other Diagnostic Testing: I have personally reviewed pertinent reports      ACTIVE MEDICATIONS     Current Facility-Administered Medications   Medication Dose Route Frequency    acetaminophen (TYLENOL) tablet 650 mg  650 mg Oral Q4H PRN    aspirin chewable tablet 81 mg  81 mg Oral Daily    atorvastatin (LIPITOR) tablet 10 mg  10 mg Oral Daily    benzonatate (TESSALON PERLES) capsule 100 mg  100 mg Oral TID    enoxaparin (LOVENOX) subcutaneous injection 40 mg  40 mg Subcutaneous Q24H CATHLEEN    fluticasone-vilanterol (BREO ELLIPTA) 100-25 mcg/inh inhaler 1 puff  1 puff Inhalation Daily    guaiFENesin (MUCINEX) 12 hr tablet 600 mg  600 mg Oral Q12H CATHLEEN    insulin glargine (LANTUS) subcutaneous injection 20 Units 0 2 mL  20 Units Subcutaneous HS    insulin lispro (HumaLOG) 100 units/mL subcutaneous injection 1-6 Units  1-6 Units Subcutaneous TID AC    insulin lispro (HumaLOG) 100 units/mL subcutaneous injection 15 Units  15 Units Subcutaneous TID With Meals    lisinopril (ZESTRIL) tablet 5 mg  5 mg Oral Daily    ondansetron (ZOFRAN) injection 4 mg  4 mg Intravenous Q6H PRN    pantoprazole (PROTONIX) EC tablet 40 mg  40 mg Oral Early Morning    polyethylene glycol (MIRALAX) packet 17 g  17 g Oral Daily    [START ON 3/5/2022] predniSONE tablet 60 mg  60 mg Oral Daily    Followed by   Heather Curtis ON 3/10/2022] predniSONE tablet 50 mg  50 mg Oral Daily    Followed by   Heather Curtis ON 3/15/2022] predniSONE tablet 40 mg  40 mg Oral Daily    Followed by   Heather Curtis ON 3/20/2022] predniSONE tablet 30 mg  30 mg Oral Daily    Followed by   Heather Curtis ON 3/25/2022] predniSONE tablet 20 mg  20 mg Oral Daily    Followed by   Heather Curtis ON 3/30/2022] predniSONE tablet 10 mg  10 mg Oral Daily    sodium chloride (OCEAN) 0 65 % nasal spray 1 spray  1 spray Each Nare Q1H PRN    thiamine tablet 100 mg  100 mg Oral Daily    zinc sulfate (ZINCATE) capsule 220 mg  220 mg Oral Daily         Portions of the record may have been created with voice recognition software  Occasional wrong word or "sound a like" substitutions may have occurred due to the inherent limitations of voice recognition software    Read the chart carefully and recognize, using context, where substitutions have occurred   ==  Jesus Wan, [address] [place] [address]  Pulmonary/[facility] Care [facility] PGY-5

## 2022-03-04 NOTE — PROGRESS NOTES
o2 tank delivered to room x 2 and home o2 to be delivered in next 2-3 hours, oxygen  has been in room with pt for very long time instructing in use

## 2022-03-04 NOTE — PLAN OF CARE
Problem: PAIN - ADULT  Goal: Verbalizes/displays adequate comfort level or baseline comfort level  Description: Interventions:  - Encourage patient to monitor pain and request assistance  - Assess pain using appropriate pain scale  - Administer analgesics based on type and severity of pain and evaluate response  - Implement non-pharmacological measures as appropriate and evaluate response  - Consider cultural and social influences on pain and pain management  - Notify physician/advanced practitioner if interventions unsuccessful or patient reports new pain  Outcome: Progressing     Problem: INFECTION - ADULT  Goal: Absence or prevention of progression during hospitalization  Description: INTERVENTIONS:  - Assess and monitor for signs and symptoms of infection  - Monitor lab/diagnostic results  - Monitor all insertion sites, i e  indwelling lines, tubes, and drains  - Monitor endotracheal if appropriate and nasal secretions for changes in amount and color  - [place] appropriate cooling/warming therapies per order  - Administer medications as ordered  - Instruct and encourage patient and family to use good hand hygiene technique  - Identify and instruct in appropriate isolation precautions for identified infection/condition  Outcome: Progressing

## 2022-03-04 NOTE — PROGRESS NOTES
All d/c instructions reviewed with pt till her verbalized understanding of them, pt will be wheeled to entrance and [facility] on way out with nurse aide and his o2 tanks, he is very anxious to go

## 2022-03-04 NOTE — PLAN OF CARE
Problem: PAIN - ADULT  Goal: Verbalizes/displays adequate comfort level or baseline comfort level  Description: Interventions:  - Encourage patient to monitor pain and request assistance  - Assess pain using appropriate pain scale  - Administer analgesics based on type and severity of pain and evaluate response  - Implement non-pharmacological measures as appropriate and evaluate response  - Consider cultural and social influences on pain and pain management  - Notify physician/advanced practitioner if interventions unsuccessful or patient reports new pain  3/4/2022 1455 by Deejay Chavez RN  Outcome: Adequate for Discharge  3/4/2022 1101 by Deejay Chavez RN  Outcome: Progressing

## 2022-03-04 NOTE — RESPIRATORY THERAPY NOTE
Home Oxygen Qualifying Test     Patient name: Brandi Franco        : 1955   Date of Test:  2022  Diagnosis:    Home Oxygen Test:    **Medicare Guidelines require item(s) 1-5 on all ambulatory patients or 1 and 2 on non-ambulatory patients  1  Baseline SPO2 on Room Air at rest 86 %   a  If <= 88% on Room Air add O2 via NC to obtain SpO2 >=88%  If LPM needed, document LPM 1 needed to reach =>88%    2  SPO2 during exertion on Room Air N/A  %  a  During exertion monitor SPO2  If SPO2 increases >=89%, do not add supplemental oxygen    3  SPO2 on Oxygen at Rest 92 % at 4 LPM    4  SPO2 during exertion on Oxygen 75 % at 6 LPM    5  Test performed during exertion activity  [x]  Supplemental Home Oxygen is indicated  []  Client does not qualify for home oxygen  Respiratory Additional Notes- Pt ambulated in his room x6 minutes   O2 lpm increased as needed, to maximum of 6 lpm  Recommend oxygen conserving device due to high oxygen requirements    Manuel Velez RT

## 2022-03-04 NOTE — PROGRESS NOTES
[address] [facility], [address] called to confirm pt's address and phone number and credit card info, phone given to pt to do so, pt has glucometer at home already and knows how to check his BS's, will review insulin with pt as well

## 2022-03-07 NOTE — UTILIZATION REVIEW
Notification of Discharge   This is a Notification of Discharge from our facility [address]  Please be advised that this patient has been discharge from our facility  Below you will find the admission and discharge date and time including the patients disposition  UTILIZATION REVIEW CONTACT:  Alexander Rome  Utilization   Network Utilization Review Department  Phone: [phone] x carefully listen to the prompts  All voicemails are confidential   Email: [email or web address]     PHYSICIAN ADVISORY SERVICES:  FOR [id number] REVIEW - MEDICAL NECESSITY DENIAL  Phone: [phone]  Fax: [phone]  Email: [email or web address]     PRESENTATION DATE: 2/18/2022  2:15 AM  OBERVATION ADMISSION DATE:   INPATIENT ADMISSION DATE: 2/18/22  2:15 AM   DISCHARGE DATE: 3/4/2022  4:44 PM  DISPOSITION: Home/Self Care Home/Self Care      IMPORTANT INFORMATION:  Send all requests for admission clinical reviews, approved or denied determinations and any other requests to dedicated fax number below belonging to the campus where the patient is receiving treatment   List of dedicated fax numbers:  [address] DENIALS (Administrative/Medical Necessity) [phone]   [address] (Maternity/NICU/Pediatrics) [phone]   [facility] [phone]   130 [facility] [phone]   [address] [place] [address] [phone]   2000 [facility] [address] [place] [address],[address] [phone]   [facility]  [phone]   2205 [facility]  2401 [facility] [place] [facility] [place] 1000 [facility] [phone]

## 2022-03-17 ENCOUNTER — OFFICE VISIT (OUTPATIENT)
Dept: CARDIOLOGY CLINIC | Facility: CLINIC | Age: 67
End: 2022-03-17
Payer: COMMERCIAL

## 2022-03-17 VITALS
BODY MASS INDEX: 28.95 KG/M2 | HEIGHT: 68 IN | WEIGHT: 191 LBS | DIASTOLIC BLOOD PRESSURE: 66 MMHG | HEART RATE: 88 BPM | SYSTOLIC BLOOD PRESSURE: 104 MMHG

## 2022-03-17 DIAGNOSIS — Z82.49 FAMILY HISTORY OF EARLY CAD: ICD-10-CM

## 2022-03-17 DIAGNOSIS — R06.02 SHORTNESS OF BREATH: Primary | ICD-10-CM

## 2022-03-17 DIAGNOSIS — R77.8 ELEVATED TROPONIN: ICD-10-CM

## 2022-03-17 DIAGNOSIS — E66.3 OVERWEIGHT (BMI 25.0-29.9): ICD-10-CM

## 2022-03-17 DIAGNOSIS — E11.9 TYPE 2 DIABETES MELLITUS WITHOUT COMPLICATION, WITHOUT LONG-TERM CURRENT USE OF INSULIN (HCC): ICD-10-CM

## 2022-03-17 DIAGNOSIS — J96.11 CHRONIC HYPOXEMIC RESPIRATORY FAILURE (HCC): ICD-10-CM

## 2022-03-17 PROCEDURE — 99214 OFFICE O/P EST MOD 30 MIN: CPT | Performed by: INTERNAL MEDICINE

## 2022-03-17 RX ORDER — ASCORBIC ACID 500 MG
TABLET ORAL DAILY
COMMUNITY

## 2022-03-17 RX ORDER — MULTIVITAMIN
1 TABLET ORAL DAILY
COMMUNITY

## 2022-03-17 NOTE — PROGRESS NOTES
[facility] [address] [address]  Phone#  [phone]  Fax#  [phone]                                               Cardiology Office Consult  Jewels Flores, 73 y o  male  YOB: 1955  MRN: [id number] Encounter: [id number]      PCP - Suman Guadalupe MD  Referring Provider - Self, Referral    Chief Complaint   Patient presents with   [facility] follow up       Assessment  1  Shortness of breath  2  Subacute/Chronic hypoxic respiratory failure  3  Elevated troponins  4  Family history of early CAD  · Father  of a heart attack at age 48  7  Diabetes Mellitus Type 2  6  Overweight, Body mass index is 29 04 kg/m²  Plan  Shortness of breath,  Hypoxic respiratory failure, Family h/o early CAD, elevated troponin levels  · Ongoing since late 2022, remains on ambulatory oxygen needing 3 L  · He was subsequently diagnosed with COVID at that time and was initially being monitored on pulse ox at home, but subsequently due to worsening hypoxia was admitted to the hospital   · At that time, he also had elevated troponins to 3350 --> 3675, but after cardiology evaluation this was felt to be Non-MI troponin elevation from COVID infection  · ECG - 22 -  Normal sinus rhythm, left axis deviation, right bundle-branch block  ·  with ongoing continued shortness of breath, multiple risk factors for CAD including a family history of early heart attack and sudden death in early 46s in his father,  He needs further evaluation for this   · Check echocardiogram  · Check nuclear Lexiscan stress test to evaluate for ischemia  · Has follow up with pulmonary as well --> steroids and oxygen management per pulm/PCP      No results found for this visit on 22      Orders Placed This Encounter   Procedures    Echo complete w/ contrast if indicated     Return in about 4 weeks (around 2022), or if symptoms worsen or fail to improve  History of Present Illness   73 y o  male,  (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), comes in as a new patient for consultation regarding ongoing symptoms of shortness of breath and hypoxia as well as elevated troponins noted during recent hospitalization  In late 2022, 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 and subsequently saw his PCP who recommended a COVID test and home pulse ox monitoring  He tested positive and was being monitored at home, but then developed hypoxia and as a result presented to [address] [place] [address] ED on 2022  He had mild troponin elevation (112 --> 235 --> 191) and was evaluated by Cardiology Dr Markus Wong, and was felt to be non-MI troponin elevation related to COVID infection  He was treated with remdesivir and then left AMA on 22  He subsequently presented back to [facility] ED the same night, and was found to have lactic acidosis and found to have more elevated troponin levels (3350 --> 3675)  After review with on-call cardiologist Madisyn Pennington,  He was transferred to [facility] for cardiac evaluation  He was seen by Cardiology Dr Spike martinez and this was felt to be all related to [place] and as a result he was recommended outpatient cardiology follow up  He remained in the hospital for another 2 weeks and was eventually discharged on 2022 on oxygen and steroid therapy  Since discharge, he continues to be hypoxic with ambulation and continues to need ambulatory oxygen  He has no complains of chest pain, palpitations, dizziness    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  and as a result is here to complete cardiology evaluation    Family history -   Mother - diabetic, had gangrene,   Father  of heart attack at 48      Historical Information   Past Medical History:   Diagnosis Date    COVID-19 2022    Diabetes mellitus ([facility] [address] )     Hypertension      Past Surgical History: Procedure Laterality Date    ANKLE FRACTURE SURGERY Left     FOOT FRACTURE SURGERY      KNEE SURGERY Right     VASECTOMY       Family History   Problem Relation Age of Onset    Diabetes Mother     Alcohol abuse Father      Current Outpatient Medications on File Prior to Visit   Medication Sig Dispense Refill    Alcohol Swabs 70 % PADS May substitute brand based on insurance coverage  Check glucose TID  100 each 0    ascorbic acid (VITAMIN C) 500 mg tablet in the morning      atorvastatin (LIPITOR) 10 mg tablet Take 10 mg by mouth daily      benzonatate (TESSALON PERLES) 100 mg capsule Take 1 capsule (100 mg total) by mouth 3 (three) times a day 20 capsule 0    Blood Glucose Monitoring Suppl (OneTouch Verio Reflect) w/Device KIT May substitute brand based on insurance coverage  Check glucose TID  1 kit 0    fluticasone-vilanterol (Breo Ellipta) 100-25 mcg/inh inhaler Inhale 1 puff daily Rinse mouth after use  60 each 2    glucose blood (OneTouch Verio) test strip May substitute brand based on insurance coverage  Check glucose TID  100 each 0    insulin glargine (Lantus SoloStar) 100 units/mL injection pen Inject 20 Units under the skin daily at bedtime 15 mL 0    insulin lispro (HumaLOG KwikPen) 100 units/mL injection pen Inject 15 Units under the skin 3 (three) times a day with meals 15 mL 0    Insulin Pen Needle (BD Pen Needle Silvia 2nd Gen) 32G X 4 MM MISC For use with insulin pen  Pharmacy may dispense brand covered by insurance  100 each 0    Insulin Pen Needle (BD Pen Needle Silvia 2nd Gen) 32G X 4 MM MISC For use with insulin pen  Pharmacy may dispense brand covered by insurance   100 each 0    lisinopril (ZESTRIL) 5 mg tablet Take 5 mg by mouth daily      metFORMIN (GLUCOPHAGE) 1000 MG tablet Take 1,000 mg by mouth 2 (two) times a day with meals      Multiple Vitamin (multivitamin) tablet Take 1 tablet by mouth daily      OneTouch Delica Lancets 82Q MISC May substitute brand based on insurance coverage  Check glucose TID  100 each 0    pantoprazole (PROTONIX) 40 mg tablet Take 1 tablet (40 mg total) by mouth daily in the early morning 60 tablet 0    predniSONE 20 mg tablet Take 2 tablets (40 mg total) by mouth daily for 5 doses 10 tablet 0    thiamine (VITAMIN B1) 100 mg tablet Take 1 tablet (100 mg total) by mouth daily 60 tablet 0    VITAMIN D PO daily      zinc sulfate (ZINCATE) 220 mg capsule Take 1 capsule (220 mg total) by mouth daily 60 capsule 0    [START ON 3/20/2022] predniSONE 10 mg tablet Take 3 tablets (30 mg total) by mouth daily for 5 doses 15 tablet 0    [START ON 3/30/2022] predniSONE 10 mg tablet Take 1 tablet (10 mg total) by mouth daily for 5 doses 5 tablet 0    [START ON 3/25/2022] predniSONE 20 mg tablet Take 1 tablet (20 mg total) by mouth daily for 5 doses 5 tablet 0    Semaglutide (OZEMPIC, 0 25 OR 0 5 MG/DOSE, SC) Inject under the skin (Patient not taking: Reported on 3/17/2022 )       No current facility-administered medications on file prior to visit       No Known Allergies  Social History     Socioeconomic History    Marital status: /Civil Union     Spouse name: None    Number of children: None    Years of education: None    Highest education level: None   Occupational History    None   Tobacco Use    Smoking status: Former Smoker     Packs/day: 1 00     Years: 30 00     Pack years: 30 00     Types: Cigarettes     Quit date: 2008     Years since quittin 1    Smokeless tobacco: Never Used   Vaping Use    Vaping Use: Never used   Substance and Sexual Activity    Alcohol use: Never    Drug use: Never    Sexual activity: Not Currently   Other Topics Concern    None   Social History Narrative    None     Social Determinants of Health     Financial Resource Strain: Not on file   Food Insecurity: No Food Insecurity    Worried About Running Out of Food in the Last Year: Never true    Regis of Food in the Last Year: Never true Transportation Needs: No Transportation Needs    Lack of Transportation (Medical): No    Lack of Transportation (Non-Medical): No   Physical Activity: Not on file   Stress: Not on file   Social Connections: Not on file   Intimate Partner Violence: Not on file   Housing Stability: Low Risk     Unable to Pay for Housing in the Last Year: No    Number of Places Lived in the Last Year: 1    Unstable Housing in the Last Year: No        Review of Systems   All other systems reviewed and are negative  Vitals:  Vitals:    03/17/22 1424   BP: 104/66   Pulse: 88   Weight: 86 6 kg (191 lb)   Height: 5' 8" (1 727 m)     BMI - Body mass index is 29 04 kg/m²  Wt Readings from Last 7 Encounters:   03/17/22 86 6 kg (191 lb)   03/04/22 79 7 kg (175 lb 12 8 oz)   02/16/22 88 3 kg (194 lb 10 7 oz)       Physical Exam  Vitals and nursing note reviewed  Constitutional:       General: He is not in acute distress  Appearance: He is well-developed  He is obese  He is ill-appearing  He is not diaphoretic  HENT:      Head: Normocephalic and atraumatic  Nose: No congestion  Eyes:      General: No scleral icterus  Conjunctiva/sclera: Conjunctivae normal    Neck:      Vascular: No carotid bruit or JVD  Cardiovascular:      Rate and Rhythm: Normal rate and regular rhythm  Heart sounds: Normal heart sounds  No murmur heard  No friction rub  No gallop  Pulmonary:      Effort: Pulmonary effort is normal  No respiratory distress  Breath sounds: Normal breath sounds  No wheezing or rales  Chest:      Chest wall: No tenderness  Abdominal:      General: There is no distension  Palpations: Abdomen is soft  Tenderness: There is no abdominal tenderness  Musculoskeletal:         General: No swelling, tenderness or deformity  Cervical back: Neck supple  No muscular tenderness  Right lower leg: No edema  Left lower leg: No edema  Skin:     General: Skin is warm  Neurological:      General: No focal deficit present  Mental Status: He is alert and oriented to person, place, and time  Mental status is at baseline  Psychiatric:         Mood and Affect: Mood normal          Behavior: Behavior normal          Thought Content: Thought content normal            Labs:  CBC:   Lab Results   Component Value Date    WBC 13 52 (H) 03/04/2022    RBC 4 06 03/04/2022    HGB 13 6 03/04/2022    HCT 38 9 03/04/2022    MCV 96 03/04/2022     03/04/2022    RDW 12 8 03/04/2022       CMP:   Lab Results   Component Value Date    K 4 0 03/02/2022     03/02/2022    CO2 27 03/02/2022    BUN 19 03/02/2022    CREATININE 0 80 03/02/2022    EGFR 93 03/02/2022    CALCIUM 8 7 03/02/2022    AST 24 03/02/2022    ALT 73 03/02/2022    ALKPHOS 82 03/02/2022       Magnesium:  Lab Results   Component Value Date    MG 2 1 03/01/2022       Lipid Profile:   No results found for: CHOL, HDL, TRIG, LDLCALC    Thyroid Studies: No results found for: HTP9NSFYQARG, T3FREE, FREET4, Q8VHSIJ, P8DWDGE    A1c:  No components found for: HGA1C    INR:  Lab Results   Component Value Date    INR 1 12 02/17/2022    INR 0 99 02/08/2022   5    Imaging: XR chest portable    Result Date: 3/1/2022  Narrative: CHEST INDICATION:   worsening oxygen requirements  Patient has confirmed COVID-19  COMPARISON:  Chest x-ray 2/20/2022 EXAM PERFORMED/VIEWS:  XR CHEST PORTABLE FINDINGS: Cardiomediastinal silhouette appears unremarkable  Extensive peripheral infiltrates again seen bilaterally, partially improved on the right, stable on the left  No pneumothorax or pleural effusion  Osseous structures appear within normal limits for patient age  Impression: Extensive peripheral infiltrates again seen bilaterally, partially improved on the right, stable on the left  Findings again most suggestive of Covid 19 pneumonia   Workstation performed: [id number]     XR chest portable    Result Date: 2/20/2022  Narrative: CHEST INDICATION:   increase oxygen requirement  Patient has confirmed COVID-19  COMPARISON:  None EXAM PERFORMED/VIEWS:  XR CHEST PORTABLE FINDINGS: Cardiomediastinal silhouette appears unremarkable  Worsened peripheral pulmonary opacities  No effusion or pneumothorax  Osseous structures appear within normal limits for patient age  Impression: Worsened peripheral pulmonary opacities  Workstation performed: [id number]     XR chest 1 view portable    Result Date: 2/18/2022  Narrative: CHEST INDICATION:   sob covid  "patient c/o sob ongoing for several days, COVID +  recently signed out AMA from [facility] because they "weren't telling him anything about his care"  patient hypoxic on arrival"  Patient has confirmed COVID-19  COMPARISON:  2/8/2022 EXAM PERFORMED/VIEWS:  XR CHEST PORTABLE FINDINGS: Cardiomediastinal silhouette appears unremarkable  Peripheral pulmonary opacities most prominent at the lung bases indicating Covid pneumonia in this patient with recently confirmed Covid 19  No effusion or pneumothorax  Osseous structures appear within normal limits for patient age  Impression: Peripheral pulmonary opacities most prominent at the lung bases indicating Covid pneumonia in this patient with recently confirmed Covid 23  Findings are concordant with preliminary interpretation provided in the emergency department  Workstation performed: [id number]     CTA chest pe study    Result Date: 3/1/2022  Narrative: CTA - CHEST WITH IV CONTRAST - PULMONARY ANGIOGRAM INDICATION:   Hypoxia and COVID  COMPARISON: Chest x-rays from 2/8/2022, 2/17/2022, and 2/20/2022 TECHNIQUE: CTA examination of the chest was performed using angiographic technique according to a protocol specifically tailored to evaluate for pulmonary embolism  Axial, sagittal, and coronal 2D reformatted images were created from the source data and  submitted for interpretation    In addition, coronal 3D MIP postprocessing was performed on the acquisition scanner  Radiation dose length product (DLP) for this visit:  589 55 mGy-cm   This examination, like all CT scans performed in the [facility], was performed utilizing techniques to minimize radiation dose exposure, including the use of iterative  reconstruction and automated exposure control  IV Contrast:  85 mL of iohexol (OMNIPAQUE)  FINDINGS: PULMONARY ARTERIAL TREE:  No pulmonary embolus is seen  There is a small amount of gas in the right ventricle and pulmonary artery likely related to injection  LUNGS:  There is peripheral consolidation, predominantly in the lower lung fields, similar to the recent chest x-ray from 2/20/2022 though progressed from earlier chest x-rays from 2/17/2022 and 2/8/2022  There are cystic changes throughout, mostly in a  subpleural distribution though there also appears to be traction bronchiectasis at the lung bases  PLEURA:  Unremarkable  HEART/GREAT VESSELS:  Unremarkable for patient's age  No thoracic aortic aneurysm  MEDIASTINUM AND CHUY:  There is mild lymphadenopathy including a 1 3 x 1 5 cm right hilar lymph node and a 1 4 x 2 1 cm subcarinal lymph node  Additional shotty lymph nodes are also present  CHEST WALL AND LOWER NECK:   Unremarkable  VISUALIZED STRUCTURES IN THE UPPER ABDOMEN:  Unremarkable  OSSEOUS STRUCTURES:  No acute fracture or destructive osseous lesion  Impression: 1  No acute pulmonary embolism  2   Covid-19 pneumonia, progressed from chest x-rays in early February, now with developing cystic changes, mostly in a subpleural distribution, with traction bronchiectasis at the lung bases  This may represent early fibrosis though continued follow-up  is recommended  3   Paraseptal emphysema   Workstation performed: [id number]     VAS lower limb venous duplex study, complete bilateral    Result Date: 2/21/2022  Narrative:  [facility] REPORT CLINICAL: Indications: Physician wants to determine patency of the venous system secondary to elevated D-Dimer and Covid 19  Operative History: No Cardiovascular Surgeries Risk Factors The patient has history of Diabetes (IDDM) and previous smoking (quit >10yrs ago)  CONCLUSION: Impression: RIGHT LOWER LIMB: No evidence of acute or chronic deep vein thrombosis  No evidence of superficial thrombophlebitis noted  Doppler evaluation shows a normal response to augmentation maneuvers  LEFT LOWER LIMB: No evidence of acute or chronic deep vein thrombosis  No evidence of superficial thrombophlebitis noted  Doppler evaluation shows a normal response to augmentation maneuvers  Technical findings posted in EPIC  SIGNATURE: Electronically Signed by: Kerri Dubois MD on 2022-02-21 05:44:14 PM      Cardiac testing:   No results found for this or any previous visit  No results found for this or any previous visit  No results found for this or any previous visit  No results found for this or any previous visit  XR chest portable  Narrative: CHEST     INDICATION:   worsening oxygen requirements  Patient has confirmed COVID-19  COMPARISON:  Chest x-ray 2/20/2022    EXAM PERFORMED/VIEWS:  XR CHEST PORTABLE    FINDINGS:    Cardiomediastinal silhouette appears unremarkable  Extensive peripheral infiltrates again seen bilaterally, partially improved on the right, stable on the left  No pneumothorax or pleural effusion  Osseous structures appear within normal limits for patient age  Impression: Extensive peripheral infiltrates again seen bilaterally, partially improved on the right, stable on the left  Findings again most suggestive of Covid 19 pneumonia  Workstation performed: [id number]  CTA chest pe study  Narrative: CTA - CHEST WITH IV CONTRAST - PULMONARY ANGIOGRAM    INDICATION:   Hypoxia and COVID      COMPARISON: Chest x-rays from 2/8/2022, 2/17/2022, and 2/20/2022    TECHNIQUE: CTA examination of the chest was performed using angiographic technique according to a protocol specifically tailored to evaluate for pulmonary embolism  Axial, sagittal, and coronal 2D reformatted images were created from the source data and   submitted for interpretation  In addition, coronal 3D MIP postprocessing was performed on the acquisition scanner  Radiation dose length product (DLP) for this visit:  589 55 mGy-cm   This examination, like all CT scans performed in the [facility], was performed utilizing techniques to minimize radiation dose exposure, including the use of iterative   reconstruction and automated exposure control  IV Contrast:  85 mL of iohexol (OMNIPAQUE)     FINDINGS:    PULMONARY ARTERIAL TREE:  No pulmonary embolus is seen  There is a small amount of gas in the right ventricle and pulmonary artery likely related to injection  LUNGS:  There is peripheral consolidation, predominantly in the lower lung fields, similar to the recent chest x-ray from 2/20/2022 though progressed from earlier chest x-rays from 2/17/2022 and 2/8/2022  There are cystic changes throughout, mostly in a   subpleural distribution though there also appears to be traction bronchiectasis at the lung bases  PLEURA:  Unremarkable  HEART/GREAT VESSELS:  Unremarkable for patient's age  No thoracic aortic aneurysm  MEDIASTINUM AND CHUY:  There is mild lymphadenopathy including a 1 3 x 1 5 cm right hilar lymph node and a 1 4 x 2 1 cm subcarinal lymph node  Additional shotty lymph nodes are also present  CHEST WALL AND LOWER NECK:   Unremarkable  VISUALIZED STRUCTURES IN THE UPPER ABDOMEN:  Unremarkable  OSSEOUS STRUCTURES:  No acute fracture or destructive osseous lesion  Impression: 1  No acute pulmonary embolism  2   Covid-19 pneumonia, progressed from chest x-rays in early February, now with developing cystic changes, mostly in a subpleural distribution, with traction bronchiectasis at the lung bases    This may represent early fibrosis though continued follow-up   is recommended  3   Paraseptal emphysema      Workstation performed: [id number]

## 2022-03-24 ENCOUNTER — HOSPITAL ENCOUNTER (OUTPATIENT)
Dept: NON INVASIVE DIAGNOSTICS | Facility: HOSPITAL | Age: 67
Discharge: HOME | End: 2022-03-24
Attending: INTERNAL MEDICINE
Payer: COMMERCIAL

## 2022-03-24 ENCOUNTER — HOSPITAL ENCOUNTER (OUTPATIENT)
Dept: NUCLEAR MEDICINE | Facility: HOSPITAL | Age: 67
Discharge: HOME | End: 2022-03-24
Attending: INTERNAL MEDICINE
Payer: COMMERCIAL

## 2022-03-24 VITALS
OXYGEN SATURATION: 97 % | HEART RATE: 96 BPM | BODY MASS INDEX: 28.95 KG/M2 | DIASTOLIC BLOOD PRESSURE: 78 MMHG | HEIGHT: 68 IN | WEIGHT: 191 LBS | SYSTOLIC BLOOD PRESSURE: 120 MMHG

## 2022-03-24 DIAGNOSIS — R06.02 SHORTNESS OF BREATH: ICD-10-CM

## 2022-03-24 DIAGNOSIS — R77.8 ELEVATED TROPONIN: ICD-10-CM

## 2022-03-24 DIAGNOSIS — J96.11 CHRONIC HYPOXEMIC RESPIRATORY FAILURE (HCC): ICD-10-CM

## 2022-03-24 DIAGNOSIS — Z82.49 FAMILY HISTORY OF EARLY CAD: ICD-10-CM

## 2022-03-24 LAB
MAX HR PERCENT: 73 %
NUC STRESS EJECTION FRACTION: 70 %
RATE PRESSURE PRODUCT: NORMAL
SL CV REST NUCLEAR ISOTOPE DOSE: 11 MCI
SL CV STRESS NUCLEAR ISOTOPE DOSE: 32.9 MCI
SL CV STRESS RECOVERY BP: NORMAL MMHG
SL CV STRESS RECOVERY HR: 100 BPM
SL CV STRESS RECOVERY O2 SAT: 97 %
STRESS ANGINA INDEX: 0
STRESS BASELINE BP: NORMAL MMHG
STRESS BASELINE HR: 96 BPM
STRESS O2 SAT REST: 97 %
STRESS PEAK HR: 113 BPM
STRESS PERCENT HR: 73 %
STRESS POST EXERCISE DUR MIN: 3 MIN
STRESS POST EXERCISE DUR SEC: 0 SEC
STRESS POST O2 SAT PEAK: 98 %
STRESS POST PEAK BP: 116 MMHG
STRESS TARGET HR: 113 BPM
STRESS/REST PERFUSION RATIO: 0.83

## 2022-03-24 PROCEDURE — 93018 CV STRESS TEST I&R ONLY: CPT | Performed by: INTERNAL MEDICINE

## 2022-03-24 PROCEDURE — 78452 HT MUSCLE IMAGE SPECT MULT: CPT

## 2022-03-24 PROCEDURE — A9502 TC99M TETROFOSMIN: HCPCS

## 2022-03-24 PROCEDURE — 93016 CV STRESS TEST SUPVJ ONLY: CPT | Performed by: INTERNAL MEDICINE

## 2022-03-24 PROCEDURE — 78452 HT MUSCLE IMAGE SPECT MULT: CPT | Performed by: INTERNAL MEDICINE

## 2022-03-24 PROCEDURE — 93017 CV STRESS TEST TRACING ONLY: CPT

## 2022-03-24 RX ADMIN — REGADENOSON 0.4 MG: 0.08 INJECTION, SOLUTION INTRAVENOUS at 11:00

## 2022-03-25 LAB
CHEST PAIN STATEMENT: NORMAL
MAX DIASTOLIC BP: 72 MMHG
MAX HEART RATE: 113 BPM
MAX PREDICTED HEART RATE: 154 BPM
MAX. SYSTOLIC BP: 124 MMHG
PROTOCOL NAME: NORMAL
REASON FOR TERMINATION: NORMAL
TARGET HR FORMULA: NORMAL
TEST INDICATION: NORMAL
TIME IN EXERCISE PHASE: NORMAL

## 2022-03-30 ENCOUNTER — APPOINTMENT (EMERGENCY)
Dept: CT IMAGING | Facility: HOSPITAL | Age: 67
End: 2022-03-30
Payer: COMMERCIAL

## 2022-03-30 ENCOUNTER — APPOINTMENT (EMERGENCY)
Dept: RADIOLOGY | Facility: HOSPITAL | Age: 67
End: 2022-03-30
Payer: COMMERCIAL

## 2022-03-30 ENCOUNTER — TELEPHONE (OUTPATIENT)
Dept: CARDIOLOGY CLINIC | Facility: CLINIC | Age: 67
End: 2022-03-30

## 2022-03-30 ENCOUNTER — HOSPITAL ENCOUNTER (EMERGENCY)
Facility: HOSPITAL | Age: 67
End: 2022-03-30
Attending: EMERGENCY MEDICINE
Payer: COMMERCIAL

## 2022-03-30 ENCOUNTER — HOSPITAL ENCOUNTER (INPATIENT)
Facility: HOSPITAL | Age: 67
LOS: 7 days | Discharge: HOME | DRG: 193 | End: 2022-04-06
Attending: ANESTHESIOLOGY | Admitting: ANESTHESIOLOGY
Payer: COMMERCIAL

## 2022-03-30 VITALS
RESPIRATION RATE: 30 BRPM | DIASTOLIC BLOOD PRESSURE: 54 MMHG | OXYGEN SATURATION: 98 % | TEMPERATURE: 98.3 F | SYSTOLIC BLOOD PRESSURE: 104 MMHG | HEART RATE: 76 BPM

## 2022-03-30 DIAGNOSIS — U07.1 COVID-19: ICD-10-CM

## 2022-03-30 DIAGNOSIS — J96.21 ACUTE ON CHRONIC RESPIRATORY FAILURE WITH HYPOXIA (HCC): Primary | ICD-10-CM

## 2022-03-30 DIAGNOSIS — R09.02 HYPOXIA: Primary | ICD-10-CM

## 2022-03-30 DIAGNOSIS — I50.31 ACUTE DIASTOLIC HEART FAILURE (HCC): ICD-10-CM

## 2022-03-30 DIAGNOSIS — R06.00 DYSPNEA: ICD-10-CM

## 2022-03-30 DIAGNOSIS — E11.9 TYPE 2 DIABETES MELLITUS WITHOUT COMPLICATION, WITHOUT LONG-TERM CURRENT USE OF INSULIN (HCC): ICD-10-CM

## 2022-03-30 DIAGNOSIS — J18.9 PNEUMONITIS: ICD-10-CM

## 2022-03-30 PROBLEM — R79.82 ELEVATED C-REACTIVE PROTEIN (CRP): Status: ACTIVE | Noted: 2022-03-30

## 2022-03-30 PROBLEM — R79.89 ELEVATED BRAIN NATRIURETIC PEPTIDE (BNP) LEVEL: Status: ACTIVE | Noted: 2022-03-30

## 2022-03-30 LAB
2HR DELTA HS TROPONIN: 48 NG/L
4HR DELTA HS TROPONIN: 119 NG/L
ALBUMIN SERPL BCP-MCNC: 2.8 G/DL (ref 3.5–5)
ALP SERPL-CCNC: 65 U/L (ref 46–116)
ALT SERPL W P-5'-P-CCNC: 42 U/L (ref 12–78)
ANION GAP SERPL CALCULATED.3IONS-SCNC: 11 MMOL/L (ref 4–13)
APTT PPP: 39 SECONDS (ref 23–37)
AST SERPL W P-5'-P-CCNC: 23 U/L (ref 5–45)
ATRIAL RATE: 107 BPM
BASOPHILS # BLD AUTO: 0.02 THOUSANDS/ΜL (ref 0–0.1)
BASOPHILS NFR BLD AUTO: 0 % (ref 0–1)
BILIRUB SERPL-MCNC: 0.92 MG/DL (ref 0.2–1)
BUN SERPL-MCNC: 19 MG/DL (ref 5–25)
CALCIUM ALBUM COR SERPL-MCNC: 9.3 MG/DL (ref 8.3–10.1)
CALCIUM SERPL-MCNC: 8.3 MG/DL (ref 8.3–10.1)
CARDIAC TROPONIN I PNL SERPL HS: 105 NG/L
CARDIAC TROPONIN I PNL SERPL HS: 153 NG/L
CARDIAC TROPONIN I PNL SERPL HS: 203 NG/L (ref 8–18)
CARDIAC TROPONIN I PNL SERPL HS: 224 NG/L
CHLORIDE SERPL-SCNC: 103 MMOL/L (ref 100–108)
CO2 SERPL-SCNC: 23 MMOL/L (ref 21–32)
CREAT SERPL-MCNC: 1.06 MG/DL (ref 0.6–1.3)
CRP SERPL QL: 194.7 MG/L
EOSINOPHIL # BLD AUTO: 0.03 THOUSAND/ΜL (ref 0–0.61)
EOSINOPHIL NFR BLD AUTO: 0 % (ref 0–6)
ERYTHROCYTE [DISTWIDTH] IN BLOOD BY AUTOMATED COUNT: 15.4 % (ref 11.6–15.1)
GFR SERPL CREATININE-BSD FRML MDRD: 72 ML/MIN/1.73SQ M
GLUCOSE SERPL-MCNC: 132 MG/DL (ref 65–140)
GLUCOSE SERPL-MCNC: 145 MG/DL (ref 65–140)
GLUCOSE SERPL-MCNC: 260 MG/DL (ref 65–140)
HCT VFR BLD AUTO: 33.7 % (ref 36.5–49.3)
HGB BLD-MCNC: 11.2 G/DL (ref 12–17)
IMM GRANULOCYTES # BLD AUTO: 0.04 THOUSAND/UL (ref 0–0.2)
IMM GRANULOCYTES NFR BLD AUTO: 1 % (ref 0–2)
INR PPP: 1.11 (ref 0.84–1.19)
LACTATE SERPL-SCNC: 1.6 MMOL/L (ref 0.5–2)
LACTATE SERPL-SCNC: 2.8 MMOL/L (ref 0.5–2)
LYMPHOCYTES # BLD AUTO: 1.59 THOUSANDS/ΜL (ref 0.6–4.47)
LYMPHOCYTES NFR BLD AUTO: 19 % (ref 14–44)
MAGNESIUM SERPL-MCNC: 1.9 MG/DL (ref 1.6–2.6)
MCH RBC QN AUTO: 33.5 PG (ref 26.8–34.3)
MCHC RBC AUTO-ENTMCNC: 33.2 G/DL (ref 31.4–37.4)
MCV RBC AUTO: 101 FL (ref 82–98)
MONOCYTES # BLD AUTO: 0.51 THOUSAND/ΜL (ref 0.17–1.22)
MONOCYTES NFR BLD AUTO: 6 % (ref 4–12)
NEUTROPHILS # BLD AUTO: 6.15 THOUSANDS/ΜL (ref 1.85–7.62)
NEUTS SEG NFR BLD AUTO: 74 % (ref 43–75)
NRBC BLD AUTO-RTO: 0 /100 WBCS
NT-PROBNP SERPL-MCNC: 1116 PG/ML
P AXIS: 47 DEGREES
PLATELET # BLD AUTO: 168 THOUSANDS/UL (ref 149–390)
PMV BLD AUTO: 9.9 FL (ref 8.9–12.7)
POTASSIUM SERPL-SCNC: 4 MMOL/L (ref 3.5–5.3)
PR INTERVAL: 128 MS
PROCALCITONIN SERPL-MCNC: 0.36 NG/ML
PROT SERPL-MCNC: 6.6 G/DL (ref 6.4–8.2)
PROTHROMBIN TIME: 13.7 SECONDS (ref 11.6–14.5)
QRS AXIS: -67 DEGREES
QRSD INTERVAL: 120 MS
QT INTERVAL: 350 MS
QTC INTERVAL: 467 MS
RBC # BLD AUTO: 3.34 MILLION/UL (ref 3.88–5.62)
SODIUM SERPL-SCNC: 137 MMOL/L (ref 136–145)
T WAVE AXIS: -8 DEGREES
VENTRICULAR RATE: 107 BPM
WBC # BLD AUTO: 8.34 THOUSAND/UL (ref 4.31–10.16)

## 2022-03-30 PROCEDURE — G1004 CDSM NDSC: HCPCS

## 2022-03-30 PROCEDURE — 93010 ELECTROCARDIOGRAM REPORT: CPT | Performed by: INTERNAL MEDICINE

## 2022-03-30 PROCEDURE — 85610 PROTHROMBIN TIME: CPT | Performed by: PHYSICIAN ASSISTANT

## 2022-03-30 PROCEDURE — 85025 COMPLETE CBC W/AUTO DIFF WBC: CPT | Performed by: PHYSICIAN ASSISTANT

## 2022-03-30 PROCEDURE — 85730 THROMBOPLASTIN TIME PARTIAL: CPT | Performed by: PHYSICIAN ASSISTANT

## 2022-03-30 PROCEDURE — 94760 N-INVAS EAR/PLS OXIMETRY 1: CPT

## 2022-03-30 PROCEDURE — 36415 COLL VENOUS BLD VENIPUNCTURE: CPT | Performed by: PHYSICIAN ASSISTANT

## 2022-03-30 PROCEDURE — 87040 BLOOD CULTURE FOR BACTERIA: CPT | Performed by: PHYSICIAN ASSISTANT

## 2022-03-30 PROCEDURE — 82948 REAGENT STRIP/BLOOD GLUCOSE: CPT

## 2022-03-30 PROCEDURE — 84484 ASSAY OF TROPONIN QUANT: CPT | Performed by: PHYSICIAN ASSISTANT

## 2022-03-30 PROCEDURE — 99285 EMERGENCY DEPT VISIT HI MDM: CPT

## 2022-03-30 PROCEDURE — 93005 ELECTROCARDIOGRAM TRACING: CPT

## 2022-03-30 PROCEDURE — 94640 AIRWAY INHALATION TREATMENT: CPT

## 2022-03-30 PROCEDURE — 71045 X-RAY EXAM CHEST 1 VIEW: CPT

## 2022-03-30 PROCEDURE — 80053 COMPREHEN METABOLIC PANEL: CPT | Performed by: PHYSICIAN ASSISTANT

## 2022-03-30 PROCEDURE — 94664 DEMO&/EVAL PT USE INHALER: CPT

## 2022-03-30 PROCEDURE — 83735 ASSAY OF MAGNESIUM: CPT | Performed by: PHYSICIAN ASSISTANT

## 2022-03-30 PROCEDURE — 86140 C-REACTIVE PROTEIN: CPT | Performed by: PHYSICIAN ASSISTANT

## 2022-03-30 PROCEDURE — 71275 CT ANGIOGRAPHY CHEST: CPT

## 2022-03-30 PROCEDURE — 83880 ASSAY OF NATRIURETIC PEPTIDE: CPT | Performed by: PHYSICIAN ASSISTANT

## 2022-03-30 PROCEDURE — 83605 ASSAY OF LACTIC ACID: CPT | Performed by: PHYSICIAN ASSISTANT

## 2022-03-30 PROCEDURE — 84145 PROCALCITONIN (PCT): CPT | Performed by: NURSE PRACTITIONER

## 2022-03-30 PROCEDURE — 99291 CRITICAL CARE FIRST HOUR: CPT | Performed by: PHYSICIAN ASSISTANT

## 2022-03-30 PROCEDURE — 99223 1ST HOSP IP/OBS HIGH 75: CPT | Performed by: ANESTHESIOLOGY

## 2022-03-30 RX ORDER — BENZONATATE 100 MG/1
100 CAPSULE ORAL ONCE
Status: COMPLETED | OUTPATIENT
Start: 2022-03-30 | End: 2022-03-30

## 2022-03-30 RX ORDER — FLUTICASONE FUROATE AND VILANTEROL 100; 25 UG/1; UG/1
1 POWDER RESPIRATORY (INHALATION) DAILY
Status: DISCONTINUED | OUTPATIENT
Start: 2022-03-31 | End: 2022-04-06 | Stop reason: HOSPADM

## 2022-03-30 RX ORDER — ATORVASTATIN CALCIUM 10 MG/1
10 TABLET, FILM COATED ORAL DAILY
Status: DISCONTINUED | OUTPATIENT
Start: 2022-03-31 | End: 2022-04-06 | Stop reason: HOSPADM

## 2022-03-30 RX ORDER — GUAIFENESIN 600 MG
600 TABLET, EXTENDED RELEASE 12 HR ORAL EVERY 12 HOURS SCHEDULED
Status: DISCONTINUED | OUTPATIENT
Start: 2022-03-30 | End: 2022-04-06 | Stop reason: HOSPADM

## 2022-03-30 RX ORDER — FUROSEMIDE 10 MG/ML
40 INJECTION INTRAMUSCULAR; INTRAVENOUS ONCE
Status: COMPLETED | OUTPATIENT
Start: 2022-03-30 | End: 2022-03-30

## 2022-03-30 RX ORDER — ECHINACEA PURPUREA EXTRACT 125 MG
1 TABLET ORAL
Status: DISCONTINUED | OUTPATIENT
Start: 2022-03-30 | End: 2022-04-06 | Stop reason: HOSPADM

## 2022-03-30 RX ORDER — ASCORBIC ACID 500 MG
500 TABLET ORAL DAILY
Status: DISCONTINUED | OUTPATIENT
Start: 2022-03-31 | End: 2022-04-06 | Stop reason: HOSPADM

## 2022-03-30 RX ORDER — MAGNESIUM SULFATE HEPTAHYDRATE 40 MG/ML
2 INJECTION, SOLUTION INTRAVENOUS ONCE
Status: COMPLETED | OUTPATIENT
Start: 2022-03-30 | End: 2022-03-31

## 2022-03-30 RX ORDER — METHYLPREDNISOLONE SODIUM SUCCINATE 40 MG/ML
40 INJECTION, POWDER, LYOPHILIZED, FOR SOLUTION INTRAMUSCULAR; INTRAVENOUS EVERY 8 HOURS SCHEDULED
Status: DISCONTINUED | OUTPATIENT
Start: 2022-03-30 | End: 2022-03-30

## 2022-03-30 RX ORDER — BENZONATATE 100 MG/1
200 CAPSULE ORAL 3 TIMES DAILY
Status: DISCONTINUED | OUTPATIENT
Start: 2022-03-31 | End: 2022-04-06 | Stop reason: HOSPADM

## 2022-03-30 RX ORDER — PANTOPRAZOLE SODIUM 40 MG/1
40 TABLET, DELAYED RELEASE ORAL
Status: DISCONTINUED | OUTPATIENT
Start: 2022-03-31 | End: 2022-04-06 | Stop reason: HOSPADM

## 2022-03-30 RX ORDER — METHYLPREDNISOLONE SODIUM SUCCINATE 40 MG/ML
40 INJECTION, POWDER, LYOPHILIZED, FOR SOLUTION INTRAMUSCULAR; INTRAVENOUS EVERY 8 HOURS SCHEDULED
Status: DISCONTINUED | OUTPATIENT
Start: 2022-03-30 | End: 2022-03-31

## 2022-03-30 RX ORDER — AMOXICILLIN 250 MG
2 CAPSULE ORAL 2 TIMES DAILY
Status: DISCONTINUED | OUTPATIENT
Start: 2022-03-30 | End: 2022-04-06 | Stop reason: HOSPADM

## 2022-03-30 RX ORDER — ZINC SULFATE 50(220)MG
220 CAPSULE ORAL DAILY
Status: DISCONTINUED | OUTPATIENT
Start: 2022-03-31 | End: 2022-04-06 | Stop reason: HOSPADM

## 2022-03-30 RX ORDER — BENZONATATE 100 MG/1
100 CAPSULE ORAL 3 TIMES DAILY
Status: DISCONTINUED | OUTPATIENT
Start: 2022-03-30 | End: 2022-03-30

## 2022-03-30 RX ORDER — LANOLIN ALCOHOL/MO/W.PET/CERES
100 CREAM (GRAM) TOPICAL DAILY
Status: DISCONTINUED | OUTPATIENT
Start: 2022-03-31 | End: 2022-04-06 | Stop reason: HOSPADM

## 2022-03-30 RX ORDER — INSULIN GLARGINE 100 [IU]/ML
20 INJECTION, SOLUTION SUBCUTANEOUS
Status: DISCONTINUED | OUTPATIENT
Start: 2022-03-30 | End: 2022-03-31

## 2022-03-30 RX ORDER — IPRATROPIUM BROMIDE AND ALBUTEROL SULFATE 2.5; .5 MG/3ML; MG/3ML
3 SOLUTION RESPIRATORY (INHALATION) ONCE
Status: COMPLETED | OUTPATIENT
Start: 2022-03-30 | End: 2022-03-30

## 2022-03-30 RX ADMIN — BENZONATATE 100 MG: 100 CAPSULE ORAL at 19:42

## 2022-03-30 RX ADMIN — METHYLPREDNISOLONE SODIUM SUCCINATE 40 MG: 40 INJECTION, POWDER, FOR SOLUTION INTRAMUSCULAR; INTRAVENOUS at 19:57

## 2022-03-30 RX ADMIN — BENZONATATE 100 MG: 100 CAPSULE ORAL at 22:45

## 2022-03-30 RX ADMIN — MAGNESIUM SULFATE HEPTAHYDRATE 2 G: 40 INJECTION, SOLUTION INTRAVENOUS at 20:43

## 2022-03-30 RX ADMIN — INSULIN GLARGINE 20 UNITS: 100 INJECTION, SOLUTION SUBCUTANEOUS at 22:45

## 2022-03-30 RX ADMIN — FUROSEMIDE 40 MG: 10 INJECTION, SOLUTION INTRAMUSCULAR; INTRAVENOUS at 19:42

## 2022-03-30 RX ADMIN — GUAIFENESIN 600 MG: 600 TABLET ORAL at 19:58

## 2022-03-30 RX ADMIN — SENNOSIDES AND DOCUSATE SODIUM 2 TABLET: 50; 8.6 TABLET ORAL at 19:41

## 2022-03-30 RX ADMIN — IPRATROPIUM BROMIDE AND ALBUTEROL SULFATE 3 ML: 2.5; .5 SOLUTION RESPIRATORY (INHALATION) at 11:45

## 2022-03-30 RX ADMIN — IOHEXOL 100 ML: 350 INJECTION, SOLUTION INTRAVENOUS at 13:04

## 2022-03-30 NOTE — H&P
[facility]  H&P- Nehemiah Iqbal 1955, 78 y o  male MRN: [id number]  Unit/Bed#:  Encounter: [id number]  Primary Care Provider: Anne Hernández MD   Date and time admitted to hospital: 3/30/2022  5:53 PM    Elevated brain natriuretic peptide (BNP) level  Assessment & Plan  · Patient reports no history of CHF  · Was scheduled to have TTE today by outpatient cardiologist  · BNP elevated at 1116, vascular congestion on CXR  · Will trial 40mg IV lasix and observe for any improvement in respiratory status  · Obtain TTE    Elevated C-reactive protein (CRP)  Assessment & Plan  · In setting of prolonged covid illness  · Initiate methylprednisolone 40mg q8hr  · Trend daily    Type 2 diabetes mellitus, without long-term current use of insulin ([facility] )  Assessment & Plan  Lab Results   Component Value Date    HGBA1C 7 7 (H) 12/06/2021       No results for input(s): POCGLU in the last 72 hours      Blood Sugar Average: Last 72 hrs:     · Home regimen of humalog 15u TID with meals + SSI and lantus 20u QHS  · Will continue SSI while inpatient + lantus 20u QHS  · Goal -180    Non MI elevated troponin  Assessment & Plan  · Hs troponin elevated at 105  · Will continue to trend until downtrending  · Favor non-MI troponin elevation in setting of acute hypoxic respiratory failure  · Obtain baseline EKG    * Acute on chronic respiratory failure with hypoxia (HCC)  Assessment & Plan  · Suspect primarily long term sequelae of COVID-19 pneumonia  · Currently requiring HFNC 70%/50L  · Wean HFNC for goal SpO2>88%  · Will initiate IV steroids with methylprednisolone 40mg IV q8hr given elevated CRP (194 7)  · With elevated BNP, dose lasix 40mg x1 and monitor for response  · Continue outpatient breo-ellipta  · Hold on abx at this time, check procal-- if procal elevated, will initiate abx  · Encourage pulmonary hygiene      -------------------------------------------------------------------------------------------------------------  Chief Complaint: "I was getting the bad coughing again "    History of Present Illness   HX and PE limited by: none  Lexis Thomason is a 78 y o  male with PMH Dm2, HTN, and recent COVID-19 infection (2/8/22) who now presents with worsening shortness of breath and low oxygen on home pulseOx  Patient initially hospitalized from 2/8-2/17/2022 for COVID-19 PNA  He left [place] at that time and then re-presented the next day to [facility] and was transferred at that time to [facility] for further treatment and evaluation  Patient was hospitalized at [facility] from 2/18-3/4/2022 and was treated with IV steroids at that time  He was discharged home on a prednisone taper and home O2 of 4-6L at rest with 6L with ambulation  Patient reports that over past several days, he has experienced worsening dyspnea and coughing and his home pulseOx showed SpO2 <85%  On EMS arrival, SpO2 79% on 10L NC  He was taken to [facility] ED and required escalation to HFNC  CT PE performed which was negative for PE, revealed small pleural effusions  CXR with some vascular congestion  He was transferred to [facility] for further treatment and evaluation  At time of my evaluation, patient with SpO2 95% on HFNC 70%/50L  History obtained from chart review and the patient   -------------------------------------------------------------------------------------------------------------  Dispo: Admit to Stepdown Level 1    Code Status: Level 1 - Full Code  --------------------------------------------------------------------------------------------------------------  Review of Systems    A 12-point, complete review of systems was reviewed and negative except as stated above     Physical Exam  Vitals and nursing note reviewed  Constitutional:       Appearance: Normal appearance  He is well-developed  HENT:      Head: Normocephalic and atraumatic        Mouth/Throat: Mouth: Mucous membranes are moist    Eyes:      Conjunctiva/sclera: Conjunctivae normal       Pupils: Pupils are equal, round, and reactive to light  Cardiovascular:      Rate and Rhythm: Regular rhythm  Tachycardia present  Heart sounds: No murmur heard  Pulmonary:      Effort: Pulmonary effort is normal  No respiratory distress  Breath sounds: No wheezing, rhonchi or rales  Comments: Breath sounds somewhat diminished in bilateral bases, otherwise CTA throughout  Abdominal:      General: There is no distension  Palpations: Abdomen is soft  Tenderness: There is no abdominal tenderness  There is no guarding  Musculoskeletal:      Cervical back: Neck supple  Right lower leg: No edema  Left lower leg: No edema  Skin:     General: Skin is warm and dry  Neurological:      General: No focal deficit present  Mental Status: He is alert and oriented to person, place, and time  --------------------------------------------------------------------------------------------------------------  Vitals:   Vitals:    03/30/22 1800 03/30/22 1805   BP: 119/75    BP Location: Right arm    Pulse: 86    Resp: (!) 40    Temp: 98 °F (36 7 °C)    TempSrc: Oral    SpO2: 99% 95%   Weight: 83 6 kg (184 lb 4 9 oz)    Height: 5' 7" (1 702 m)      Temp  Min: 98 °F (36 7 °C)  Max: 98 3 °F (36 8 °C)  IBW (Ideal Body Weight): 66 1 kg  Height: 5' 7" (170 2 cm)  Body mass index is 28 87 kg/m²      Laboratory and Diagnostics:  Results from last 7 days   Lab Units 03/30/22  1149   WBC Thousand/uL 8 34   HEMOGLOBIN g/dL 11 2*   HEMATOCRIT % 33 7*   PLATELETS Thousands/uL 168   NEUTROS PCT % 74   MONOS PCT % 6     Results from last 7 days   Lab Units 03/30/22  1149   SODIUM mmol/L 137   POTASSIUM mmol/L 4 0   CHLORIDE mmol/L 103   CO2 mmol/L 23   ANION GAP mmol/L 11   BUN mg/dL 19   CREATININE mg/dL 1 06   CALCIUM mg/dL 8 3   GLUCOSE RANDOM mg/dL 260*   ALT U/L 42   AST U/L 23   ALK PHOS U/L 65 ALBUMIN g/dL 2 8*   TOTAL BILIRUBIN mg/dL 0 92     Results from last 7 days   Lab Units 22  1149   MAGNESIUM mg/dL 1 9      Results from last 7 days   Lab Units 22  1149   INR  1 11   PTT seconds 39*          Results from last 7 days   Lab Units 22  1425 22  1149   LACTIC ACID mmol/L 1 6 2 8*     ABG:    VBG:          Micro:        EKG: pending  Imaging: I have personally reviewed pertinent reports     and I have personally reviewed pertinent films in PACS      Historical Information   Past Medical History:   Diagnosis Date    COVID-19 2022    Diabetes mellitus ([address] )     Hypertension      Past Surgical History:   Procedure Laterality Date    ANKLE FRACTURE SURGERY Left     FOOT FRACTURE SURGERY      KNEE SURGERY Right     VASECTOMY       Social History   Social History     Substance and Sexual Activity   Alcohol Use Never     Social History     Substance and Sexual Activity   Drug Use Never     Social History     Tobacco Use   Smoking Status Former Smoker    Packs/day: 1 00    Years: 30 00    Pack years: 30 00    Types: Cigarettes    Quit date: 2008    Years since quittin 1   Smokeless Tobacco Never Used     Family History:   Family History   Problem Relation Age of Onset    Diabetes Mother     Alcohol abuse Father      I have reviewed this patient's family history and commented on sigificant items within the HPI      Medications:  Current Facility-Administered Medications   Medication Dose Route Frequency    [START ON 3/31/2022] ascorbic acid (VITAMIN C) tablet 500 mg  500 mg Oral Daily    [START ON 3/31/2022] atorvastatin (LIPITOR) tablet 10 mg  10 mg Oral Daily    benzonatate (TESSALON PERLES) capsule 100 mg  100 mg Oral TID    [START ON 3/31/2022] enoxaparin (LOVENOX) subcutaneous injection 40 mg  40 mg Subcutaneous Daily    [START ON 3/31/2022] fluticasone-vilanterol (BREO ELLIPTA) 100-25 mcg/inh inhaler 1 puff  1 puff Inhalation Daily    furosemide (LASIX) injection 40 mg  40 mg Intravenous Once    insulin glargine (LANTUS) subcutaneous injection 20 Units 0 2 mL  20 Units Subcutaneous HS    insulin lispro (HumaLOG) 100 units/mL subcutaneous injection 1-6 Units  1-6 Units Subcutaneous TID AC    insulin lispro (HumaLOG) 100 units/mL subcutaneous injection 1-6 Units  1-6 Units Subcutaneous HS    insulin lispro (HumaLOG) 100 units/mL subcutaneous injection 15 Units  15 Units Subcutaneous TID With Meals    methylPREDNISolone sodium succinate (Solu-MEDROL) injection 40 mg  40 mg Intravenous Q8H [facility] & halfway    [START ON 3/31/2022] pantoprazole (PROTONIX) EC tablet 40 mg  40 mg Oral Early Morning    senna-docusate sodium (SENOKOT S) 8 6-50 mg per tablet 2 tablet  2 tablet Oral BID    [START ON 3/31/2022] thiamine tablet 100 mg  100 mg Oral Daily    [START ON 3/31/2022] zinc sulfate (ZINCATE) capsule 220 mg  220 mg Oral Daily     Home medications:  Prior to Admission Medications   Prescriptions Last Dose Informant Patient Reported? Taking? Alcohol Swabs 70 % PADS   No No   Sig: May substitute brand based on insurance coverage  Check glucose TID  Blood Glucose Monitoring Suppl (OneTouch Verio Reflect) w/Device KIT   No No   Sig: May substitute brand based on insurance coverage  Check glucose TID  Insulin Pen Needle (BD Pen Needle Silvia 2nd Gen) 32G X 4 MM MISC   No No   Sig: For use with insulin pen  Pharmacy may dispense brand covered by insurance  Insulin Pen Needle (BD Pen Needle Silvia 2nd Gen) 32G X 4 MM MISC   No No   Sig: For use with insulin pen  Pharmacy may dispense brand covered by insurance  Multiple Vitamin (multivitamin) tablet   Yes No   Sig: Take 1 tablet by mouth daily   OneTouch Delica Lancets 54N MISC   No No   Sig: May substitute brand based on insurance coverage  Check glucose TID     Semaglutide (OZEMPIC, 0 25 OR 0 5 MG/DOSE, SC)   Yes No   Sig: Inject under the skin   Patient not taking: Reported on 3/17/2022    VITAMIN D PO   Yes No   Sig: daily   ascorbic acid (VITAMIN C) 500 mg tablet   Yes No   Sig: in the morning   atorvastatin (LIPITOR) 10 mg tablet   Yes No   Sig: Take 10 mg by mouth daily   benzonatate (TESSALON PERLES) 100 mg capsule   No No   Sig: Take 1 capsule (100 mg total) by mouth 3 (three) times a day   fluticasone-vilanterol (Breo Ellipta) 100-25 mcg/inh inhaler   No No   Sig: Inhale 1 puff daily Rinse mouth after use  glucose blood (OneTouch Verio) test strip   No No   Sig: May substitute brand based on insurance coverage  Check glucose TID     insulin glargine (Lantus SoloStar) 100 units/mL injection pen   No No   Sig: Inject 20 Units under the skin daily at bedtime   insulin lispro (HumaLOG KwikPen) 100 units/mL injection pen   No No   Sig: Inject 15 Units under the skin 3 (three) times a day with meals   lisinopril (ZESTRIL) 5 mg tablet   Yes No   Sig: Take 5 mg by mouth daily   metFORMIN (GLUCOPHAGE) 1000 MG tablet   Yes No   Sig: Take 1,000 mg by mouth 2 (two) times a day with meals   pantoprazole (PROTONIX) 40 mg tablet   No No   Sig: Take 1 tablet (40 mg total) by mouth daily in the early morning   predniSONE 10 mg tablet   No No   Sig: Take 1 tablet (10 mg total) by mouth daily for 5 doses   predniSONE 20 mg tablet   No No   Sig: Take 1 tablet (20 mg total) by mouth daily for 5 doses   thiamine (VITAMIN B1) 100 mg tablet   No No   Sig: Take 1 tablet (100 mg total) by mouth daily   zinc sulfate (ZINCATE) 220 mg capsule   No No   Sig: Take 1 capsule (220 mg total) by mouth daily      Facility-Administered Medications: None     Allergies:  No Known Allergies    ------------------------------------------------------------------------------------------------------------  Advance Directive and Living Will:      Power of :    POLST:    ------------------------------------------------------------------------------------------------------------  Anticipated Length of Stay is > 2 midnights    Care Time Delivered:   No Critical Care time spent       Mac Alexandray PA-C        Portions of the record may have been created with voice recognition software  Occasional wrong word or "sound a like" substitutions may have occurred due to the inherent limitations of voice recognition software    Read the chart carefully and recognize, using context, where substitutions have occurred

## 2022-03-30 NOTE — TELEPHONE ENCOUNTER
Patient called the office this morning stating approx three days ago he starting to not feel well  Experiencing constant cough, difficulty breathing, trouble urinating, no fever but is experiencing chills  He states he can't walk a few feet without his oxygen dropping to the 70's  He usually wears 3L and has put himself up to 9L  This is still ongoing  Advising patient to go to the ER, will make his cardiologist aware

## 2022-03-30 NOTE — CASE MANAGEMENT
Case Management Progress Note    Patient name Nadiya Hayden  [facility] MRN [id number]  : 1955 Date 3/30/2022       LOS (days): 0  Geometric Mean LOS (GMLOS) (days):   Days to GMLOS:        OBJECTIVE:        Current admission status: Emergency  Preferred Pharmacy:    [address] [address]  Phone: [phone] Fax: [phone] [facility] [address]  Phone: [phone] Fax: [phone] [facility] [employer] [place] [address] [facility] [place] [address] [place] [address]  Phone: [phone] Fax: [phone]    Primary Care Provider: Madalyn Velazquez MD    Primary Insurance: [address] [place] [address]  Secondary Insurance:     PROGRESS NOTE:  CM responded to Code R alert  RECENT ADMISSION: Pt was discharged from [facility] on 3/4/22 after being treated for Pneumonia due to Covid , Resp failure   AGE: 78  SEX: M   DX: Pneumonia due to Covid and resp failure  OUTCOME: Pt   RESOURCES ON D/C Pt was discharged with 02 and oxymizer from [facility]   CURRENT F/U APPTS: 22 Pulmonary and 22 Cardiology   *REASON FOR READMISSION: Pt with c/o x3 days with SOB    *INTERVENTIONS: As per nursing pt is requiring hi-flow 02 and will likely being admitted  Pt will need to be referred to outpatient  on discharge

## 2022-03-30 NOTE — ASSESSMENT & PLAN NOTE
· Hs troponin elevated at 105  · Will continue to trend until downtrending  · Favor non-MI troponin elevation in setting of acute hypoxic respiratory failure  · Obtain baseline EKG

## 2022-03-30 NOTE — RESPIRATORY THERAPY NOTE
1135 placed pt on vapaotherm 35 flow/100    %, pulse ox 98%, resp aerogen tx placed in line with duoneb, titrated FIO2 to 80%  With flow 35l/m  Pulse ox 94-95%  Will titrate as tolerated   BS decreased with crackles in bases   Pt is post covid in [place]

## 2022-03-30 NOTE — PLAN OF CARE
Problem: Potential for Falls  Goal: Patient will remain free of falls  Description: INTERVENTIONS:  - Educate patient/family on patient safety including physical limitations  - Instruct patient to call for assistance with activity   - Consult OT/PT to assist with strengthening/mobility   - Keep Call bell within reach  - Keep bed low and locked with side rails adjusted as appropriate  - Keep care items and personal belongings within reach  - Initiate and maintain comfort rounds  - Make Fall Risk Sign visible to staff  - Offer Toileting every 2 Hours, in advance of need  - Initiate/Maintain bed alarm  - Apply yellow socks and bracelet for high fall risk patients  - Consider moving patient to room near nurses station  Outcome: Progressing     Problem: MOBILITY - ADULT  Goal: Maintain or return to baseline ADL function  Description: INTERVENTIONS:  -  Assess patient's ability to carry out ADLs; assess patient's baseline for ADL function and identify physical deficits which impact ability to perform ADLs (bathing, care of mouth/teeth, toileting, grooming, dressing, etc )  - Assess/evaluate cause of self-care deficits   - Assess range of motion  - Assess patient's mobility; develop plan if impaired  - Assess patient's need for assistive devices and provide as appropriate  - Encourage maximum independence but intervene and supervise when necessary  - Involve family in performance of ADLs  - Assess for home care needs following discharge   - Consider OT consult to assist with ADL evaluation and planning for discharge  - Provide patient education as appropriate  Outcome: Progressing  Goal: Maintains/Returns to pre admission functional level  Description: INTERVENTIONS:  - Perform BMAT or MOVE assessment daily    - Set and communicate daily mobility goal to care team and patient/family/caregiver  - Collaborate with rehabilitation services on mobility goals if consulted  - Perform Range of Motion 3 times a day    - Reposition patient every 2 hours    - Dangle patient 3 times a day  - Stand patient 3 times a day  - Ambulate patient 3 times a day  - Out of bed to chair 3 times a day   - Out of bed for meals 3 times a day  - Out of bed for toileting  - Record patient progress and toleration of activity level   Outcome: Progressing     Problem: PAIN - ADULT  Goal: Verbalizes/displays adequate comfort level or baseline comfort level  Description: Interventions:  - Encourage patient to monitor pain and request assistance  - Assess pain using appropriate pain scale  - Administer analgesics based on type and severity of pain and evaluate response  - Implement non-pharmacological measures as appropriate and evaluate response  - Consider cultural and social influences on pain and pain management  - Notify physician/advanced practitioner if interventions unsuccessful or patient reports new pain  Outcome: Progressing     Problem: INFECTION - ADULT  Goal: Absence or prevention of progression during hospitalization  Description: INTERVENTIONS:  - Assess and monitor for signs and symptoms of infection  - Monitor lab/diagnostic results  - Monitor all insertion sites, i e  indwelling lines, tubes, and drains  - Monitor endotracheal if appropriate and nasal secretions for changes in amount and color  - [place] appropriate cooling/warming therapies per order  - Administer medications as ordered  - Instruct and encourage patient and family to use good hand hygiene technique  - Identify and instruct in appropriate isolation precautions for identified infection/condition  Outcome: Progressing     Problem: SAFETY ADULT  Goal: Patient will remain free of falls  Description: INTERVENTIONS:  - Educate patient/family on patient safety including physical limitations  - Instruct patient to call for assistance with activity   - Consult OT/PT to assist with strengthening/mobility   - Keep Call bell within reach  - Keep bed low and locked with side rails adjusted as appropriate  - Keep care items and personal belongings within reach  - Initiate and maintain comfort rounds  - Make Fall Risk Sign visible to staff  - Offer Toileting every 2 Hours, in advance of need  - Initiate/Maintain bed alarm  - Apply yellow socks and bracelet for high fall risk patients  - Consider moving patient to room near nurses station  Outcome: Progressing  Goal: Maintain or return to baseline ADL function  Description: INTERVENTIONS:  -  Assess patient's ability to carry out ADLs; assess patient's baseline for ADL function and identify physical deficits which impact ability to perform ADLs (bathing, care of mouth/teeth, toileting, grooming, dressing, etc )  - Assess/evaluate cause of self-care deficits   - Assess range of motion  - Assess patient's mobility; develop plan if impaired  - Assess patient's need for assistive devices and provide as appropriate  - Encourage maximum independence but intervene and supervise when necessary  - Involve family in performance of ADLs  - Assess for home care needs following discharge   - Consider OT consult to assist with ADL evaluation and planning for discharge  - Provide patient education as appropriate  Outcome: Progressing  Goal: Maintains/Returns to pre admission functional level  Description: INTERVENTIONS:  - Perform BMAT or MOVE assessment daily    - Set and communicate daily mobility goal to care team and patient/family/caregiver  - Collaborate with rehabilitation services on mobility goals if consulted  - Perform Range of Motion 3 times a day  - Reposition patient every 2 hours    - Dangle patient 3 times a day  - Stand patient 3 times a day  - Ambulate patient 3 times a day  - Out of bed to chair 3 times a day   - Out of bed for meals 3 times a day  - Out of bed for toileting  - Record patient progress and toleration of activity level   Outcome: Progressing     Problem: DISCHARGE PLANNING  Goal: Discharge to home or other facility with appropriate resources  Description: INTERVENTIONS:  - Identify barriers to discharge w/patient and caregiver  - Arrange for needed discharge resources and transportation as appropriate  - Identify discharge learning needs (meds, wound care, etc )  - Arrange for interpretive services to assist at discharge as needed  - Refer to Case Management Department for coordinating discharge planning if the patient needs post-hospital services based on physician/advanced practitioner order or complex needs related to functional status, cognitive ability, or social support system  Outcome: Progressing

## 2022-03-30 NOTE — ASSESSMENT & PLAN NOTE
· Suspect primarily long term sequelae of COVID-19 pneumonia  · Currently requiring HFNC 70%/50L  · Wean HFNC for goal SpO2>88%  · Will initiate IV steroids with methylprednisolone 40mg IV q8hr given elevated CRP (194 7)  · With elevated BNP, dose lasix 40mg x1 and monitor for response  · Continue outpatient breo-ellipta  · Hold on abx at this time, check procal-- if procal elevated, will initiate abx  · Encourage pulmonary hygiene

## 2022-03-30 NOTE — EMTALA/ACUTE CARE TRANSFER
[facility] EMERGENCY DEPARTMENT  [address] [place] [id number]  Dept: [address] [place] TRANSFER CONSENT    NAME Ying Murguia                                         1955                              MRN [id number]    I have been informed of my rights regarding examination, treatment, and transfer   by Dr Edwin Sorensen PA-C/ Taco Huerta MD    Benefits:  CCU/Pulm    Risks:   Transport Risks      Consent for Transfer:  I acknowledge that my medical condition has been evaluated and explained to me by the emergency department physician or other qualified medical person and/or my attending physician, who has recommended that I be transferred to the service of   Dr Dionicio Camacho at  [facility]  The above potential benefits of such transfer, the potential risks associated with such transfer, and the probable risks of not being transferred have been explained to me, and I fully understand them  The doctor has explained that, in my case, the benefits of transfer outweigh the risks  I agree to be transferred  I authorize the performance of emergency medical procedures and treatments upon me in both transit and upon arrival at the receiving facility  Additionally, I authorize the release of any and all medical records to the receiving facility and request they be transported with me, if possible  I understand that the safest mode of transportation during a medical emergency is an ambulance and that the Hospital advocates the use of this mode of transport  Risks of traveling to the receiving facility by car, including absence of medical control, life sustaining equipment, such as oxygen, and medical personnel has been explained to me and I fully understand them  (JOSE ARMANDO CORRECT BOX BELOW)  [  ]  I consent to the stated transfer and to be transported by ambulance/helicopter    [  ]  I consent to the stated transfer, but refuse transportation by ambulance and accept full responsibility for my transportation by car  I understand the risks of non-ambulance transfers and I exonerate the Hospital and its staff from any deterioration in my condition that results from this refusal     X___________________________________________    DATE  22  TIME________  Signature of patient or legally responsible individual signing on patient behalf           RELATIONSHIP TO PATIENT_________________________          Provider Certification    NAME Connie Denson                                         1955                              MRN [id number]    A medical screening exam was performed on the above named patient  Based on the examination:    Condition Necessitating Transfer The primary encounter diagnosis was Hypoxia  Diagnoses of Dyspnea and COVID-19 were also pertinent to this visit  Patient Condition:  [address]    Reason for Transfer:  Pulm/CCU    Transfer Requirements: [address]   · Space available and qualified personnel available for treatment as acknowledged by  Anuja Nolen  · Agreed to accept transfer and to provide appropriate medical treatment as acknowledged by          · Appropriate medical records of the examination and treatment of the patient are provided at the time of transfer   [address] _______  · Transfer will be performed by qualified personnel from    and appropriate transfer equipment as required, including the use of necessary and appropriate life support measures      Provider Certification: I have examined the patient and explained the following risks and benefits of being transferred/refusing transfer to the patient/family:         Based on these reasonable risks and benefits to the patient and/or the unborn child(helio), and based upon the information available at the time of the patients examination, I certify that the medical benefits reasonably to be expected from the provision of appropriate medical treatments at another medical facility outweigh the increasing risks, if any, to the individuals medical condition, and in the case of labor to the unborn child, from effecting the transfer      X____________________________________________ DATE 03/30/22        TIME_______      ORIGINAL - SEND TO MEDICAL RECORDS   COPY - SEND WITH PATIENT DURING TRANSFER

## 2022-03-30 NOTE — ED PROVIDER NOTES
History  Chief Complaint   Patient presents with    Shortness of Breath     Arrives from home per EMS with report of increasing SOB x 3days  Recent covid infection aprx 1month ago  Per EMS room air Saturation 79%  96% on non-rebreather  Patient presents to the emergency department today for evaluation of shortness of breath  This is a 69-year-old male who presents via [address] on 15 L non-rebreather  Patient provides no history states he had a 1 month hospital stay for COVID he has been home for about a month he has been using at home oxygen  He states over the last 3 days he has noticed increased shortness of breath  Denies chest pain or leg edema  No history of fever  He states he was utilizing 10 L at home last night  Ox saturation 79% on that 10 L when EMS arrived  They placed him on non-rebreather  He will be transitioned to high-flow nasal cannula here  He is tachypneic and tachycardic  He states he was on Lovenox injections however is no longer on any anticoagulants  Prior to Admission Medications   Prescriptions Last Dose Informant Patient Reported? Taking? Alcohol Swabs 70 % PADS   No No   Sig: May substitute brand based on insurance coverage  Check glucose TID  Blood Glucose Monitoring Suppl (OneTouch Verio Reflect) w/Device KIT   No No   Sig: May substitute brand based on insurance coverage  Check glucose TID  Insulin Pen Needle (BD Pen Needle Silvia 2nd Gen) 32G X 4 MM MISC   No No   Sig: For use with insulin pen  Pharmacy may dispense brand covered by insurance  Insulin Pen Needle (BD Pen Needle Silvia 2nd Gen) 32G X 4 MM MISC   No No   Sig: For use with insulin pen  Pharmacy may dispense brand covered by insurance  Multiple Vitamin (multivitamin) tablet   Yes No   Sig: Take 1 tablet by mouth daily   OneTouch Delica Lancets 63R MISC   No No   Sig: May substitute brand based on insurance coverage  Check glucose TID     Semaglutide (OZEMPIC, 0 25 OR 0 5 MG/DOSE, SC)   Yes No   Sig: Inject under the skin   Patient not taking: Reported on 3/17/2022    VITAMIN D PO   Yes No   Sig: daily   ascorbic acid (VITAMIN C) 500 mg tablet   Yes No   Sig: in the morning   atorvastatin (LIPITOR) 10 mg tablet   Yes No   Sig: Take 10 mg by mouth daily   benzonatate (TESSALON PERLES) 100 mg capsule   No No   Sig: Take 1 capsule (100 mg total) by mouth 3 (three) times a day   fluticasone-vilanterol (Breo Ellipta) 100-25 mcg/inh inhaler   No No   Sig: Inhale 1 puff daily Rinse mouth after use  glucose blood (OneTouch Verio) test strip   No No   Sig: May substitute brand based on insurance coverage  Check glucose TID     insulin glargine (Lantus SoloStar) 100 units/mL injection pen   No No   Sig: Inject 20 Units under the skin daily at bedtime   insulin lispro (HumaLOG KwikPen) 100 units/mL injection pen   No No   Sig: Inject 15 Units under the skin 3 (three) times a day with meals   lisinopril (ZESTRIL) 5 mg tablet   Yes No   Sig: Take 5 mg by mouth daily   metFORMIN (GLUCOPHAGE) 1000 MG tablet   Yes No   Sig: Take 1,000 mg by mouth 2 (two) times a day with meals   pantoprazole (PROTONIX) 40 mg tablet   No No   Sig: Take 1 tablet (40 mg total) by mouth daily in the early morning   predniSONE 10 mg tablet   No No   Sig: Take 1 tablet (10 mg total) by mouth daily for 5 doses   predniSONE 20 mg tablet   No No   Sig: Take 1 tablet (20 mg total) by mouth daily for 5 doses   thiamine (VITAMIN B1) 100 mg tablet   No No   Sig: Take 1 tablet (100 mg total) by mouth daily   zinc sulfate (ZINCATE) 220 mg capsule   No No   Sig: Take 1 capsule (220 mg total) by mouth daily      Facility-Administered Medications: None       Past Medical History:   Diagnosis Date    COVID-19 02/08/2022    Diabetes mellitus ([facility] [address] )     Hypertension        Past Surgical History:   Procedure Laterality Date    ANKLE FRACTURE SURGERY Left     FOOT FRACTURE SURGERY      KNEE SURGERY Right     VASECTOMY         Family History Problem Relation Age of Onset    Diabetes Mother     Alcohol abuse Father      I have reviewed and agree with the history as documented  E-Cigarette/Vaping    E-Cigarette Use Never User     Cartridges/Day 0      E-Cigarette/Vaping Substances    Nicotine No     THC No     CBD No     Flavoring No     Other No     Unknown No      Social History     Tobacco Use    Smoking status: Former Smoker     Packs/day: 1 00     Years: 30 00     Pack years: 30 00     Types: Cigarettes     Quit date: 2008     Years since quittin 1    Smokeless tobacco: Never Used   Vaping Use    Vaping Use: Never used   Substance Use Topics    Alcohol use: Never    Drug use: Never       Review of Systems   Constitutional: Negative for chills and fever  HENT: Negative for ear pain and sore throat  Eyes: Negative for pain and visual disturbance  Respiratory: Positive for shortness of breath  Cardiovascular: Negative for chest pain and palpitations  Gastrointestinal: Negative for abdominal pain and vomiting  Genitourinary: Negative for dysuria and hematuria  Musculoskeletal: Negative for arthralgias and back pain  Skin: Negative for color change and rash  Neurological: Negative for seizures and syncope  All other systems reviewed and are negative  Physical Exam  Physical Exam  Vitals reviewed  Constitutional:       General: He is in acute distress  Appearance: He is well-developed  He is ill-appearing and toxic-appearing  He is not diaphoretic  HENT:      Right Ear: External ear normal  No swelling  Tympanic membrane is not bulging  Left Ear: External ear normal  No swelling  Tympanic membrane is not bulging  Nose: Nose normal       Mouth/Throat:      Pharynx: No oropharyngeal exudate  Eyes:      General: Lids are normal       Conjunctiva/sclera: Conjunctivae normal       Pupils: Pupils are equal, round, and reactive to light  Neck:      Thyroid: No thyromegaly  Vascular: No JVD  Trachea: No tracheal deviation  Cardiovascular:      Rate and Rhythm: Normal rate and regular rhythm  Pulses: Normal pulses  Heart sounds: Normal heart sounds  No murmur heard  No friction rub  No gallop  Pulmonary:      Effort: Tachypnea, accessory muscle usage and respiratory distress present  Breath sounds: No stridor  Rhonchi present  No wheezing or rales  Chest:      Chest wall: No tenderness  Abdominal:      General: Bowel sounds are normal  There is no distension  Palpations: Abdomen is soft  There is no mass  Tenderness: There is no abdominal tenderness  There is no guarding or rebound  Negative signs include Palma's sign  Hernia: No hernia is present  Musculoskeletal:         General: No tenderness  Normal range of motion  Cervical back: Normal range of motion and neck supple  No edema  Normal range of motion  Right lower leg: No tenderness  No edema  Left lower leg: No tenderness  No edema  Lymphadenopathy:      Cervical: No cervical adenopathy  Skin:     General: Skin is warm and dry  Capillary Refill: Capillary refill takes less than 2 seconds  Coloration: Skin is not pale  Findings: No erythema or rash  Neurological:      Mental Status: He is alert and oriented to person, place, and time  GCS: GCS eye subscore is 4  GCS verbal subscore is 5  GCS motor subscore is 6  Cranial Nerves: No cranial nerve deficit  Sensory: No sensory deficit  Deep Tendon Reflexes: Reflexes are normal and symmetric  Psychiatric:         Mood and Affect: Mood normal  Mood is not anxious  Speech: Speech normal          Behavior: Behavior normal  Behavior is not agitated           Vital Signs  ED Triage Vitals   Temperature Pulse Respirations Blood Pressure SpO2   03/30/22 1134 03/30/22 1134 03/30/22 1134 03/30/22 1136 03/30/22 1134   98 3 °F (36 8 °C) (!) 117 (!) 26 116/58 96 %      Temp Source Heart Rate Source Patient Position - Orthostatic VS BP Location FiO2 (%)   03/30/22 1134 03/30/22 1134 03/30/22 1136 03/30/22 1136 03/30/22 1230   Temporal Monitor Lying Left arm 100      Pain Score       --                  Vitals:    03/30/22 1230 03/30/22 1300 03/30/22 1400 03/30/22 1430   BP: 98/58 113/55 99/52 102/72   Pulse: 88 98 80 91   Patient Position - Orthostatic VS: Lying Lying  Lying         Visual Acuity      ED Medications  Medications   ipratropium-albuterol (DUO-NEB) 0 5-2 5 mg/3 mL inhalation solution 3 mL (3 mL Nebulization Given 3/30/22 1145)   iohexol (OMNIPAQUE) 350 MG/ML injection (SINGLE-DOSE) 100 mL (100 mL Intravenous Given 3/30/22 1304)       Diagnostic Studies  Results Reviewed     Procedure Component Value Units Date/Time    C-reactive protein [[id number]] Collected: 03/30/22 1149    Lab Status: In process Specimen: Blood from Arm, Right Updated: 03/30/22 1507    HS Troponin I 2hr [[id number]]  (Abnormal) Collected: 03/30/22 1425    Lab Status: Final result Specimen: Blood from Arm, Left Updated: 03/30/22 1455     hs TnI 2hr 153 ng/L      Delta 2hr hsTnI 48 ng/L     Lactic acid 2 Hours [[id number]]  (Normal) Collected: 03/30/22 1425    Lab Status: Final result Specimen: Blood from Arm, Left Updated: 03/30/22 1450     LACTIC ACID 1 6 mmol/L     Narrative:      Result may be elevated if tourniquet was used during collection      NT-BNP PRO [[id number]]  (Abnormal) Collected: 03/30/22 1149    Lab Status: Final result Specimen: Blood from Arm, Right Updated: 03/30/22 1225     NT-proBNP 1,116 pg/mL     Magnesium [[id number]]  (Normal) Collected: 03/30/22 1149    Lab Status: Final result Specimen: Blood from Arm, Right Updated: 03/30/22 1225     Magnesium 1 9 mg/dL     Comprehensive metabolic panel [[id number]]  (Abnormal) Collected: 03/30/22 1149    Lab Status: Final result Specimen: Blood from Arm, Right Updated: 03/30/22 1224     Sodium 137 mmol/L      Potassium 4 0 mmol/L      Chloride 103 mmol/L CO2 23 mmol/L      ANION GAP 11 mmol/L      BUN 19 mg/dL      Creatinine 1 06 mg/dL      Glucose 260 mg/dL      Calcium 8 3 mg/dL      Corrected Calcium 9 3 mg/dL      AST 23 U/L      ALT 42 U/L      Alkaline Phosphatase 65 U/L      Total Protein 6 6 g/dL      Albumin 2 8 g/dL      Total Bilirubin 0 92 mg/dL      eGFR 72 ml/min/1 73sq m     Narrative:      [place] guidelines for Chronic Kidney Disease (CKD):     Stage 1 with normal or high GFR (GFR > 90 mL/min/1 73 square meters)    Stage 2 Mild CKD (GFR = 60-89 mL/min/1 73 square meters)    Stage 3A Moderate CKD (GFR = 45-59 mL/min/1 73 square meters)    Stage 3B Moderate CKD (GFR = 30-44 mL/min/1 73 square meters)    Stage 4 Severe CKD (GFR = 15-29 mL/min/1 73 square meters)    Stage 5 End Stage CKD (GFR <15 mL/min/1 73 square meters)  Note: GFR calculation is accurate only with a steady state creatinine    HS Troponin I 4hr [[id number]]     Lab Status: No result Specimen: Blood     HS Troponin 0hr (reflex protocol) [[id number]]  (Abnormal) Collected: 03/30/22 1149    Lab Status: Final result Specimen: Blood from Arm, Right Updated: 03/30/22 1222     hs TnI 0hr 105 ng/L     Lactic acid [[id number]]  (Abnormal) Collected: 03/30/22 1149    Lab Status: Final result Specimen: Blood from Arm, Right Updated: 03/30/22 1221     LACTIC ACID 2 8 mmol/L     Narrative:      Result may be elevated if tourniquet was used during collection      [facility] [[id number]]  (Normal) Collected: 03/30/22 1149    Lab Status: Final result Specimen: Blood from Arm, Right Updated: 03/30/22 1221     Protime 13 7 seconds      INR 1 11    APTT [[id number]]  (Abnormal) Collected: 03/30/22 1149    Lab Status: Final result Specimen: Blood from Arm, Right Updated: 03/30/22 1221     PTT 39 seconds     CBC and differential [[id number]]  (Abnormal) Collected: 03/30/22 1149    Lab Status: Final result Specimen: Blood from Arm, Right Updated: 03/30/22 1159     WBC 8 34 Thousand/uL      RBC 3 34 Million/uL      Hemoglobin 11 2 g/dL      Hematocrit 33 7 %       fL      MCH 33 5 pg      MCHC 33 2 g/dL      RDW 15 4 %      MPV 9 9 fL      Platelets 967 Thousands/uL      nRBC 0 /100 WBCs      Neutrophils Relative 74 %      Immat GRANS % 1 %      Lymphocytes Relative 19 %      Monocytes Relative 6 %      Eosinophils Relative 0 %      Basophils Relative 0 %      Neutrophils Absolute 6 15 Thousands/µL      Immature Grans Absolute 0 04 Thousand/uL      Lymphocytes Absolute 1 59 Thousands/µL      Monocytes Absolute 0 51 Thousand/µL      Eosinophils Absolute 0 03 Thousand/µL      Basophils Absolute 0 02 Thousands/µL     Blood culture #1 [[id number]] Collected: 03/30/22 1139    Lab Status: In process Specimen: Blood from Arm, Right Updated: 03/30/22 1155    Blood culture #2 [[id number]] Collected: 03/30/22 1149    Lab Status: In process Specimen: Blood from Arm, Right Updated: 03/30/22 1155                 CTA ED chest PE Study   Final Result by Haley Mon MD (03/30 1317)      Compared to 3/1/2022, increased bilateral, symmetric airspace disease and small pleural effusions  Morphology of airspace disease is compatible with Covid 19 infection, but cannot exclude superimposed cardiogenic or noncardiogenic edema  No PE identified  Workstation performed: [id number]         XR chest 1 view portable   ED Interpretation by Lenin Beard PA-C (03/30 1207)   Although those no evidence of pneumothorax there is diffuse bilateral consolidative process is likely consistent with worsening COVID pneumonia      Final Result by Emelia Boyd MD (03/30 1402)      Vascular congestion with small left pleural effusion                    Workstation performed: [id number]                    Procedures  ECG 12 Lead Documentation Only    Date/Time: 3/30/2022 11:48 AM  Performed by: Lenin Beard PA-C  Authorized by: Lenin Beard PA-C     Indications / Diagnosis: Shortness of breath  ECG reviewed by me, the ED Provider: yes    Patient location:  Bedside  Previous ECG:     Previous ECG:  Compared to current    Similarity:  No change  Interpretation:     Interpretation: abnormal    Rate:     ECG rate:  104    ECG rate assessment: tachycardic    Rhythm:     Rhythm: sinus tachycardia    Ectopy:     Ectopy: none    QRS:     QRS axis:  Left  Conduction:     Conduction: abnormal    ST segments:     ST segments:  Non-specific  T waves:     T waves: inverted      Inverted:  III and V3  Comments:      I did personally review today's EKG and compared to an EKG from approximately 6 days ago  Although there are abnormalities these are not appear novel  It is very consistent with prior EKG  CriticalCare Time  Performed by: Umm Enriquez PA-C  Authorized by: mUm Enriquez PA-C     Critical care provider statement:     Critical care time (minutes):  35    Critical care was necessary to treat or prevent imminent or life-threatening deterioration of the following conditions:  Respiratory failure    Critical care was time spent personally by me on the following activities:  Blood draw for specimens, development of treatment plan with patient or surrogate, review of old charts, re-evaluation of patient's condition, ordering and review of radiographic studies, ordering and review of laboratory studies, ordering and performing treatments and interventions, examination of patient and evaluation of patient's response to treatment    I assumed direction of critical care for this patient from another provider in my specialty: no    Comments:      Patient presents profoundly hypoxic requiring Vapotherm to maintain adequate oxygen saturations to prevent end-organ damage               ED Course  ED Course as of 03/30/22 1508   Wed Mar 30, 2022   1206 WBC: 8 34   1206 Hemoglobin(!): 11 2   1206 Platelet Count: 704   1257 NT-proBNP(!): 1,116   1257 Magnesium: 1 9   1257 Sodium: 137   1257 Total Protein: 6 6   1257 eGFR: 72   1258 Last high sensitivity troponin was 3,300 month ago  [address] interpretation and likely admission to the ICU   1400 IMPRESSION:     Compared to 3/1/2022, increased bilateral, symmetric airspace disease and small pleural effusions  Morphology of airspace disease is compatible with Covid 19 infection, but cannot exclude superimposed cardiogenic or noncardiogenic edema      No PE identified  1404 TT sent to [facility]   [phone] here requesting transfer for higher level of care   1422 Pt okay with transfer                                 SBIRT 20yo+      Most Recent Value   SBIRT (25 yo +)    In order to provide better care to our patients, we are screening all of our patients for alcohol and drug use  Would it be okay to ask you these screening questions? Unable to answer at this time Filed at: 03/30/2022 1137                    MDM    Disposition  Final diagnoses:   Hypoxia   Dyspnea   COVID-19     Time reflects when diagnosis was documented in both MDM as applicable and the Disposition within this note     Time User Action Codes Description Comment    3/30/2022  3:04 PM Kathryn Decent Add [R09 02] Hypoxia     3/30/2022  3:04 PM Kathryn Decent Add [Z20 822] Encounter for laboratory testing for COVID-19 virus     3/30/2022  3:04 PM Kathryn Decent Remove [Y12 594] Encounter for laboratory testing for COVID-19 virus     3/30/2022  3:05 PM Kathryn Decent Add [R06 00] Dyspnea     3/30/2022  3:05 PM Kathryn Decent Add [U07 1] COVID-19       ED Disposition     ED Disposition Condition Date/Time Comment    Transfer to Another Facility-In Network  Wed Mar 30, 2022  3:04 PM Lex Dailey should be transferred out to [facility]  Follow-up Information    None         Patient's Medications   Discharge Prescriptions    No medications on file       No discharge procedures on file      PDMP Review     None          ED Provider  Electronically Signed by           Beena Del Rosario PA-C  03/30/22 0980

## 2022-03-30 NOTE — ASSESSMENT & PLAN NOTE
Lab Results   Component Value Date    HGBA1C 7 7 (H) 12/06/2021       No results for input(s): POCGLU in the last 72 hours      Blood Sugar Average: Last 72 hrs:     · Home regimen of humalog 15u TID with meals + SSI and lantus 20u QHS  · Will continue SSI while inpatient + lantus 20u QHS  · Goal -180

## 2022-03-30 NOTE — ASSESSMENT & PLAN NOTE
· Patient reports no history of CHF  · Was scheduled to have TTE today by outpatient cardiologist  · BNP elevated at 1116, vascular congestion on CXR  · Will trial 40mg IV lasix and observe for any improvement in respiratory status  · Obtain TTE

## 2022-03-31 ENCOUNTER — APPOINTMENT (INPATIENT)
Dept: NON INVASIVE DIAGNOSTICS | Facility: HOSPITAL | Age: 67
DRG: 193 | End: 2022-03-31
Payer: COMMERCIAL

## 2022-03-31 LAB
ALBUMIN SERPL BCP-MCNC: 2.6 G/DL (ref 3.5–5)
ALP SERPL-CCNC: 61 U/L (ref 46–116)
ALT SERPL W P-5'-P-CCNC: 37 U/L (ref 12–78)
ANION GAP SERPL CALCULATED.3IONS-SCNC: 11 MMOL/L (ref 4–13)
AORTIC ROOT: 3.5 CM
APICAL FOUR CHAMBER EJECTION FRACTION: 63 %
ASCENDING AORTA: 2.5 CM (ref 2.03–3.04)
AST SERPL W P-5'-P-CCNC: 40 U/L (ref 5–45)
ATRIAL RATE: 104 BPM
BASOPHILS # BLD AUTO: 0.01 THOUSANDS/ΜL (ref 0–0.1)
BASOPHILS NFR BLD AUTO: 0 % (ref 0–1)
BILIRUB SERPL-MCNC: 1.39 MG/DL (ref 0.2–1)
BUN SERPL-MCNC: 20 MG/DL (ref 5–25)
CALCIUM ALBUM COR SERPL-MCNC: 9.7 MG/DL (ref 8.3–10.1)
CALCIUM SERPL-MCNC: 8.6 MG/DL (ref 8.3–10.1)
CHLORIDE SERPL-SCNC: 100 MMOL/L (ref 100–108)
CO2 SERPL-SCNC: 24 MMOL/L (ref 21–32)
CREAT SERPL-MCNC: 1 MG/DL (ref 0.6–1.3)
CRP SERPL QL: 240.4 MG/L
E WAVE DECELERATION TIME: 227 MS
E/A RATIO: 0.89
EOSINOPHIL # BLD AUTO: 0 THOUSAND/ΜL (ref 0–0.61)
EOSINOPHIL NFR BLD AUTO: 0 % (ref 0–6)
ERYTHROCYTE [DISTWIDTH] IN BLOOD BY AUTOMATED COUNT: 15 % (ref 11.6–15.1)
FRACTIONAL SHORTENING: 41 (ref 28–44)
GFR SERPL CREATININE-BSD FRML MDRD: 78 ML/MIN/1.73SQ M
GLUCOSE SERPL-MCNC: 278 MG/DL (ref 65–140)
GLUCOSE SERPL-MCNC: 302 MG/DL (ref 65–140)
GLUCOSE SERPL-MCNC: 323 MG/DL (ref 65–140)
GLUCOSE SERPL-MCNC: 332 MG/DL (ref 65–140)
GLUCOSE SERPL-MCNC: 358 MG/DL (ref 65–140)
HCT VFR BLD AUTO: 31.4 % (ref 36.5–49.3)
HGB BLD-MCNC: 10.8 G/DL (ref 12–17)
IMM GRANULOCYTES # BLD AUTO: 0.05 THOUSAND/UL (ref 0–0.2)
IMM GRANULOCYTES NFR BLD AUTO: 1 % (ref 0–2)
INTERVENTRICULAR SEPTUM IN DIASTOLE (PARASTERNAL SHORT AXIS VIEW): 0.9 CM
INTERVENTRICULAR SEPTUM: 0.9 CM (ref 0.53–1)
LAAS-AP2: 12.6 CM2
LAAS-AP4: 15.6 CM2
LEFT ATRIUM AREA SYSTOLE SINGLE PLANE A4C: 15.2 CM2
LEFT ATRIUM SIZE: 3.8 CM
LEFT ATRIUM VOLUME INDEX (MOD BIPLANE): 19.5
LEFT INTERNAL DIMENSION IN SYSTOLE: 2.6 CM (ref 2.94–4.45)
LEFT VENTRICULAR INTERNAL DIMENSION IN DIASTOLE: 4.4 CM (ref 4.83–7.19)
LEFT VENTRICULAR POSTERIOR WALL IN END DIASTOLE: 0.9 CM (ref 0.52–0.99)
LEFT VENTRICULAR STROKE VOLUME: 64 ML
LVSV (TEICH): 64 ML
LYMPHOCYTES # BLD AUTO: 1.99 THOUSANDS/ΜL (ref 0.6–4.47)
LYMPHOCYTES NFR BLD AUTO: 26 % (ref 14–44)
MAGNESIUM SERPL-MCNC: 2.9 MG/DL (ref 1.6–2.6)
MCH RBC QN AUTO: 34.7 PG (ref 26.8–34.3)
MCHC RBC AUTO-ENTMCNC: 34.4 G/DL (ref 31.4–37.4)
MCV RBC AUTO: 101 FL (ref 82–98)
MONOCYTES # BLD AUTO: 0.18 THOUSAND/ΜL (ref 0.17–1.22)
MONOCYTES NFR BLD AUTO: 2 % (ref 4–12)
MV E'TISSUE VEL-SEP: 7 CM/S
MV PEAK A VEL: 0.79 M/S
MV PEAK E VEL: 70 CM/S
MV STENOSIS PRESSURE HALF TIME: 66 MS
MV VALVE AREA P 1/2 METHOD: 3.3
NEUTROPHILS # BLD AUTO: 5.33 THOUSANDS/ΜL (ref 1.85–7.62)
NEUTS SEG NFR BLD AUTO: 71 % (ref 43–75)
NRBC BLD AUTO-RTO: 0 /100 WBCS
P AXIS: 36 DEGREES
PHOSPHATE SERPL-MCNC: 5.7 MG/DL (ref 2.3–4.1)
PLATELET # BLD AUTO: 160 THOUSANDS/UL (ref 149–390)
PMV BLD AUTO: 10.5 FL (ref 8.9–12.7)
POTASSIUM SERPL-SCNC: 4.7 MMOL/L (ref 3.5–5.3)
PR INTERVAL: 136 MS
PROCALCITONIN SERPL-MCNC: 0.64 NG/ML
PROT SERPL-MCNC: 6.5 G/DL (ref 6.4–8.2)
QRS AXIS: -56 DEGREES
QRSD INTERVAL: 122 MS
QT INTERVAL: 370 MS
QTC INTERVAL: 486 MS
RBC # BLD AUTO: 3.11 MILLION/UL (ref 3.88–5.62)
RIGHT ATRIUM AREA SYSTOLE A4C: 12.4 CM2
RIGHT VENTRICLE ID DIMENSION: 3.7 CM
SL CV LEFT ATRIUM LENGTH A2C: 4.6 CM
SL CV PED ECHO LEFT VENTRICLE DIASTOLIC VOLUME (MOD BIPLANE) 2D: 90 ML
SL CV PED ECHO LEFT VENTRICLE SYSTOLIC VOLUME (MOD BIPLANE) 2D: 26 ML
SODIUM SERPL-SCNC: 135 MMOL/L (ref 136–145)
T WAVE AXIS: -49 DEGREES
TR MAX PG: 21 MMHG
TR PEAK VELOCITY: 2.3 M/S
TRICUSPID VALVE PEAK REGURGITATION VELOCITY: 2.29 M/S
VENTRICULAR RATE: 104 BPM
WBC # BLD AUTO: 7.56 THOUSAND/UL (ref 4.31–10.16)

## 2022-03-31 PROCEDURE — 87081 CULTURE SCREEN ONLY: CPT | Performed by: NURSE PRACTITIONER

## 2022-03-31 PROCEDURE — 94664 DEMO&/EVAL PT USE INHALER: CPT

## 2022-03-31 PROCEDURE — 86140 C-REACTIVE PROTEIN: CPT | Performed by: NURSE PRACTITIONER

## 2022-03-31 PROCEDURE — 93306 TTE W/DOPPLER COMPLETE: CPT | Performed by: INTERNAL MEDICINE

## 2022-03-31 PROCEDURE — 97165 OT EVAL LOW COMPLEX 30 MIN: CPT

## 2022-03-31 PROCEDURE — 82948 REAGENT STRIP/BLOOD GLUCOSE: CPT

## 2022-03-31 PROCEDURE — 80053 COMPREHEN METABOLIC PANEL: CPT | Performed by: NURSE PRACTITIONER

## 2022-03-31 PROCEDURE — 84100 ASSAY OF PHOSPHORUS: CPT | Performed by: NURSE PRACTITIONER

## 2022-03-31 PROCEDURE — 97163 PT EVAL HIGH COMPLEX 45 MIN: CPT

## 2022-03-31 PROCEDURE — 84145 PROCALCITONIN (PCT): CPT | Performed by: PHYSICIAN ASSISTANT

## 2022-03-31 PROCEDURE — 93306 TTE W/DOPPLER COMPLETE: CPT

## 2022-03-31 PROCEDURE — 99291 CRITICAL CARE FIRST HOUR: CPT | Performed by: ANESTHESIOLOGY

## 2022-03-31 PROCEDURE — 85025 COMPLETE CBC W/AUTO DIFF WBC: CPT | Performed by: NURSE PRACTITIONER

## 2022-03-31 PROCEDURE — 93010 ELECTROCARDIOGRAM REPORT: CPT | Performed by: INTERNAL MEDICINE

## 2022-03-31 PROCEDURE — 94760 N-INVAS EAR/PLS OXIMETRY 1: CPT

## 2022-03-31 PROCEDURE — 99223 1ST HOSP IP/OBS HIGH 75: CPT | Performed by: INTERNAL MEDICINE

## 2022-03-31 PROCEDURE — 83735 ASSAY OF MAGNESIUM: CPT | Performed by: NURSE PRACTITIONER

## 2022-03-31 RX ORDER — FUROSEMIDE 10 MG/ML
40 INJECTION INTRAMUSCULAR; INTRAVENOUS ONCE
Status: COMPLETED | OUTPATIENT
Start: 2022-03-31 | End: 2022-03-31

## 2022-03-31 RX ORDER — VANCOMYCIN HYDROCHLORIDE 1 G/200ML
12.5 INJECTION, SOLUTION INTRAVENOUS EVERY 12 HOURS
Status: DISCONTINUED | OUTPATIENT
Start: 2022-03-31 | End: 2022-04-01

## 2022-03-31 RX ORDER — METHYLPREDNISOLONE SODIUM SUCCINATE 125 MG/2ML
60 INJECTION, POWDER, LYOPHILIZED, FOR SOLUTION INTRAMUSCULAR; INTRAVENOUS EVERY 6 HOURS SCHEDULED
Status: COMPLETED | OUTPATIENT
Start: 2022-03-31 | End: 2022-04-02

## 2022-03-31 RX ADMIN — PANTOPRAZOLE SODIUM 40 MG: 40 TABLET, DELAYED RELEASE ORAL at 05:35

## 2022-03-31 RX ADMIN — CEFEPIME HYDROCHLORIDE 2000 MG: 2 INJECTION, POWDER, FOR SOLUTION INTRAVENOUS at 05:35

## 2022-03-31 RX ADMIN — SODIUM CHLORIDE 7 UNITS/HR: 9 INJECTION, SOLUTION INTRAVENOUS at 21:36

## 2022-03-31 RX ADMIN — ATORVASTATIN CALCIUM 10 MG: 10 TABLET, FILM COATED ORAL at 08:50

## 2022-03-31 RX ADMIN — BENZONATATE 200 MG: 100 CAPSULE ORAL at 16:42

## 2022-03-31 RX ADMIN — SENNOSIDES AND DOCUSATE SODIUM 2 TABLET: 50; 8.6 TABLET ORAL at 17:31

## 2022-03-31 RX ADMIN — BENZONATATE 200 MG: 100 CAPSULE ORAL at 08:51

## 2022-03-31 RX ADMIN — INSULIN LISPRO 15 UNITS: 100 INJECTION, SOLUTION INTRAVENOUS; SUBCUTANEOUS at 08:47

## 2022-03-31 RX ADMIN — METHYLPREDNISOLONE SODIUM SUCCINATE 40 MG: 40 INJECTION, POWDER, FOR SOLUTION INTRAMUSCULAR; INTRAVENOUS at 05:35

## 2022-03-31 RX ADMIN — ZINC SULFATE 220 MG (50 MG) CAPSULE 220 MG: CAPSULE at 08:50

## 2022-03-31 RX ADMIN — SENNOSIDES AND DOCUSATE SODIUM 2 TABLET: 50; 8.6 TABLET ORAL at 08:51

## 2022-03-31 RX ADMIN — VANCOMYCIN HYDROCHLORIDE 1250 MG: 5 INJECTION, POWDER, LYOPHILIZED, FOR SOLUTION INTRAVENOUS at 05:35

## 2022-03-31 RX ADMIN — ENOXAPARIN SODIUM 40 MG: 40 INJECTION SUBCUTANEOUS at 08:52

## 2022-03-31 RX ADMIN — INSULIN LISPRO 4 UNITS: 100 INJECTION, SOLUTION INTRAVENOUS; SUBCUTANEOUS at 16:46

## 2022-03-31 RX ADMIN — FUROSEMIDE 40 MG: 10 INJECTION, SOLUTION INTRAMUSCULAR; INTRAVENOUS at 06:30

## 2022-03-31 RX ADMIN — SALINE NASAL SPRAY 1 SPRAY: 1.5 SOLUTION NASAL at 09:13

## 2022-03-31 RX ADMIN — METHYLPREDNISOLONE SODIUM SUCCINATE 60 MG: 125 INJECTION, POWDER, FOR SOLUTION INTRAMUSCULAR; INTRAVENOUS at 17:32

## 2022-03-31 RX ADMIN — CEFEPIME HYDROCHLORIDE 2000 MG: 2 INJECTION, POWDER, FOR SOLUTION INTRAVENOUS at 16:42

## 2022-03-31 RX ADMIN — INSULIN LISPRO 15 UNITS: 100 INJECTION, SOLUTION INTRAVENOUS; SUBCUTANEOUS at 11:24

## 2022-03-31 RX ADMIN — FLUTICASONE FUROATE AND VILANTEROL TRIFENATATE 1 PUFF: 100; 25 POWDER RESPIRATORY (INHALATION) at 08:56

## 2022-03-31 RX ADMIN — METHYLPREDNISOLONE SODIUM SUCCINATE 60 MG: 125 INJECTION, POWDER, FOR SOLUTION INTRAMUSCULAR; INTRAVENOUS at 11:25

## 2022-03-31 RX ADMIN — INSULIN LISPRO 5 UNITS: 100 INJECTION, SOLUTION INTRAVENOUS; SUBCUTANEOUS at 08:46

## 2022-03-31 RX ADMIN — GUAIFENESIN 600 MG: 600 TABLET ORAL at 08:51

## 2022-03-31 RX ADMIN — OXYCODONE HYDROCHLORIDE AND ACETAMINOPHEN 500 MG: 500 TABLET ORAL at 08:51

## 2022-03-31 RX ADMIN — BENZONATATE 200 MG: 100 CAPSULE ORAL at 21:35

## 2022-03-31 RX ADMIN — THIAMINE HCL TAB 100 MG 100 MG: 100 TAB at 08:51

## 2022-03-31 RX ADMIN — INSULIN LISPRO 15 UNITS: 100 INJECTION, SOLUTION INTRAVENOUS; SUBCUTANEOUS at 16:47

## 2022-03-31 RX ADMIN — GUAIFENESIN 600 MG: 600 TABLET ORAL at 21:35

## 2022-03-31 RX ADMIN — INSULIN LISPRO 5 UNITS: 100 INJECTION, SOLUTION INTRAVENOUS; SUBCUTANEOUS at 11:22

## 2022-03-31 RX ADMIN — VANCOMYCIN HYDROCHLORIDE 1000 MG: 1 INJECTION, SOLUTION INTRAVENOUS at 17:52

## 2022-03-31 NOTE — ASSESSMENT & PLAN NOTE
Lab Results   Component Value Date    HGBA1C 7 7 (H) 12/06/2021       Recent Labs     03/30/22  2102 03/31/22  0804 03/31/22  1103 03/31/22  1617   POCGLU 132 332* 323* 278*       Blood Sugar Average: Last 72 hrs:  (P) 242   · BG uncontrolled on subQ insulin in setting of DM2 now worsened by steroid-induced hyperglycemia  · Started on insulin gtt  · Goal -180

## 2022-03-31 NOTE — ASSESSMENT & PLAN NOTE
· Patient reports no history of CHF  · Was scheduled to have TTE today by outpatient cardiologist  · BNP elevated at 1116, vascular congestion on CXR  · PRN diuresis  · Obtain TTE

## 2022-03-31 NOTE — CONSULTS
Consultation - Pulmonary Medicine   Katherine Harrison 78 y o  male MRN: [id number]  Unit/Bed#:  Encounter: [id number]      Assessment:  1  Acute on chronic hypoxemic respiratory failure  2  Recent COVID-19 pneumonia with suspected reactivation inflammation  3  Elevated BNP, possible acute CHF  4  Suspected superimposed bacterial pneumonia    Plan:   Acute on chronic hypoxemic respiratory failure, patient requiring high-flow nasal cannula, was recently discharged home on 4-6 L nasal cannula after COVID diagnosis in February  Suspect multifactorial related to reactivation of inflammatory response with elevated CRP, increased opacities on CT scan, also with what appears to be CHF with elevated BNP, possible superimposed bacterial pneumonia with elevated procalcitonin  Titrate O2 to maintain O2 sats greater than 88%, was recently discharged on 4-6 L nasal cannula but he reports he had been able to titrate down to 3 L  Would increase Solu-Medrol to 40 Q 6 hours at least for the next 24 hours given elevated CRP  Procalcitonin is elevated, can continue broad-spectrum antibiotics  He was given 1 dose of Lasix, echocardiogram is pending  Will touch base with Dr Servando Vazquez regarding patient's case though do not think anything to offer at this point from [facility]  Will continue to follow  Discussed with Critical Care    History of Present Illness   Physician Requesting Consult: Tania Gongora DO  Reason for Consult / Principal Problem: respiratory failure  Hx and PE limited by: None  HPI: Katherine Harrison is a 78y o  year old male former smoker with past medical history of diabetes, hypertension, COVID-19 pneumonia in February who presents with complaint of worsening shortness of breath as well as worsening hypoxia over the last few days  He was discharged home in early March  Had been feeling better and was able to get himself down to 3 L nasal cannula    Has been experiencing increasing shortness of breath and cough as well as worsening hypoxia and increased O2 requirement at home  He denies any prior pulmonary history  He quit smoking about 14 years ago, was never officially diagnosed with COPD and has not been on bronchodilators  He also worked as a   During his recent hospitalization he was treated with steroids, remdesivir, ceftriaxone and doxycycline  Consults    Review of Systems   Constitutional: Positive for fatigue  HENT: Negative  Respiratory: Positive for cough and shortness of breath  Cardiovascular: Negative  Gastrointestinal: Negative  Genitourinary: Negative  Musculoskeletal: Negative  Skin: Negative  Allergic/Immunologic: Negative  Neurological: Negative  Psychiatric/Behavioral: Negative          Historical Information   Past Medical History:   Diagnosis Date    COVID-19 2022    Diabetes mellitus ([facility] [address] )     Hypertension      Past Surgical History:   Procedure Laterality Date    ANKLE FRACTURE SURGERY Left     FOOT FRACTURE SURGERY      KNEE SURGERY Right     VASECTOMY       Social History   Social History     Substance and Sexual Activity   Alcohol Use Never     Social History     Substance and Sexual Activity   Drug Use Never     E-Cigarette/Vaping    E-Cigarette Use Never User     Cartridges/Day 0      E-Cigarette/Vaping Substances    Nicotine No     THC No     CBD No     Flavoring No     Other No     Unknown No      Social History     Tobacco Use   Smoking Status Former Smoker    Packs/day: 1 00    Years: 30 00    Pack years: 30 00    Types: Cigarettes    Quit date: 2008    Years since quittin 1   Smokeless Tobacco Never Used     Occupational History:     Family History:   Family History   Problem Relation Age of Onset    Diabetes Mother     Alcohol abuse Father        Meds/Allergies   all current active meds have been reviewed, pertinent pulmonary meds have been reviewed and current meds:   Current Facility-Administered Medications   Medication Dose Route Frequency    ascorbic acid (VITAMIN C) tablet 500 mg  500 mg Oral Daily    atorvastatin (LIPITOR) tablet 10 mg  10 mg Oral Daily    benzonatate (TESSALON PERLES) capsule 200 mg  200 mg Oral TID    cefepime (MAXIPIME) 2,000 mg in dextrose 5 % 50 mL IVPB  2,000 mg Intravenous Q12H    enoxaparin (LOVENOX) subcutaneous injection 40 mg  40 mg Subcutaneous Daily    fluticasone-vilanterol (BREO ELLIPTA) 100-25 mcg/inh inhaler 1 puff  1 puff Inhalation Daily    guaiFENesin (MUCINEX) 12 hr tablet 600 mg  600 mg Oral Q12H CATHLEEN    insulin glargine (LANTUS) subcutaneous injection 20 Units 0 2 mL  20 Units Subcutaneous HS    insulin lispro (HumaLOG) 100 units/mL subcutaneous injection 1-6 Units  1-6 Units Subcutaneous TID AC    insulin lispro (HumaLOG) 100 units/mL subcutaneous injection 1-6 Units  1-6 Units Subcutaneous HS    insulin lispro (HumaLOG) 100 units/mL subcutaneous injection 15 Units  15 Units Subcutaneous TID With Meals    methylPREDNISolone sodium succinate (Solu-MEDROL) injection 60 mg  60 mg Intravenous Q6H CATHLEEN    pantoprazole (PROTONIX) EC tablet 40 mg  40 mg Oral Early Morning    senna-docusate sodium (SENOKOT S) 8 6-50 mg per tablet 2 tablet  2 tablet Oral BID    sodium chloride (OCEAN) 0 65 % nasal spray 1 spray  1 spray Each Nare Q1H PRN    thiamine tablet 100 mg  100 mg Oral Daily    vancomycin (VANCOCIN) IVPB (premix in dextrose) 1,000 mg 200 mL  12 5 mg/kg Intravenous Q12H    zinc sulfate (ZINCATE) capsule 220 mg  220 mg Oral Daily       No Known Allergies    Objective   Vitals: Blood pressure 99/57, pulse 97, temperature 97 9 °F (36 6 °C), temperature source Oral, resp  rate (!) 26, height 5' 7" (1 702 m), weight 83 5 kg (184 lb), SpO2 93 %  ,Body mass index is 28 82 kg/m²      Intake/Output Summary (Last 24 hours) at 3/31/2022 1205  Last data filed at 3/31/2022 1000  Gross per 24 hour   Intake 410 ml   Output 1990 ml   Net -1580 ml Invasive Devices  Report    Peripheral Intravenous Line            Peripheral IV 03/30/22 Right Forearm 1 day    Peripheral IV 03/30/22 Right Hand 1 day                Physical Exam  Vitals reviewed  Constitutional:       Appearance: Normal appearance  HENT:      Head: Normocephalic and atraumatic  Mouth/Throat:      Pharynx: Oropharynx is clear  Eyes:      Conjunctiva/sclera: Conjunctivae normal    Cardiovascular:      Rate and Rhythm: Normal rate and regular rhythm  Pulmonary:      Effort: Pulmonary effort is normal  No respiratory distress  Breath sounds: Decreased breath sounds present  No wheezing, rhonchi or rales  Abdominal:      General: Abdomen is flat  There is no distension  Musculoskeletal:         General: Normal range of motion  Cervical back: Normal range of motion  Right lower leg: No edema  Left lower leg: No edema  Skin:     General: Skin is warm and dry  Neurological:      Mental Status: He is alert and oriented to person, place, and time  Psychiatric:         Mood and Affect: Mood normal          Behavior: Behavior normal          Lab Results:   I have personally reviewed pertinent lab results  , BNP: No results found for: BNP, CBC:   Lab Results   Component Value Date    WBC 7 56 03/31/2022    HGB 10 8 (L) 03/31/2022    HCT 31 4 (L) 03/31/2022     (H) 03/31/2022     03/31/2022    MCH 34 7 (H) 03/31/2022    MCHC 34 4 03/31/2022    RDW 15 0 03/31/2022    MPV 10 5 03/31/2022    NRBC 0 03/31/2022   , CMP:   Lab Results   Component Value Date    SODIUM 135 (L) 03/31/2022    K 4 7 03/31/2022     03/31/2022    CO2 24 03/31/2022    BUN 20 03/31/2022    CREATININE 1 00 03/31/2022    CALCIUM 8 6 03/31/2022    AST 40 03/31/2022    ALT 37 03/31/2022    ALKPHOS 61 03/31/2022    EGFR 78 03/31/2022     Imaging Studies: I have personally reviewed pertinent reports     and I have personally reviewed pertinent films in PACS  EKG, Pathology, and Other Studies: I have personally reviewed pertinent reports      VTE Prophylaxis: Sequential compression device (Venodyne)  and Enoxaparin (Lovenox)    Code Status: Level 1 - Full Code  Advance Directive and Living Will:      Power of :    POLST:

## 2022-03-31 NOTE — ASSESSMENT & PLAN NOTE
· Suspect primarily long term sequelae of COVID-19 pneumonia  · Currently requiring HFNC 70%/55L  · Wean HFNC for goal SpO2>88%  · Continue IV steroids with methylprednisolone 40mg IV q6hr given elevated CRP-- follow daily CRP  · Continue PRN diuresis  · Continue outpatient breo-ellipta  · On cefepime and vanc  · F/u AM procal; bcx NG x24hr; f/u MRSA  · Encourage pulmonary hygiene  · Scheduled tessalon perles and mucinex for cough as coughing is what contributes most to desaturation episodes  · Pulmonology consulted, appreciate recommendations

## 2022-03-31 NOTE — PROGRESS NOTES
[facility]  Progress Note - Wes Stafford 1955, 78 y o  male MRN: [id number]  Unit/Bed#:  Encounter: [id number]  Primary Care Provider: Karyna Johnson MD   Date and time admitted to hospital: 3/30/2022  5:53 PM    Elevated brain natriuretic peptide (BNP) level  Assessment & Plan  · Patient reports no history of CHF  · Was scheduled to have TTE today by outpatient cardiologist  · BNP elevated at 1116, vascular congestion on CXR  · PRN diuresis  · Obtain TTE    Elevated C-reactive protein (CRP)  Assessment & Plan  · In setting of prolonged covid illness  · Continue methylprednisolone 40mg q8hr  · Trend daily    Type 2 diabetes mellitus, without long-term current use of insulin ([facility] )  Assessment & Plan  Lab Results   Component Value Date    HGBA1C 7 7 (H) 12/06/2021       Recent Labs     03/30/22  1923   POCGLU 145*       Blood Sugar Average: Last 72 hrs:  (P) 145   · Home regimen of humalog 15u TID with meals + SSI and lantus 20u QHS  · Will continue SSI while inpatient + lantus 20u QHS  · Goal -180    Non MI elevated troponin  Assessment & Plan  · Hs troponin elevated at 105  · Peaked at 224 and downtrended  · Favor non-MI troponin elevation in setting of acute hypoxic respiratory failure    * Acute on chronic respiratory failure with hypoxia (HCC)  Assessment & Plan  · Suspect primarily long term sequelae of COVID-19 pneumonia  · Currently requiring HFNC 100%/50L  · Wean HFNC for goal SpO2>88%  · Continue IV steroids with methylprednisolone 40mg IV q8hr given elevated CRP (194 7)  · Continue PRN diuresis  · Continue outpatient breo-ellipta  · Procal 0 36 on 3/30- f/u AM procal and add abx if still elevated/increased  · Encourage pulmonary hygiene  · Scheduled tessalon perles and mucinex for cough as coughing is what contributes most to desaturation episodes  · Pulmonology consulted, appreciate recommendations    ----------------------------------------------------------------------------------------  HPI/24hr events: Desaturations to low 80s with coughing episodes  Escalated from HFNC 70%/50L to 90%/50L, however this AM back to original 70%/50L  Troponin peaked and downtrended  Patient appropriate for transfer out of the ICU today?: No  Disposition: Continue Stepdown Level 1 level of care   Code Status: Level 1 - Full Code  ---------------------------------------------------------------------------------------  SUBJECTIVE  Reports that work of breathing feels slightly improved this AM  Denies Ha, dizziness, lightheadedness, abdominal pain, NVD  Review of Systems  Review of systems was reviewed and negative unless stated above in HPI/24-hour events   ---------------------------------------------------------------------------------------  OBJECTIVE    Vitals   Vitals:    22 0200 22 0300 22 0330 22 033   BP: 95/50      BP Location:       Pulse: 72 74 76    Resp: (!) 30 (!) 28 (!) 33    Temp:       TempSrc:       SpO2: 97% 98% 94% 94%   Weight:       Height:         Temp (24hrs), Av 1 °F (36 7 °C), Min:98 °F (36 7 °C), Max:98 3 °F (36 8 °C)  Current: Temperature: 98 1 °F (36 7 °C)          Respiratory:  SpO2: SpO2: 94 %, SpO2 Activity: SpO2 Activity: At Rest, SpO2 Device: O2 Device: High flow nasal cannula       Invasive/non-invasive ventilation settings   Respiratory  Report   Lab Data (Last 4 hours)    None         O2/Vent Data (Last 4 hours)       0330          Non-Invasive Ventilation Mode HFNC (High flow)                   Physical Exam  Vitals and nursing note reviewed  Constitutional:       Appearance: Normal appearance  He is well-developed  HENT:      Head: Normocephalic and atraumatic        Mouth/Throat:      Mouth: Mucous membranes are moist    Eyes:      Conjunctiva/sclera: Conjunctivae normal       Pupils: Pupils are equal, round, and reactive to light  Cardiovascular:      Rate and Rhythm: Normal rate and regular rhythm  Heart sounds: No murmur heard  Pulmonary:      Effort: Pulmonary effort is normal  No respiratory distress  Breath sounds: No wheezing, rhonchi or rales  Comments: Breath sounds diminished in bilateral bases, otherwise CTA throughout  Abdominal:      General: There is no distension  Palpations: Abdomen is soft  Tenderness: There is no abdominal tenderness  There is no guarding  Musculoskeletal:      Cervical back: Neck supple  Right lower leg: No edema  Left lower leg: No edema  Skin:     General: Skin is warm and dry  Neurological:      General: No focal deficit present  Mental Status: He is alert and oriented to person, place, and time  Laboratory and Diagnostics:  Results from last 7 days   Lab Units 03/31/22  0427 03/30/22  1149   WBC Thousand/uL 7 56 8 34   HEMOGLOBIN g/dL 10 8* 11 2*   HEMATOCRIT % 31 4* 33 7*   PLATELETS Thousands/uL 160 168   NEUTROS PCT % 71 74   MONOS PCT % 2* 6     Results from last 7 days   Lab Units 03/30/22  1149   SODIUM mmol/L 137   POTASSIUM mmol/L 4 0   CHLORIDE mmol/L 103   CO2 mmol/L 23   ANION GAP mmol/L 11   BUN mg/dL 19   CREATININE mg/dL 1 06   CALCIUM mg/dL 8 3   GLUCOSE RANDOM mg/dL 260*   ALT U/L 42   AST U/L 23   ALK PHOS U/L 65   ALBUMIN g/dL 2 8*   TOTAL BILIRUBIN mg/dL 0 92     Results from last 7 days   Lab Units 03/30/22  1149   MAGNESIUM mg/dL 1 9      Results from last 7 days   Lab Units 03/30/22  1149   INR  1 11   PTT seconds 39*          Results from last 7 days   Lab Units 03/30/22  1425 03/30/22  1149   LACTIC ACID mmol/L 1 6 2 8*     ABG:    VBG:    Results from last 7 days   Lab Units 03/31/22  0427 03/30/22  1924   PROCALCITONIN ng/ml 0 64* 0 36*       Micro  Results from last 7 days   Lab Units 03/30/22  1149 03/30/22  1139   BLOOD CULTURE  Received in Microbiology Lab  Culture in Progress   Received in Microbiology Lab  Culture in Progress  Imaging: I have personally reviewed pertinent reports  and I have personally reviewed pertinent films in PACS    Intake and Output  I/O       03/29 0701  03/30 0700 03/30 0701 03/31 0700    Urine (mL/kg/hr)  850    Total Output  850    Net  -850                Height and Weights   Height: 5' 7" (170 2 cm)  IBW (Ideal Body Weight): 66 1 kg  Body mass index is 28 87 kg/m²  Weight (last 2 days)     Date/Time Weight    03/30/22 1800 83 6 (184 3)            Nutrition       Diet Orders   (From admission, onward)             Start     Ordered    03/30/22 1800  Diet Clear Liquid  Diet effective now        References:    Nutrtion Support Algorithm Enteral vs  Parenteral   Question Answer Comment   Diet Type Clear Liquid    RD to adjust diet per protocol?  Yes        03/30/22 1807                  Active Medications  Scheduled Meds:  Current Facility-Administered Medications   Medication Dose Route Frequency Provider Last Rate    ascorbic acid  500 mg Oral Daily Jyoti Siad, CRNP      atorvastatin  10 mg Oral Daily Jyoti Siad, CRNP      benzonatate  200 mg Oral TID Leatha Luu PA-C      cefepime  2,000 mg Intravenous Q12H George Reza PA-C      enoxaparin  40 mg Subcutaneous Daily Jyoti Siad, CRKEERTHI      fluticasone-vilanterol  1 puff Inhalation Daily Jyoti Siad, CRNP      guaiFENesin  600 mg Oral Q12H [address] [employer] [place], [place]      insulin glargine  20 Units Subcutaneous HS Jyoti Siad, CRNP      insulin lispro  1-6 Units Subcutaneous TID AC Jyoti Siad, CRNP      insulin lispro  1-6 Units Subcutaneous HS Jyoti Siad, CRNP      methylPREDNISolone sodium succinate  40 mg Intravenous [facility] George GANDARA [place], [place]      pantoprazole  40 mg Oral Early Morning Jyoti Siad, CRNP      senna-docusate sodium  2 tablet Oral BID Jyoti Siad, CRNP      sodium chloride  1 spray Each Nare Q1H PRN Leatha Luu PA-C      thiamine  100 mg Oral Daily NEGRITA Sheets      vancomycin  15 mg/kg Intravenous Once George Reza PA-C      zinc sulfate  220 mg Oral Daily NEGRITA Sheets       Continuous Infusions:     PRN Meds:   sodium chloride, 1 spray, Q1H PRN        Invasive Devices Review  Invasive Devices  Report    Peripheral Intravenous Line            Peripheral IV 03/30/22 Right Forearm <1 day    Peripheral IV 03/30/22 Right Hand <1 day                Rationale for remaining devices: n/a  ---------------------------------------------------------------------------------------  Advance Directive and Living Will:      Power of :    POLST:    ---------------------------------------------------------------------------------------  Care Time Delivered:   No Critical Care time spent       Graciela Palmer PA-C      Portions of the record may have been created with voice recognition software  Occasional wrong word or "sound a like" substitutions may have occurred due to the inherent limitations of voice recognition software    Read the chart carefully and recognize, using context, where substitutions have occurred

## 2022-03-31 NOTE — ASSESSMENT & PLAN NOTE
· Suspect primarily long term sequelae of COVID-19 pneumonia  · Currently requiring HFNC 100%/50L  · Wean HFNC for goal SpO2>88%  · Continue IV steroids with methylprednisolone 40mg IV q8hr given elevated CRP (194 7)  · Continue PRN diuresis  · Continue outpatient breo-ellipta  · Procal 0 36 on 3/30- f/u AM procal and add abx if still elevated/increased  · Encourage pulmonary hygiene  · Scheduled tessalon perles and mucinex for cough as coughing is what contributes most to desaturation episodes  · Pulmonology consulted, appreciate recommendations

## 2022-03-31 NOTE — RESPIRATORY THERAPY NOTE
RT Protocol Note  Guille Cristina 78 y o  male MRN: [id number]  Unit/Bed#:  Encounter: [id number]    Assessment    Principal Problem:    Acute on chronic respiratory failure with hypoxia (HCC)  Active Problems:    Non MI elevated troponin    Type 2 diabetes mellitus, without long-term current use of insulin (HCC)    Elevated C-reactive protein (CRP)    Elevated brain natriuretic peptide (BNP) level      Home Pulmonary Medications:  Inhaler    Home Devices/Therapy: Home O2    Past Medical History:   Diagnosis Date    COVID-19 2022    Diabetes mellitus ([address] )     Hypertension      Social History     Socioeconomic History    Marital status: /Civil Union     Spouse name: Not on file    Number of children: Not on file    Years of education: Not on file    Highest education level: Not on file   Occupational History    Not on file   Tobacco Use    Smoking status: Former Smoker     Packs/day: 1 00     Years: 30 00     Pack years: 30 00     Types: Cigarettes     Quit date: 2008     Years since quittin 1    Smokeless tobacco: Never Used   Vaping Use    Vaping Use: Never used   Substance and Sexual Activity    Alcohol use: Never    Drug use: Never    Sexual activity: Not Currently   Other Topics Concern    Not on file   Social History Narrative    Not on file     Social Determinants of Health     Financial Resource Strain: Not on file   Food Insecurity: No Food Insecurity    Worried About Running Out of Food in the Last Year: Never true    Regis of Food in the Last Year: Never true   Transportation Needs: No Transportation Needs    Lack of Transportation (Medical): No    Lack of Transportation (Non-Medical):  No   Physical Activity: Not on file   Stress: Not on file   Social Connections: Not on file   Intimate Partner Violence: Not on file   Housing Stability: Low Risk     Unable to Pay for Housing in the Last Year: No    Number of Places Lived in the Last Year: 1    Unstable Housing in the Last Year: No       Subjective    Subjective Data: sleeping    Objective    Physical Exam:   Assessment Type: Assess only  General Appearance: Alert,Awake  Respiratory Pattern: Tachypneic  Chest Assessment: Chest expansion symmetrical  Bilateral Breath Sounds: Diminished,Clear  Cough: Strong,Dry,Non-productive  O2 Device: HFNC    Vitals:  Blood pressure 119/66, pulse (P) 98, temperature 97 8 °F (36 6 °C), temperature source Oral, resp  rate (!) (P) 26, height 5' 7" (1 702 m), weight 83 6 kg (184 lb 4 9 oz), SpO2 90 %  Imaging and other studies: I have personally reviewed pertinent reports  O2 Device: HFNC     Plan    Respiratory Plan: (P) Home Bronchodilator Patient pathway,Vent/NIV/HFNC      Resp Comments: Pt remains on HFNC  Sitting in chair and in no apparent distress  Will wean as able

## 2022-03-31 NOTE — UTILIZATION REVIEW
Initial Clinical Review    Admission: Date/Time/Statement:   Admission Orders (From admission, onward)     Ordered        03/30/22 1755  Inpatient Admission  Once                      Orders Placed This Encounter   Procedures    Inpatient Admission     Standing Status:   Standing     Number of Occurrences:   1     Order Specific Question:   Level of Care     Answer:   Level 1 Stepdown [13]     Order Specific Question:   Estimated length of stay     Answer:   More than 2 Midnights     Order Specific Question:   Certification     Answer:   I certify that inpatient services are medically necessary for this patient for a duration of greater than two midnights  See H&P and MD Progress Notes for additional information about the patient's course of treatment  Initial Presentation: 78 y o  male directly admitted from [facility] ED to [facility] step down bed for evaluation of elevated BNP, CRP, acute on chronic respiratory failure with hypoxia requiring HFNC  He was admitted from 2/18/22-3/4 for COVID pneumonia requiring ICU stay at that time, discharged on home o2  He noticed and increase in shortness of breath over the past 3 days, returning to the ED  On arrival to ICU, he is requiring 70 % 50 L HFNC  CRP elevated  Started on IV steroids  CXR shows: vascular congestion with pleural effusions  ProBNP elevated  GIve IV Lasix  Initial troponin elevated  COntinue to trend  Favor NOn MI elevation  Lungs with diminished breath sounds  Tachycardic, tachypneic  COughing  Date: 3/31   Day 2:    Remains on HFNC  Desaturations with coughing episodes overnight  Troponin down trending  Procal elevated  Started on IV abx  Tachypneic  Pulmonary consult:Acute on chronic hypoxemic respiratory failure  Has been titrated down to [address] [place]  INcrease IV solumedrol dose  GIve dose of IV lasix  Decreased breath sounds        Temperature Pulse Respirations Blood Pressure SpO2   03/30/22 1800 03/30/22 1800 03/30/22 1800 03/30/22 1800 03/30/22 1800   98 °F (36 7 °C) 86 (!) 40 119/75 99 %      Temp Source Heart Rate Source Patient Position - Orthostatic VS BP Location FiO2 (%)   03/30/22 1800 03/30/22 1800 03/30/22 1800 03/30/22 1800 03/30/22 2021   Oral Monitor Lying Right arm 90      Pain Score       03/30/22 1800       No Pain          Wt Readings from Last 1 Encounters:   03/31/22 83 5 kg (184 lb)     Additional Vital Signs:   Time Temp Pulse Resp BP MAP (mmHg) SpO2 FiO2 (%) O2 Flow Rate (L/min) O2 Device O2 Interface Device Patient Position - Orthostatic VS   03/31/22 1104 97 9 °F (36 6 °C) 97 26 Abnormal  99/57 74 93 % -- -- High flow nasal cannula -- Lying   03/31/22 0920 -- 98 -- 119/66 -- -- -- -- -- -- --   03/31/22 0848 -- 98 26 Abnormal  -- -- 90 % -- -- -- HFNC prongs --   03/31/22 0800 97 8 °F (36 6 °C) 90 29 Abnormal  119/66 83 90 % 70 55 L/min High flow nasal cannula -- Lying   03/31/22 0750 97 8 °F (36 6 °C) 85 30 Abnormal  119/66 83 90 % -- -- High flow nasal cannula -- Lying   03/31/22 0600 -- 77 31 Abnormal  100/55 72 -- -- -- -- -- --   03/31/22 0400 -- 74 31 Abnormal  110/58 55 93 % -- -- -- -- --   03/31/22 0332 -- -- -- -- -- 94 % 70 55 L/min High flow nasal cannula -- --   03/31/22 0330 -- 76 33 Abnormal  -- -- 94 % -- -- -- HFNC prongs --   03/31/22 0300 -- 74 28 Abnormal  -- -- 98 % -- -- -- -- --   03/31/22 0200 -- 72 30 Abnormal  95/50 69 97 % -- -- -- -- --   03/31/22 0100 -- 80 30 Abnormal  -- -- 98 % -- -- -- -- --   03/31/22 0000 -- 92 38 Abnormal  113/57 79 99 % -- -- -- -- --   03/30/22 2300 -- 101 46 Abnormal  -- -- 94 % -- -- -- -- --   03/30/22 2200 -- 114 Abnormal  37 Abnormal  124/60 86 88 % Abnormal  -- -- -- -- --   03/30/22 2131 -- 114 Abnormal  29 Abnormal  -- -- 87 % Abnormal  -- -- -- -- --   03/30/22 2100 -- 118 Abnormal  38 Abnormal  -- -- 85 % Abnormal  -- -- -- -- --   03/30/22 2021 -- -- -- -- -- 87 % Abnormal  90 55 L/min High flow nasal cannula -- --   03/30/22 2015 -- 112 Abnormal  29 Abnormal  -- -- 87 % Abnormal  -- -- -- HFNC prongs --   03/30/22 2000 -- 106 Abnormal  34 Abnormal  134/62 87 85 % Abnormal  -- -- -- -- --   03/30/22 1900 98 1 °F (36 7 °C) 100 25 Abnormal  106/57 78 82 % Abnormal  -- -- High flow nasal cannula -- Lying   03/30/22 1805 -- -- -- -- -- 95 % -- -- -- HFNC prongs --   03/30/22 1800 98 °F (36 7 °C) 86 40 Abnormal  119/75 87 99 % -- -- High flow nasal cannula -- Lying       Pertinent Labs/Diagnostic Test Results:  3/31 EKG: Sinus tachycardia  Right bundle branch block  Left anterior fascicular block  Bifascicular block   Cannot rule out Inferior infarct (masked by fascicular block?) , age undetermined  T wave abnormality, consider lateral ischemia  Abnormal ECG  When compared with ECG of 18-FEB-2022 06:13,  ST no longer elevated in Inferior leads  Non-specific change in ST segment in Anterior leads  T wave inversion now evident in Inferior leads  Inverted T waves have replaced nonspecific T wave abnormality in Lateral leads   3/30 CTA chest:   Compared to 3/1/2022, increased bilateral, symmetric airspace disease and small pleural effusions   Morphology of airspace disease is compatible with Covid 19 infection, but cannot exclude superimposed cardiogenic or noncardiogenic edema  3/30 CXR: Vascular congestion with small left pleural effusion         Results from last 7 days   Lab Units 03/31/22  0427 03/30/22  1149   WBC Thousand/uL 7 56 8 34   HEMOGLOBIN g/dL 10 8* 11 2*   HEMATOCRIT % 31 4* 33 7*   PLATELETS Thousands/uL 160 168   NEUTROS ABS Thousands/µL 5 33 6 15         Results from last 7 days   Lab Units 03/31/22  0427 03/30/22  1149   SODIUM mmol/L 135* 137   POTASSIUM mmol/L 4 7 4 0   CHLORIDE mmol/L 100 103   CO2 mmol/L 24 23   ANION GAP mmol/L 11 11   BUN mg/dL 20 19   CREATININE mg/dL 1 00 1 06   EGFR ml/min/1 73sq m 78 72   CALCIUM mg/dL 8 6 8 3   MAGNESIUM mg/dL 2 9* 1 9   PHOSPHORUS mg/dL 5 7*  --      Results from last 7 days Lab Units 03/31/22  0427 03/30/22  1149   AST U/L 40 23   ALT U/L 37 42   ALK PHOS U/L 61 65   TOTAL PROTEIN g/dL 6 5 6 6   ALBUMIN g/dL 2 6* 2 8*   TOTAL BILIRUBIN mg/dL 1 39* 0 92     Results from last 7 days   Lab Units 03/31/22  0804 03/30/22  2102 03/30/22  1923   POC GLUCOSE mg/dl 332* 132 145*     Results from last 7 days   Lab Units 03/31/22  0427 03/30/22  1149   GLUCOSE RANDOM mg/dL 302* 260*     Results from last 7 days   Lab Units 03/30/22  1637 03/30/22  1425 03/30/22  1149   HS TNI 0HR ng/L  --   --  105*   HS TNI 2HR ng/L  --  153*  --    HSTNI D2 ng/L  --  48*  --    HS TNI 4HR ng/L 224*  --   --    HSTNI D4 ng/L 119*  --   --          Results from last 7 days   Lab Units 03/30/22  1149   PROTIME seconds 13 7   INR  1 11   PTT seconds 39*         Results from last 7 days   Lab Units 03/31/22  0427 03/30/22  1924   PROCALCITONIN ng/ml 0 64* 0 36*     Results from last 7 days   Lab Units 03/30/22  1425 03/30/22  1149   LACTIC ACID mmol/L 1 6 2 8*       Results from last 7 days   Lab Units 03/30/22  1149   NT-PRO BNP pg/mL 1,116*       Results from last 7 days   Lab Units 03/31/22  0427 03/30/22  1149   CRP mg/L 240 4* 194 7*     Results from last 7 days   Lab Units 03/30/22  1149 03/30/22  1139   BLOOD CULTURE  Received in Microbiology Lab  Culture in Progress  Received in Microbiology Lab  Culture in Progress               Past Medical History:   Diagnosis Date    COVID-19 02/08/2022    Diabetes mellitus ([facility] [address] )     Hypertension      Present on Admission:   Type 2 diabetes mellitus, without long-term current use of insulin (HCC)   Acute on chronic respiratory failure with hypoxia (HCC)   Elevated C-reactive protein (CRP)   Non MI elevated troponin      Admitting Diagnosis: COVID-19 [U07 1]  Age/Sex: 78 y o  male  Admission Orders:  Scheduled Medications:  ascorbic acid, 500 mg, Oral, Daily  atorvastatin, 10 mg, Oral, Daily  benzonatate, 200 mg, Oral, TID  cefepime, 2,000 mg, Intravenous, Q12H  enoxaparin, 40 mg, Subcutaneous, Daily  fluticasone-vilanterol, 1 puff, Inhalation, Daily  guaiFENesin, 600 mg, Oral, Q12H CATHLEEN  insulin glargine, 20 Units, Subcutaneous, HS  insulin lispro, 1-6 Units, Subcutaneous, TID AC  insulin lispro, 1-6 Units, Subcutaneous, HS  insulin lispro, 15 Units, Subcutaneous, TID With Meals  methylPREDNISolone sodium succinate, 40 mg, Intravenous, Q8H CATHLEEN  pantoprazole, 40 mg, Oral, Early Morning  senna-docusate sodium, 2 tablet, Oral, BID  thiamine, 100 mg, Oral, Daily  vancomycin, 12 5 mg/kg, Intravenous, Q12H  zinc sulfate, 220 mg, Oral, Daily      Continuous IV Infusions:     PRN Meds:  sodium chloride, 1 spray, Each Nare, Q1H PRN        IP CONSULT TO CASE MANAGEMENT  IP CONSULT TO PULMONOLOGY  IP CONSULT TO PHARMACY    Network Utilization Review Department  ATTENTION: Please call with any questions or concerns to [phone] and carefully listen to the prompts so that you are directed to the right person  All voicemails are confidential   Clary Gutierrez all requests for admission clinical reviews, approved or denied determinations and any other requests to dedicated fax number below belonging to the campus where the patient is receiving treatment   List of dedicated fax numbers for the Facilities:  [address] DENIALS (Administrative/Medical Necessity) [phone]   [address] (Maternity/NICU/Pediatrics) [phone]   [address]  [address] Medical [place] [address] [address] [address] Mayela Lomax 14[address] [phone] [place] [address] [phone]

## 2022-03-31 NOTE — PROGRESS NOTES
Vancomycin Assessment    Gennaro Isabel is a 78 y o  male who is currently receiving vancomycin 1250mg IV once for Pneumonia     Relevant clinical data and objective history reviewed:  Creatinine   Date Value Ref Range Status   03/31/2022 1 00 0 60 - 1 30 mg/dL Final     Comment:     Standardized to IDMS reference method   03/30/2022 1 06 0 60 - 1 30 mg/dL Final     Comment:     Standardized to IDMS reference method   03/02/2022 0 80 0 60 - 1 30 mg/dL Final     Comment:     Standardized to IDMS reference method     BP 95/50   Pulse 76   Temp 98 1 °F (36 7 °C) (Oral)   Resp (!) 33   Ht 5' 7" (1 702 m)   Wt 83 6 kg (184 lb 4 9 oz)   SpO2 94%   BMI 28 87 kg/m²   No intake/output data recorded  Lab Results   Component Value Date/Time    BUN 20 03/31/2022 04:27 AM    WBC 7 56 03/31/2022 04:27 AM    HGB 10 8 (L) 03/31/2022 04:27 AM    HCT 31 4 (L) 03/31/2022 04:27 AM     (H) 03/31/2022 04:27 AM     03/31/2022 04:27 AM     Temp Readings from Last 3 Encounters:   03/30/22 98 1 °F (36 7 °C) (Oral)   03/30/22 98 3 °F (36 8 °C) (Temporal)   03/04/22 98 6 °F (37 °C)     Vancomycin Days of Therapy: 1    Assessment/Plan  The patient is currently on vancomycin utilizing scheduled dosing based on actual body weight  Baseline risks associated with therapy include: pre-existing renal impairment, concomitant nephrotoxic medications, advanced age, and dehydration  The patient is currently receiving 1250mg IV once and after clinical evaluation will be changed to 1000mg IV q12h  Pharmacy will also follow closely for s/sx of nephrotoxicity, infusion reactions, and appropriateness of therapy  BMP and CBC will be ordered per protocol  Plan for trough as patient approaches steady state, prior to the 4th  dose at approximately 01 72 64 30 83 on 4/1/22  Due to infection severity, will target a trough of 15-20 (appropriate for most indications)     Pharmacy will continue to follow the patients culture results and clinical progress daily      Carson Julien, Pharmacist

## 2022-03-31 NOTE — ASSESSMENT & PLAN NOTE
Lab Results   Component Value Date    HGBA1C 7 7 (H) 12/06/2021       Recent Labs     03/30/22 1923   POCGLU 145*       Blood Sugar Average: Last 72 hrs:  (P) 145   · Home regimen of humalog 15u TID with meals + SSI and lantus 20u QHS  · Will continue SSI while inpatient + lantus 20u QHS  · Goal -180

## 2022-03-31 NOTE — RESPIRATORY THERAPY NOTE
RT Protocol Note  Louise Robles 78 y o  male MRN: [id number]  Unit/Bed#:  Encounter: [id number]    Assessment    Principal Problem:    Acute on chronic respiratory failure with hypoxia (HCC)  Active Problems:    Non MI elevated troponin    Type 2 diabetes mellitus, without long-term current use of insulin (HCC)    Elevated C-reactive protein (CRP)    Elevated brain natriuretic peptide (BNP) level      Home Pulmonary Medications:  inhalers  Home Devices/Therapy: Home O2    Past Medical History:   Diagnosis Date    COVID-19 2022    Diabetes mellitus ([address] )     Hypertension      Social History     Socioeconomic History    Marital status: /Civil Union     Spouse name: Not on file    Number of children: Not on file    Years of education: Not on file    Highest education level: Not on file   Occupational History    Not on file   Tobacco Use    Smoking status: Former Smoker     Packs/day: 1 00     Years: 30 00     Pack years: 30 00     Types: Cigarettes     Quit date: 2008     Years since quittin 1    Smokeless tobacco: Never Used   Vaping Use    Vaping Use: Never used   Substance and Sexual Activity    Alcohol use: Never    Drug use: Never    Sexual activity: Not Currently   Other Topics Concern    Not on file   Social History Narrative    Not on file     Social Determinants of Health     Financial Resource Strain: Not on file   Food Insecurity: No Food Insecurity    Worried About Running Out of Food in the Last Year: Never true    Regis of Food in the Last Year: Never true   Transportation Needs: No Transportation Needs    Lack of Transportation (Medical): No    Lack of Transportation (Non-Medical):  No   Physical Activity: Not on file   Stress: Not on file   Social Connections: Not on file   Intimate Partner Violence: Not on file   Housing Stability: Low Risk     Unable to Pay for Housing in the Last Year: No    Number of Places Lived in the Last Year: 1    Unstable Housing in the Last Year: No       Subjective    Subjective Data: awake and alert with dry "hacking" NPC    Objective    Physical Exam:   Assessment Type: Assess only  General Appearance: Alert,Awake  Respiratory Pattern: Irregular,Dyspnea at rest,Shallow,Spontaneous,Assisted  Chest Assessment: Chest expansion symmetrical  Bilateral Breath Sounds: Clear,Diminished,Scattered,Crackles  Cough: Non-productive,Dry ("hacking")  O2 Device: HFNC    Vitals:  Blood pressure 106/57, pulse (!) 114, temperature 98 1 °F (36 7 °C), temperature source Oral, resp  rate (!) 29, height 5' 7" (1 702 m), weight 83 6 kg (184 lb 4 9 oz), SpO2 (!) 87 %  Imaging and other studies: I have personally reviewed pertinent reports  O2 Device: HFNC     Plan    Respiratory Plan: Vent/NIV/HFNC  Airway Clearance Plan: Incentive Spirometer     Resp Comments: respiratory and airway clearance protocol completed at thsi time patient awake and alert on HFNC at elevated settings to mainataimn adeqauiet ventilation adn oxygenation pateint has signifiacnt pulmbnary PMH for COVID in february this year and on MDI therapty at home and will be continued here no other medication warrented at thsi time as b s  are clear and decreaed wihtout great adveres breath sonds noted pateit achiecved Vt >10 ml/kg with no obvious retained scertions and will be ordered for I  S therpay per protocol

## 2022-03-31 NOTE — OCCUPATIONAL THERAPY NOTE
Occupational Therapy Evaluation Note        Patient Name: Mirella Holland Date: 3/31/2022        03/31/22 0815   OT Last Visit   OT Visit Date 03/31/22   Note Type   Note type Evaluation   Restrictions/Precautions   Weight Bearing Precautions Per Order No   Other Precautions Chair Alarm; Bed Alarm;O2;Fall Risk;Multiple lines;Telemetry  (HFNC + NRB)   Pain Assessment   Pain Assessment Tool 0-10   Pain Score No Pain   Home Living   Type of Home House   Home Layout Two level;Bed/bath upstairs;Stairs to enter with rails  (6 [address]; FOS to 2nd floor )   Bathroom Shower/Tub Walk-in shower   Bathroom Toilet Standard   Bathroom Equipment Grab bars in shower; Shower chair   Bathroom Accessibility Accessible   Home Equipment Walker;Cane   Additional Comments Patient ambulatory with no AD at baseline   Prior Function   Level of [place] Independent with ADLs and functional mobility   Lives With Spouse   Receives Help From Family   ADL Assistance Independent   IADLs Independent   Falls in the last 6 months 0   Vocational Full time employment   Comments (+) drives; Home setup and PLOF obtained via chart review from previous OT evaluation on 2/9/22 and information confirmed by pt at time of IE   Lifestyle   Autonomy Per chart review and patient report, he lives with his spouse in a two-story home with 6 STEPHEN and a FOS to the bedroom and bathroom on the second floor  At baseline, patient reports that he is independent in both ADLs and IADLs and is ambulatory with no AD     Reciprocal Relationships Spouse   Service to Others    Psychosocial   Psychosocial (WDL) WDL   ADL   Eating Assistance 6  Modified independent   Grooming Assistance 6  Modified Independent   UB Bathing Assistance 5  Supervision/Setup   LB Bathing Assistance 5  Supervision/Setup   UB Dressing Assistance 6  Modified independent   LB Dressing Assistance 5  Supervision/Setup   Toileting Assistance  6  Modified independent   Functional Assistance 5  Supervision/Setup   Additional Comments ADL assist levels based on pt's functional performance during OT evaluation   Bed Mobility   Supine to Sit 5  Supervision   Additional items Assist x 1;HOB elevated;Verbal cues   Additional Comments Patient received lying supine in bed upon OT arrival; at end of session: pt seated OOB to recliner chair w/ all needs within reach and chair alarm activated  SPO2 down to 86% on HFNC during verbal conversation, LESLYE Christie present and donned NRB, increased SPO2 to 95% in 1-2min duration  Transfers   Sit to Stand 5  Supervision   Additional items Assist x 1; Increased time required;Verbal cues   Stand to Sit 5  Supervision   Additional items Assist x 1; Increased time required;Verbal cues;Armrests   Additional Comments Performed functional transfers using no AD  Functional Mobility   Functional Mobility 5  Supervision   Additional Comments x1; 2-3 steps from EOB to recliner chair  Further mobility limited due to lines (HFNC and NRB) and decreased activity tolerance     Additional items   (no AD)   Balance   Static Sitting Good   Dynamic Sitting Fair +   Static Standing Fair   Dynamic Standing [address] -   Activity Tolerance   Activity Tolerance Patient limited by fatigue   Medical Staff Made Aware  [address]   Nurse Made Aware Discussed case with LESLYE Eaton, made aware of session outcomes   RUE Assessment   RUE Assessment WFL  (AROM and strength WFL based on formal assessment)   LUE Assessment   LUE Assessment WFL  (AROM and strength WFL based on formal assessment)   Hand Function   Gross Motor Coordination Functional  (Based on pt's functional performance)   Fine Motor Coordination Functional  (Based on pt's functional performance)   Sensation   Light Touch No apparent deficits  (BUEs)   Additional Comments Patient denies diminished sensation t/o BUEs   Vision-Basic Assessment   Current Vision Wears glasses all the time   Cognition   Overall Cognitive Status [facility]   Arousal/Participation Alert; Responsive   Attention Within functional limits   Orientation Level Oriented X4   Memory Within functional limits   Following Commands Follows all commands and directions without difficulty   Comments Patient agreeable to OT evaluation   Assessment   Limitation Decreased ADL status; Decreased endurance;Decreased self-care trans;Decreased high-level ADLs   Prognosis Good   Assessment Patient is a 78 y o  male seen for OT evaluation s/p admit to [address] [facility] on 3/30/2022 w/Acute on chronic respiratory failure with hypoxia ([facility] [address] )  Commorbidities affecting patient's functional performance at time of assessment include: elevated BNP level, elevated C-reactive protein, type 2 DM without long-term current use of insulin, and non-MI elevated troponin  Patient  has a past medical history of COVID-19 (02/08/2022), Diabetes mellitus ([facility] [address] ), and Hypertension  Orders placed for OT evaluation and treatment  Performed at least two patient identifiers during session including name and wristband  Prior to admission, patient was living with his spouse in a two-story home with 6 STE[address] and a FOS to the bedroom and bathroom on the second floor  At baseline, patient reports that he is independent in both ADLs and IADLs and is ambulatory with no AD  Personal factors affecting patient at time of initial evaluation include: steps to enter, difficulty performing ADLs and difficulty performing IADLs, and interior steps  Upon evaluation, patient requires modified independent, supervision and set up assist for UB ADLs, supervision and set up assist for LB ADLs, transfers and functional ambulation in room with supervision assist, with no AD  Patient is alert and oriented x 4    Occupational performance is affected by the following deficits: dynamic sit/ stand balance deficit with poor standing tolerance time for self care and functional mobility and decreased activity tolerance, and decreased physical endurance and stamina  Patient to benefit from continued Occupational Therapy treatment while in the hospital to address deficits as defined above and maximize level of functional independence with ADLs and functional mobility  Occupational Performance areas to address include: bathing/ shower, dressing, transfer to all surfaces, functional mobility, community mobility and Leisure Participation  From OT standpoint, recommendation at time of d/c would be Home with family support  Plan   Treatment Interventions ADL retraining;Functional transfer training; Endurance training;Patient/family training; Compensatory technique education; Energy conservation; Activityengagement;Continued evaluation   Goal Expiration Date 04/07/22   OT Treatment Day 0   OT Frequency 1-2x/wk   Recommendation   OT Discharge Recommendation No rehabilitation needs  (Continue with PT recs for OP)   AM-PAC Daily Activity Inpatient   Lower Body Dressing 3   Bathing 3   Toileting 4   Upper Body Dressing 4   Grooming 4   Eating 4   Daily Activity Raw Score 22   Daily Activity Standardized Score (Calc for Raw Score >=11) 47  1   AM-PAC Applied Cognition Inpatient   Following a Speech/Presentation 4   Understanding Ordinary Conversation 4   Taking Medications 4   Remembering Where Things Are Placed or Put Away 4   Remembering List of 4-5 Errands 4   Taking Care of Complicated Tasks 4   Applied Cognition Raw Score 24   Applied Cognition Standardized Score 62 21   Barthel Index   Feeding 10   Bathing 0   Grooming Score 5   Dressing Score 10   Bladder Score 10   Bowels Score 10   Toilet Use Score 5   Transfers (Bed/Chair) Score 10   Mobility (Level Surface) Score 0   Stairs Score 0   Barthel Index Score 60   Modified [place] Scale   Modified [place] Scale 3     Occupational Therapy Goals to be completed in 5-7 Days:    1- Patient will verbalize and demonstrate use of energy conservation/ deep breathing technique and work simplification skills during functional activity with no verbal cues  2- Patient will verbalize and demonstrate good body mechanics and joint protection techniques during ADLs/ IADLs with no verbal cues  3- Patient will increase OOB/ sitting tolerance to 4-6 hours per day for increased participation in self care and leisure tasks with no s/s of exertion  4- Patient will identify s/s of exertion during ADL and functional mobility with no verbal cues  5- Patient will verbalize/ demonstrate compensatory strategies to recover from exertion with no verbal cues  6- Patient will increase standing tolerance time to 10 minutes with No UE support to complete sink level ADLs @ Mod I level  7- Patient will increase sitting tolerance at edge of bed to 30 minutes to complete UB ADLs @ Indep  Level       8- Patient/ Family will demonstrate competency with 1 JESSICA Shelby/JB

## 2022-03-31 NOTE — CASE MANAGEMENT
Case Management Assessment & Discharge Planning Note    Patient name Eliz Drain  Location / MRN [id number]  : 1955 Date 3/31/2022       Current Admission Date: 3/30/2022  Current Admission Diagnosis:Acute on chronic respiratory failure with hypoxia [facility])   Patient Active Problem List    Diagnosis Date Noted    Elevated C-reactive protein (CRP) 2022    Elevated brain natriuretic peptide (BNP) level 2022    Positive blood culture 2022    Acute on chronic respiratory failure with hypoxia ([facility] [address] ) 2022    COVID 2022    Non MI elevated troponin 2022    Type 2 diabetes mellitus, without long-term current use [facility] ) 2022    Primary hypertension 2022      LOS (days): 1  Geometric Mean LOS (GMLOS) (days):   Days to GMLOS:     OBJECTIVE:  PATIENT READMITTED TO HOSPITAL  Risk of Unplanned Readmission Score: 21         Current admission status: Inpatient       Preferred Pharmacy:    [address]  Phone: [phone] Fax: [phone], [place] [address]  Phone: [phone] Fax: [phone] [facility] [place] [address] [facility] 38 210 [facility]  Phone: [phone] Fax: [phone]    Primary Care Provider: Delia Boss MD    [facility] Insurance: [facility] [facility]  Secondary Insurance:     ASSESSMENT:  Active Health Care Proxies     Edyta kemp Representative - Spouse   Primary Phone: [phone] (Mobile)               Advance Directives  Does patient have a [address]?: No  Was patient offered paperwork?: Yes  Does patient currently have a Health Care decision maker?: Yes, please see Health Care Proxy section  Does patient have Advance Directives?: No  Was patient offered paperwork?: Yes  Primary Contact: Saira Ball (Spouse) [phone] (M)              Patient Information  Admitted from[de-identified] Home  Mental Status: Alert  During Assessment patient was accompanied by: Not accompanied during assessment  Assessment information provided by[de-identified] Spouse  Primary Caregiver: Self  Support Systems: Spouse/significant other  [place] of Residence: [address] do you live in?: [address] entry access options   Select all that apply : Stairs  Number of steps to enter home : 2  Do the steps have railings?: Yes  Type of Current Residence: 2 story home  Upon entering residence, is there a bedroom on the main floor (no further steps)?: No  A bedroom is located on the following floor levels of residence (select all that apply):: 2nd Floor  Upon entering residence, is there a bathroom on the main floor (no further steps)?: No  Indicate which floors of current residence have a bathroom (select all the apply):: 2nd Floor  Number of steps to 2nd floor from main floor: One Flight  In the last 12 months, was there a time when you were not able to pay the mortgage or rent on time?: No  In the last 12 months, how many places have you lived?: 1 (X 7 YRS)  In the last 12 months, was there a time when you did not have a steady place to sleep or slept in a shelter (including now)?: No  Homeless/housing insecurity resource given?: N/A  Living Arrangements: Lives w/ Spouse/significant other  Is patient a ?: No    Activities of Daily Living Prior to Admission  Functional Status: Independent  Completes ADLs independently?: Yes  Ambulates independently?: Yes  Does patient use assisted devices?: No  Does patient currently own DME?: No  Does patient have a history of Outpatient Therapy (PT/OT)?: No  Does the patient have a history of Short-Term Rehab?: No  Does patient have a history of HHC?: No  Does patient currently have [place] [address]?: No         Patient Information Continued  Income Source: Employed  Does patient have prescription coverage?: Yes  Within the past 12 months, you worried that your food would run out before you got the money to buy more : Never true  Within the past 12 months, the food you bought just didnt last and you didnt have money to get more : Never true  Food insecurity resource given?: N/A  Does patient receive dialysis treatments?: No  Does patient have a history of substance abuse?: No  Does patient have a history of Mental Health Diagnosis?: No         Means of Transportation  Means of Transport to Appts[de-identified] Drives Self  In the past 12 months, has lack of transportation kept you from medical appointments or from getting medications?: No  In the past 12 months, has lack of transportation kept you from meetings, work, or from getting things needed for daily living?: No  Was application for public transport provided?: N/A        DISCHARGE DETAILS:    Discharge planning discussed with[de-identified] Brian Khalil (Spouse) [phone] (M)  Freedom of Choice: Yes  Comments - Freedom of Choice: CM DISCUSSED FREEDOM OF CHOICE  DECLINES HHC OR STR AT THIS, WILL CONSIDER IF RECOMMENDED BY PT OT    CM contacted family/caregiver?: Yes  Were Treatment Team discharge recommendations reviewed with patient/caregiver?: Yes  Did patient/caregiver verbalize understanding of patient care needs?: Yes  Were patient/caregiver advised of the risks associated with not following Treatment Team discharge recommendations?: Yes    Contacts  Patient Contacts: Sofie Waters  Relationship to Patient[de-identified] Family  Contact Method: Phone  Phone Number: [phone]  Reason/Outcome: Discharge Planning,Emergency Contact,Continuity of [address]         Is the patient interested in [place]  at discharge?: No    DME Referral Provided  Referral made for DME?: No              Treatment Team Recommendation: Home  Discharge Destination Plan[de-identified] Home  Transport at Discharge : Family

## 2022-03-31 NOTE — PLAN OF CARE
Problem: PHYSICAL THERAPY ADULT  Goal: Performs mobility at highest level of function for planned discharge setting  See evaluation for individualized goals  Description: Treatment/Interventions: Functional transfer training,LE strengthening/ROM,Elevations,Therapeutic exercise,Endurance training,Patient/family training,Equipment eval/education,Bed mobility,Gait training,Spoke to nursing  Equipment Recommended:  (TBD)       See flowsheet documentation for full assessment, interventions and recommendations  3/31/2022 1219 by Nano Pink PT  Note: Prognosis: Good  Problem List: Decreased strength,Decreased endurance,Impaired balance,Decreased mobility  Assessment: Pt is 78 y o  male seen for high-complexity PT evaluation on 3/31/2022 s/p admit to [facility] on 3/30/2022 w/ Acute on chronic respiratory failure with hypoxia ([address] )  PT was consulted to assess pt's functional mobility and d/c needs  Order placed for PT eval and tx, w/ up w/ A order  PTA, pt resides with spouse in [facility] with 6 STEPHEN, FOS to 2nd floor  At time of eval, pt performing all phases of mobility at SBA level, limited d/t O2 de sat and NRB/O2 lines  Upon evaluation, pt presenting with impaired functional mobility d/t decreased strength, decreased endurance, impaired balance, decreased mobility and activity intolerance  Pertinent PMHx and current co-morbidities affecting pt's physical performance at time of assessment include: acute on chronic respiratory failure with hypoxia, non MI elevated troponin, type 2 DM, elevated BNP, COVID, positive blood culture, primary HTN  Personal factors affecting pt at time of eval include: lives in 2 story house, stairs to enter home and inability to navigate community distances  The following objective measures performed on IE also reveal limitations: Barthel Index: 60/100, Modified [place]: 3 (moderate disability) and AM-PAC 6-Clicks: 72/37   Pt's clinical presentation is currently unstable/unpredictable seen in pt's presentation of abnormal lab value(s), ongoing medical assessment and on telemetry monitoring  Overall, pt's rehab potential and prognosis to return to PLOF is good as impacted by objective findings, warranting pt to receive further skilled PT interventions to address identified impairments, activity limitation(s), and participation restriction(s)  Pt to benefit from continued PT tx to address deficits as defined above and maximize level of functional independent mobility  From PT/mobility standpoint, recommendation at time of d/c would be home with outpatient rehabilitation pending progress in order to facilitate return to PLOF  Barriers to Discharge: Inaccessible home environment,Decreased caregiver support        PT Discharge Recommendation: Home with outpatient rehabilitation          See flowsheet documentation for full assessment  3/31/2022 1219 by Madhavi Dugan PT  Note: Prognosis: Good  Problem List: Decreased strength,Decreased endurance,Impaired balance,Decreased mobility  Assessment: Pt is 78 y o  male seen for high-complexity PT evaluation on 3/31/2022 s/p admit to [facility] on 3/30/2022 w/ Acute on chronic respiratory failure with hypoxia ([facility] [facility] )  PT was consulted to assess pt's functional mobility and d/c needs  Order placed for PT eval and tx, w/ up w/ A order  PTA, pt resides with spouse in [facility] with 6 STEPHEN, FOS to 2nd floor  At time of eval, pt performing all phases of mobility at SBA level, limited d/t O2 de sat and NRB/O2 lines  Upon evaluation, pt presenting with impaired functional mobility d/t decreased strength, decreased endurance, impaired balance, decreased mobility and activity intolerance  Pertinent PMHx and current co-morbidities affecting pt's physical performance at time of assessment include: acute on chronic respiratory failure with hypoxia, non MI elevated troponin, type 2 DM, elevated BNP, COVID, positive blood culture, primary HTN   Personal factors affecting pt at time of eval include: lives in 2 story house, stairs to enter home and inability to navigate community distances  The following objective measures performed on IE also reveal limitations: Barthel Index: 60/100, Modified [place]: 3 (moderate disability) and AM-PAC 6-Clicks: 36/52  Pt's clinical presentation is currently unstable/unpredictable seen in pt's presentation of abnormal lab value(s), ongoing medical assessment and on telemetry monitoring  Overall, pt's rehab potential and prognosis to return to PLOF is good as impacted by objective findings, warranting pt to receive further skilled PT interventions to address identified impairments, activity limitation(s), and participation restriction(s)  Pt to benefit from continued PT tx to address deficits as defined above and maximize level of functional independent mobility  From PT/mobility standpoint, recommendation at time of d/c would be home with outpatient rehabilitation pending progress in order to facilitate return to PLOF  Barriers to Discharge: Inaccessible home environment,Decreased caregiver support        PT Discharge Recommendation: Home with outpatient rehabilitation          See flowsheet documentation for full assessment

## 2022-03-31 NOTE — CASE MANAGEMENT
Case Management Discharge Planning Note    Patient name Wes Stafford  Location / MRN [id number]  : 1955 Date 3/31/2022       Current Admission Date: 3/30/2022  Current Admission Diagnosis:Acute on chronic respiratory failure with hypoxia [facility])   Patient Active Problem List    Diagnosis Date Noted    Elevated C-reactive protein (CRP) 2022    Elevated brain natriuretic peptide (BNP) level 2022    Positive blood culture 2022    Acute on chronic respiratory failure with hypoxia ([facility] ) 2022    COVID 2022    Non MI elevated troponin 2022    Type 2 diabetes mellitus, without long-term current use of insulin [facility] ) 2022    Primary hypertension 2022      LOS (days): 1  Geometric Mean LOS (GMLOS) (days):   Days to GMLOS:     OBJECTIVE:  Risk of Unplanned Readmission Score: 21         Current admission status: Inpatient   Preferred Pharmacy:    [place] [address]  Phone: [phone] Fax: [phone], [facility] [address]  Phone: [phone] Fax: [address] 210 [facility]  Phone: [phone] Fax: [phone]    Primary Care Provider: Karyna Johnson MD    Primary Insurance: [address] [place] [address]  Secondary Insurance:     DISCHARGE DETAILS:    Discharge planning discussed with[de-identified] Brian Yes (Spouse) [phone] Gallo Weinstein)  [place] of Choice: Yes  Comments - Freedom of Choice: CM LEFT VOICEMAIL MESSAGE FOR CALLBACK    CM contacted family/caregiver?: Yes

## 2022-03-31 NOTE — PLAN OF CARE
Problem: Potential for Falls  Goal: Patient will remain free of falls  Description: INTERVENTIONS:  - Educate patient/family on patient safety including physical limitations  - Instruct patient to call for assistance with activity   - Consult OT/PT to assist with strengthening/mobility   - Keep Call bell within reach  - Keep bed low and locked with side rails adjusted as appropriate  - Keep care items and personal belongings within reach  - Initiate and maintain comfort rounds  - Make Fall Risk Sign visible to staff  - Offer Toileting every 4 Hours, in advance of need  - Initiate/Maintain bed alarm  - Obtain necessary fall risk management equipment:     - Apply yellow socks and bracelet for high fall risk patients  - Consider moving patient to room near nurses station  Outcome: Progressing     Problem: MOBILITY - ADULT  Goal: Maintain or return to baseline ADL function  Description: INTERVENTIONS:  -  Assess patient's ability to carry out ADLs; assess patient's baseline for ADL function and identify physical deficits which impact ability to perform ADLs (bathing, care of mouth/teeth, toileting, grooming, dressing, etc )  - Assess/evaluate cause of self-care deficits   - Assess range of motion  - Assess patient's mobility; develop plan if impaired  - Assess patient's need for assistive devices and provide as appropriate  - Encourage maximum independence but intervene and supervise when necessary  - Involve family in performance of ADLs  - Assess for home care needs following discharge   - Consider OT consult to assist with ADL evaluation and planning for discharge  - Provide patient education as appropriate  Outcome: Progressing  Goal: Maintains/Returns to pre admission functional level  Description: INTERVENTIONS:  - Perform BMAT or MOVE assessment daily    - Set and communicate daily mobility goal to care team and patient/family/caregiver     - Collaborate with rehabilitation services on mobility goals if consulted  - Perform Range of Motion 4 times a day  - Reposition patient every 2 hours    - Dangle patient 2 times a day  - Stand patient 2 times a day  - Ambulate patient 2 times a day  - Out of bed to chair 2 times a day   - Out of bed for meals 2 times a day  - Out of bed for toileting  - Record patient progress and toleration of activity level   Outcome: Progressing     Problem: PAIN - ADULT  Goal: Verbalizes/displays adequate comfort level or baseline comfort level  Description: Interventions:  - Encourage patient to monitor pain and request assistance  - Assess pain using appropriate pain scale  - Administer analgesics based on type and severity of pain and evaluate response  - Implement non-pharmacological measures as appropriate and evaluate response  - Consider cultural and social influences on pain and pain management  - Notify physician/advanced practitioner if interventions unsuccessful or patient reports new pain  Outcome: Progressing     Problem: INFECTION - ADULT  Goal: Absence or prevention of progression during hospitalization  Description: INTERVENTIONS:  - Assess and monitor for signs and symptoms of infection  - Monitor lab/diagnostic results  - Monitor all insertion sites, i e  indwelling lines, tubes, and drains  - Monitor endotracheal if appropriate and nasal secretions for changes in amount and color  - [place]une appropriate cooling/warming therapies per order  - Administer medications as ordered  - Instruct and encourage patient and family to use good hand hygiene technique  - Identify and instruct in appropriate isolation precautions for identified infection/condition  Outcome: Progressing     Problem: SAFETY ADULT  Goal: Patient will remain free of falls  Description: INTERVENTIONS:  - Educate patient/family on patient safety including physical limitations  - Instruct patient to call for assistance with activity   - Consult OT/PT to assist with strengthening/mobility   - Keep Call bell within reach  - Keep bed low and locked with side rails adjusted as appropriate  - Keep care items and personal belongings within reach  - Initiate and maintain comfort rounds  - Make Fall Risk Sign visible to staff  - Offer Toileting every 4 Hours, in advance of need  - Initiate/Maintain bed alarm  - Obtain necessary fall risk management equipment:   - Apply yellow socks and bracelet for high fall risk patients  - Consider moving patient to room near nurses station  Outcome: Progressing  Goal: Maintain or return to baseline ADL function  Description: INTERVENTIONS:  -  Assess patient's ability to carry out ADLs; assess patient's baseline for ADL function and identify physical deficits which impact ability to perform ADLs (bathing, care of mouth/teeth, toileting, grooming, dressing, etc )  - Assess/evaluate cause of self-care deficits   - Assess range of motion  - Assess patient's mobility; develop plan if impaired  - Assess patient's need for assistive devices and provide as appropriate  - Encourage maximum independence but intervene and supervise when necessary  - Involve family in performance of ADLs  - Assess for home care needs following discharge   - Consider OT consult to assist with ADL evaluation and planning for discharge  - Provide patient education as appropriate  Outcome: Progressing  Goal: Maintains/Returns to pre admission functional level  Description: INTERVENTIONS:  - Perform BMAT or MOVE assessment daily    - Set and communicate daily mobility goal to care team and patient/family/caregiver  - Collaborate with rehabilitation services on mobility goals if consulted  - Perform Range of Motion 4 times a day  - Reposition patient every 2 hours    - Dangle patient 2 times a day  - Stand patient 2 times a day  - Ambulate patient 2 times a day  - Out of bed to chair 2 times a day   - Out of bed for meals 2 times a day  - Out of bed for toileting  - Record patient progress and toleration of activity level   Outcome: Progressing     Problem: DISCHARGE PLANNING  Goal: Discharge to home or other facility with appropriate resources  Description: INTERVENTIONS:  - Identify barriers to discharge w/patient and caregiver  - Arrange for needed discharge resources and transportation as appropriate  - Identify discharge learning needs (meds, wound care, etc )  - Arrange for interpretive services to assist at discharge as needed  - Refer to Case Management Department for coordinating discharge planning if the patient needs post-hospital services based on physician/advanced practitioner order or complex needs related to functional status, cognitive ability, or social support system  Outcome: Progressing     Problem: Knowledge Deficit  Goal: Patient/family/caregiver demonstrates understanding of disease process, treatment plan, medications, and discharge instructions  Description: Complete learning assessment and assess knowledge base  Interventions:  - Provide teaching at level of understanding  - Provide teaching via preferred learning methods  Outcome: Progressing     Problem: Nutrition/Hydration-ADULT  Goal: Nutrient/Hydration intake appropriate for improving, restoring or maintaining nutritional needs  Description: Monitor and assess patient's nutrition/hydration status for malnutrition  Collaborate with interdisciplinary team and initiate plan and interventions as ordered  Monitor patient's weight and dietary intake as ordered or per policy  Utilize nutrition screening tool and intervene as necessary  Determine patient's food preferences and provide high-protein, high-caloric foods as appropriate       INTERVENTIONS:  - Monitor oral intake, urinary output, labs, and treatment plans  - Assess nutrition and hydration status and recommend course of action  - Evaluate amount of meals eaten  - Assist patient with eating if necessary   - Allow adequate time for meals  - Recommend/ encourage appropriate diets, oral nutritional supplements, and vitamin/mineral supplements  - Order, calculate, and assess calorie counts as needed  - Recommend, monitor, and adjust tube feedings and TPN/PPN based on assessed needs  - Assess need for intravenous fluids  - Provide specific nutrition/hydration education as appropriate  - Include patient/family/caregiver in decisions related to nutrition  Outcome: Progressing

## 2022-03-31 NOTE — PLAN OF CARE
Problem: OCCUPATIONAL THERAPY ADULT  Goal: Performs self-care activities at highest level of function for planned discharge setting  See evaluation for individualized goals  Description: Treatment Interventions: ADL retraining,Functional transfer training,Endurance training,Patient/family training,Compensatory technique education,Energy conservation,Activityengagement,Continued evaluation          See flowsheet documentation for full assessment, interventions and recommendations  Note: Limitation: Decreased ADL status,Decreased endurance,Decreased self-care trans,Decreased high-level ADLs  Prognosis: Good  Assessment: Patient is a 78 y o  male seen for OT evaluation s/p admit to [address] [place] [address] on 3/30/2022 w/Acute on chronic respiratory failure with hypoxia ([facility] [address] )  Commorbidities affecting patient's functional performance at time of assessment include: elevated BNP level, elevated C-reactive protein, type 2 DM without long-term current use of insulin, and non-MI elevated troponin  Patient  has a past medical history of COVID-19 (02/08/2022), Diabetes mellitus ([facility] ), and Hypertension  Orders placed for OT evaluation and treatment  Performed at least two patient identifiers during session including name and wristband  Prior to admission, patient was living with his spouse in a two-story home with 6 STEPHEN and a FOS to the bedroom and bathroom on the second floor  At baseline, patient reports that he is independent in both ADLs and IADLs and is ambulatory with no AD  Personal factors affecting patient at time of initial evaluation include: steps to enter, difficulty performing ADLs and difficulty performing IADLs, and interior steps  Upon evaluation, patient requires modified independent, supervision and set up assist for UB ADLs, supervision and set up assist for LB ADLs, transfers and functional ambulation in room with supervision assist, with no AD  Patient is alert and oriented x 4    Occupational performance is affected by the following deficits: dynamic sit/ stand balance deficit with poor standing tolerance time for self care and functional mobility and decreased activity tolerance, and decreased physical endurance and stamina  Patient to benefit from continued Occupational Therapy treatment while in the hospital to address deficits as defined above and maximize level of functional independence with ADLs and functional mobility  Occupational Performance areas to address include: bathing/ shower, dressing, transfer to all surfaces, functional mobility, community mobility and Leisure Participation  From OT standpoint, recommendation at time of d/c would be Home with family support         OT Discharge Recommendation: No rehabilitation needs (Continue with PT recs for OP)

## 2022-03-31 NOTE — PHYSICAL THERAPY NOTE
Physical Therapy Evaluation   Time in: 814  Time out: 830  Total evaluation time: 16 minutes    Patient's Name: Bimal Lynn    Admitting Diagnosis  COVID-19 [U07 1]    Problem List  Patient Active Problem List   Diagnosis    COVID    Non MI elevated troponin    Type 2 diabetes mellitus, without long-term current use of insulin ([facility] )    Primary hypertension    Acute on chronic respiratory failure with hypoxia (HCC)    Positive blood culture    Elevated C-reactive protein (CRP)    Elevated brain natriuretic peptide (BNP) level       Past Medical History  Past Medical History:   Diagnosis Date    COVID-19 02/08/2022    Diabetes mellitus ([facility] )     Hypertension        Past Surgical History  Past Surgical History:   Procedure Laterality Date    ANKLE FRACTURE SURGERY Left     FOOT FRACTURE SURGERY      KNEE SURGERY Right     VASECTOMY         PT performed at least 2 patient identifiers during session: Name and wristband  03/31/22 0816   PT Last Visit   PT Visit Date 03/31/22   Note Type   Note type Evaluation   Pain Assessment   Pain Assessment Tool 0-10   Pain Score No Pain   Restrictions/Precautions   Weight Bearing Precautions Per Order No   Other Precautions Chair Alarm; Bed Alarm;O2;Fall Risk;Multiple lines;Telemetry  (HFNC + NRB)   Home Living   Type of Home House   Home Layout Two level;Bed/bath upstairs;Stairs to enter with rails  (6 STEPHEN; FOS to 2nd floor )   Bathroom Shower/Tub Walk-in shower   Bathroom Toilet Standard   Bathroom Equipment Grab bars in shower; Shower chair   Bathroom Accessibility Accessible   Home Equipment Walker;Cane   Additional Comments Patient ambulatory with no AD at baseline   Prior Function   Level of [place] Independent with ADLs and functional mobility   Lives With Spouse   Receives Help From Family   ADL Assistance Independent   IADLs Independent   Falls in the last 6 months 0   Vocational Full time employment   Comments (+) drives;  Home setup and PLOF obtained via chart review from previous therapy evaluation and information confirmed by pt at time of IE   General   Family/Caregiver Present No   Cognition   Overall Cognitive Status WFL   Arousal/Participation Alert   Attention Within functional limits   Orientation Level Oriented X4   Memory Within functional limits   Following Commands Follows all commands and directions without difficulty   Comments pt agreeable to PT eval   RUE Assessment   RUE Assessment WFL  (AROM and strength WFL based on formal assessment)   LUE Assessment   LUE Assessment WFL  (AROM and strength WFL based on formal assessment)   RLE Assessment   RLE Assessment   (grossly 4/5 observed with functional mobility)   LLE Assessment   LLE Assessment   (grossly 4/5 observed with functional mobility)   Vision-Basic Assessment   Current Vision Wears glasses all the time   Coordination   Movements are Fluid and Coordinated 1   Sensation WFL   Bed Mobility   Supine to Sit 5  Supervision   Additional items Assist x 1;HOB elevated;Verbal cues   Additional Comments at end of session: pt seated OOB to recliner chair w/ all needs within reach and chair alarm activated  SPO2 down to 86% on HFNC during verbal conversation, LESLYE Christie present and donned NRB, increased SPO2 to 95% in 1-2min duration   Transfers   Sit to Stand 5  Supervision   Additional items Assist x 1; Increased time required;Verbal cues   Stand to Sit 5  Supervision   Additional items Assist x 1; Increased time required;Verbal cues;Armrests   Ambulation/Elevation   Gait pattern Decreased foot clearance; Short stride   Gait Assistance 5  Supervision   Additional items Assist x 1;Verbal cues   Assistive Device None   Distance 2' from EOB to recliner; distance deferred d/t O2 de sat, O2 line   Balance   Static Sitting Good   Dynamic Sitting Fair +   Static Standing Fair   Dynamic Standing Fair   Ambulatory Fair -   Endurance Deficit   Endurance Deficit Yes   Activity Tolerance   Activity Tolerance Patient limited by fatigue   Medical Staff Made Aware OT Nusrat Rosales   Nurse Made Aware Discussed case with RN Ag Taylor, made aware of session outcomes   Assessment   Prognosis Good   Problem List Decreased strength;Decreased endurance; Impaired balance;Decreased mobility   Assessment Pt is 78 y o  male seen for high-complexity PT evaluation on 3/31/2022 s/p [facility] on 3/30/2022 w/ Acute on chronic respiratory failure with hypoxia ([address] )  PT was consulted to assess pt's functional mobility and d/c needs  Order placed for PT eval and tx, w/ up w/ A order  PTA, pt resides with spouse in [facility] with 6 STEPHEN, FOS to 2nd floor  At time of eval, pt performing all phases of mobility at SBA level, limited d/t O2 de sat and NRB/O2 lines  Upon evaluation, pt presenting with impaired functional mobility d/t decreased strength, decreased endurance, impaired balance, decreased mobility and activity intolerance  Pertinent PMHx and current co-morbidities affecting pt's physical performance at time of assessment include: acute on chronic respiratory failure with hypoxia, non MI elevated troponin, type 2 DM, elevated BNP, COVID, positive blood culture, primary HTN  Personal factors affecting pt at time of eval include: lives in 2 story house, stairs to enter home and inability to navigate community distances  The following objective measures performed on IE also reveal limitations: Barthel Index: 60/100, Modified [place]: 3 (moderate disability) and AM-PAC 6-Clicks: 19/64  Pt's clinical presentation is currently unstable/unpredictable seen in pt's presentation of abnormal lab value(s), ongoing medical assessment and on telemetry monitoring  Overall, pt's rehab potential and prognosis to return to PLOF is good as impacted by objective findings, warranting pt to receive further skilled PT interventions to address identified impairments, activity limitation(s), and participation restriction(s)   Pt to benefit from continued PT tx to address deficits as defined above and maximize level of functional independent mobility  From PT/mobility standpoint, recommendation at time of d/c would be home with outpatient rehabilitation pending progress in order to facilitate return to PLOF  Barriers to Discharge Inaccessible home environment;Decreased caregiver support   Goals   Patient Goals to return home   STG Expiration Date 04/10/22   Short Term Goal #1 In 7-10 days: Increase bilateral LE strength 1/2 grade to facilitate independent mobility, Perform all bed mobility tasks modified independent to decrease caregiver burden, Perform all transfers modified independent to improve independence, Ambulate > 150 ft  with least restrictive assistive device modified independent w/o LOB and w/ normalized gait pattern 100% of the time, Navigate 13 stairs modified independent with unilateral handrail to facilitate return to previous living environment, Increase all balance 1/2 grade to decrease risk for falls, Improve Barthel Index score to 75 or greater to facilitate independence and PT provider will perform functional balance assessment to determine fall risk   PT Treatment Day 0   Plan   Treatment/Interventions Functional transfer training;LE strengthening/ROM; Elevations; Therapeutic exercise; Endurance training;Patient/family training;Equipment eval/education; Bed mobility;Gait training;Spoke to nursing   PT Frequency 3-5x/wk   Recommendation   PT Discharge Recommendation Home with outpatient rehabilitation   Equipment Recommended   (TBD)   AM-PAC Basic Mobility Inpatient   Turning in Bed Without Bedrails 3   Lying on Back to Sitting on Edge of Flat Bed 3   Moving Bed to Chair 3   Standing Up From Chair 3   Walk in Room 3   Climb 3-5 Stairs 2   Basic Mobility Inpatient Raw Score 17   Basic Mobility Standardized Score 39 67   Highest Level Of Mobility   -HL Goal 5: Stand one or more mins   -[facility] Highest Level of Mobility 4: Move to chair/commode   -HLM Goal Achieved No   Modified Heather Scale   Modified Romayor Scale 3   Barthel Index   Feeding 10   Bathing 0   Grooming Score 5   Dressing Score 10   Bladder Score 10   Bowels Score 10   Toilet Use Score 5   Transfers (Bed/Chair) Score 10   Mobility (Level Surface) Score 0   Stairs Score 0   Barthel Index Score 60       Satya Greenwood, PT, DPT

## 2022-03-31 NOTE — ASSESSMENT & PLAN NOTE
· Hs troponin elevated at 105  · Peaked at 224 and downtrended  · Favor non-MI troponin elevation in setting of acute hypoxic respiratory failure

## 2022-03-31 NOTE — PLAN OF CARE
Problem: Potential for Falls  Goal: Patient will remain free of falls  Description: INTERVENTIONS:  - Educate patient/family on patient safety including physical limitations  - Instruct patient to call for assistance with activity   - Consult OT/PT to assist with strengthening/mobility   - Keep Call bell within reach  - Keep bed low and locked with side rails adjusted as appropriate  - Keep care items and personal belongings within reach  - Initiate and maintain comfort rounds  - Make Fall Risk Sign visible to staff  - Offer Toileting every 2 Hours, in advance of need  - Initiate/Maintain bed and chair alarm  - Apply yellow socks and bracelet for high fall risk patients  - Consider moving patient to room near nurses station  Outcome: Progressing     Problem: MOBILITY - ADULT  Goal: Maintain or return to baseline ADL function  Description: INTERVENTIONS:  - Educate patient/family on patient safety including physical limitations  - Instruct patient to call for assistance with activity   - Consult OT/PT to assist with strengthening/mobility   - Keep Call bell within reach  - Keep bed low and locked with side rails adjusted as appropriate  - Keep care items and personal belongings within reach  - Initiate and maintain comfort rounds  - Make Fall Risk Sign visible to staff  - Offer Toileting every 2 Hours, in advance of need  - Initiate/Maintain bed and chair alarm  - Apply yellow socks and bracelet for high fall risk patients  - Consider moving patient to room near nurses station  Outcome: Progressing  Goal: Maintains/Returns to pre admission functional level  Description: INTERVENTIONS:  -  Assess patient's ability to carry out ADLs; assess patient's baseline for ADL function and identify physical deficits which impact ability to perform ADLs (bathing, care of mouth/teeth, toileting, grooming, dressing, etc )  - Assess/evaluate cause of self-care deficits   - Assess range of motion  - Assess patient's mobility; develop plan if impaired  - Assess patient's need for assistive devices and provide as appropriate  - Encourage maximum independence but intervene and supervise when necessary  - Involve family in performance of ADLs  - Assess for home care needs following discharge   - Consider OT consult to assist with ADL evaluation and planning for discharge  - Provide patient education as appropriate  Outcome: Progressing     Problem: PAIN - ADULT  Goal: Verbalizes/displays adequate comfort level or baseline comfort level  Description: Interventions:  - Encourage patient to monitor pain and request assistance  - Assess pain using appropriate pain scale  - Administer analgesics based on type and severity of pain and evaluate response  - Implement non-pharmacological measures as appropriate and evaluate response  - Consider cultural and social influences on pain and pain management  - Notify physician/advanced practitioner if interventions unsuccessful or patient reports new pain  Outcome: Progressing     Problem: INFECTION - ADULT  Goal: Absence or prevention of progression during hospitalization  Description: INTERVENTIONS:  - Assess and monitor for signs and symptoms of infection  - Monitor lab/diagnostic results  - Monitor all insertion sites, i e  indwelling lines, tubes, and drains  - Monitor endotracheal if appropriate and nasal secretions for changes in amount and color  - [place] appropriate cooling/warming therapies per order  - Administer medications as ordered  - Instruct and encourage patient and family to use good hand hygiene technique  - Identify and instruct in appropriate isolation precautions for identified infection/condition  Outcome: Progressing     Problem: SAFETY ADULT  Goal: Maintains/Returns to pre admission functional level  Description: INTERVENTIONS:  -  Assess patient's ability to carry out ADLs; assess patient's baseline for ADL function and identify physical deficits which impact ability to perform ADLs (bathing, care of mouth/teeth, toileting, grooming, dressing, etc )  - Assess/evaluate cause of self-care deficits   - Assess range of motion  - Assess patient's mobility; develop plan if impaired  - Assess patient's need for assistive devices and provide as appropriate  - Encourage maximum independence but intervene and supervise when necessary  - Involve family in performance of ADLs  - Assess for home care needs following discharge   - Consider OT consult to assist with ADL evaluation and planning for discharge  - Provide patient education as appropriate  Outcome: Progressing     Problem: DISCHARGE PLANNING  Goal: Discharge to home or other facility with appropriate resources  Description: INTERVENTIONS:  - Identify barriers to discharge w/patient and caregiver  - Arrange for needed discharge resources and transportation as appropriate  - Identify discharge learning needs (meds, wound care, etc )  - Arrange for interpretive services to assist at discharge as needed  - Refer to Case Management Department for coordinating discharge planning if the patient needs post-hospital services based on physician/advanced practitioner order or complex needs related to functional status, cognitive ability, or social support system  Outcome: Progressing     Problem: Knowledge Deficit  Goal: Patient/family/caregiver demonstrates understanding of disease process, treatment plan, medications, and discharge instructions  Description: Complete learning assessment and assess knowledge base  Interventions:  - Provide teaching at level of understanding  - Provide teaching via preferred learning methods  Outcome: Progressing     Problem: Nutrition/Hydration-ADULT  Goal: Nutrient/Hydration intake appropriate for improving, restoring or maintaining nutritional needs  Description: Monitor and assess patient's nutrition/hydration status for malnutrition   Collaborate with interdisciplinary team and initiate plan and interventions as ordered  Monitor patient's weight and dietary intake as ordered or per policy  Utilize nutrition screening tool and intervene as necessary  Determine patient's food preferences and provide high-protein, high-caloric foods as appropriate       INTERVENTIONS:  - Monitor oral intake, urinary output, labs, and treatment plans  - Assess nutrition and hydration status and recommend course of action  - Evaluate amount of meals eaten  - Assist patient with eating if necessary   - Allow adequate time for meals  - Recommend/ encourage appropriate diets, oral nutritional supplements, and vitamin/mineral supplements  - Order, calculate, and assess calorie counts as needed  - Recommend, monitor, and adjust tube feedings and TPN/PPN based on assessed needs  - Assess need for intravenous fluids  - Provide specific nutrition/hydration education as appropriate  - Include patient/family/caregiver in decisions related to nutrition  Outcome: Progressing

## 2022-04-01 LAB
ANION GAP SERPL CALCULATED.3IONS-SCNC: 9 MMOL/L (ref 4–13)
BUN SERPL-MCNC: 27 MG/DL (ref 5–25)
CALCIUM SERPL-MCNC: 8.6 MG/DL (ref 8.3–10.1)
CHLORIDE SERPL-SCNC: 101 MMOL/L (ref 100–108)
CO2 SERPL-SCNC: 24 MMOL/L (ref 21–32)
CREAT SERPL-MCNC: 0.72 MG/DL (ref 0.6–1.3)
ERYTHROCYTE [DISTWIDTH] IN BLOOD BY AUTOMATED COUNT: 14.3 % (ref 11.6–15.1)
GFR SERPL CREATININE-BSD FRML MDRD: 97 ML/MIN/1.73SQ M
GLUCOSE SERPL-MCNC: 146 MG/DL (ref 65–140)
GLUCOSE SERPL-MCNC: 152 MG/DL (ref 65–140)
GLUCOSE SERPL-MCNC: 162 MG/DL (ref 65–140)
GLUCOSE SERPL-MCNC: 213 MG/DL (ref 65–140)
GLUCOSE SERPL-MCNC: 218 MG/DL (ref 65–140)
GLUCOSE SERPL-MCNC: 231 MG/DL (ref 65–140)
GLUCOSE SERPL-MCNC: 259 MG/DL (ref 65–140)
GLUCOSE SERPL-MCNC: 299 MG/DL (ref 65–140)
GLUCOSE SERPL-MCNC: 300 MG/DL (ref 65–140)
GLUCOSE SERPL-MCNC: 317 MG/DL (ref 65–140)
GLUCOSE SERPL-MCNC: 330 MG/DL (ref 65–140)
GLUCOSE SERPL-MCNC: 375 MG/DL (ref 65–140)
GLUCOSE SERPL-MCNC: 392 MG/DL (ref 65–140)
HCT VFR BLD AUTO: 27.9 % (ref 36.5–49.3)
HGB BLD-MCNC: 9.6 G/DL (ref 12–17)
MAGNESIUM SERPL-MCNC: 2.5 MG/DL (ref 1.6–2.6)
MCH RBC QN AUTO: 33.8 PG (ref 26.8–34.3)
MCHC RBC AUTO-ENTMCNC: 34.4 G/DL (ref 31.4–37.4)
MCV RBC AUTO: 98 FL (ref 82–98)
PHOSPHATE SERPL-MCNC: 4.5 MG/DL (ref 2.3–4.1)
PLATELET # BLD AUTO: 185 THOUSANDS/UL (ref 149–390)
PMV BLD AUTO: 10.4 FL (ref 8.9–12.7)
POTASSIUM SERPL-SCNC: 4.8 MMOL/L (ref 3.5–5.3)
PROCALCITONIN SERPL-MCNC: 0.44 NG/ML
RBC # BLD AUTO: 2.84 MILLION/UL (ref 3.88–5.62)
SODIUM SERPL-SCNC: 134 MMOL/L (ref 136–145)
VANCOMYCIN TROUGH SERPL-MCNC: 9.8 UG/ML (ref 10–20)
WBC # BLD AUTO: 10.68 THOUSAND/UL (ref 4.31–10.16)

## 2022-04-01 PROCEDURE — 84145 PROCALCITONIN (PCT): CPT | Performed by: NURSE PRACTITIONER

## 2022-04-01 PROCEDURE — 99233 SBSQ HOSP IP/OBS HIGH 50: CPT | Performed by: ANESTHESIOLOGY

## 2022-04-01 PROCEDURE — 85027 COMPLETE CBC AUTOMATED: CPT | Performed by: NURSE PRACTITIONER

## 2022-04-01 PROCEDURE — 94664 DEMO&/EVAL PT USE INHALER: CPT

## 2022-04-01 PROCEDURE — 99232 SBSQ HOSP IP/OBS MODERATE 35: CPT | Performed by: INTERNAL MEDICINE

## 2022-04-01 PROCEDURE — 82948 REAGENT STRIP/BLOOD GLUCOSE: CPT

## 2022-04-01 PROCEDURE — 80048 BASIC METABOLIC PNL TOTAL CA: CPT | Performed by: NURSE PRACTITIONER

## 2022-04-01 PROCEDURE — 94760 N-INVAS EAR/PLS OXIMETRY 1: CPT

## 2022-04-01 PROCEDURE — 80202 ASSAY OF VANCOMYCIN: CPT | Performed by: PHYSICIAN ASSISTANT

## 2022-04-01 PROCEDURE — 83735 ASSAY OF MAGNESIUM: CPT | Performed by: NURSE PRACTITIONER

## 2022-04-01 PROCEDURE — 84100 ASSAY OF PHOSPHORUS: CPT | Performed by: NURSE PRACTITIONER

## 2022-04-01 RX ORDER — VANCOMYCIN HYDROCHLORIDE 1 G/200ML
12.5 INJECTION, SOLUTION INTRAVENOUS EVERY 8 HOURS
Status: DISCONTINUED | OUTPATIENT
Start: 2022-04-02 | End: 2022-04-03

## 2022-04-01 RX ORDER — FUROSEMIDE 10 MG/ML
40 INJECTION INTRAMUSCULAR; INTRAVENOUS ONCE
Status: COMPLETED | OUTPATIENT
Start: 2022-04-01 | End: 2022-04-01

## 2022-04-01 RX ADMIN — SODIUM CHLORIDE 17 UNITS/HR: 9 INJECTION, SOLUTION INTRAVENOUS at 20:20

## 2022-04-01 RX ADMIN — SENNOSIDES AND DOCUSATE SODIUM 2 TABLET: 50; 8.6 TABLET ORAL at 08:03

## 2022-04-01 RX ADMIN — ENOXAPARIN SODIUM 40 MG: 40 INJECTION SUBCUTANEOUS at 08:03

## 2022-04-01 RX ADMIN — OXYCODONE HYDROCHLORIDE AND ACETAMINOPHEN 500 MG: 500 TABLET ORAL at 08:03

## 2022-04-01 RX ADMIN — VANCOMYCIN HYDROCHLORIDE 1000 MG: 1 INJECTION, SOLUTION INTRAVENOUS at 17:52

## 2022-04-01 RX ADMIN — CEFEPIME HYDROCHLORIDE 2000 MG: 2 INJECTION, POWDER, FOR SOLUTION INTRAVENOUS at 17:00

## 2022-04-01 RX ADMIN — VANCOMYCIN HYDROCHLORIDE 1000 MG: 1 INJECTION, SOLUTION INTRAVENOUS at 05:53

## 2022-04-01 RX ADMIN — BENZONATATE 200 MG: 100 CAPSULE ORAL at 08:03

## 2022-04-01 RX ADMIN — METHYLPREDNISOLONE SODIUM SUCCINATE 60 MG: 125 INJECTION, POWDER, FOR SOLUTION INTRAMUSCULAR; INTRAVENOUS at 17:52

## 2022-04-01 RX ADMIN — ZINC SULFATE 220 MG (50 MG) CAPSULE 220 MG: CAPSULE at 08:03

## 2022-04-01 RX ADMIN — CEFEPIME HYDROCHLORIDE 2000 MG: 2 INJECTION, POWDER, FOR SOLUTION INTRAVENOUS at 05:53

## 2022-04-01 RX ADMIN — ATORVASTATIN CALCIUM 10 MG: 10 TABLET, FILM COATED ORAL at 08:03

## 2022-04-01 RX ADMIN — SODIUM CHLORIDE 10 UNITS/HR: 9 INJECTION, SOLUTION INTRAVENOUS at 10:10

## 2022-04-01 RX ADMIN — METHYLPREDNISOLONE SODIUM SUCCINATE 60 MG: 125 INJECTION, POWDER, FOR SOLUTION INTRAMUSCULAR; INTRAVENOUS at 01:18

## 2022-04-01 RX ADMIN — SENNOSIDES AND DOCUSATE SODIUM 2 TABLET: 50; 8.6 TABLET ORAL at 17:52

## 2022-04-01 RX ADMIN — GUAIFENESIN 600 MG: 600 TABLET ORAL at 08:03

## 2022-04-01 RX ADMIN — THIAMINE HCL TAB 100 MG 100 MG: 100 TAB at 08:03

## 2022-04-01 RX ADMIN — BENZONATATE 200 MG: 100 CAPSULE ORAL at 16:19

## 2022-04-01 RX ADMIN — FUROSEMIDE 40 MG: 10 INJECTION, SOLUTION INTRAMUSCULAR; INTRAVENOUS at 09:00

## 2022-04-01 RX ADMIN — METHYLPREDNISOLONE SODIUM SUCCINATE 60 MG: 125 INJECTION, POWDER, FOR SOLUTION INTRAMUSCULAR; INTRAVENOUS at 05:53

## 2022-04-01 RX ADMIN — FLUTICASONE FUROATE AND VILANTEROL TRIFENATATE 1 PUFF: 100; 25 POWDER RESPIRATORY (INHALATION) at 08:04

## 2022-04-01 RX ADMIN — PANTOPRAZOLE SODIUM 40 MG: 40 TABLET, DELAYED RELEASE ORAL at 05:53

## 2022-04-01 RX ADMIN — GUAIFENESIN 600 MG: 600 TABLET ORAL at 21:23

## 2022-04-01 RX ADMIN — METHYLPREDNISOLONE SODIUM SUCCINATE 60 MG: 125 INJECTION, POWDER, FOR SOLUTION INTRAMUSCULAR; INTRAVENOUS at 12:21

## 2022-04-01 RX ADMIN — BENZONATATE 200 MG: 100 CAPSULE ORAL at 21:23

## 2022-04-01 NOTE — PLAN OF CARE
Problem: MOBILITY - ADULT  Goal: Maintain or return to baseline ADL function  Description: INTERVENTIONS:  -  Assess patient's ability to carry out ADLs; assess patient's baseline for ADL function and identify physical deficits which impact ability to perform ADLs (bathing, care of mouth/teeth, toileting, grooming, dressing, etc )  - Assess/evaluate cause of self-care deficits   - Assess range of motion  - Assess patient's mobility; develop plan if impaired  - Assess patient's need for assistive devices and provide as appropriate  - Encourage maximum independence but intervene and supervise when necessary  - Involve family in performance of ADLs  - Assess for home care needs following discharge   - Consider OT consult to assist with ADL evaluation and planning for discharge  - Provide patient education as appropriate  Outcome: Progressing  Goal: Maintains/Returns to pre admission functional level  Description: INTERVENTIONS:  - Perform BMAT or MOVE assessment daily    - Set and communicate daily mobility goal to care team and patient/family/caregiver  - Collaborate with rehabilitation services on mobility goals if consulted  - Perform Range of Motion 3 times a day  - Reposition patient every 3 hours    - Dangle patient 3 times a day  - Stand patient 3 times a day  - Ambulate patient 3 times a day  - Out of bed to chair 3 times a day   - Out of bed for meals 3 times a day  - Out of bed for toileting  - Record patient progress and toleration of activity level   Outcome: Progressing     Problem: PAIN - ADULT  Goal: Verbalizes/displays adequate comfort level or baseline comfort level  Description: Interventions:  - Encourage patient to monitor pain and request assistance  - Assess pain using appropriate pain scale  - Administer analgesics based on type and severity of pain and evaluate response  - Implement non-pharmacological measures as appropriate and evaluate response  - Consider cultural and social influences on pain and pain management  - Notify physician/advanced practitioner if interventions unsuccessful or patient reports new pain  Outcome: Progressing     Problem: INFECTION - ADULT  Goal: Absence or prevention of progression during hospitalization  Description: INTERVENTIONS:  - Assess and monitor for signs and symptoms of infection  - Monitor lab/diagnostic results  - Monitor all insertion sites, i e  indwelling lines, tubes, and drains  - [place] appropriate cooling/warming therapies per order  - Administer medications as ordered  - Instruct and encourage patient and family to use good hand hygiene technique  - Identify and instruct in appropriate isolation precautions for identified infection/condition  Outcome: Progressing     Problem: SAFETY ADULT  Goal: Patient will remain free of falls  Description: INTERVENTIONS:  - Educate patient/family on patient safety including physical limitations  - Instruct patient to call for assistance with activity   - Consult OT/PT to assist with strengthening/mobility   - Keep Call bell within reach  - Keep bed low and locked with side rails adjusted as appropriate  - Keep care items and personal belongings within reach  - Initiate and maintain comfort rounds  - Make Fall Risk Sign visible to staff  - Offer Toileting every 2 Hours, in advance of need  - Initiate/Maintain bed alarm  - Obtain necessary fall risk management equipment: bed alarm  - Apply yellow socks and bracelet for high fall risk patients  - Consider moving patient to room near nurses station  Outcome: Progressing  Goal: Maintain or return to baseline ADL function  Description: INTERVENTIONS:  -  Assess patient's ability to carry out ADLs; assess patient's baseline for ADL function and identify physical deficits which impact ability to perform ADLs (bathing, care of mouth/teeth, toileting, grooming, dressing, etc )  - Assess/evaluate cause of self-care deficits   - Assess range of motion  - Assess patient's mobility; develop plan if impaired  - Assess patient's need for assistive devices and provide as appropriate  - Encourage maximum independence but intervene and supervise when necessary  - Involve family in performance of ADLs  - Assess for home care needs following discharge   - Consider OT consult to assist with ADL evaluation and planning for discharge  - Provide patient education as appropriate  Outcome: Progressing  Goal: Maintains/Returns to pre admission functional level  Description: INTERVENTIONS:  - Perform BMAT or MOVE assessment daily    - Set and communicate daily mobility goal to care team and patient/family/caregiver  - Collaborate with rehabilitation services on mobility goals if consulted  - Perform Range of Motion 3 times a day  - Reposition patient every 3 hours    - Dangle patient 3 times a day  - Stand patient 3 times a day  - Ambulate patient 3 times a day  - Out of bed to chair 3 times a day   - Out of bed for meals 3 times a day  - Out of bed for toileting  - Record patient progress and toleration of activity level   Outcome: Progressing     Problem: DISCHARGE PLANNING  Goal: Discharge to home or other facility with appropriate resources  Description: INTERVENTIONS:  - Identify barriers to discharge w/patient and caregiver  - Arrange for needed discharge resources and transportation as appropriate  - Identify discharge learning needs (meds, wound care, etc )  - Refer to Case Management Department for coordinating discharge planning if the patient needs post-hospital services based on physician/advanced practitioner order or complex needs related to functional status, cognitive ability, or social support system  Outcome: Progressing     Problem: Knowledge Deficit  Goal: Patient/family/caregiver demonstrates understanding of disease process, treatment plan, medications, and discharge instructions  Description: Complete learning assessment and assess knowledge base   Interventions:  - Provide teaching at level of understanding  - Provide teaching via preferred learning methods  Outcome: Progressing     Problem: Nutrition/Hydration-ADULT  Goal: Nutrient/Hydration intake appropriate for improving, restoring or maintaining nutritional needs  Description: Monitor and assess patient's nutrition/hydration status for malnutrition  Collaborate with interdisciplinary team and initiate plan and interventions as ordered  Monitor patient's weight and dietary intake as ordered or per policy  Utilize nutrition screening tool and intervene as necessary  Determine patient's food preferences and provide high-protein, high-caloric foods as appropriate       INTERVENTIONS:  - Monitor oral intake, urinary output, labs, and treatment plans  - Assess nutrition and hydration status and recommend course of action  - Evaluate amount of meals eaten  - Assist patient with eating if necessary   - Allow adequate time for meals  - Recommend/ encourage appropriate diets, oral nutritional supplements, and vitamin/mineral supplements    - Assess need for intravenous fluids  - Provide specific nutrition/hydration education as appropriate  - Include patient/family/caregiver in decisions related to nutrition  Outcome: Progressing

## 2022-04-01 NOTE — RESPIRATORY THERAPY NOTE
04/01/22 1311   Non-Invasive Information   O2 Interface Device HFNC prongs   Non-Invasive Ventilation Mode HFNC (High flow)   SpO2 95 %   Non-Invasive Settings   FiO2 (%) 55   Flow (lpm) 50   Temperature (Set) 31   Non-Invasive Readings   Heater Temperature (Obs) 31   Maintenance   Water bag changed No

## 2022-04-01 NOTE — RESPIRATORY THERAPY NOTE
04/01/22 0717   Non-Invasive Information   O2 Interface Device HFNC prongs   Non-Invasive Ventilation Mode HFNC (High flow)   SpO2 90 %   $ Pulse Oximetry Spot Check Charge Completed   Non-Invasive Settings   FiO2 (%) 60   Flow (lpm) 50   Temperature (Set) 31   Non-Invasive Readings   Heater Temperature (Obs) 31   Skin Intervention Skin intact   Maintenance   Water bag changed Yes

## 2022-04-01 NOTE — PROGRESS NOTES
Progress Note - Pulmonary   Pepito Prom 78 y o  male MRN: [id number]  Unit/Bed#:  Encounter: [id number]    Assessment:  1  Acute on chronic hypoxemic respiratory failure  2  Recent COVID-19 pneumonia with suspected reactivation inflammation  3  Elevated BNP, possible acute CHF  4  Suspected superimposed bacterial pneumonia    Plan:  Acute on chronic hypoxemic respiratory failure, patient requiring high-flow nasal cannula- slightly improved O2 requirements on 60% FiO2, 50 LPM  He had recently been discharged home on 4-6 L nasal cannula, had titrated himself down to 3 L  Suspect multifactorial related to reactivation COVID inflammation with elevated CRP, superimposed bacterial pneumonia and possible CHF  He was started on high-dose steroids yesterday 40 Q 6 hours, will trend CRP  Would keep steroids the same dose for today  Continue broad-spectrum antibiotics given CT findings as well as elevated procalcitonin  He is receiving p r n  Lasix, echocardiogram shows EF of 60%, mildly dilated RV with mildly reduced systolic function  Continue OOB to chair and commode as able, ambulate as O2 requirement improves  Will continue to follow  Subjective:   Patient sitting up in the chair  He overall feels slightly better, continues have an ongoing cough he has been out of bed to the chair and commode  Objective:     Vitals: Blood pressure 111/59, pulse 91, temperature 97 7 °F (36 5 °C), temperature source Oral, resp  rate 22, height 5' 7" (1 702 m), weight 83 5 kg (184 lb), SpO2 90 %  ,Body mass index is 28 82 kg/m²  Intake/Output Summary (Last 24 hours) at 4/1/2022 1032  Last data filed at 4/1/2022 1011  Gross per 24 hour   Intake 1015 ml   Output 1890 ml   Net -875 ml       Invasive Devices  Report    Peripheral Intravenous Line            Peripheral IV 03/30/22 Right Forearm 1 day    Peripheral IV 03/31/22 Dorsal (posterior); Right Hand <1 day                Physical Exam: /59 (BP Location: Right arm) Pulse 91   Temp 97 7 °F (36 5 °C) (Oral)   Resp 22   Ht 5' 7" (1 702 m)   Wt 83 5 kg (184 lb)   SpO2 90%   BMI 28 82 kg/m²   General appearance: alert and oriented, in no acute distress  Head: Normocephalic, without obvious abnormality, atraumatic  Eyes: negative findings: conjunctivae and sclerae normal  Lungs: diminished breath sounds  Heart: regular rate and rhythm  Abdomen: normal findings: soft, non-tender  Extremities: No edema  Skin: Warm and dry  Neurologic: Mental status: Alert, oriented, thought content appropriate     Labs: I have personally reviewed pertinent lab results  , CBC:   Lab Results   Component Value Date    WBC 10 68 (H) 04/01/2022    HGB 9 6 (L) 04/01/2022    HCT 27 9 (L) 04/01/2022    MCV 98 04/01/2022     04/01/2022    MCH 33 8 04/01/2022    MCHC 34 4 04/01/2022    RDW 14 3 04/01/2022    MPV 10 4 04/01/2022   , CMP:   Lab Results   Component Value Date    SODIUM 134 (L) 04/01/2022    K 4 8 04/01/2022     04/01/2022    CO2 24 04/01/2022    BUN 27 (H) 04/01/2022    CREATININE 0 72 04/01/2022    CALCIUM 8 6 04/01/2022    EGFR 97 04/01/2022     Imaging and other studies: I have personally reviewed pertinent reports     and I have personally reviewed pertinent films in PACS

## 2022-04-01 NOTE — RESPIRATORY THERAPY NOTE
04/01/22 1601   Non-Invasive Information   O2 Interface Device HFNC prongs   Non-Invasive Ventilation Mode HFNC (High flow)   SpO2 91 %   $ Pulse Oximetry Spot Check Charge Completed   Non-Invasive Settings   FiO2 (%) 55   Flow (lpm) 50   Temperature (Set) 31   Non-Invasive Readings   Heater Temperature (Obs) 31   Skin Intervention Skin intact   Maintenance   Water bag changed No

## 2022-04-01 NOTE — PROGRESS NOTES
Vancomycin Assessment    Lidia Rai is a 78 y o  male who is currently receiving vancomycin 1000mg IV q12h for Pneumonia     Relevant clinical data and objective history reviewed:  Creatinine   Date Value Ref Range Status   04/01/2022 0 72 0 60 - 1 30 mg/dL Final     Comment:     Standardized to IDMS reference method   03/31/2022 1 00 0 60 - 1 30 mg/dL Final     Comment:     Standardized to IDMS reference method   03/30/2022 1 06 0 60 - 1 30 mg/dL Final     Comment:     Standardized to IDMS reference method     /55 (BP Location: Right arm)   Pulse 82   Temp 98 °F (36 7 °C) (Oral)   Resp 20   Ht 5' 7" (1 702 m)   Wt 83 5 kg (184 lb)   SpO2 91%   BMI 28 82 kg/m²   I/O last 3 completed shifts: In: 2031 3 [P O :1320; I V :111 3; IV Piggyback:600]  Out: 3110 [Urine:3110]  Lab Results   Component Value Date/Time    BUN 27 (H) 04/01/2022 05:15 AM    WBC 10 68 (H) 04/01/2022 05:15 AM    HGB 9 6 (L) 04/01/2022 05:15 AM    HCT 27 9 (L) 04/01/2022 05:15 AM    MCV 98 04/01/2022 05:15 AM     04/01/2022 05:15 AM     Temp Readings from Last 3 Encounters:   04/01/22 98 °F (36 7 °C) (Oral)   03/30/22 98 3 °F (36 8 °C) (Temporal)   03/04/22 98 6 °F (37 °C)     Vancomycin Days of Therapy: 2    Assessment/Plan  The patient is currently on vancomycin utilizing scheduled dosing  Baseline risks associated with therapy include: advanced age  The patient is receiving 1000mg IV q12h with the most recent vancomycin level being at steady-state and sub-therapeutic based on a goal of 15-20 (appropriate for most indications) ; therefore, after clinical evaluation will be changed to 1000mg IV q8h   Pharmacy will continue to follow closely for s/sx of nephrotoxicity, infusion reactions, and appropriateness of therapy  BMP and CBC will be ordered per protocol  Plan for trough as patient approaches steady state, prior to the 4th  dose at approximately 0130 on 4/3   Pharmacy will continue to follow the patients culture results and clinical progress daily      Renata Fraser, Pharmacist

## 2022-04-01 NOTE — PROGRESS NOTES
[facility]  Progress Note - Lucille Richter 1955, 78 y o  male MRN: [id number]  Unit/Bed#:  Encounter: [id number]  Primary Care Provider: Jens Fung MD   Date and time admitted to hospital: 3/30/2022  5:53 PM    Elevated brain natriuretic peptide (BNP) level  Assessment & Plan  · Patient reports no history of CHF  · Was scheduled to have TTE today by outpatient cardiologist  · BNP elevated at 1116, vascular congestion on CXR  · PRN diuresis  · 3/31 TTE: LVEF 60%, G1DD, mildly reduced RV systolic function    Elevated C-reactive protein (CRP)  Assessment & Plan  · In setting of prolonged covid illness  · Continue methylprednisolone 40mg q6hr  · Trend daily    Type 2 diabetes mellitus, without long-term current use of insulin [facility])  Assessment & Plan  Lab Results   Component Value Date    HGBA1C 7 7 (H) 12/06/2021       Recent Labs     03/30/22  2102 03/31/22  0804 03/31/22  1103 03/31/22  1617   POCGLU 132 332* 323* 278*       Blood Sugar Average: Last 72 hrs:  (P) 242   · BG uncontrolled on subQ insulin in setting of DM2 now worsened by steroid-induced hyperglycemia  · Started on insulin gtt  · Goal -180    Non MI elevated troponin  Assessment & Plan  · Hs troponin elevated at 105  · Peaked at 224 and downtrended  · Favor non-MI troponin elevation in setting of acute hypoxic respiratory failure    * Acute on chronic respiratory failure with hypoxia (HCC)  Assessment & Plan  · Suspect primarily long term sequelae of COVID-19 pneumonia  · Currently requiring HFNC 70%/55L  · Wean HFNC for goal SpO2>88%  · Continue IV steroids with methylprednisolone 40mg IV q6hr given elevated CRP-- follow daily CRP  · Continue PRN diuresis  · Continue outpatient breo-ellipta  · On cefepime and vanc  · F/u AM procal; bcx NG x24hr; f/u MRSA  · Encourage pulmonary hygiene  · Scheduled tessalon perles and mucinex for cough as coughing is what contributes most to desaturation episodes  · Pulmonology consulted, appreciate recommendations    ----------------------------------------------------------------------------------------  HPI/24hr events: Worsened hyperglycemia overnight, started on insulin gtt  HFNC weaned to 65%/50L  Patient appropriate for transfer out of the ICU today?: No  Disposition: Continue Stepdown Level 1 level of care   Code Status: Level 1 - Full Code  ---------------------------------------------------------------------------------------  SUBJECTIVE  "Breathing is a little better"  Reports that his WOB has improved over past 24hrs  Continues to have persistent cough  Denies CP, palpitations, dizziness, lightheadedness, HA, NVD, abdominal pain  Review of Systems  Review of systems was reviewed and negative unless stated above in HPI/24-hour events   ---------------------------------------------------------------------------------------  OBJECTIVE    Vitals   Vitals:    22 2121 22 2309 22 0255 22 0257   BP:  121/63     BP Location:  Right arm     Pulse:  72 68    Resp:  (!) 30 (!) 27    Temp:  (!) 97 4 °F (36 3 °C)     TempSrc:  Oral     SpO2: 95% 98% 96% 96%   Weight:       Height:         Temp (24hrs), Av 8 °F (36 6 °C), Min:97 4 °F (36 3 °C), Max:98 °F (36 7 °C)  Current: Temperature: (!) 97 4 °F (36 3 °C)          Respiratory:  SpO2: SpO2: 96 %, SpO2 Activity: SpO2 Activity: At Rest, SpO2 Device: O2 Device: High flow nasal cannula       Invasive/non-invasive ventilation settings   Respiratory  Report   Lab Data (Last 4 hours)    None         O2/Vent Data (Last 4 hours)       0255          Non-Invasive Ventilation Mode HFNC (High flow)                   Physical Exam  Vitals and nursing note reviewed  Constitutional:       Appearance: Normal appearance  He is well-developed  HENT:      Head: Normocephalic and atraumatic        Mouth/Throat:      Mouth: Mucous membranes are moist    Eyes:      Conjunctiva/sclera: Conjunctivae normal       Pupils: Pupils are equal, round, and reactive to light  Cardiovascular:      Rate and Rhythm: Normal rate and regular rhythm  Heart sounds: No murmur heard  Pulmonary:      Effort: Pulmonary effort is normal  No respiratory distress  Breath sounds: No wheezing, rhonchi or rales  Comments: Breath sounds diminished in bilateral bases, otherwise CTA throughout  Abdominal:      General: There is no distension  Palpations: Abdomen is soft  Tenderness: There is no abdominal tenderness  There is no guarding  Musculoskeletal:      Cervical back: Neck supple  Right lower leg: No edema  Left lower leg: No edema  Skin:     General: Skin is warm and dry  Neurological:      General: No focal deficit present  Mental Status: He is alert and oriented to person, place, and time               Laboratory and Diagnostics:  Results from last 7 days   Lab Units 03/31/22 0427 03/30/22  1149   WBC Thousand/uL 7 56 8 34   HEMOGLOBIN g/dL 10 8* 11 2*   HEMATOCRIT % 31 4* 33 7*   PLATELETS Thousands/uL 160 168   NEUTROS PCT % 71 74   MONOS PCT % 2* 6     Results from last 7 days   Lab Units 03/31/22  0427 03/30/22  1149   SODIUM mmol/L 135* 137   POTASSIUM mmol/L 4 7 4 0   CHLORIDE mmol/L 100 103   CO2 mmol/L 24 23   ANION GAP mmol/L 11 11   BUN mg/dL 20 19   CREATININE mg/dL 1 00 1 06   CALCIUM mg/dL 8 6 8 3   GLUCOSE RANDOM mg/dL 302* 260*   ALT U/L 37 42   AST U/L 40 23   ALK PHOS U/L 61 65   ALBUMIN g/dL 2 6* 2 8*   TOTAL BILIRUBIN mg/dL 1 39* 0 92     Results from last 7 days   Lab Units 03/31/22  0427 03/30/22  1149   MAGNESIUM mg/dL 2 9* 1 9   PHOSPHORUS mg/dL 5 7*  --       Results from last 7 days   Lab Units 03/30/22  1149   INR  1 11   PTT seconds 39*          Results from last 7 days   Lab Units 03/30/22  1425 03/30/22  1149   LACTIC ACID mmol/L 1 6 2 8*     ABG:    VBG:    Results from last 7 days   Lab Units 03/31/22  0427 03/30/22  1924   PROCALCITONIN ng/ml 0 64* 0 36*       Micro  Results from last 7 days   Lab Units 03/30/22  1149 03/30/22  1139   BLOOD CULTURE  No Growth at 24 hrs  No Growth at 24 hrs  Imaging: I have personally reviewed pertinent reports  and I have personally reviewed pertinent films in PACS    Intake and Output  I/O       03/30 0701  03/31 0700 03/31 0701 04/01 0700    P  O   1320    IV Piggyback  350    Total Intake(mL/kg)  1670 (20)    Urine (mL/kg/hr) 970 1370 (0 7)    Stool  0    Total Output 970 1370    Net -970 +300          Unmeasured Urine Occurrence  1 x    Unmeasured Stool Occurrence  4 x          Height and Weights   Height: 5' 7" (170 2 cm)  IBW (Ideal Body Weight): 66 1 kg  Body mass index is 28 82 kg/m²  Weight (last 2 days)     Date/Time Weight    03/31/22 0920 83 5 (184)    03/30/22 1800 83 6 (184 3)            Nutrition       Diet Orders   (From admission, onward)             Start     Ordered    03/31/22 1019  Diet [place]/CHO Controlled; Consistent Carbohydrate Diet Level 3 (6 carb servings/90 grams CHO/meal)  Diet effective now        References:    Nutrtion Support Algorithm Enteral vs  Parenteral   Question Answer Comment   Diet Type [place]/CHO Controlled    [place]/CHO Controlled Consistent Carbohydrate Diet Level 3 (6 carb servings/90 grams CHO/meal)    RD to adjust diet per protocol?  Yes        03/31/22 1019                  Active Medications  Scheduled Meds:  Current Facility-Administered Medications   Medication Dose Route Frequency Provider Last Rate    ascorbic acid  500 mg Oral Daily Luiza Catching, CRNP      atorvastatin  10 mg Oral Daily Luiza Catching, CRNP      benzonatate  200 mg Oral TID [employer] CORINA Salinas      cefepime  2,000 mg Intravenous Q12H Enma Reza PA-C 2,000 mg (03/31/22 1642)    enoxaparin  40 mg Subcutaneous Daily Luiza Catching, CRNP      fluticasone-vilanterol  1 puff Inhalation Daily Luiza Catching, CRNP      guaiFENesin  600 mg Oral Q12H [facility] [employer], PA-C      insulin regular (HumuLIN R,NovoLIN R) infusion  0 3-21 Units/hr Intravenous Titrated Alexey Ray PA-C 5 Units/hr (04/01/22 0226)    methylPREDNISolone sodium succinate  60 mg Intravenous Q6H [address] [place] NEGRITA Garcia      pantoprazole  40 mg Oral Early Morning Rawleigh Kate, CRKEERTHI      senna-docusate sodium  2 tablet Oral BID Rawleigh Kate, CRNP      sodium chloride  1 spray Each Nare Q1H PRN Alexey Ray PA-C      thiamine  100 mg Oral Daily Rawleigh Kate, NEGRITA      vancomycin  12 5 mg/kg Intravenous Q12H Walter Reza PA-C 1,000 mg (03/31/22 1752)    zinc sulfate  220 mg Oral Daily Rawleigh Kate, CRNP       Continuous Infusions:  insulin regular (HumuLIN R,NovoLIN R) infusion, 0 3-21 Units/hr, Last Rate: 5 Units/hr (04/01/22 0226)      PRN Meds:   sodium chloride, 1 spray, Q1H PRN        Invasive Devices Review  Invasive Devices  Report    Peripheral Intravenous Line            Peripheral IV 03/30/22 Right Forearm 1 day    Peripheral IV 03/31/22 Dorsal (posterior); Right Hand <1 day                Rationale for remaining devices: n/a  ---------------------------------------------------------------------------------------  Advance Directive and Living Will:      Power of :    POLST:    ---------------------------------------------------------------------------------------  Care Time Delivered:   No Critical Care time spent       Alexey Ray PA-C      Portions of the record may have been created with voice recognition software  Occasional wrong word or "sound a like" substitutions may have occurred due to the inherent limitations of voice recognition software    Read the chart carefully and recognize, using context, where substitutions have occurred

## 2022-04-02 LAB
ANION GAP SERPL CALCULATED.3IONS-SCNC: 8 MMOL/L (ref 4–13)
BUN SERPL-MCNC: 32 MG/DL (ref 5–25)
CALCIUM SERPL-MCNC: 8.8 MG/DL (ref 8.3–10.1)
CHLORIDE SERPL-SCNC: 103 MMOL/L (ref 100–108)
CO2 SERPL-SCNC: 24 MMOL/L (ref 21–32)
CREAT SERPL-MCNC: 0.85 MG/DL (ref 0.6–1.3)
CRP SERPL QL: 54.8 MG/L
ERYTHROCYTE [DISTWIDTH] IN BLOOD BY AUTOMATED COUNT: 14.3 % (ref 11.6–15.1)
GFR SERPL CREATININE-BSD FRML MDRD: 90 ML/MIN/1.73SQ M
GLUCOSE SERPL-MCNC: 109 MG/DL (ref 65–140)
GLUCOSE SERPL-MCNC: 135 MG/DL (ref 65–140)
GLUCOSE SERPL-MCNC: 146 MG/DL (ref 65–140)
GLUCOSE SERPL-MCNC: 161 MG/DL (ref 65–140)
GLUCOSE SERPL-MCNC: 179 MG/DL (ref 65–140)
GLUCOSE SERPL-MCNC: 211 MG/DL (ref 65–140)
GLUCOSE SERPL-MCNC: 247 MG/DL (ref 65–140)
GLUCOSE SERPL-MCNC: 257 MG/DL (ref 65–140)
GLUCOSE SERPL-MCNC: 299 MG/DL (ref 65–140)
GLUCOSE SERPL-MCNC: 316 MG/DL (ref 65–140)
GLUCOSE SERPL-MCNC: 327 MG/DL (ref 65–140)
GLUCOSE SERPL-MCNC: 333 MG/DL (ref 65–140)
GLUCOSE SERPL-MCNC: 337 MG/DL (ref 65–140)
GLUCOSE SERPL-MCNC: 342 MG/DL (ref 65–140)
HCT VFR BLD AUTO: 27.7 % (ref 36.5–49.3)
HGB BLD-MCNC: 9.4 G/DL (ref 12–17)
MCH RBC QN AUTO: 33.7 PG (ref 26.8–34.3)
MCHC RBC AUTO-ENTMCNC: 33.9 G/DL (ref 31.4–37.4)
MCV RBC AUTO: 99 FL (ref 82–98)
MRSA NOSE QL CULT: NORMAL
PLATELET # BLD AUTO: 211 THOUSANDS/UL (ref 149–390)
PMV BLD AUTO: 10.4 FL (ref 8.9–12.7)
POTASSIUM SERPL-SCNC: 4.3 MMOL/L (ref 3.5–5.3)
PROCALCITONIN SERPL-MCNC: 0.3 NG/ML
RBC # BLD AUTO: 2.79 MILLION/UL (ref 3.88–5.62)
SODIUM SERPL-SCNC: 135 MMOL/L (ref 136–145)
WBC # BLD AUTO: 10.97 THOUSAND/UL (ref 4.31–10.16)

## 2022-04-02 PROCEDURE — 87070 CULTURE OTHR SPECIMN AEROBIC: CPT | Performed by: NURSE PRACTITIONER

## 2022-04-02 PROCEDURE — 80048 BASIC METABOLIC PNL TOTAL CA: CPT | Performed by: NURSE PRACTITIONER

## 2022-04-02 PROCEDURE — 87205 SMEAR GRAM STAIN: CPT | Performed by: NURSE PRACTITIONER

## 2022-04-02 PROCEDURE — 82948 REAGENT STRIP/BLOOD GLUCOSE: CPT

## 2022-04-02 PROCEDURE — 85027 COMPLETE CBC AUTOMATED: CPT | Performed by: NURSE PRACTITIONER

## 2022-04-02 PROCEDURE — 94664 DEMO&/EVAL PT USE INHALER: CPT

## 2022-04-02 PROCEDURE — 99291 CRITICAL CARE FIRST HOUR: CPT | Performed by: NURSE PRACTITIONER

## 2022-04-02 PROCEDURE — 86140 C-REACTIVE PROTEIN: CPT | Performed by: NURSE PRACTITIONER

## 2022-04-02 PROCEDURE — 84145 PROCALCITONIN (PCT): CPT | Performed by: NURSE PRACTITIONER

## 2022-04-02 PROCEDURE — 94760 N-INVAS EAR/PLS OXIMETRY 1: CPT

## 2022-04-02 RX ORDER — INSULIN GLARGINE 100 [IU]/ML
20 INJECTION, SOLUTION SUBCUTANEOUS EVERY 12 HOURS SCHEDULED
Status: DISCONTINUED | OUTPATIENT
Start: 2022-04-02 | End: 2022-04-03

## 2022-04-02 RX ORDER — METHYLPREDNISOLONE SODIUM SUCCINATE 125 MG/2ML
60 INJECTION, POWDER, LYOPHILIZED, FOR SOLUTION INTRAMUSCULAR; INTRAVENOUS EVERY 8 HOURS SCHEDULED
Status: COMPLETED | OUTPATIENT
Start: 2022-04-03 | End: 2022-04-03

## 2022-04-02 RX ORDER — METHYLPREDNISOLONE SODIUM SUCCINATE 125 MG/2ML
60 INJECTION, POWDER, LYOPHILIZED, FOR SOLUTION INTRAMUSCULAR; INTRAVENOUS EVERY 12 HOURS SCHEDULED
Status: COMPLETED | OUTPATIENT
Start: 2022-04-04 | End: 2022-04-04

## 2022-04-02 RX ADMIN — ENOXAPARIN SODIUM 40 MG: 40 INJECTION SUBCUTANEOUS at 08:00

## 2022-04-02 RX ADMIN — METHYLPREDNISOLONE SODIUM SUCCINATE 60 MG: 125 INJECTION, POWDER, FOR SOLUTION INTRAMUSCULAR; INTRAVENOUS at 17:12

## 2022-04-02 RX ADMIN — METHYLPREDNISOLONE SODIUM SUCCINATE 60 MG: 125 INJECTION, POWDER, FOR SOLUTION INTRAMUSCULAR; INTRAVENOUS at 06:25

## 2022-04-02 RX ADMIN — GUAIFENESIN 600 MG: 600 TABLET ORAL at 22:02

## 2022-04-02 RX ADMIN — FLUTICASONE FUROATE AND VILANTEROL TRIFENATATE 1 PUFF: 100; 25 POWDER RESPIRATORY (INHALATION) at 08:01

## 2022-04-02 RX ADMIN — BENZONATATE 200 MG: 100 CAPSULE ORAL at 22:02

## 2022-04-02 RX ADMIN — SODIUM CHLORIDE 15 UNITS/HR: 9 INJECTION, SOLUTION INTRAVENOUS at 14:15

## 2022-04-02 RX ADMIN — CEFEPIME HYDROCHLORIDE 2000 MG: 2 INJECTION, POWDER, FOR SOLUTION INTRAVENOUS at 17:11

## 2022-04-02 RX ADMIN — VANCOMYCIN HYDROCHLORIDE 1000 MG: 1 INJECTION, SOLUTION INTRAVENOUS at 10:14

## 2022-04-02 RX ADMIN — METHYLPREDNISOLONE SODIUM SUCCINATE 60 MG: 125 INJECTION, POWDER, FOR SOLUTION INTRAMUSCULAR; INTRAVENOUS at 00:56

## 2022-04-02 RX ADMIN — SALINE NASAL SPRAY 1 SPRAY: 1.5 SOLUTION NASAL at 09:22

## 2022-04-02 RX ADMIN — GUAIFENESIN 600 MG: 600 TABLET ORAL at 08:00

## 2022-04-02 RX ADMIN — CEFEPIME HYDROCHLORIDE 2000 MG: 2 INJECTION, POWDER, FOR SOLUTION INTRAVENOUS at 06:25

## 2022-04-02 RX ADMIN — SODIUM CHLORIDE 3 UNITS/HR: 9 INJECTION, SOLUTION INTRAVENOUS at 02:23

## 2022-04-02 RX ADMIN — SODIUM CHLORIDE 16 UNITS/HR: 9 INJECTION, SOLUTION INTRAVENOUS at 21:05

## 2022-04-02 RX ADMIN — SENNOSIDES AND DOCUSATE SODIUM 2 TABLET: 50; 8.6 TABLET ORAL at 08:00

## 2022-04-02 RX ADMIN — PANTOPRAZOLE SODIUM 40 MG: 40 TABLET, DELAYED RELEASE ORAL at 06:25

## 2022-04-02 RX ADMIN — INSULIN GLARGINE 20 UNITS: 100 INJECTION, SOLUTION SUBCUTANEOUS at 22:02

## 2022-04-02 RX ADMIN — ZINC SULFATE 220 MG (50 MG) CAPSULE 220 MG: CAPSULE at 08:00

## 2022-04-02 RX ADMIN — VANCOMYCIN HYDROCHLORIDE 1000 MG: 1 INJECTION, SOLUTION INTRAVENOUS at 17:11

## 2022-04-02 RX ADMIN — BENZONATATE 200 MG: 100 CAPSULE ORAL at 08:00

## 2022-04-02 RX ADMIN — BENZONATATE 200 MG: 100 CAPSULE ORAL at 15:32

## 2022-04-02 RX ADMIN — INSULIN LISPRO 5 UNITS: 100 INJECTION, SOLUTION INTRAVENOUS; SUBCUTANEOUS at 17:41

## 2022-04-02 RX ADMIN — ATORVASTATIN CALCIUM 10 MG: 10 TABLET, FILM COATED ORAL at 08:00

## 2022-04-02 RX ADMIN — THIAMINE HCL TAB 100 MG 100 MG: 100 TAB at 08:00

## 2022-04-02 RX ADMIN — METHYLPREDNISOLONE SODIUM SUCCINATE 60 MG: 125 INJECTION, POWDER, FOR SOLUTION INTRAMUSCULAR; INTRAVENOUS at 11:42

## 2022-04-02 RX ADMIN — INSULIN GLARGINE 20 UNITS: 100 INJECTION, SOLUTION SUBCUTANEOUS at 11:42

## 2022-04-02 RX ADMIN — OXYCODONE HYDROCHLORIDE AND ACETAMINOPHEN 500 MG: 500 TABLET ORAL at 08:00

## 2022-04-02 RX ADMIN — VANCOMYCIN HYDROCHLORIDE 1000 MG: 1 INJECTION, SOLUTION INTRAVENOUS at 02:36

## 2022-04-02 RX ADMIN — METHYLPREDNISOLONE SODIUM SUCCINATE 60 MG: 125 INJECTION, POWDER, FOR SOLUTION INTRAMUSCULAR; INTRAVENOUS at 23:49

## 2022-04-02 RX ADMIN — SENNOSIDES AND DOCUSATE SODIUM 2 TABLET: 50; 8.6 TABLET ORAL at 17:12

## 2022-04-02 NOTE — RESPIRATORY THERAPY NOTE
04/02/22 1400   Respiratory Assessment   Resp Comments changed to 6L midflow cannula from optiflow 40%, spo2 96% HR 82, nsg and physician aware   O2 Device midflow   Oxygen Therapy/Pulse Ox   O2 Device Mid flow nasal cannula   O2 Flow Rate (L/min) 6 L/min   SpO2 95 %   SpO2 Activity At Rest   $ Pulse Oximetry Spot Check Charge Completed

## 2022-04-02 NOTE — PLAN OF CARE
Problem: PAIN - ADULT  Goal: Verbalizes/displays adequate comfort level or baseline comfort level  Description: Interventions:  - Encourage patient to monitor pain and request assistance  - Assess pain using appropriate pain scale  - Administer analgesics based on type and severity of pain and evaluate response  - Implement non-pharmacological measures as appropriate and evaluate response  - Consider cultural and social influences on pain and pain management  - Notify physician/advanced practitioner if interventions unsuccessful or patient reports new pain  Outcome: Progressing     Problem: Knowledge Deficit  Goal: Patient/family/caregiver demonstrates understanding of disease process, treatment plan, medications, and discharge instructions  Description: Complete learning assessment and assess knowledge base  Interventions:  - Provide teaching at level of understanding  - Provide teaching via preferred learning methods  Outcome: Progressing     Problem: INFECTION - ADULT  Goal: Absence or prevention of progression during hospitalization  Description: INTERVENTIONS:  - Assess and monitor for signs and symptoms of infection  - Monitor lab/diagnostic results  - Monitor all insertion sites, i e  indwelling lines, tubes, and drains  - Monitor endotracheal if appropriate and nasal secretions for changes in amount and color  - [place] appropriate cooling/warming therapies per order  - Administer medications as ordered  - Instruct and encourage patient and family to use good hand hygiene technique  - Identify and instruct in appropriate isolation precautions for identified infection/condition  Outcome: Progressing     Problem: Nutrition/Hydration-ADULT  Goal: Nutrient/Hydration intake appropriate for improving, restoring or maintaining nutritional needs  Description: Monitor and assess patient's nutrition/hydration status for malnutrition   Collaborate with interdisciplinary team and initiate plan and interventions as ordered  Monitor patient's weight and dietary intake as ordered or per policy  Utilize nutrition screening tool and intervene as necessary  Determine patient's food preferences and provide high-protein, high-caloric foods as appropriate       INTERVENTIONS:  - Monitor oral intake, urinary output, labs, and treatment plans  - Assess nutrition and hydration status and recommend course of action  - Evaluate amount of meals eaten  - Assist patient with eating if necessary   - Allow adequate time for meals  - Recommend/ encourage appropriate diets, oral nutritional supplements, and vitamin/mineral supplements  - Order, calculate, and assess calorie counts as needed  - Recommend, monitor, and adjust tube feedings and TPN/PPN based on assessed needs  - Assess need for intravenous fluids  - Provide specific nutrition/hydration education as appropriate  - Include patient/family/caregiver in decisions related to nutrition  Outcome: Progressing

## 2022-04-02 NOTE — RESPIRATORY THERAPY NOTE
04/01/22 2003   Respiratory Assessment   Assessment Type Assess only   General Appearance Alert; Awake   Respiratory Pattern Tachypneic;Dyspnea with exertion;Dyspnea at rest   Chest Assessment Chest expansion symmetrical   Bilateral Breath Sounds Diminished   Cough Strong;Dry;Non-productive   Resp Comments patient remains on HFNC at this time, decreased FIO2 based on patient's current SPO2     Non-Invasive Information   O2 Interface Device HFNC prongs   Non-Invasive Ventilation Mode HFNC (High flow)   SpO2 98 %   $ Pulse Oximetry Spot Check Charge Completed   Non-Invasive Settings   FiO2 (%) 50   Flow (lpm) 50   Temperature (Set) 31   Non-Invasive Readings   Heater Temperature (Obs) 31   Maintenance   Water bag changed Yes

## 2022-04-02 NOTE — PROGRESS NOTES
[address] [facility]  Progress Note - Lucille Richter 1955, 78 y o  male MRN: [id number]  Unit/Bed#:  Encounter: [id number]  Primary Care Provider: Jens Fung MD   Date and time admitted to hospital: 3/30/2022  5:53 PM    Elevated brain natriuretic peptide (BNP) level  Assessment & Plan  · Patient reports no history of CHF  · Was scheduled to have TTE today by outpatient cardiologist  · BNP elevated at 1116, vascular congestion on CXR  · PRN diuresis  · 3/31 TTE: LVEF 60%, G1DD, mildly reduced RV systolic function    Elevated C-reactive protein (CRP)  Assessment & Plan  · In setting of prolonged covid illness  · Continue methylprednisolone 60mg q6hr  · Trend daily    Type 2 diabetes mellitus, without long-term current use of insulin [facility])  Assessment & Plan  Lab Results   Component Value Date    HGBA1C 7 7 (H) 12/06/2021       Recent Labs     04/01/22  1801 04/01/22 2008 04/01/22  2202 04/02/22  0007   POCGLU 300* 330* 259* 161*       Blood Sugar Average: Last 72 hrs:  (P) 261 [id number]   · BG uncontrolled on subQ insulin in setting of DM2 now worsened by steroid-induced hyperglycemia  · Started on insulin gtt  · Goal -180    Non MI elevated troponin  Assessment & Plan  · Hs troponin elevated at 105  · Peaked at 224 and downtrended  · Favor non-MI troponin elevation in setting of acute hypoxic respiratory failure    * Acute on chronic respiratory failure with hypoxia (HCC)  Assessment & Plan  · Suspect primarily long term sequelae of COVID-19 pneumonia  · Currently requiring HFNC 50%/50L  · Wean HFNC for goal SpO2>88%  · Continue IV steroids with methylprednisolone 60mg IV q6hr given elevated CRP-- follow daily CRP  · Continue PRN diuresis  · Continue outpatient breo-ellipta  · On cefepime and vanc  · F/u AM procal; bcx NG x48hr; f/u MRSA  · Encourage pulmonary hygiene  · Scheduled tessalon perles and mucinex for cough as coughing is what contributes most to desaturation episodes  · Pulmonology consulted, appreciate recommendations      ----------------------------------------------------------------------------------------  HPI/24hr events: tolerating 50%/50L, no acute events overnight    Patient appropriate for transfer out of the ICU today?: No  Disposition: Continue Stepdown Level 1 level of care   Code Status: Level 1 - Full Code  ---------------------------------------------------------------------------------------  SUBJECTIVE  "I feel like I take 1 step forward and 1 step back"     Review of Systems  Review of systems was reviewed and negative unless stated above in HPI/24-hour events   ---------------------------------------------------------------------------------------  OBJECTIVE    Vitals   Vitals:    22 1500 22 1605 22   BP: 115/55  109/52    BP Location: Right arm  Left arm    Pulse: 82  67    Resp: 20  19    Temp: 98 °F (36 7 °C)  (!) 97 1 °F (36 2 °C)    TempSrc: Oral  Axillary    SpO2: 98% 91% 98% 94%   Weight:       Height:         Temp (24hrs), Av 7 °F (36 5 °C), Min:97 1 °F (36 2 °C), Max:98 1 °F (36 7 °C)  Current: Temperature: (!) 97 1 °F (36 2 °C)          Respiratory:  SpO2: SpO2: 94 %       Invasive/non-invasive ventilation settings   Respiratory  Report   Lab Data (Last 4 hours)    None         O2/Vent Data (Last 4 hours)    None                Physical Exam  Constitutional:       General: He is awake  Appearance: He is overweight  Comments: High flow nasal cannula    HENT:      Head: Normocephalic and atraumatic  Eyes:      General: Lids are normal       Extraocular Movements: Extraocular movements intact  Conjunctiva/sclera: Conjunctivae normal    Neck:      Trachea: Trachea normal    Cardiovascular:      Rate and Rhythm: Normal rate  Pulses: Normal pulses  Pulmonary:      Breath sounds: Decreased breath sounds present  Abdominal:      General: There is distension        Palpations: Abdomen is soft  Musculoskeletal:      Cervical back: Normal range of motion  Right lower leg: Edema present  Left lower leg: Edema present  Skin:     General: Skin is warm and dry  Neurological:      General: No focal deficit present  Mental Status: He is alert  GCS: GCS eye subscore is 4  GCS verbal subscore is 5  GCS motor subscore is 6  Sensory: Sensation is intact  Motor: Motor function is intact  Coordination: Coordination is intact  Laboratory and Diagnostics:  Results from last 7 days   Lab Units 04/01/22  0515 03/31/22  0427 03/30/22  1149   WBC Thousand/uL 10 68* 7 56 8 34   HEMOGLOBIN g/dL 9 6* 10 8* 11 2*   HEMATOCRIT % 27 9* 31 4* 33 7*   PLATELETS Thousands/uL 185 160 168   NEUTROS PCT %  --  71 74   MONOS PCT %  --  2* 6     Results from last 7 days   Lab Units 04/01/22  0515 03/31/22  0427 03/30/22  1149   SODIUM mmol/L 134* 135* 137   POTASSIUM mmol/L 4 8 4 7 4 0   CHLORIDE mmol/L 101 100 103   CO2 mmol/L 24 24 23   ANION GAP mmol/L 9 11 11   BUN mg/dL 27* 20 19   CREATININE mg/dL 0 72 1 00 1 06   CALCIUM mg/dL 8 6 8 6 8 3   GLUCOSE RANDOM mg/dL 152* 302* 260*   ALT U/L  --  37 42   AST U/L  --  40 23   ALK PHOS U/L  --  61 65   ALBUMIN g/dL  --  2 6* 2 8*   TOTAL BILIRUBIN mg/dL  --  1 39* 0 92     Results from last 7 days   Lab Units 04/01/22  0515 03/31/22  0427 03/30/22  1149   MAGNESIUM mg/dL 2 5 2 9* 1 9   PHOSPHORUS mg/dL 4 5* 5 7*  --       Results from last 7 days   Lab Units 03/30/22  1149   INR  1 11   PTT seconds 39*          Results from last 7 days   Lab Units 03/30/22  1425 03/30/22  1149   LACTIC ACID mmol/L 1 6 2 8*     ABG:    VBG:    Results from last 7 days   Lab Units 04/01/22  0601 03/31/22  0427 03/30/22  1924   PROCALCITONIN ng/ml 0 44* 0 64* 0 36*       Micro  Results from last 7 days   Lab Units 03/30/22  1149 03/30/22  1139   BLOOD CULTURE  No Growth at 48 hrs  No Growth at 48 hrs         EKG: NSR   Imaging:   No orders to display       Intake and Output  I/O       03/31 0701 04/01 0700 04/01 0701  04/02 0700    P  O  1320     I V  (mL/kg)  162 6 (1 9)    IV Piggyback 350 250    Total Intake(mL/kg) 1670 (20) 412 6 (4 9)    Urine (mL/kg/hr) 2310 (1 2) 1050 (0 5)    Stool 0 0    Total Output 2310 1050    Net -640 -637 5          Unmeasured Urine Occurrence 1 x     Unmeasured Stool Occurrence 4 x 1 x        Height and Weights   Height: 5' 7" (170 2 cm)  IBW (Ideal Body Weight): 66 1 kg  Body mass index is 28 82 kg/m²  Weight (last 2 days)     Date/Time Weight    03/31/22 0920 83 5 (184)        Nutrition       Diet Orders   (From admission, onward)             Start     Ordered    03/31/22 1019  Diet [place]/CHO Controlled; Consistent Carbohydrate Diet Level 3 (6 carb servings/90 grams CHO/meal)  Diet effective now        References:    Nutrtion Support Algorithm Enteral vs  Parenteral   Question Answer Comment   Diet Type Tano/CHO Controlled    Tano/CHO Controlled Consistent Carbohydrate Diet Level 3 (6 carb servings/90 grams CHO/meal)    RD to adjust diet per protocol?  Yes        03/31/22 1019              Active Medications  Scheduled Meds:  Current Facility-Administered Medications   Medication Dose Route Frequency Provider Last Rate    ascorbic acid  500 mg Oral Daily NEGRTIA Murphy      atorvastatin  10 mg Oral Daily NEGRITA Murphy      benzonatate  200 mg Oral TID Nito Jeronimo PA-C      cefepime  2,000 mg Intravenous Q12H Joana Reza PA-C 2,000 mg (04/01/22 1700)    enoxaparin  40 mg Subcutaneous Daily NEGRITA Murphy      fluticasone-vilanterol  1 puff Inhalation Daily NEGRITA Murphy      guaiFENesin  600 mg Oral Q12H [address] George Gramajo PA-C      insulin regular (HumuLIN R,NovoLIN R) infusion  0 3-21 Units/hr Intravenous Titrated Joana Reza PA-C 8 Units/hr (04/02/22 0049)    methylPREDNISolone sodium succinate  60 mg Intravenous Q6H [address] NEGRITA Ferrera      pantoprazole  40 mg Oral Early Morning NEGRITA Kerr      senna-docusate sodium  2 tablet Oral BID Dellia Vincennes, GRAZYNANP      sodium chloride  1 spray Each Nare Q1H PRN Junior Moore PA-C      thiamine  100 mg Oral Daily Dellia VincennesNEGRITA seals      vancomycin  12 5 mg/kg Intravenous Q8H George Espinoza DO      zinc sulfate  220 mg Oral Daily Dellia Vincennes, CRNP       Continuous Infusions:  insulin regular (HumuLIN R,NovoLIN R) infusion, 0 3-21 Units/hr, Last Rate: 8 Units/hr (04/02/22 0049)      PRN Meds:   sodium chloride, 1 spray, Q1H PRN        Invasive Devices Review  Invasive Devices  Report    Peripheral Intravenous Line            Peripheral IV 03/30/22 Right Forearm 2 days    Peripheral IV 03/31/22 Dorsal (posterior); Right Hand 1 day                Rationale for remaining devices: NA   ---------------------------------------------------------------------------------------  Advance Directive and Living Will:      Power of :    POLST:    ---------------------------------------------------------------------------------------  Care Time Delivered:   Upon my evaluation, this patient had a high probability of imminent or life-threatening deterioration due to acute hypoxic respiratory failure, which required my direct attention, intervention, and personal management  I have personally provided 39 minutes ([phone]) of critical care time, exclusive of procedures, teaching, family meetings, and any prior time recorded by providers other than myself  NEGRITA Martini      Portions of the record may have been created with voice recognition software  Occasional wrong word or "sound a like" substitutions may have occurred due to the inherent limitations of voice recognition software    Read the chart carefully and recognize, using context, where substitutions have occurred

## 2022-04-02 NOTE — RESPIRATORY THERAPY NOTE
04/02/22 0342   Respiratory Assessment   Resp Comments decreased HFNC settins at this time     Non-Invasive Information   O2 Interface Device HFNC prongs   Non-Invasive Ventilation Mode HFNC (High flow)   $ Pulse Oximetry Spot Check Charge Completed   Non-Invasive Settings   FiO2 (%) 45   Flow (lpm) 45   Temperature (Set) 31   Non-Invasive Readings   Heater Temperature (Obs) 31 1   Skin Intervention Skin intact

## 2022-04-02 NOTE — RESPIRATORY THERAPY NOTE
RT Protocol Note  Iraida Corado 78 y o  male MRN: [id number]  Unit/Bed#:  Encounter: [id number]    Assessment    Principal Problem:    Acute on chronic respiratory failure with hypoxia (HCC)  Active Problems:    Non MI elevated troponin    Type 2 diabetes mellitus, without long-term current use of insulin (HCC)    Elevated C-reactive protein (CRP)    Elevated brain natriuretic peptide (BNP) level      Home Pulmonary Medications:  Breo  Home Devices/Therapy: Home O2    Past Medical History:   Diagnosis Date    COVID-19 2022    Diabetes mellitus ([address] )     Hypertension      Social History     Socioeconomic History    Marital status: /Civil Union     Spouse name: Not on file    Number of children: Not on file    Years of education: Not on file    Highest education level: Not on file   Occupational History    Not on file   Tobacco Use    Smoking status: Former Smoker     Packs/day: 1 00     Years: 30 00     Pack years: 30 00     Types: Cigarettes     Quit date: 2008     Years since quittin 1    Smokeless tobacco: Never Used   Vaping Use    Vaping Use: Never used   Substance and Sexual Activity    Alcohol use: Never    Drug use: Never    Sexual activity: Not Currently   Other Topics Concern    Not on file   Social History Narrative    Not on file     Social Determinants of Health     Financial Resource Strain: Not on file   Food Insecurity: No Food Insecurity    Worried About Running Out of Food in the Last Year: Never true    Regis of Food in the Last Year: Never true   Transportation Needs: No Transportation Needs    Lack of Transportation (Medical): No    Lack of Transportation (Non-Medical):  No   Physical Activity: Not on file   Stress: Not on file   Social Connections: Not on file   Intimate Partner Violence: Not on file   Housing Stability: Low Risk     Unable to Pay for Housing in the Last Year: No    Number of Places Lived in the Last Year: 1    Unstable Housing in the Last Year: No       Subjective    Subjective Data: sleeping    Objective    Physical Exam:   Assessment Type: Assess only  General Appearance: Alert,Awake  Respiratory Pattern: Tachypneic,Dyspnea with exertion,Dyspnea at rest  Chest Assessment: Chest expansion symmetrical  Bilateral Breath Sounds: Diminished  Cough: Strong,Dry,Non-productive    Vitals:  Blood pressure 109/52, pulse 67, temperature (!) 97 1 °F (36 2 °C), temperature source Axillary, resp  rate 19, height 5' 7" (1 702 m), weight 83 5 kg (184 lb), SpO2 94 %  Imaging and other studies: I have personally reviewed pertinent reports        O2 Device: HFNC     Plan    Respiratory Plan: Home Bronchodilator Patient pathway  Airway Clearance Plan: Incentive Spirometer     Resp Comments: patient with resp history currently on HFNC only uses breo at home and has not PRN txs, has not needed any prns

## 2022-04-02 NOTE — ASSESSMENT & PLAN NOTE
· Patient reports no history of CHF  · Was scheduled to have TTE today by outpatient cardiologist  · BNP elevated at 1116, vascular congestion on CXR  · PRN diuresis  · 3/31 TTE: LVEF 60%, G1DD, mildly reduced RV systolic function

## 2022-04-02 NOTE — RESPIRATORY THERAPY NOTE
04/02/22 0302   Respiratory Assessment   Assessment Type Assess only   General Appearance Sleeping   Respiratory Pattern Tachypneic;Dyspnea with exertion;Dyspnea at rest   Chest Assessment Chest expansion symmetrical   Bilateral Breath Sounds Diminished   Resp Comments pt remains on HFNC will continue to wean as tolerated   O2 Device Optiflow   Non-Invasive Information   O2 Interface Device HFNC prongs   Non-Invasive Ventilation Mode HFNC (High flow)   $ Pulse Oximetry Spot Check Charge Completed   Non-Invasive Settings   FiO2 (%) 50   Flow (lpm) 50   Temperature (Set) 31   Non-Invasive Readings   Heater Temperature (Obs) 31

## 2022-04-02 NOTE — ASSESSMENT & PLAN NOTE
· Suspect primarily long term sequelae of COVID-19 pneumonia  · Currently requiring HFNC 50%/50L  · Wean HFNC for goal SpO2>88%  · Continue IV steroids with methylprednisolone 60mg IV q6hr given elevated CRP-- follow daily CRP  · Continue PRN diuresis  · Continue outpatient breo-ellipta  · On cefepime and vanc  · F/u AM procal; bcx NG x48hr; f/u MRSA  · Encourage pulmonary hygiene  · Scheduled tessalon perles and mucinex for cough as coughing is what contributes most to desaturation episodes  · Pulmonology consulted, appreciate recommendations

## 2022-04-02 NOTE — ASSESSMENT & PLAN NOTE
Lab Results   Component Value Date    HGBA1C 7 7 (H) 12/06/2021       Recent Labs     04/01/22  1801 04/01/22 2008 04/01/22 2202 04/02/22  0007   POCGLU 300* 330* 259* 161*       Blood Sugar Average: Last 72 hrs:  (P) 261 [id number]   · BG uncontrolled on subQ insulin in setting of DM2 now worsened by steroid-induced hyperglycemia  · Started on insulin gtt  · Goal -180

## 2022-04-02 NOTE — RESPIRATORY THERAPY NOTE
04/02/22 0727   Respiratory Assessment   Assessment Type Assess only   General Appearance Alert; Awake   Respiratory Pattern Normal   Chest Assessment Chest expansion symmetrical   Bilateral Breath Sounds Diminished   O2 Device optiflow   Oxygen Therapy/Pulse Ox   O2 Device High flow nasal cannula   O2 Therapy Oxygen humidified   FiO2 (%) 45   O2 Flow Rate (L/min) 45 L/min   SpO2 93 %   SpO2 Activity At Rest   $ Pulse Oximetry Spot Check Charge Completed

## 2022-04-02 NOTE — PROGRESS NOTES
Vancomycin IV Pharmacy-to-Dose Consultation    Eliz Vyas is a 78 y o  male who is currently receiving Vancomycin IV with management by the Pharmacy Consult service  Assessment/Plan:  The patient was reviewed  Renal function is stable and no signs or symptoms of nephrotoxicity and/or infusion reactions were documented in the chart  Based on todays assessment, continue current vancomycin (day # 3 total) current dosing of 1000mg q8h, with a plan for trough to be drawn at 0130 on 4/3/22  We will continue to follow the patients culture results and clinical progress daily      Isabelle Hsieh, Pharmacist

## 2022-04-03 LAB
ANION GAP SERPL CALCULATED.3IONS-SCNC: 8 MMOL/L (ref 4–13)
BUN SERPL-MCNC: 29 MG/DL (ref 5–25)
CALCIUM SERPL-MCNC: 9 MG/DL (ref 8.3–10.1)
CHLORIDE SERPL-SCNC: 105 MMOL/L (ref 100–108)
CO2 SERPL-SCNC: 23 MMOL/L (ref 21–32)
CREAT SERPL-MCNC: 0.98 MG/DL (ref 0.6–1.3)
ERYTHROCYTE [DISTWIDTH] IN BLOOD BY AUTOMATED COUNT: 14.5 % (ref 11.6–15.1)
GFR SERPL CREATININE-BSD FRML MDRD: 80 ML/MIN/1.73SQ M
GLUCOSE SERPL-MCNC: 108 MG/DL (ref 65–140)
GLUCOSE SERPL-MCNC: 134 MG/DL (ref 65–140)
GLUCOSE SERPL-MCNC: 167 MG/DL (ref 65–140)
GLUCOSE SERPL-MCNC: 176 MG/DL (ref 65–140)
GLUCOSE SERPL-MCNC: 177 MG/DL (ref 65–140)
GLUCOSE SERPL-MCNC: 195 MG/DL (ref 65–140)
GLUCOSE SERPL-MCNC: 225 MG/DL (ref 65–140)
GLUCOSE SERPL-MCNC: 244 MG/DL (ref 65–140)
GLUCOSE SERPL-MCNC: 252 MG/DL (ref 65–140)
GLUCOSE SERPL-MCNC: 268 MG/DL (ref 65–140)
GLUCOSE SERPL-MCNC: 319 MG/DL (ref 65–140)
GLUCOSE SERPL-MCNC: 357 MG/DL (ref 65–140)
HCT VFR BLD AUTO: 29.6 % (ref 36.5–49.3)
HGB BLD-MCNC: 10.1 G/DL (ref 12–17)
MAGNESIUM SERPL-MCNC: 2 MG/DL (ref 1.6–2.6)
MCH RBC QN AUTO: 34.4 PG (ref 26.8–34.3)
MCHC RBC AUTO-ENTMCNC: 34.1 G/DL (ref 31.4–37.4)
MCV RBC AUTO: 101 FL (ref 82–98)
PLATELET # BLD AUTO: 228 THOUSANDS/UL (ref 149–390)
PMV BLD AUTO: 10.2 FL (ref 8.9–12.7)
POTASSIUM SERPL-SCNC: 4.3 MMOL/L (ref 3.5–5.3)
RBC # BLD AUTO: 2.94 MILLION/UL (ref 3.88–5.62)
SODIUM SERPL-SCNC: 136 MMOL/L (ref 136–145)
VANCOMYCIN TROUGH SERPL-MCNC: 14.6 UG/ML (ref 10–20)
WBC # BLD AUTO: 13.17 THOUSAND/UL (ref 4.31–10.16)

## 2022-04-03 PROCEDURE — 83735 ASSAY OF MAGNESIUM: CPT | Performed by: NURSE PRACTITIONER

## 2022-04-03 PROCEDURE — 82948 REAGENT STRIP/BLOOD GLUCOSE: CPT

## 2022-04-03 PROCEDURE — 80048 BASIC METABOLIC PNL TOTAL CA: CPT | Performed by: NURSE PRACTITIONER

## 2022-04-03 PROCEDURE — 85027 COMPLETE CBC AUTOMATED: CPT | Performed by: NURSE PRACTITIONER

## 2022-04-03 PROCEDURE — 80202 ASSAY OF VANCOMYCIN: CPT | Performed by: ANESTHESIOLOGY

## 2022-04-03 PROCEDURE — 99233 SBSQ HOSP IP/OBS HIGH 50: CPT | Performed by: ANESTHESIOLOGY

## 2022-04-03 PROCEDURE — 94664 DEMO&/EVAL PT USE INHALER: CPT

## 2022-04-03 PROCEDURE — 94760 N-INVAS EAR/PLS OXIMETRY 1: CPT

## 2022-04-03 RX ORDER — INSULIN GLARGINE 100 [IU]/ML
50 INJECTION, SOLUTION SUBCUTANEOUS EVERY 12 HOURS SCHEDULED
Status: DISCONTINUED | OUTPATIENT
Start: 2022-04-03 | End: 2022-04-04

## 2022-04-03 RX ORDER — INSULIN GLARGINE 100 [IU]/ML
30 INJECTION, SOLUTION SUBCUTANEOUS EVERY MORNING
Status: DISCONTINUED | OUTPATIENT
Start: 2022-04-03 | End: 2022-04-04

## 2022-04-03 RX ORDER — FUROSEMIDE 40 MG/1
40 TABLET ORAL DAILY
Status: DISCONTINUED | OUTPATIENT
Start: 2022-04-03 | End: 2022-04-06 | Stop reason: HOSPADM

## 2022-04-03 RX ADMIN — THIAMINE HCL TAB 100 MG 100 MG: 100 TAB at 08:12

## 2022-04-03 RX ADMIN — BENZONATATE 200 MG: 100 CAPSULE ORAL at 08:12

## 2022-04-03 RX ADMIN — SALINE NASAL SPRAY 1 SPRAY: 1.5 SOLUTION NASAL at 16:17

## 2022-04-03 RX ADMIN — ENOXAPARIN SODIUM 40 MG: 40 INJECTION SUBCUTANEOUS at 08:12

## 2022-04-03 RX ADMIN — CEFEPIME HYDROCHLORIDE 2000 MG: 2 INJECTION, POWDER, FOR SOLUTION INTRAVENOUS at 18:00

## 2022-04-03 RX ADMIN — PANTOPRAZOLE SODIUM 40 MG: 40 TABLET, DELAYED RELEASE ORAL at 05:03

## 2022-04-03 RX ADMIN — OXYCODONE HYDROCHLORIDE AND ACETAMINOPHEN 500 MG: 500 TABLET ORAL at 08:12

## 2022-04-03 RX ADMIN — METHYLPREDNISOLONE SODIUM SUCCINATE 60 MG: 125 INJECTION, POWDER, FOR SOLUTION INTRAMUSCULAR; INTRAVENOUS at 21:00

## 2022-04-03 RX ADMIN — INSULIN GLARGINE 30 UNITS: 100 INJECTION, SOLUTION SUBCUTANEOUS at 10:05

## 2022-04-03 RX ADMIN — VANCOMYCIN HYDROCHLORIDE 1250 MG: 5 INJECTION, POWDER, LYOPHILIZED, FOR SOLUTION INTRAVENOUS at 02:29

## 2022-04-03 RX ADMIN — INSULIN LISPRO 5 UNITS: 100 INJECTION, SOLUTION INTRAVENOUS; SUBCUTANEOUS at 16:16

## 2022-04-03 RX ADMIN — VANCOMYCIN HYDROCHLORIDE 1250 MG: 5 INJECTION, POWDER, LYOPHILIZED, FOR SOLUTION INTRAVENOUS at 10:08

## 2022-04-03 RX ADMIN — SODIUM CHLORIDE 12 UNITS/HR: 9 INJECTION, SOLUTION INTRAVENOUS at 18:02

## 2022-04-03 RX ADMIN — SENNOSIDES AND DOCUSATE SODIUM 2 TABLET: 50; 8.6 TABLET ORAL at 08:12

## 2022-04-03 RX ADMIN — FUROSEMIDE 40 MG: 40 TABLET ORAL at 10:05

## 2022-04-03 RX ADMIN — FLUTICASONE FUROATE AND VILANTEROL TRIFENATATE 1 PUFF: 100; 25 POWDER RESPIRATORY (INHALATION) at 08:12

## 2022-04-03 RX ADMIN — METHYLPREDNISOLONE SODIUM SUCCINATE 60 MG: 125 INJECTION, POWDER, FOR SOLUTION INTRAMUSCULAR; INTRAVENOUS at 14:06

## 2022-04-03 RX ADMIN — INSULIN LISPRO 5 UNITS: 100 INJECTION, SOLUTION INTRAVENOUS; SUBCUTANEOUS at 12:33

## 2022-04-03 RX ADMIN — SODIUM CHLORIDE 15 UNITS/HR: 9 INJECTION, SOLUTION INTRAVENOUS at 10:06

## 2022-04-03 RX ADMIN — GUAIFENESIN 600 MG: 600 TABLET ORAL at 08:12

## 2022-04-03 RX ADMIN — INSULIN GLARGINE 20 UNITS: 100 INJECTION, SOLUTION SUBCUTANEOUS at 08:12

## 2022-04-03 RX ADMIN — SALINE NASAL SPRAY 1 SPRAY: 1.5 SOLUTION NASAL at 05:12

## 2022-04-03 RX ADMIN — ZINC SULFATE 220 MG (50 MG) CAPSULE 220 MG: CAPSULE at 08:12

## 2022-04-03 RX ADMIN — METHYLPREDNISOLONE SODIUM SUCCINATE 60 MG: 125 INJECTION, POWDER, FOR SOLUTION INTRAMUSCULAR; INTRAVENOUS at 05:04

## 2022-04-03 RX ADMIN — BENZONATATE 200 MG: 100 CAPSULE ORAL at 16:16

## 2022-04-03 RX ADMIN — INSULIN LISPRO 5 UNITS: 100 INJECTION, SOLUTION INTRAVENOUS; SUBCUTANEOUS at 08:12

## 2022-04-03 RX ADMIN — CEFEPIME HYDROCHLORIDE 2000 MG: 2 INJECTION, POWDER, FOR SOLUTION INTRAVENOUS at 04:36

## 2022-04-03 RX ADMIN — GUAIFENESIN 600 MG: 600 TABLET ORAL at 20:59

## 2022-04-03 RX ADMIN — ATORVASTATIN CALCIUM 10 MG: 10 TABLET, FILM COATED ORAL at 08:12

## 2022-04-03 RX ADMIN — INSULIN GLARGINE 50 UNITS: 100 INJECTION, SOLUTION SUBCUTANEOUS at 21:02

## 2022-04-03 RX ADMIN — BENZONATATE 200 MG: 100 CAPSULE ORAL at 20:59

## 2022-04-03 NOTE — ASSESSMENT & PLAN NOTE
· Suspect primarily long term sequelae of COVID-19 pneumonia  · Previously required high flow nasal cannula, now on traditional nasal cannula, cont to titrate down  · Pulmonary following  · On IV steroids taper per pulm and CRP  · Follow daily CRP  · Continue Furosemide 40mg daily  · Continue outpatient breo-ellipta  · On cefepime, day 5/7   Vanco d/cd given neg mrsa  · Encourage pulmonary hygiene  · Scheduled Tessalon Perles TID and mucinex for cough as coughing is what contributes most to desaturation episodes

## 2022-04-03 NOTE — PROGRESS NOTES
[address] [facility]  Progress Note - Gennaro Has 1955, 78 y o  male MRN: [id number]  Unit/Bed#:  Encounter: [id number]  Primary Care Provider: Jessica Oh MD   Date and time admitted to hospital: 3/30/2022  5:53 PM    Elevated brain natriuretic peptide (BNP) level  Assessment & Plan  · Patient reports no history of CHF  · Was scheduled to have TTE today by outpatient cardiologist  · BNP elevated at 1116, vascular congestion on CXR  · PRN diuresis  · 3/31 TTE: LVEF 60%, G1DD, mildly reduced RV systolic function    Elevated C-reactive protein (CRP)  Assessment & Plan  · In setting of prolonged covid illness  · Continue methylprednisolone taper   · Trend daily    Type 2 diabetes mellitus, without long-term current use of insulin [facility])  Assessment & Plan  Lab Results   Component Value Date    HGBA1C 7 7 (H) 12/06/2021       Recent Labs     04/02/22  1808 04/02/22 2000 04/02/22 2202 04/02/22  2350   POCGLU 257* 299* 342* 247*       Blood Sugar Average: Last 72 hrs:  (P) 267 [id number]   · BG uncontrolled on subQ insulin in setting of DM2 now worsened by steroid-induced hyperglycemia  · Started on insulin gtt  · Goal -180    Non MI elevated troponin  Assessment & Plan  · Hs troponin elevated at 105  · Peaked at 224 and downtrended  · Favor non-MI troponin elevation in setting of acute hypoxic respiratory failure    * Acute on chronic respiratory failure with hypoxia (HCC)  Assessment & Plan  · Suspect primarily long term sequelae of COVID-19 pneumonia  · Previously required high flow nasal cannula, now on traditional nasal cannula 6 L N/C   · Wean HFNC for goal SpO2>88%  · Continue IV steroids with methylprednisolone taper given elevated CRP-- follow daily CRP  · Continue PRN diuresis  · Continue outpatient breo-ellipta  · On cefepime and vanc  · F/u AM procal; bcx NG x72hr; f/u MRSA  · Encourage pulmonary hygiene  · Scheduled tessalon perles and mucinex for cough as coughing is what contributes most to desaturation episodes  · Pulmonology consulted, appreciate recommendations      ----------------------------------------------------------------------------------------  HPI/24hr events: transitioned to traditional nasal cannula today  Continuing to have elevated insulin requirements     Patient appropriate for transfer out of the ICU today?: Patient does not meet criteria for referral to the ICU Follow-Up Clinic; referral has not been made  Disposition: Transfer to Med Surg with Telemetry   Code Status: Level 1 - Full Code  ---------------------------------------------------------------------------------------    Review of Systems  Review of systems was reviewed and negative unless stated above in HPI/24-hour events   ---------------------------------------------------------------------------------------  OBJECTIVE    Vitals   Vitals:    22 1535 22 1906 22 2300 22   BP: 116/57 140/65 129/61    BP Location: Right arm Right arm Right arm    Pulse: 74 78 59    Resp:     Temp: (!) 97 4 °F (36 3 °C) 97 9 °F (36 6 °C) 97 5 °F (36 4 °C)    TempSrc: Oral Oral Oral    SpO2: 95% 93% 93% 93%   Weight:   83 5 kg (184 lb 1 4 oz)    Height:         Temp (24hrs), Av 6 °F (36 4 °C), Min:97 °F (36 1 °C), Max:98 °F (36 7 °C)  Current: Temperature: 97 5 °F (36 4 °C)          Respiratory:  SpO2: SpO2: 93 %       Invasive/non-invasive ventilation settings   Respiratory  Report   Lab Data (Last 4 hours)    None         O2/Vent Data (Last 4 hours)    None                Physical Exam  Constitutional:       General: He is awake  Appearance: He is overweight  Interventions: Nasal cannula in place  HENT:      Head: Normocephalic and atraumatic  Eyes:      General: Lids are normal       Extraocular Movements: Extraocular movements intact        Conjunctiva/sclera: Conjunctivae normal    Neck:      Trachea: Trachea normal    Cardiovascular:      Rate and Rhythm: Normal rate and regular rhythm  Pulses: Normal pulses  Pulmonary:      Breath sounds: Decreased breath sounds present  Abdominal:      Palpations: Abdomen is soft  Musculoskeletal:      Cervical back: Normal range of motion  Right lower leg: Edema present  Left lower leg: Edema present  Skin:     General: Skin is warm and dry  Neurological:      General: No focal deficit present  Mental Status: He is alert  GCS: GCS eye subscore is 4  GCS verbal subscore is 5  GCS motor subscore is 6  Sensory: Sensation is intact  Motor: Motor function is intact  Coordination: Coordination is intact         Laboratory and Diagnostics:  Results from last 7 days   Lab Units 04/02/22 0439 04/01/22 0515 03/31/22 0427 03/30/22  1149   WBC Thousand/uL 10 97* 10 68* 7 56 8 34   HEMOGLOBIN g/dL 9 4* 9 6* 10 8* 11 2*   HEMATOCRIT % 27 7* 27 9* 31 4* 33 7*   PLATELETS Thousands/uL 211 185 160 168   NEUTROS PCT %  --   --  71 74   MONOS PCT %  --   --  2* 6     Results from last 7 days   Lab Units 04/02/22 0439 04/01/22 0515 03/31/22 0427 03/30/22  1149   SODIUM mmol/L 135* 134* 135* 137   POTASSIUM mmol/L 4 3 4 8 4 7 4 0   CHLORIDE mmol/L 103 101 100 103   CO2 mmol/L 24 24 24 23   ANION GAP mmol/L 8 9 11 11   BUN mg/dL 32* 27* 20 19   CREATININE mg/dL 0 85 0 72 1 00 1 06   CALCIUM mg/dL 8 8 8 6 8 6 8 3   GLUCOSE RANDOM mg/dL 179* 152* 302* 260*   ALT U/L  --   --  37 42   AST U/L  --   --  40 23   ALK PHOS U/L  --   --  61 65   ALBUMIN g/dL  --   --  2 6* 2 8*   TOTAL BILIRUBIN mg/dL  --   --  1 39* 0 92     Results from last 7 days   Lab Units 04/01/22 0515 03/31/22 0427 03/30/22  1149   MAGNESIUM mg/dL 2 5 2 9* 1 9   PHOSPHORUS mg/dL 4 5* 5 7*  --       Results from last 7 days   Lab Units 03/30/22  1149   INR  1 11   PTT seconds 39*          Results from last 7 days   Lab Units 03/30/22  1425 03/30/22  1149   LACTIC ACID mmol/L 1 6 2 8*     ABG:    VBG:    Results from last 7 days   Lab Units 04/02/22  0439 04/01/22  0601 03/31/22  0427 03/30/22  1924   PROCALCITONIN ng/ml 0 30* 0 44* 0 64* 0 36*       Micro  Results from last 7 days   Lab Units 03/31/22  0955 03/30/22  1149 03/30/22  1139   BLOOD CULTURE   --  No Growth at 72 hrs  No Growth at 72 hrs  MRSA CULTURE ONLY  No Methicillin Resistant Staphlyococcus aureus (MRSA) isolated  --   --        EKG: NSR   Imaging:   No new imaging     Intake and Output  I/O       04/01 0701 04/02 0700 04/02 0701 04/03 0700    P  O   660    I V  (mL/kg) 162 6 (2) 186 4 (2 2)    IV Piggyback 250 450    Total Intake(mL/kg) 412 6 (5) 1296 4 (15 5)    Urine (mL/kg/hr) 1900 (1) 1275 (0 6)    Stool 0     Total Output 1900 1275    Net -1487 5 +21 4          Unmeasured Stool Occurrence 1 x         Height and Weights   Height: 5' 7" (170 2 cm)  IBW (Ideal Body Weight): 66 1 kg  Body mass index is 28 83 kg/m²  Weight (last 2 days)     Date/Time Weight    04/02/22 2300 83 5 (184 08)    04/02/22 0600 82 8 (182 54)        Nutrition       Diet Orders   (From admission, onward)             Start     Ordered    03/31/22 1019  Diet [place]/CHO Controlled; Consistent Carbohydrate Diet Level 3 (6 carb servings/90 grams CHO/meal)  Diet effective now        References:    Nutrtion Support Algorithm Enteral vs  Parenteral   Question Answer Comment   Diet Type [place]/CHO Controlled    [place]/CHO Controlled Consistent Carbohydrate Diet Level 3 (6 carb servings/90 grams CHO/meal)    RD to adjust diet per protocol?  Yes        03/31/22 1019              Active Medications  Scheduled Meds:  Current Facility-Administered Medications   Medication Dose Route Frequency Provider Last Rate    ascorbic acid  500 mg Oral Daily GRAZYNA KenneyNP      atorvastatin  10 mg Oral Daily Cary Taylor, CRNP      benzonatate  200 mg Oral TID Denisse Trujillo PA-C      cefepime  2,000 mg Intravenous Q12H Sarabjit Reza PA-C 2,000 mg (04/02/22 1711)    enoxaparin  40 mg Subcutaneous Daily Anne Marie Arellano NEGRITA Tomas      fluticasone-vilanterol  1 puff Inhalation Daily NEGRITA Kaur      guaiFENesin  600 mg Oral Q12H [facility] George GANDARA [place], [place]      insulin glargine  20 Units Subcutaneous Q12H [facility] George Espinoza DO      insulin lispro  5 Units Subcutaneous TID With Meals NEGRITA [place]      insulin regular (HumuLIN R,NovoLIN R) infusion  0 3-21 Units/hr Intravenous Titrated Yohan Garcias PA-C 15 Units/hr (04/02/22 2351)    methylPREDNISolone sodium succinate  60 mg Intravenous Q8H [facility] NEGRITA Miguel      Followed by   Esteban Chang ON 4/6/2022] methylPREDNISolone sodium succinate  60 mg Intravenous Q12H [facility] NEGRITA Miguel      pantoprazole  40 mg Oral Early Morning NEGRITA Kaur      senna-docusate sodium  2 tablet Oral BID NEGRITA Kaur      sodium chloride  1 spray Each Nare Q1H PRN Yohan Garcias PA-C      thiamine  100 mg Oral Daily NEGRITA Kaur      vancomycin  12 5 mg/kg Intravenous Q8H George DO Olga 1,000 mg (04/02/22 1711)    zinc sulfate  220 mg Oral Daily NEGRITA Kaur       Continuous Infusions:  insulin regular (HumuLIN R,NovoLIN R) infusion, 0 3-21 Units/hr, Last Rate: 15 Units/hr (04/02/22 2351)      PRN Meds:   sodium chloride, 1 spray, Q1H PRN        Invasive Devices Review  Invasive Devices  Report    Peripheral Intravenous Line            Peripheral IV 03/31/22 Dorsal (posterior); Right Hand 2 days                Rationale for remaining devices: NA   ---------------------------------------------------------------------------------------  Advance Directive and Living Will:      Power of :    POLST:    ---------------------------------------------------------------------------------------  Care Time Delivered:   No Critical Care time spent       NEGRITA Lomeli      Portions of the record may have been created with voice recognition software    Occasional wrong word or "sound a like" substitutions may have occurred due to the inherent limitations of voice recognition software    Read the chart carefully and recognize, using context, where substitutions have occurred

## 2022-04-03 NOTE — ASSESSMENT & PLAN NOTE
Lab Results   Component Value Date    HGBA1C 7 7 (H) 12/06/2021       Recent Labs     04/03/22  0358 04/03/22  0546 04/03/22  0801 04/03/22  1002   POCGLU 177* 176* 134 357*       Blood Sugar Average: Last 72 hrs:  (P) 273 [id number]     · Steroid-induced hyperglycemia thus was on insulin gtt now off  · Cont insulin regimen and titrate up for optimal control  · Close monitor given on IV steroids

## 2022-04-03 NOTE — ASSESSMENT & PLAN NOTE
· Suspect primarily long term sequelae of COVID-19 pneumonia  · Previously required high flow nasal cannula, now on traditional nasal cannula 6 L N/C   · Wean HFNC for goal SpO2>88%  · Continue IV steroids with methylprednisolone taper given elevated CRP-- follow daily CRP  · Continue PRN diuresis  · Continue outpatient breo-ellipta  · On cefepime and vanc  · F/u AM procal; bcx NG x72hr; f/u MRSA  · Encourage pulmonary hygiene  · Scheduled tessalon perles and mucinex for cough as coughing is what contributes most to desaturation episodes  · Pulmonology consulted, appreciate recommendations

## 2022-04-03 NOTE — PLAN OF CARE
Problem: Potential for Falls  Goal: Patient will remain free of falls  Description: INTERVENTIONS:  - Educate patient/family on patient safety including physical limitations  - Instruct patient to call for assistance with activity   - Consult OT/PT to assist with strengthening/mobility   - Keep Call bell within reach  - Keep bed low and locked with side rails adjusted as appropriate  - Keep care items and personal belongings within reach  - Initiate and maintain comfort rounds  - Make Fall Risk Sign visible to staff  - Offer Toileting every 2 Hours, in advance of need  - Initiate/Maintain bed alarm  - Apply yellow socks and bracelet for high fall risk patients  - Consider moving patient to room near nurses station  Outcome: Progressing     Problem: MOBILITY - ADULT  Goal: Maintain or return to baseline ADL function  Description: INTERVENTIONS:  -  Assess patient's ability to carry out ADLs; assess patient's baseline for ADL function and identify physical deficits which impact ability to perform ADLs (bathing, care of mouth/teeth, toileting, grooming, dressing, etc )  - Assess/evaluate cause of self-care deficits   - Assess range of motion  - Assess patient's mobility; develop plan if impaired  - Assess patient's need for assistive devices and provide as appropriate  - Encourage maximum independence but intervene and supervise when necessary  - Involve family in performance of ADLs  - Assess for home care needs following discharge   - Consider OT consult to assist with ADL evaluation and planning for discharge  - Provide patient education as appropriate  Outcome: Progressing  Goal: Maintains/Returns to pre admission functional level  Description: INTERVENTIONS:  - Perform BMAT or MOVE assessment daily    - Set and communicate daily mobility goal to care team and patient/family/caregiver  - Collaborate with rehabilitation services on mobility goals if consulted  - Perform Range of Motion 3 times a day    - Reposition patient every 2 hours    - Dangle patient 2 times a day  - Stand patient 3 times a day  - Ambulate patient 2 times a day  - Out of bed to chair 3 times a day   - Out of bed for meals 3 times a day  - Out of bed for toileting  - Record patient progress and toleration of activity level   Outcome: Progressing     Problem: PAIN - ADULT  Goal: Verbalizes/displays adequate comfort level or baseline comfort level  Description: Interventions:  - Encourage patient to monitor pain and request assistance  - Assess pain using appropriate pain scale  - Administer analgesics based on type and severity of pain and evaluate response  - Implement non-pharmacological measures as appropriate and evaluate response  - Consider cultural and social influences on pain and pain management  - Notify physician/advanced practitioner if interventions unsuccessful or patient reports new pain  Outcome: Progressing     Problem: INFECTION - ADULT  Goal: Absence or prevention of progression during hospitalization  Description: INTERVENTIONS:  - Assess and monitor for signs and symptoms of infection  - Monitor lab/diagnostic results  - Monitor all insertion sites, i e  indwelling lines, tubes, and drains  - Monitor endotracheal if appropriate and nasal secretions for changes in amount and color  - [place] appropriate cooling/warming therapies per order  - Administer medications as ordered  - Instruct and encourage patient and family to use good hand hygiene technique  - Identify and instruct in appropriate isolation precautions for identified infection/condition  Outcome: Progressing     Problem: SAFETY ADULT  Goal: Patient will remain free of falls  Description: INTERVENTIONS:  - Educate patient/family on patient safety including physical limitations  - Instruct patient to call for assistance with activity   - Consult OT/PT to assist with strengthening/mobility   - Keep Call bell within reach  - Keep bed low and locked with side rails adjusted as appropriate  - Keep care items and personal belongings within reach  - Initiate and maintain comfort rounds  - Make Fall Risk Sign visible to staff  - Offer Toileting every 2 Hours, in advance of need  - Initiate/Maintain bed alarm  - Apply yellow socks and bracelet for high fall risk patients  - Consider moving patient to room near nurses station  Outcome: Progressing  Goal: Maintain or return to baseline ADL function  Description: INTERVENTIONS:  -  Assess patient's ability to carry out ADLs; assess patient's baseline for ADL function and identify physical deficits which impact ability to perform ADLs (bathing, care of mouth/teeth, toileting, grooming, dressing, etc )  - Assess/evaluate cause of self-care deficits   - Assess range of motion  - Assess patient's mobility; develop plan if impaired  - Assess patient's need for assistive devices and provide as appropriate  - Encourage maximum independence but intervene and supervise when necessary  - Involve family in performance of ADLs  - Assess for home care needs following discharge   - Consider OT consult to assist with ADL evaluation and planning for discharge  - Provide patient education as appropriate  Outcome: Progressing  Goal: Maintains/Returns to pre admission functional level  Description: INTERVENTIONS:  - Perform BMAT or MOVE assessment daily    - Set and communicate daily mobility goal to care team and patient/family/caregiver  - Collaborate with rehabilitation services on mobility goals if consulted  - Perform Range of Motion 3 times a day  - Reposition patient every 2 hours    - Dangle patient 2 times a day  - Stand patient 3 times a day  - Ambulate patient 3 times a day  - Out of bed to chair 3 times a day   - Out of bed for meals 3 times a day  - Out of bed for toileting  - Record patient progress and toleration of activity level   Outcome: Progressing     Problem: DISCHARGE PLANNING  Goal: Discharge to home or other facility with appropriate resources  Description: INTERVENTIONS:  - Identify barriers to discharge w/patient and caregiver  - Arrange for needed discharge resources and transportation as appropriate  - Identify discharge learning needs (meds, wound care, etc )  - Arrange for interpretive services to assist at discharge as needed  - Refer to Case Management Department for coordinating discharge planning if the patient needs post-hospital services based on physician/advanced practitioner order or complex needs related to functional status, cognitive ability, or social support system  Outcome: Progressing     Problem: Knowledge Deficit  Goal: Patient/family/caregiver demonstrates understanding of disease process, treatment plan, medications, and discharge instructions  Description: Complete learning assessment and assess knowledge base  Interventions:  - Provide teaching at level of understanding  - Provide teaching via preferred learning methods  Outcome: Progressing     Problem: Nutrition/Hydration-ADULT  Goal: Nutrient/Hydration intake appropriate for improving, restoring or maintaining nutritional needs  Description: Monitor and assess patient's nutrition/hydration status for malnutrition  Collaborate with interdisciplinary team and initiate plan and interventions as ordered  Monitor patient's weight and dietary intake as ordered or per policy  Utilize nutrition screening tool and intervene as necessary  Determine patient's food preferences and provide high-protein, high-caloric foods as appropriate       INTERVENTIONS:  - Monitor oral intake, urinary output, labs, and treatment plans  - Assess nutrition and hydration status and recommend course of action  - Evaluate amount of meals eaten  - Assist patient with eating if necessary   - Allow adequate time for meals  - Recommend/ encourage appropriate diets, oral nutritional supplements, and vitamin/mineral supplements  - Order, calculate, and assess calorie counts as needed  - Recommend, monitor, and adjust tube feedings and TPN/PPN based on assessed needs  - Assess need for intravenous fluids  - Provide specific nutrition/hydration education as appropriate  - Include patient/family/caregiver in decisions related to nutrition  Outcome: Progressing

## 2022-04-03 NOTE — ASSESSMENT & PLAN NOTE
· Acute diastolic heart failure    Patient reports no prior history of CHF  · BNP elevated at 1116, vascular congestion on CXR  · On Lasix 40 mg daily  · 3/31 TTE: LVEF 01%, Grade 1 diastolic dysfunction, mildly reduced RV systolic function

## 2022-04-03 NOTE — PLAN OF CARE
Problem: Potential for Falls  Goal: Patient will remain free of falls  Description: INTERVENTIONS:  - Educate patient/family on patient safety including physical limitations  - Instruct patient to call for assistance with activity   - Consult OT/PT to assist with strengthening/mobility   - Keep Call bell within reach  - Keep bed low and locked with side rails adjusted as appropriate  - Keep care items and personal belongings within reach  - Initiate and maintain comfort rounds  - Make Fall Risk Sign visible to staff  - Offer Toileting every 2 Hours, in advance of need  - Initiate/Maintain bed alarm  - Obtain necessary fall risk management equipment  - Apply yellow socks and bracelet for high fall risk patients  - Consider moving patient to room near nurses station  Outcome: Progressing     Problem: MOBILITY - ADULT  Goal: Maintain or return to baseline ADL function  Description: INTERVENTIONS:  - Educate patient/family on patient safety including physical limitations  - Instruct patient to call for assistance with activity   - Consult OT/PT to assist with strengthening/mobility   - Keep Call bell within reach  - Keep bed low and locked with side rails adjusted as appropriate  - Keep care items and personal belongings within reach  - Initiate and maintain comfort rounds  - Make Fall Risk Sign visible to staff  - Offer Toileting every 2 Hours, in advance of need  - Initiate/Maintain bed alarm  - Obtain necessary fall risk management equipment  - Apply yellow socks and bracelet for high fall risk patients  - Consider moving patient to room near nurses station  Outcome: Progressing  Goal: Maintains/Returns to pre admission functional level  Description: INTERVENTIONS:  -  Assess patient's ability to carry out ADLs; assess patient's baseline for ADL function and identify physical deficits which impact ability to perform ADLs (bathing, care of mouth/teeth, toileting, grooming, dressing, etc )  - Assess/evaluate cause of self-care deficits   - Assess range of motion  - Assess patient's mobility; develop plan if impaired  - Assess patient's need for assistive devices and provide as appropriate  - Encourage maximum independence but intervene and supervise when necessary  - Involve family in performance of ADLs  - Assess for home care needs following discharge   - Consider OT consult to assist with ADL evaluation and planning for discharge  - Provide patient education as appropriate  Outcome: Progressing     Problem: PAIN - ADULT  Goal: Verbalizes/displays adequate comfort level or baseline comfort level  Description: Interventions:  - Encourage patient to monitor pain and request assistance  - Assess pain using appropriate pain scale  - Administer analgesics based on type and severity of pain and evaluate response  - Implement non-pharmacological measures as appropriate and evaluate response  - Consider cultural and social influences on pain and pain management  - Notify physician/advanced practitioner if interventions unsuccessful or patient reports new pain  Outcome: Progressing     Problem: INFECTION - ADULT  Goal: Absence or prevention of progression during hospitalization  Description: INTERVENTIONS:  - Assess and monitor for signs and symptoms of infection  - Monitor lab/diagnostic results  - Monitor all insertion sites, i e  indwelling lines, tubes, and drains  - Monitor endotracheal if appropriate and nasal secretions for changes in amount and color  - [place] appropriate cooling/warming therapies per order  - Administer medications as ordered  - Instruct and encourage patient and family to use good hand hygiene technique  - Identify and instruct in appropriate isolation precautions for identified infection/condition  Outcome: Progressing     Problem: SAFETY ADULT  Goal: Patient will remain free of falls  Description: INTERVENTIONS:  - Educate patient/family on patient safety including physical limitations  - Instruct patient to call for assistance with activity   - Consult OT/PT to assist with strengthening/mobility   - Keep Call bell within reach  - Keep bed low and locked with side rails adjusted as appropriate  - Keep care items and personal belongings within reach  - Initiate and maintain comfort rounds  - Make Fall Risk Sign visible to staff  - Offer Toileting every 2 Hours, in advance of need  - Initiate/Maintain bed alarm  - Obtain necessary fall risk management equipment  - Apply yellow socks and bracelet for high fall risk patients  - Consider moving patient to room near nurses station  Outcome: Progressing  Goal: Maintain or return to baseline ADL function  Description: INTERVENTIONS:  - Educate patient/family on patient safety including physical limitations  - Instruct patient to call for assistance with activity   - Consult OT/PT to assist with strengthening/mobility   - Keep Call bell within reach  - Keep bed low and locked with side rails adjusted as appropriate  - Keep care items and personal belongings within reach  - Initiate and maintain comfort rounds  - Make Fall Risk Sign visible to staff  - Offer Toileting every 2 Hours, in advance of need  - Initiate/Maintain bed alarm  - Obtain necessary fall risk management equipment  - Apply yellow socks and bracelet for high fall risk patients  - Consider moving patient to room near nurses station  Outcome: Progressing  Goal: Maintains/Returns to pre admission functional level  Description: INTERVENTIONS:  -  Assess patient's ability to carry out ADLs; assess patient's baseline for ADL function and identify physical deficits which impact ability to perform ADLs (bathing, care of mouth/teeth, toileting, grooming, dressing, etc )  - Assess/evaluate cause of self-care deficits   - Assess range of motion  - Assess patient's mobility; develop plan if impaired  - Assess patient's need for assistive devices and provide as appropriate  - Encourage maximum independence but intervene and supervise when necessary  - Involve family in performance of ADLs  - Assess for home care needs following discharge   - Consider OT consult to assist with ADL evaluation and planning for discharge  - Provide patient education as appropriate  Outcome: Progressing     Problem: DISCHARGE PLANNING  Goal: Discharge to home or other facility with appropriate resources  Description: INTERVENTIONS:  - Identify barriers to discharge w/patient and caregiver  - Arrange for needed discharge resources and transportation as appropriate  - Identify discharge learning needs (meds, wound care, etc )  - Arrange for interpretive services to assist at discharge as needed  - Refer to Case Management Department for coordinating discharge planning if the patient needs post-hospital services based on physician/advanced practitioner order or complex needs related to functional status, cognitive ability, or social support system  Outcome: Progressing     Problem: Knowledge Deficit  Goal: Patient/family/caregiver demonstrates understanding of disease process, treatment plan, medications, and discharge instructions  Description: Complete learning assessment and assess knowledge base  Interventions:  - Provide teaching at level of understanding  - Provide teaching via preferred learning methods  Outcome: Progressing     Problem: Nutrition/Hydration-ADULT  Goal: Nutrient/Hydration intake appropriate for improving, restoring or maintaining nutritional needs  Description: Monitor and assess patient's nutrition/hydration status for malnutrition  Collaborate with interdisciplinary team and initiate plan and interventions as ordered  Monitor patient's weight and dietary intake as ordered or per policy  Utilize nutrition screening tool and intervene as necessary  Determine patient's food preferences and provide high-protein, high-caloric foods as appropriate       INTERVENTIONS:  - Monitor oral intake, urinary output, labs, and treatment plans  - Assess nutrition and hydration status and recommend course of action  - Evaluate amount of meals eaten  - Assist patient with eating if necessary   - Allow adequate time for meals  - Recommend/ encourage appropriate diets, oral nutritional supplements, and vitamin/mineral supplements  - Order, calculate, and assess calorie counts as needed  - Recommend, monitor, and adjust tube feedings and TPN/PPN based on assessed needs  - Assess need for intravenous fluids  - Provide specific nutrition/hydration education as appropriate  - Include patient/family/caregiver in decisions related to nutrition  Outcome: Progressing

## 2022-04-03 NOTE — ASSESSMENT & PLAN NOTE
Lab Results   Component Value Date    HGBA1C 7 7 (H) 12/06/2021       Recent Labs     04/02/22  1808 04/02/22 2000 04/02/22 2202 04/02/22  2350   POCGLU 257* 299* 342* 247*       Blood Sugar Average: Last 72 hrs:  (P) 181 [id number]   · BG uncontrolled on subQ insulin in setting of DM2 now worsened by steroid-induced hyperglycemia  · Started on insulin gtt  · Goal -180

## 2022-04-03 NOTE — PROGRESS NOTES
Vancomycin Assessment    Lidia Rai is a 78 y o  male who is currently receiving vancomycin 1000mg IV q8h for Pneumonia     Relevant clinical data and objective history reviewed:  Creatinine   Date Value Ref Range Status   04/02/2022 0 85 0 60 - 1 30 mg/dL Final     Comment:     Standardized to IDMS reference method   04/01/2022 0 72 0 60 - 1 30 mg/dL Final     Comment:     Standardized to IDMS reference method   03/31/2022 1 00 0 60 - 1 30 mg/dL Final     Comment:     Standardized to IDMS reference method     /61 (BP Location: Right arm)   Pulse 59   Temp 97 5 °F (36 4 °C) (Oral)   Resp 22   Ht 5' 7" (1 702 m)   Wt 83 5 kg (184 lb 1 4 oz)   SpO2 93%   BMI 28 83 kg/m²   I/O last 3 completed shifts: In: 1661 5 [P O :660; I V :301 5; IV Piggyback:700]  Out: 2650 [Urine:2650]  Lab Results   Component Value Date/Time    BUN 32 (H) 04/02/2022 04:39 AM    WBC 10 97 (H) 04/02/2022 04:39 AM    HGB 9 4 (L) 04/02/2022 04:39 AM    HCT 27 7 (L) 04/02/2022 04:39 AM    MCV 99 (H) 04/02/2022 04:39 AM     04/02/2022 04:39 AM     Temp Readings from Last 3 Encounters:   04/02/22 97 5 °F (36 4 °C) (Oral)   03/30/22 98 3 °F (36 8 °C) (Temporal)   03/04/22 98 6 °F (37 °C)     Vancomycin Days of Therapy: 4    Assessment/Plan  The patient is currently on vancomycin utilizing scheduled dosing  Baseline risks associated with therapy include: concomitant nephrotoxic medications, advanced age, and dehydration  The patient is receiving 1000mg IV q8h with the most recent vancomycin level being at steady-state and sub-therapeutic based on a goal of 15-20 (appropriate for most indications) ; therefore, after clinical evaluation will be changed to 1250mg IV q8h   Pharmacy will continue to follow closely for s/sx of nephrotoxicity, infusion reactions, and appropriateness of therapy  BMP and CBC will be ordered per protocol    Plan for trough as patient approaches steady state, prior to the 5th  dose at approximately 1030 on 4/4/22  Pharmacy will continue to follow the patients culture results and clinical progress daily      Isaias Osler, Pharmacist

## 2022-04-03 NOTE — ASSESSMENT & PLAN NOTE
· Hs troponin elevated at 105  · Peaked at 224 and downtrended  · Favor non-MI troponin elevation in setting of acute hypoxic respiratory failure  · Denies chest pain  · Recent NM stress test revealing moderate to large predominantly fixed defect of the basal to mid inferior wall   This is possible secondary to artifact

## 2022-04-04 LAB
ANION GAP SERPL CALCULATED.3IONS-SCNC: 9 MMOL/L (ref 4–13)
BACTERIA BLD CULT: NORMAL
BACTERIA BLD CULT: NORMAL
BUN SERPL-MCNC: 25 MG/DL (ref 5–25)
CALCIUM SERPL-MCNC: 8.5 MG/DL (ref 8.3–10.1)
CHLORIDE SERPL-SCNC: 107 MMOL/L (ref 100–108)
CO2 SERPL-SCNC: 23 MMOL/L (ref 21–32)
CREAT SERPL-MCNC: 0.83 MG/DL (ref 0.6–1.3)
CRP SERPL QL: 9.4 MG/L
ERYTHROCYTE [DISTWIDTH] IN BLOOD BY AUTOMATED COUNT: 14.8 % (ref 11.6–15.1)
GFR SERPL CREATININE-BSD FRML MDRD: 91 ML/MIN/1.73SQ M
GLUCOSE SERPL-MCNC: 102 MG/DL (ref 65–140)
GLUCOSE SERPL-MCNC: 106 MG/DL (ref 65–140)
GLUCOSE SERPL-MCNC: 114 MG/DL (ref 65–140)
GLUCOSE SERPL-MCNC: 153 MG/DL (ref 65–140)
GLUCOSE SERPL-MCNC: 186 MG/DL (ref 65–140)
GLUCOSE SERPL-MCNC: 316 MG/DL (ref 65–140)
GLUCOSE SERPL-MCNC: 328 MG/DL (ref 65–140)
GLUCOSE SERPL-MCNC: 68 MG/DL (ref 65–140)
GLUCOSE SERPL-MCNC: 79 MG/DL (ref 65–140)
GLUCOSE SERPL-MCNC: 85 MG/DL (ref 65–140)
GLUCOSE SERPL-MCNC: 99 MG/DL (ref 65–140)
HCT VFR BLD AUTO: 31.3 % (ref 36.5–49.3)
HGB BLD-MCNC: 10.7 G/DL (ref 12–17)
MAGNESIUM SERPL-MCNC: 2.1 MG/DL (ref 1.6–2.6)
MCH RBC QN AUTO: 34.2 PG (ref 26.8–34.3)
MCHC RBC AUTO-ENTMCNC: 34.2 G/DL (ref 31.4–37.4)
MCV RBC AUTO: 100 FL (ref 82–98)
NT-PROBNP SERPL-MCNC: 973 PG/ML
PHOSPHATE SERPL-MCNC: 3.8 MG/DL (ref 2.3–4.1)
PLATELET # BLD AUTO: 256 THOUSANDS/UL (ref 149–390)
PMV BLD AUTO: 10 FL (ref 8.9–12.7)
POTASSIUM SERPL-SCNC: 3.7 MMOL/L (ref 3.5–5.3)
PROCALCITONIN SERPL-MCNC: 0.16 NG/ML
RBC # BLD AUTO: 3.13 MILLION/UL (ref 3.88–5.62)
SODIUM SERPL-SCNC: 139 MMOL/L (ref 136–145)
WBC # BLD AUTO: 15.07 THOUSAND/UL (ref 4.31–10.16)

## 2022-04-04 PROCEDURE — 94664 DEMO&/EVAL PT USE INHALER: CPT

## 2022-04-04 PROCEDURE — 83735 ASSAY OF MAGNESIUM: CPT | Performed by: NURSE PRACTITIONER

## 2022-04-04 PROCEDURE — 99232 SBSQ HOSP IP/OBS MODERATE 35: CPT | Performed by: INTERNAL MEDICINE

## 2022-04-04 PROCEDURE — 84145 PROCALCITONIN (PCT): CPT | Performed by: ANESTHESIOLOGY

## 2022-04-04 PROCEDURE — 83880 ASSAY OF NATRIURETIC PEPTIDE: CPT | Performed by: ANESTHESIOLOGY

## 2022-04-04 PROCEDURE — 85027 COMPLETE CBC AUTOMATED: CPT | Performed by: NURSE PRACTITIONER

## 2022-04-04 PROCEDURE — 84100 ASSAY OF PHOSPHORUS: CPT

## 2022-04-04 PROCEDURE — 82948 REAGENT STRIP/BLOOD GLUCOSE: CPT

## 2022-04-04 PROCEDURE — 86140 C-REACTIVE PROTEIN: CPT | Performed by: ANESTHESIOLOGY

## 2022-04-04 PROCEDURE — 80048 BASIC METABOLIC PNL TOTAL CA: CPT | Performed by: NURSE PRACTITIONER

## 2022-04-04 PROCEDURE — 94760 N-INVAS EAR/PLS OXIMETRY 1: CPT

## 2022-04-04 RX ORDER — INSULIN GLARGINE 100 [IU]/ML
25 INJECTION, SOLUTION SUBCUTANEOUS
Status: DISCONTINUED | OUTPATIENT
Start: 2022-04-04 | End: 2022-04-05

## 2022-04-04 RX ORDER — INSULIN GLARGINE 100 [IU]/ML
20 INJECTION, SOLUTION SUBCUTANEOUS
Status: DISCONTINUED | OUTPATIENT
Start: 2022-04-04 | End: 2022-04-04

## 2022-04-04 RX ADMIN — SENNOSIDES AND DOCUSATE SODIUM 2 TABLET: 50; 8.6 TABLET ORAL at 17:59

## 2022-04-04 RX ADMIN — BENZONATATE 200 MG: 100 CAPSULE ORAL at 18:03

## 2022-04-04 RX ADMIN — ATORVASTATIN CALCIUM 10 MG: 10 TABLET, FILM COATED ORAL at 09:47

## 2022-04-04 RX ADMIN — SODIUM CHLORIDE 8 UNITS/HR: 9 INJECTION, SOLUTION INTRAVENOUS at 00:05

## 2022-04-04 RX ADMIN — CEFEPIME HYDROCHLORIDE 2000 MG: 2 INJECTION, POWDER, FOR SOLUTION INTRAVENOUS at 18:03

## 2022-04-04 RX ADMIN — GUAIFENESIN 600 MG: 600 TABLET ORAL at 21:26

## 2022-04-04 RX ADMIN — INSULIN GLARGINE 25 UNITS: 100 INJECTION, SOLUTION SUBCUTANEOUS at 21:26

## 2022-04-04 RX ADMIN — METHYLPREDNISOLONE SODIUM SUCCINATE 60 MG: 125 INJECTION, POWDER, FOR SOLUTION INTRAMUSCULAR; INTRAVENOUS at 09:46

## 2022-04-04 RX ADMIN — PANTOPRAZOLE SODIUM 40 MG: 40 TABLET, DELAYED RELEASE ORAL at 06:00

## 2022-04-04 RX ADMIN — GUAIFENESIN 600 MG: 600 TABLET ORAL at 09:47

## 2022-04-04 RX ADMIN — FLUTICASONE FUROATE AND VILANTEROL TRIFENATATE 1 PUFF: 100; 25 POWDER RESPIRATORY (INHALATION) at 09:50

## 2022-04-04 RX ADMIN — INSULIN LISPRO 5 UNITS: 100 INJECTION, SOLUTION INTRAVENOUS; SUBCUTANEOUS at 17:58

## 2022-04-04 RX ADMIN — FUROSEMIDE 40 MG: 40 TABLET ORAL at 11:16

## 2022-04-04 RX ADMIN — INSULIN LISPRO 5 UNITS: 100 INJECTION, SOLUTION INTRAVENOUS; SUBCUTANEOUS at 12:44

## 2022-04-04 RX ADMIN — THIAMINE HCL TAB 100 MG 100 MG: 100 TAB at 09:47

## 2022-04-04 RX ADMIN — METHYLPREDNISOLONE SODIUM SUCCINATE 60 MG: 125 INJECTION, POWDER, FOR SOLUTION INTRAMUSCULAR; INTRAVENOUS at 21:26

## 2022-04-04 RX ADMIN — ENOXAPARIN SODIUM 40 MG: 40 INJECTION SUBCUTANEOUS at 09:46

## 2022-04-04 RX ADMIN — ZINC SULFATE 220 MG (50 MG) CAPSULE 220 MG: CAPSULE at 09:47

## 2022-04-04 RX ADMIN — CEFEPIME HYDROCHLORIDE 2000 MG: 2 INJECTION, POWDER, FOR SOLUTION INTRAVENOUS at 06:03

## 2022-04-04 RX ADMIN — SENNOSIDES AND DOCUSATE SODIUM 2 TABLET: 50; 8.6 TABLET ORAL at 09:47

## 2022-04-04 RX ADMIN — OXYCODONE HYDROCHLORIDE AND ACETAMINOPHEN 500 MG: 500 TABLET ORAL at 09:46

## 2022-04-04 RX ADMIN — BENZONATATE 200 MG: 100 CAPSULE ORAL at 09:46

## 2022-04-04 RX ADMIN — BENZONATATE 200 MG: 100 CAPSULE ORAL at 21:26

## 2022-04-04 NOTE — PROGRESS NOTES
[address] [facility]  Progress Note - Patrick Stager 1955, 78 y o  male MRN: [id number]  Unit/Bed#:  Encounter: [id number]  Primary Care Provider: Juanita Candelaria MD   Date and time admitted to hospital: 3/30/2022  5:53 PM    * Acute on chronic respiratory failure with hypoxia [facility])  Assessment & Plan  · Suspect primarily long term sequelae of COVID-19 pneumonia  · Previously required high flow nasal cannula, now on traditional nasal cannula, cont to titrate down  · Pulmonary following  · On IV steroids taper per pulm and CRP  · Follow daily CRP  · Continue Furosemide 40mg daily  · Continue outpatient breo-ellipta  · On cefepime, day 5/7  Vanco d/cd given neg mrsa  · Encourage pulmonary hygiene  · Scheduled Tessalon Perles TID and mucinex for cough as coughing is what contributes most to desaturation episodes         Non MI elevated troponin  Assessment & Plan  · Hs troponin elevated at 105  · Peaked at 224 and downtrended  · Favor non-MI troponin elevation in setting of acute hypoxic respiratory failure  · Denies chest pain  · Recent NM stress test revealing moderate to large predominantly fixed defect of the basal to mid inferior wall  This is possible secondary to artifact         Elevated brain natriuretic peptide (BNP) level  Assessment & Plan  · Acute diastolic heart failure    Patient reports no prior history of CHF  · BNP elevated at 1116, vascular congestion on CXR  · On Lasix 40 mg daily  · 3/31 TTE: LVEF 48%, Grade 1 diastolic dysfunction, mildly reduced RV systolic function      Elevated C-reactive protein (CRP)  Assessment & Plan  · In setting of prolonged covid illness  · Continue methylprednisolone taper   · Trend daily      Type 2 diabetes mellitus, without long-term current use of insulin [facility])  Assessment & Plan  Lab Results   Component Value Date    HGBA1C 7 7 (H) 12/06/2021       Recent Labs     04/03/22  0358 04/03/22  0546 04/03/22  0801 04/03/22  1002   POCGLU 177* 176* 134 357*       Blood Sugar Average: Last 72 hrs:  (P) 240 [id number]     · Steroid-induced hyperglycemia thus was on insulin gtt now off  · Cont insulin regimen and titrate up for optimal control  · Close monitor given on IV steroids    VTE Pharmacologic Prophylaxis:   Pharmacologic: Enoxaparin (Lovenox)  Mechanical VTE Prophylaxis in Place: No    Patient Centered Rounds: I have performed bedside rounds with nursing staff today  Discussions with Specialists or Other Care Team Provider:     Education and Discussions with Family / Patient:  Patient  Called his wife, no answer left msg    Time Spent for Care: 30 minutes  More than 50% of total time spent on counseling and coordination of care as described above  Current Length of Stay: 5 day(s)    Current Patient Status: Inpatient   Certification Statement: The patient will continue to require additional inpatient hospital stay due to Acute illness    Discharge Plan:     Code Status: Level 1 - Full Code      Subjective:   Sitting up in chair, O2 requirements being weaned down to 4L  No sign of respiratory distress  Persistent cough    Objective:     Vitals:   Temp (24hrs), Av 8 °F (36 6 °C), Min:97 6 °F (36 4 °C), Max:98 1 °F (36 7 °C)    Temp:  [97 6 °F (36 4 °C)-98 1 °F (36 7 °C)] 97 7 °F (36 5 °C)  HR:  [65-78] 70  Resp:  [16-20] 19  BP: (115-124)/(59-63) 117/63  SpO2:  [93 %-97 %] 95 %  Body mass index is 28 83 kg/m²  Input and Output Summary (last 24 hours): Intake/Output Summary (Last 24 hours) at 2022 1806  Last data filed at 2022 0403  Gross per 24 hour   Intake 102 48 ml   Output 1725 ml   Net -1622 52 ml       Physical Exam:     Physical Exam  Cardiovascular:      Rate and Rhythm: Normal rate and regular rhythm  Pulses: Normal pulses  Heart sounds: Normal heart sounds  No murmur heard  Pulmonary:      Effort: Pulmonary effort is normal  No respiratory distress  Breath sounds: No wheezing or rales  Comments: Diminished breath sounds  Abdominal:      General: Abdomen is flat  Bowel sounds are normal  There is no distension  Palpations: Abdomen is soft  Tenderness: There is no abdominal tenderness  There is no guarding  Musculoskeletal:      Cervical back: Normal range of motion and neck supple  Right lower leg: No edema  Left lower leg: No edema  Skin:     General: Skin is warm and dry  Neurological:      General: No focal deficit present  Mental Status: He is alert and oriented to person, place, and time  Mental status is at baseline  Cranial Nerves: No cranial nerve deficit  Motor: No weakness  Additional Data:     Labs:    Results from last 7 days   Lab Units 04/04/22  0623 04/01/22  0515 03/31/22  0427   WBC Thousand/uL 15 07*   < > 7 56   HEMOGLOBIN g/dL 10 7*   < > 10 8*   HEMATOCRIT % 31 3*   < > 31 4*   PLATELETS Thousands/uL 256   < > 160   NEUTROS PCT %  --   --  71   LYMPHS PCT %  --   --  26   MONOS PCT %  --   --  2*   EOS PCT %  --   --  0    < > = values in this interval not displayed  Results from last 7 days   Lab Units 04/04/22  0623 04/01/22  0515 03/31/22  0427   SODIUM mmol/L 139   < > 135*   POTASSIUM mmol/L 3 7   < > 4 7   CHLORIDE mmol/L 107   < > 100   CO2 mmol/L 23   < > 24   BUN mg/dL 25   < > 20   CREATININE mg/dL 0 83   < > 1 00   ANION GAP mmol/L 9   < > 11   CALCIUM mg/dL 8 5   < > 8 6   ALBUMIN g/dL  --   --  2 6*   TOTAL BILIRUBIN mg/dL  --   --  1 39*   ALK PHOS U/L  --   --  61   ALT U/L  --   --  37   AST U/L  --   --  40   GLUCOSE RANDOM mg/dL 79   < > 302*    < > = values in this interval not displayed       Results from last 7 days   Lab Units 03/30/22  1149   INR  1 11     Results from last 7 days   Lab Units 04/04/22  1628 04/04/22  1139 04/04/22  0845 04/04/22  0819 04/04/22  0800 04/04/22  0559 04/04/22  0401 04/04/22  0202 04/04/22  0002 04/03/22  2200 04/03/22  2101 04/03/22  1955   POC GLUCOSE mg/dl 328* 186* 102 99 68 85 114 106 153* 225* 268* 319*         Results from last 7 days   Lab Units 04/04/22  0623 04/02/22  0439 04/01/22  0601 03/31/22  0427 03/30/22  1924 03/30/22  1425 03/30/22  1149   LACTIC ACID mmol/L  --   --   --   --   --  1 6 2 8*   PROCALCITONIN ng/ml 0 16 0 30* 0 44* 0 64* 0 36*  --   --            * I Have Reviewed All Lab Data Listed Above  * Additional Pertinent Lab Tests Reviewed: All Labs Within Last 24 Hours Reviewed    Imaging:    Imaging Reports Reviewed Today Include:   Imaging Personally Reviewed by Myself Includes:      Recent Cultures (last 7 days):     Results from last 7 days   Lab Units 04/02/22  0922 03/30/22  1149 03/30/22  1139   BLOOD CULTURE   --  No Growth After 5 Days  No Growth After 5 Days     SPUTUM CULTURE  Culture too young- will reincubate  --   --    GRAM STAIN RESULT  No Polys or Bacteria seen  Rare Epithelial Cells  --   --        Last 24 Hours Medication List:   Current Facility-Administered Medications   Medication Dose Route Frequency Provider Last Rate    ascorbic acid  500 mg Oral Daily NEGRITA Fraser      atorvastatin  10 mg Oral Daily NEGRITA Fraser      benzonatate  200 mg Oral TID NEGRITA Fraser      cefepime  2,000 mg Intravenous Q12H NEGRITA Fraesr 2,000 mg (04/04/22 1803)    enoxaparin  40 mg Subcutaneous Daily NEGRITA Fraser      fluticasone-vilanterol  1 puff Inhalation Daily NEGRITA Fraser      furosemide  40 mg Oral Daily NEGRITA Fraser      guaiFENesin  600 mg Oral Q12H [facility] NEGRITA Fraser      insulin glargine  25 Units Subcutaneous HS Leana Menchaca, DO      insulin lispro  12 Units Subcutaneous TID With Meals Leni Ayon,       methylPREDNISolone sodium succinate  60 mg Intravenous Q12H [facility] NEGRITA Fraser      pantoprazole  40 mg Oral Early Morning Charlyne Failing, NEGRITA      senna-docusate sodium  2 tablet Oral BID Charlyne Failing, NEGRITA  sodium chloride  1 spray Each Nare Q1H PRN Sierra Fuentes, NEGRITA      thiamine  100 mg Oral Daily NEGRITA Ricks      zinc sulfate  220 mg Oral Daily NEGRITA Simon          Today, Patient Was Seen By: Gladys Cordova DO    ** Please Note: Dictation voice to text software may have been used in the creation of this document   **

## 2022-04-04 NOTE — RESPIRATORY THERAPY NOTE
04/04/22 0804   Respiratory Assessment   Assessment Type Assess only   General Appearance Alert; Awake   Respiratory Pattern Normal   Chest Assessment Chest expansion symmetrical   Bilateral Breath Sounds Diminished   Cough Dry   Resp Comments Pt sitting in chair in no apparent distress  Pt offers no complaints  No indication for tx at this time  Cont w/ home meds     O2 Device NC   Oxygen Therapy/Pulse Ox   O2 Device Nasal cannula   O2 Therapy Oxygen humidified   Nasal Cannula O2 Flow Rate (L/min) 3 L/min   Calculated FIO2 (%) - Nasal Cannula 32   SpO2 93 %   SpO2 Activity At Rest   $ Pulse Oximetry Spot Check Charge Completed

## 2022-04-04 NOTE — PROGRESS NOTES
Progress Note - Pulmonary   Douglas Notch 78 y o  male MRN: [id number]  Unit/Bed#:  Encounter: [id number]      Assessment:  1  Acute/chronic hypoxic respiratory failure  · Titrate O2 to keep SpO2 >88%  · IS, OOB-chair, ambulation as able    2  Abnormal CT Chest  · Suspect combined reasons of #3,4,5  Likely post COVID pneumonitis as primary factor  · Anticipate HRCT and complete PFTs as outpatient    3  Recent COVID-19 infection with suspected post COVID-19 pneumonitis  · Inflammatory markers improving  · Continue Breo 100/25 daily  · Continue solumedrol weaning to 60mg q12hrs for now  · Anticipate further taper tomorrow  · Continue Diuresis per SLIM    4  Acute/chronic diastolic CHF  · Appears euvolemic  · Further diuresis per primary service    5  Suspected bacterial pneumonia  · PCT is improved  · D5/7 Cefepime  · Agree with stopping vancomycin      Subjective:   78year old man with COVID-19 infection in Feb 2022 requiring admission - was on moderate protocol (declined baricitinib) and discharged on 6L/NC with exertion, also has DM2, HTN, and prior tobacco abuse  He presented 3/30 for increased dyspnea, worsened cough and hypoxia  Found to have elevated inflammatory markers and diffuse ground glass infiltrates  Started on empiric antibiotics, systemic steroids, and diuresis  Initially required HFNC  24hr events - feeling improved, NP cough persists, no chest pain, no pleurisy, weaned to NC and transferred to [facility] service yesterday    Objective:       Vitals: Blood pressure 115/61, pulse 78, temperature 97 6 °F (36 4 °C), temperature source Oral, resp  rate 16, height 5' 7" (1 702 m), weight 83 5 kg (184 lb 1 4 oz), SpO2 93 %  , 3LNC, Body mass index is 28 83 kg/m²        Intake/Output Summary (Last 24 hours) at 4/4/2022 0932  Last data filed at 4/4/2022 0403  Gross per 24 hour   Intake 441 73 ml   Output 3100 ml   Net -2658 27 ml         Physical Exam  Gen: Awake, alert, oriented x 3, no acute distress  HEENT: Mucous membranes moist, no oral lesions  NECK: No accessory muscle use, JVP not elevated  Cardiac: Regular, single S1, single S2, no murmurs, no rubs, no gallops  Lungs: mild bibasilar rales, no wheeze, no rhonchi  Abdomen: normoactive bowel sounds, soft nontender, nondistended, no rebound or rigidity, no guarding  Extremities: no cyanosis, no clubbing, no edema    Labs: I have personally reviewed pertinent lab results    Laboratory and Diagnostics  Results from last 7 days   Lab Units 04/04/22  0623 04/03/22 0441 04/02/22  0439 04/01/22  0515 03/31/22 0427 03/30/22  1149   WBC Thousand/uL 15 07* 13 17* 10 97* 10 68* 7 56 8 34   HEMOGLOBIN g/dL 10 7* 10 1* 9 4* 9 6* 10 8* 11 2*   HEMATOCRIT % 31 3* 29 6* 27 7* 27 9* 31 4* 33 7*   PLATELETS Thousands/uL 256 228 211 185 160 168   NEUTROS PCT %  --   --   --   --  71 74   MONOS PCT %  --   --   --   --  2* 6     Results from last 7 days   Lab Units 04/04/22  0623 04/03/22 0441 04/02/22 0439 04/01/22  0515 03/31/22  0427 03/30/22  1149   SODIUM mmol/L 139 136 135* 134* 135* 137   POTASSIUM mmol/L 3 7 4 3 4 3 4 8 4 7 4 0   CHLORIDE mmol/L 107 105 103 101 100 103   CO2 mmol/L 23 23 24 24 24 23   ANION GAP mmol/L 9 8 8 9 11 11   BUN mg/dL 25 29* 32* 27* 20 19   CREATININE mg/dL 0 83 0 98 0 85 0 72 1 00 1 06   CALCIUM mg/dL 8 5 9 0 8 8 8 6 8 6 8 3   GLUCOSE RANDOM mg/dL 79 167* 179* 152* 302* 260*   ALT U/L  --   --   --   --  37 42   AST U/L  --   --   --   --  40 23   ALK PHOS U/L  --   --   --   --  61 65   ALBUMIN g/dL  --   --   --   --  2 6* 2 8*   TOTAL BILIRUBIN mg/dL  --   --   --   --  1 39* 0 92     Results from last 7 days   Lab Units 04/04/22  0623 04/03/22  0441 04/01/22  0515 03/31/22  0427 03/30/22  1149   MAGNESIUM mg/dL 2 1 2 0 2 5 2 9* 1 9   PHOSPHORUS mg/dL 3 8  --  4 5* 5 7*  --       Results from last 7 days   Lab Units 03/30/22  1149   INR  1 11   PTT seconds 39*          Results from last 7 days   Lab Units 03/30/22  1425 03/30/22  1149   LACTIC ACID mmol/L 1 6 2 8*     Results from last 7 days   Lab Units 04/04/22  0623 04/02/22  0439 03/31/22  0427 03/30/22  1149   CRP mg/L 9 4* 54 8* 240 4* 194 7*                 Results from last 7 days   Lab Units 04/04/22  0623 04/02/22  0439 04/01/22  0601 03/31/22  0427 03/30/22  1924   PROCALCITONIN ng/ml 0 16 0 30* 0 44* 0 64* 0 36*       Microbiology:  Bld Cx 3/30 - NGTD  Sputum Cx 4/2 - no pmn/bact, rare epi  MRSA neg    Imaging and other studies: I have personally reviewed pertinent reports  and I have personally reviewed pertinent films in PACS  CT angio 3/30/22 - no PE, increased diffuse bilateral ground glass infiltrates, background emphysematous changes, mildly enlarged mediastinal adenopathy, trace pleural effusions    TTE 3/31/2022 - EF 82%, grade I diastolic dysfunction, mildly dilated RV  Jed Schmitz DO, [facility]

## 2022-04-04 NOTE — PLAN OF CARE
Problem: Potential for Falls  Goal: Patient will remain free of falls  Description: INTERVENTIONS:  - Educate patient/family on patient safety including physical limitations  - Instruct patient to call for assistance with activity   - Consult OT/PT to assist with strengthening/mobility   - Keep Call bell within reach  - Keep bed low and locked with side rails adjusted as appropriate  - Keep care items and personal belongings within reach  - Initiate and maintain comfort rounds  - Make Fall Risk Sign visible to staff  - Offer Toileting every 2 Hours, in advance of need  - Initiate/Maintain bed/chair alarm  - Obtain necessary fall risk management equipment: bed/chair alarm, nonskid socks  - Apply yellow socks and bracelet for high fall risk patients  - Consider moving patient to room near nurses station  Outcome: Progressing     Problem: MOBILITY - ADULT  Goal: Maintain or return to baseline ADL function  Description: INTERVENTIONS:  -  Assess patient's ability to carry out ADLs; assess patient's baseline for ADL function and identify physical deficits which impact ability to perform ADLs (bathing, care of mouth/teeth, toileting, grooming, dressing, etc )  - Assess/evaluate cause of self-care deficits   - Assess range of motion  - Assess patient's mobility; develop plan if impaired  - Assess patient's need for assistive devices and provide as appropriate  - Encourage maximum independence but intervene and supervise when necessary  - Involve family in performance of ADLs  - Assess for home care needs following discharge   - Consider OT consult to assist with ADL evaluation and planning for discharge  - Provide patient education as appropriate  Outcome: Progressing  Goal: Maintains/Returns to pre admission functional level  Description: INTERVENTIONS:  - Perform BMAT or MOVE assessment daily    - Set and communicate daily mobility goal to care team and patient/family/caregiver     - Collaborate with rehabilitation services on mobility goals if consulted  - Perform Range of Motion 4 times a day  - Reposition patient every 4 hours    - Dangle patient 4 times a day  - Stand patient 4 times a day  - Ambulate patient 4 times a day  - Out of bed to chair 3 times a day   - Out of bed for meals 3 times a day  - Out of bed for toileting  - Record patient progress and toleration of activity level   Outcome: Progressing     Problem: PAIN - ADULT  Goal: Verbalizes/displays adequate comfort level or baseline comfort level  Description: Interventions:  - Encourage patient to monitor pain and request assistance  - Assess pain using appropriate pain scale  - Administer analgesics based on type and severity of pain and evaluate response  - Implement non-pharmacological measures as appropriate and evaluate response  - Consider cultural and social influences on pain and pain management  - Notify physician/advanced practitioner if interventions unsuccessful or patient reports new pain  Outcome: Progressing     Problem: INFECTION - ADULT  Goal: Absence or prevention of progression during hospitalization  Description: INTERVENTIONS:  - Assess and monitor for signs and symptoms of infection  - Monitor lab/diagnostic results  - Monitor all insertion sites, i e  indwelling lines, tubes, and drains  - Monitor endotracheal if appropriate and nasal secretions for changes in amount and color  - Bern appropriate cooling/warming therapies per order  - Administer medications as ordered  - Instruct and encourage patient and family to use good hand hygiene technique  - Identify and instruct in appropriate isolation precautions for identified infection/condition  Outcome: Progressing     Problem: SAFETY ADULT  Goal: Patient will remain free of falls  Description: INTERVENTIONS:  - Educate patient/family on patient safety including physical limitations  - Instruct patient to call for assistance with activity   - Consult OT/PT to assist with strengthening/mobility   - Keep Call bell within reach  - Keep bed low and locked with side rails adjusted as appropriate  - Keep care items and personal belongings within reach  - Initiate and maintain comfort rounds  - Make Fall Risk Sign visible to staff  - Offer Toileting every 2 Hours, in advance of need  - Initiate/Maintain bed alarm  - Obtain necessary fall risk management equipment: bed/chair alarm, nonskid socks  - Apply yellow socks and bracelet for high fall risk patients  - Consider moving patient to room near nurses station  Outcome: Progressing  Goal: Maintain or return to baseline ADL function  Description: INTERVENTIONS:  -  Assess patient's ability to carry out ADLs; assess patient's baseline for ADL function and identify physical deficits which impact ability to perform ADLs (bathing, care of mouth/teeth, toileting, grooming, dressing, etc )  - Assess/evaluate cause of self-care deficits   - Assess range of motion  - Assess patient's mobility; develop plan if impaired  - Assess patient's need for assistive devices and provide as appropriate  - Encourage maximum independence but intervene and supervise when necessary  - Involve family in performance of ADLs  - Assess for home care needs following discharge   - Consider OT consult to assist with ADL evaluation and planning for discharge  - Provide patient education as appropriate  Outcome: Progressing  Goal: Maintains/Returns to pre admission functional level  Description: INTERVENTIONS:  - Perform BMAT or MOVE assessment daily    - Set and communicate daily mobility goal to care team and patient/family/caregiver  - Collaborate with rehabilitation services on mobility goals if consulted  - Perform Range of Motion 4 times a day  - Reposition patient every 4 hours    - Dangle patient 4 times a day  - Stand patient 4 times a day  - Ambulate patient 4 times a day  - Out of bed to chair 3 times a day   - Out of bed for meals 3 times a day  - Out of bed for toileting  - Record patient progress and toleration of activity level   Outcome: Progressing     Problem: DISCHARGE PLANNING  Goal: Discharge to home or other facility with appropriate resources  Description: INTERVENTIONS:  - Identify barriers to discharge w/patient and caregiver  - Arrange for needed discharge resources and transportation as appropriate  - Identify discharge learning needs (meds, wound care, etc )  - Arrange for interpretive services to assist at discharge as needed  - Refer to Case Management Department for coordinating discharge planning if the patient needs post-hospital services based on physician/advanced practitioner order or complex needs related to functional status, cognitive ability, or social support system  Outcome: Progressing     Problem: Knowledge Deficit  Goal: Patient/family/caregiver demonstrates understanding of disease process, treatment plan, medications, and discharge instructions  Description: Complete learning assessment and assess knowledge base  Interventions:  - Provide teaching at level of understanding  - Provide teaching via preferred learning methods  Outcome: Progressing     Problem: Nutrition/Hydration-ADULT  Goal: Nutrient/Hydration intake appropriate for improving, restoring or maintaining nutritional needs  Description: Monitor and assess patient's nutrition/hydration status for malnutrition  Collaborate with interdisciplinary team and initiate plan and interventions as ordered  Monitor patient's weight and dietary intake as ordered or per policy  Utilize nutrition screening tool and intervene as necessary  Determine patient's food preferences and provide high-protein, high-caloric foods as appropriate       INTERVENTIONS:  - Monitor oral intake, urinary output, labs, and treatment plans  - Assess nutrition and hydration status and recommend course of action  - Evaluate amount of meals eaten  - Assist patient with eating if necessary   - Allow adequate time for meals  - Recommend/ encourage appropriate diets, oral nutritional supplements, and vitamin/mineral supplements  - Order, calculate, and assess calorie counts as needed  - Recommend, monitor, and adjust tube feedings and TPN/PPN based on assessed needs  - Assess need for intravenous fluids  - Provide specific nutrition/hydration education as appropriate  - Include patient/family/caregiver in decisions related to nutrition  Outcome: Progressing

## 2022-04-04 NOTE — RESPIRATORY THERAPY NOTE
RT Protocol Note  Bailee Malloy 78 y o  male MRN: [id number]  Unit/Bed#:  Encounter: [id number]    Assessment    Principal Problem:    Acute on chronic respiratory failure with hypoxia (HCC)  Active Problems:    Non MI elevated troponin    Type 2 diabetes mellitus, without long-term current use of insulin (HCC)    Elevated C-reactive protein (CRP)    Elevated brain natriuretic peptide (BNP) level      Home Pulmonary Medications:  Inhaler    Home Devices/Therapy: Home O2    Past Medical History:   Diagnosis Date    COVID-19 2022    Diabetes mellitus ([address] )     Hypertension      Social History     Socioeconomic History    Marital status: /Civil Union     Spouse name: Not on file    Number of children: Not on file    Years of education: Not on file    Highest education level: Not on file   Occupational History    Not on file   Tobacco Use    Smoking status: Former Smoker     Packs/day: 1 00     Years: 30 00     Pack years: 30 00     Types: Cigarettes     Quit date: 2008     Years since quittin 1    Smokeless tobacco: Never Used   Vaping Use    Vaping Use: Never used   Substance and Sexual Activity    Alcohol use: Never    Drug use: Never    Sexual activity: Not Currently   Other Topics Concern    Not on file   Social History Narrative    Not on file     Social Determinants of Health     Financial Resource Strain: Not on file   Food Insecurity: No Food Insecurity    Worried About Running Out of Food in the Last Year: Never true    Regis of Food in the Last Year: Never true   Transportation Needs: No Transportation Needs    Lack of Transportation (Medical): No    Lack of Transportation (Non-Medical):  No   Physical Activity: Not on file   Stress: Not on file   Social Connections: Not on file   Intimate Partner Violence: Not on file   Housing Stability: Low Risk     Unable to Pay for Housing in the Last Year: No    Number of Places Lived in the Last Year: 1    Unstable Housing in the Last Year: No       Subjective    Subjective Data: sleeping    Objective    Physical Exam:   Assessment Type: (P) Assess only  General Appearance: (P) Alert,Awake  Respiratory Pattern: (P) Normal  Chest Assessment: (P) Chest expansion symmetrical  Bilateral Breath Sounds: (P) Diminished  Cough: (P) Dry  O2 Device: (P) NC    Vitals:  Blood pressure 115/61, pulse (P) 78, temperature 97 6 °F (36 4 °C), temperature source Oral, resp  rate (P) 16, height 5' 7" (1 702 m), weight 83 5 kg (184 lb 1 4 oz), SpO2 (P) 93 %  Imaging and other studies: I have personally reviewed pertinent reports  O2 Device: (P) NC     Plan    Respiratory Plan: Home Bronchodilator Patient pathway  Airway Clearance Plan: Incentive Spirometer     Resp Comments: (P) Pt sitting in chair in no apparent distress  Pt offers no complaints  No indication for tx at this time  Cont w/ home meds

## 2022-04-04 NOTE — UTILIZATION REVIEW
Continued Stay Review    Date: 4/4                          Current Patient Class: INpatient   Current Level of Care: Med/surg    HPI:67 y o  male initially admitted on 3/30     Assessment/Plan: INflammatory markers improving  COntinue with IV abx  PUlmonary consult:  Continue with IV solumedrol  Continues with nonproductive cough  Lungs with rales  Weaned to [facility]  Vital Signs:   Date/Time Temp Pulse Resp BP MAP (mmHg) SpO2 FiO2 (%) Calculated FIO2 (%) - Nasal Cannula O2 Flow Rate (L/min) Nasal Cannula O2 Flow Rate (L/min) O2 Device O2 Interface Device Patient Position - Orthostatic VS   04/04/22 0804 -- 78 16 -- -- 93 % -- 32 -- 3 L/min  Nasal cannula -- --   04/04/22 0700 97 6 °F (36 4 °C) 69 20 115/61 83 97 % -- -- -- -- Nasal cannula -- Sitting   04/04/22 0312 -- -- -- -- -- 95 % -- -- 4 L/min -- Nasal cannula --      Pertinent Labs/Diagnostic Results:       Results from last 7 days   Lab Units 04/04/22 0623 04/03/22 0441 04/02/22  0439 04/01/22  0515 04/01/22  0515 03/31/22  0427 03/31/22  0427 03/30/22  1149 03/30/22  1149   WBC Thousand/uL 15 07* 13 17* 10 97*   < > 10 68*   < > 7 56   < > 8 34   HEMOGLOBIN g/dL 10 7* 10 1* 9 4*  --  9 6*  --  10 8*   < > 11 2*   HEMATOCRIT % 31 3* 29 6* 27 7*  --  27 9*  --  31 4*   < > 33 7*   PLATELETS Thousands/uL 256 228 211   < > 185   < > 160   < > 168   NEUTROS ABS Thousands/µL  --   --   --   --   --   --  5 33  --  6 15    < > = values in this interval not displayed           Results from last 7 days   Lab Units 04/04/22  0623 04/03/22  0441 04/02/22  0439 04/01/22  0515 03/31/22  0427 03/30/22  1149 03/30/22  1149   SODIUM mmol/L 139 136 135* 134* 135*   < > 137   POTASSIUM mmol/L 3 7 4 3 4 3 4 8 4 7   < > 4 0   CHLORIDE mmol/L 107 105 103 101 100   < > 103   CO2 mmol/L 23 23 24 24 24   < > 23   ANION GAP mmol/L 9 8 8 9 11   < > 11   BUN mg/dL 25 29* 32* 27* 20   < > 19   CREATININE mg/dL 0 83 0 98 0 85 0 72 1 00   < > 1 06   EGFR ml/min/1 73sq m 91 80 90 97 78   < > 72   CALCIUM mg/dL 8 5 9 0 8 8 8 6 8 6   < > 8 3   MAGNESIUM mg/dL 2 1 2 0  --  2 5 2 9*  --  1 9   PHOSPHORUS mg/dL 3 8  --   --  4 5* 5 7*  --   --     < > = values in this interval not displayed  Results from last 7 days   Lab Units 03/31/22  0427 03/30/22  1149   AST U/L 40 23   ALT U/L 37 42   ALK PHOS U/L 61 65   TOTAL PROTEIN g/dL 6 5 6 6   ALBUMIN g/dL 2 6* 2 8*   TOTAL BILIRUBIN mg/dL 1 39* 0 92     Results from last 7 days   Lab Units 04/04/22  1139 04/04/22  0845 04/04/22  0819 04/04/22  0800 04/04/22  0559 04/04/22  0401 04/04/22  0202 04/04/22  0002 04/03/22  2200 04/03/22  2101 04/03/22  1955 04/03/22  1802   POC GLUCOSE mg/dl 186* 102 99 68 85 114 106 153* 225* 268* 319* 195*     Results from last 7 days   Lab Units 04/04/22  0623 04/03/22  0441 04/02/22  0439 04/01/22  0515 03/31/22  0427 03/30/22  1149   GLUCOSE RANDOM mg/dL 79 167* 179* 152* 302* 260*         Results from last 7 days   Lab Units 03/30/22  1637 03/30/22  1425 03/30/22  1149   HS TNI 0HR ng/L  --   --  105*   HS TNI 2HR ng/L  --  153*  --    HSTNI D2 ng/L  --  48*  --    HS TNI 4HR ng/L 224*  --   --    HSTNI D4 ng/L 119*  --   --          Results from last 7 days   Lab Units 03/30/22  1149   PROTIME seconds 13 7   INR  1 11   PTT seconds 39*         Results from last 7 days   Lab Units 04/04/22  0623 04/02/22  0439 04/01/22  0601 03/31/22  0427 03/30/22  1924   PROCALCITONIN ng/ml 0 16 0 30* 0 44* 0 64* 0 36*     Results from last 7 days   Lab Units 03/30/22  1425 03/30/22  1149   LACTIC ACID mmol/L 1 6 2 8*       Results from last 7 days   Lab Units 04/04/22  0623 03/30/22  1149   NT-PRO BNP pg/mL 973* 1,116*         Results from last 7 days   Lab Units 04/04/22  0623 04/02/22  0439 03/31/22  0427 03/30/22  1149   CRP mg/L 9 4* 54 8* 240 4* 194 7*         Results from last 7 days   Lab Units 04/02/22  0922 03/30/22  1149 03/30/22  1139   BLOOD CULTURE   --  No Growth After 4 Days  No Growth After 4 Days     SPUTUM CULTURE  Culture too young- will reincubate  --   --    GRAM STAIN RESULT  No Polys or Bacteria seen  Rare Epithelial Cells  --   --          Medications:   Scheduled Medications:  ascorbic acid, 500 mg, Oral, Daily  atorvastatin, 10 mg, Oral, Daily  benzonatate, 200 mg, Oral, TID  cefepime, 2,000 mg, Intravenous, Q12H  enoxaparin, 40 mg, Subcutaneous, Daily  fluticasone-vilanterol, 1 puff, Inhalation, Daily  furosemide, 40 mg, Oral, Daily  guaiFENesin, 600 mg, Oral, Q12H CATHLEEN  insulin glargine, 20 Units, Subcutaneous, HS  insulin lispro, 5 Units, Subcutaneous, TID With Meals  methylPREDNISolone sodium succinate, 60 mg, Intravenous, Q12H CATHLEEN  pantoprazole, 40 mg, Oral, Early Morning  senna-docusate sodium, 2 tablet, Oral, BID  thiamine, 100 mg, Oral, Daily  zinc sulfate, 220 mg, Oral, Daily      Continuous IV Infusions:     PRN Meds:  sodium chloride, 1 spray, Each Nare, Q1H PRN        Discharge Plan: D    Network Utilization Review Department  ATTENTION: Please call with any questions or concerns to [phone] and carefully listen to the prompts so that you are directed to the right person  All voicemails are confidential   Chrystine Can all requests for admission clinical reviews, approved or denied determinations and any other requests to dedicated fax number below belonging to the campus where the patient is receiving treatment   List of dedicated fax numbers for the Facilities:  [address] DENIALS (Administrative/Medical Necessity) [phone]   [address] (Maternity/NICU/Pediatrics) [phone]   [address] [place] [address] 40 [address] [facility] [address] [place] [address] [id number] [address] [place] [phone]   [address] [place] [address] Mayela Lomax [address] [facility] [place] [address] [phone]

## 2022-04-04 NOTE — PLAN OF CARE
Problem: Potential for Falls  Goal: Patient will remain free of falls  Description: INTERVENTIONS:  - Educate patient/family on patient safety including physical limitations  - Instruct patient to call for assistance with activity   - Consult OT/PT to assist with strengthening/mobility   - Keep Call bell within reach  - Keep bed low and locked with side rails adjusted as appropriate  - Keep care items and personal belongings within reach  - Initiate and maintain comfort rounds  - Make Fall Risk Sign visible to staff  - - Apply yellow socks and bracelet for high fall risk patients  - Consider moving patient to room near nurses station  Outcome: Progressing     Problem: MOBILITY - ADULT  Goal: Maintain or return to baseline ADL function  Description: INTERVENTIONS:  -  Assess patient's ability to carry out ADLs; assess patient's baseline for ADL function and identify physical deficits which impact ability to perform ADLs (bathing, care of mouth/teeth, toileting, grooming, dressing, etc )  - Assess/evaluate cause of self-care deficits   - Assess range of motion  - Assess patient's mobility; develop plan if impaired  - Assess patient's need for assistive devices and provide as appropriate  - Encourage maximum independence but intervene and supervise when necessary  - Involve family in performance of ADLs  - Assess for home care needs following discharge   - Consider OT consult to assist with ADL evaluation and planning for discharge  - Provide patient education as appropriate  Outcome: Progressing  Goal: Maintains/Returns to pre admission functional level  Description: INTERVENTIONS:  - Perform BMAT or MOVE assessment daily    - Set and communicate daily mobility goal to care team and patient/family/caregiver     - Collaborate with rehabilitation services on mobility goals if consulted  - - Out of bed for toileting  - Record patient progress and toleration of activity level   Outcome: Progressing     Problem: PAIN - ADULT  Goal: Verbalizes/displays adequate comfort level or baseline comfort level  Description: Interventions:  - Encourage patient to monitor pain and request assistance  - Assess pain using appropriate pain scale  - Administer analgesics based on type and severity of pain and evaluate response  - Implement non-pharmacological measures as appropriate and evaluate response  - Consider cultural and social influences on pain and pain management  - Notify physician/advanced practitioner if interventions unsuccessful or patient reports new pain  Outcome: Progressing     Problem: INFECTION - ADULT  Goal: Absence or prevention of progression during hospitalization  Description: INTERVENTIONS:  - Assess and monitor for signs and symptoms of infection  - Monitor lab/diagnostic results  - Monitor all insertion sites, i e  indwelling lines, tubes, and drains  - Monitor endotracheal if appropriate and nasal secretions for changes in amount and color  - [place] appropriate cooling/warming therapies per order  - Administer medications as ordered  - Instruct and encourage patient and family to use good hand hygiene technique  - Identify and instruct in appropriate isolation precautions for identified infection/condition  Outcome: Progressing     Problem: SAFETY ADULT  Goal: Patient will remain free of falls  Description: INTERVENTIONS:  - Educate patient/family on patient safety including physical limitations  - Instruct patient to call for assistance with activity   - Consult OT/PT to assist with strengthening/mobility   - Keep Call bell within reach  - Keep bed low and locked with side rails adjusted as appropriate  - Keep care items and personal belongings within reach  - Initiate and maintain comfort rounds  - Make Fall Risk Sign visible to staff  -- Apply yellow socks and bracelet for high fall risk patients  - Consider moving patient to room near nurses station  Outcome: Progressing  Goal: Maintain or return to baseline ADL function  Description: INTERVENTIONS:  -  Assess patient's ability to carry out ADLs; assess patient's baseline for ADL function and identify physical deficits which impact ability to perform ADLs (bathing, care of mouth/teeth, toileting, grooming, dressing, etc )  - Assess/evaluate cause of self-care deficits   - Assess range of motion  - Assess patient's mobility; develop plan if impaired  - Assess patient's need for assistive devices and provide as appropriate  - Encourage maximum independence but intervene and supervise when necessary  - Involve family in performance of ADLs  - Assess for home care needs following discharge   - Consider OT consult to assist with ADL evaluation and planning for discharge  - Provide patient education as appropriate  Outcome: Progressing  Goal: Maintains/Returns to pre admission functional level  Description: INTERVENTIONS:  - Perform BMAT or MOVE assessment daily    - Set and communicate daily mobility goal to care team and patient/family/caregiver     - Collaborate with rehabilitation services on mobility goals if consulted  - Out of bed for toileting  - Record patient progress and toleration of activity level   Outcome: Progressing     Problem: DISCHARGE PLANNING  Goal: Discharge to home or other facility with appropriate resources  Description: INTERVENTIONS:  - Identify barriers to discharge w/patient and caregiver  - Arrange for needed discharge resources and transportation as appropriate  - Identify discharge learning needs (meds, wound care, etc )  - Arrange for interpretive services to assist at discharge as needed  - Refer to Case Management Department for coordinating discharge planning if the patient needs post-hospital services based on physician/advanced practitioner order or complex needs related to functional status, cognitive ability, or social support system  Outcome: Progressing     Problem: Knowledge Deficit  Goal: Patient/family/caregiver demonstrates understanding of disease process, treatment plan, medications, and discharge instructions  Description: Complete learning assessment and assess knowledge base  Interventions:  - Provide teaching at level of understanding  - Provide teaching via preferred learning methods  Outcome: Progressing     Problem: Nutrition/Hydration-ADULT  Goal: Nutrient/Hydration intake appropriate for improving, restoring or maintaining nutritional needs  Description: Monitor and assess patient's nutrition/hydration status for malnutrition  Collaborate with interdisciplinary team and initiate plan and interventions as ordered  Monitor patient's weight and dietary intake as ordered or per policy  Utilize nutrition screening tool and intervene as necessary  Determine patient's food preferences and provide high-protein, high-caloric foods as appropriate       INTERVENTIONS:  - Monitor oral intake, urinary output, labs, and treatment plans  - Assess nutrition and hydration status and recommend course of action  - Evaluate amount of meals eaten  - Assist patient with eating if necessary   - Allow adequate time for meals  - Recommend/ encourage appropriate diets, oral nutritional supplements, and vitamin/mineral supplements  - Order, calculate, and assess calorie counts as needed  - Recommend, monitor, and adjust tube feedings and TPN/PPN based on assessed needs  - Assess need for intravenous fluids  - Provide specific nutrition/hydration education as appropriate  - Include patient/family/caregiver in decisions related to nutrition  Outcome: Progressing

## 2022-04-05 PROBLEM — I50.31 ACUTE DIASTOLIC HEART FAILURE (HCC): Status: ACTIVE | Noted: 2022-03-30

## 2022-04-05 LAB
ANION GAP SERPL CALCULATED.3IONS-SCNC: 11 MMOL/L (ref 4–13)
BACTERIA SPT RESP CULT: NORMAL
BUN SERPL-MCNC: 27 MG/DL (ref 5–25)
CALCIUM SERPL-MCNC: 8.7 MG/DL (ref 8.3–10.1)
CHLORIDE SERPL-SCNC: 101 MMOL/L (ref 100–108)
CO2 SERPL-SCNC: 23 MMOL/L (ref 21–32)
CREAT SERPL-MCNC: 0.93 MG/DL (ref 0.6–1.3)
CRP SERPL QL: 6.3 MG/L
ERYTHROCYTE [DISTWIDTH] IN BLOOD BY AUTOMATED COUNT: 15 % (ref 11.6–15.1)
GFR SERPL CREATININE-BSD FRML MDRD: 85 ML/MIN/1.73SQ M
GLUCOSE SERPL-MCNC: 208 MG/DL (ref 65–140)
GLUCOSE SERPL-MCNC: 225 MG/DL (ref 65–140)
GLUCOSE SERPL-MCNC: 246 MG/DL (ref 65–140)
GLUCOSE SERPL-MCNC: 255 MG/DL (ref 65–140)
GLUCOSE SERPL-MCNC: 272 MG/DL (ref 65–140)
GLUCOSE SERPL-MCNC: 348 MG/DL (ref 65–140)
GRAM STN SPEC: NORMAL
GRAM STN SPEC: NORMAL
HCT VFR BLD AUTO: 33.4 % (ref 36.5–49.3)
HGB BLD-MCNC: 11.3 G/DL (ref 12–17)
MAGNESIUM SERPL-MCNC: 2.2 MG/DL (ref 1.6–2.6)
MCH RBC QN AUTO: 33.8 PG (ref 26.8–34.3)
MCHC RBC AUTO-ENTMCNC: 33.8 G/DL (ref 31.4–37.4)
MCV RBC AUTO: 100 FL (ref 82–98)
PLATELET # BLD AUTO: 267 THOUSANDS/UL (ref 149–390)
PMV BLD AUTO: 9.7 FL (ref 8.9–12.7)
POTASSIUM SERPL-SCNC: 4.5 MMOL/L (ref 3.5–5.3)
RBC # BLD AUTO: 3.34 MILLION/UL (ref 3.88–5.62)
SODIUM SERPL-SCNC: 135 MMOL/L (ref 136–145)
WBC # BLD AUTO: 14.54 THOUSAND/UL (ref 4.31–10.16)

## 2022-04-05 PROCEDURE — 99232 SBSQ HOSP IP/OBS MODERATE 35: CPT | Performed by: INTERNAL MEDICINE

## 2022-04-05 PROCEDURE — 99233 SBSQ HOSP IP/OBS HIGH 50: CPT | Performed by: FAMILY MEDICINE

## 2022-04-05 PROCEDURE — 80048 BASIC METABOLIC PNL TOTAL CA: CPT

## 2022-04-05 PROCEDURE — 86140 C-REACTIVE PROTEIN: CPT | Performed by: INTERNAL MEDICINE

## 2022-04-05 PROCEDURE — 82948 REAGENT STRIP/BLOOD GLUCOSE: CPT

## 2022-04-05 PROCEDURE — 94664 DEMO&/EVAL PT USE INHALER: CPT

## 2022-04-05 PROCEDURE — 94760 N-INVAS EAR/PLS OXIMETRY 1: CPT

## 2022-04-05 PROCEDURE — 85027 COMPLETE CBC AUTOMATED: CPT

## 2022-04-05 PROCEDURE — 83735 ASSAY OF MAGNESIUM: CPT

## 2022-04-05 RX ORDER — METHYLPREDNISOLONE SODIUM SUCCINATE 40 MG/ML
40 INJECTION, POWDER, LYOPHILIZED, FOR SOLUTION INTRAMUSCULAR; INTRAVENOUS DAILY
Status: DISCONTINUED | OUTPATIENT
Start: 2022-04-05 | End: 2022-04-05

## 2022-04-05 RX ORDER — INSULIN GLARGINE 100 [IU]/ML
30 INJECTION, SOLUTION SUBCUTANEOUS
Status: DISCONTINUED | OUTPATIENT
Start: 2022-04-05 | End: 2022-04-06 | Stop reason: HOSPADM

## 2022-04-05 RX ORDER — METHYLPREDNISOLONE SODIUM SUCCINATE 40 MG/ML
40 INJECTION, POWDER, LYOPHILIZED, FOR SOLUTION INTRAMUSCULAR; INTRAVENOUS EVERY 12 HOURS SCHEDULED
Status: COMPLETED | OUTPATIENT
Start: 2022-04-05 | End: 2022-04-05

## 2022-04-05 RX ADMIN — BENZONATATE 200 MG: 100 CAPSULE ORAL at 18:35

## 2022-04-05 RX ADMIN — GUAIFENESIN 600 MG: 600 TABLET ORAL at 22:12

## 2022-04-05 RX ADMIN — BENZONATATE 200 MG: 100 CAPSULE ORAL at 09:27

## 2022-04-05 RX ADMIN — ATORVASTATIN CALCIUM 10 MG: 10 TABLET, FILM COATED ORAL at 09:26

## 2022-04-05 RX ADMIN — FUROSEMIDE 40 MG: 40 TABLET ORAL at 09:26

## 2022-04-05 RX ADMIN — PANTOPRAZOLE SODIUM 40 MG: 40 TABLET, DELAYED RELEASE ORAL at 05:00

## 2022-04-05 RX ADMIN — ENOXAPARIN SODIUM 40 MG: 40 INJECTION SUBCUTANEOUS at 09:26

## 2022-04-05 RX ADMIN — FLUTICASONE FUROATE AND VILANTEROL TRIFENATATE 1 PUFF: 100; 25 POWDER RESPIRATORY (INHALATION) at 09:28

## 2022-04-05 RX ADMIN — INSULIN GLARGINE 30 UNITS: 100 INJECTION, SOLUTION SUBCUTANEOUS at 22:13

## 2022-04-05 RX ADMIN — SENNOSIDES AND DOCUSATE SODIUM 2 TABLET: 50; 8.6 TABLET ORAL at 18:36

## 2022-04-05 RX ADMIN — ZINC SULFATE 220 MG (50 MG) CAPSULE 220 MG: CAPSULE at 09:27

## 2022-04-05 RX ADMIN — METHYLPREDNISOLONE SODIUM SUCCINATE 40 MG: 40 INJECTION, POWDER, FOR SOLUTION INTRAMUSCULAR; INTRAVENOUS at 09:27

## 2022-04-05 RX ADMIN — OXYCODONE HYDROCHLORIDE AND ACETAMINOPHEN 500 MG: 500 TABLET ORAL at 09:27

## 2022-04-05 RX ADMIN — BENZONATATE 200 MG: 100 CAPSULE ORAL at 22:12

## 2022-04-05 RX ADMIN — CEFEPIME HYDROCHLORIDE 2000 MG: 2 INJECTION, POWDER, FOR SOLUTION INTRAVENOUS at 05:01

## 2022-04-05 RX ADMIN — GUAIFENESIN 600 MG: 600 TABLET ORAL at 09:26

## 2022-04-05 RX ADMIN — CEFEPIME HYDROCHLORIDE 2000 MG: 2 INJECTION, POWDER, FOR SOLUTION INTRAVENOUS at 18:36

## 2022-04-05 RX ADMIN — SENNOSIDES AND DOCUSATE SODIUM 2 TABLET: 50; 8.6 TABLET ORAL at 09:27

## 2022-04-05 RX ADMIN — THIAMINE HCL TAB 100 MG 100 MG: 100 TAB at 09:27

## 2022-04-05 RX ADMIN — METHYLPREDNISOLONE SODIUM SUCCINATE 40 MG: 40 INJECTION, POWDER, FOR SOLUTION INTRAMUSCULAR; INTRAVENOUS at 22:12

## 2022-04-05 NOTE — ASSESSMENT & PLAN NOTE
· Elevated BNP noted, vascular congestion on chest x-ray  · On Lasix 40 mg daily now euvolemic  · 3/31 TTE: LVEF 61%, Grade 1 diastolic dysfunction, mildly reduced RV systolic function

## 2022-04-05 NOTE — ASSESSMENT & PLAN NOTE
Lab Results   Component Value Date    HGBA1C 7 7 (H) 12/06/2021       Recent Labs     04/04/22  1628 04/04/22  2107 04/05/22  0713 04/05/22  1115   POCGLU 328* 316* 225* 246*       Blood Sugar Average: Last 72 hrs:  (P) 213 [id number]     · Continues to be elevated, will increase Lantus to 30 units HS  ·  Steroid-induced hyperglycemia thus was on insulin gtt now off  · Cont insulin regimen and titrate up for optimal control  · Close monitor given on IV steroids

## 2022-04-05 NOTE — RESPIRATORY THERAPY NOTE
RT Protocol Note  Caroline Thompson 78 y o  male MRN: [id number]  Unit/Bed#:  Encounter: [id number]    Assessment    Principal Problem:    Acute on chronic respiratory failure with hypoxia (HCC)  Active Problems:    Non MI elevated troponin    Type 2 diabetes mellitus, without long-term current use of insulin (HCC)    Elevated C-reactive protein (CRP)    Elevated brain natriuretic peptide (BNP) level      Home Pulmonary Medications:  Breo  Home Devices/Therapy: Home O2    Past Medical History:   Diagnosis Date    COVID-19 2022    Diabetes mellitus ([address] )     Hypertension      Social History     Socioeconomic History    Marital status: /Civil Union     Spouse name: Not on file    Number of children: Not on file    Years of education: Not on file    Highest education level: Not on file   Occupational History    Not on file   Tobacco Use    Smoking status: Former Smoker     Packs/day: 1 00     Years: 30 00     Pack years: 30 00     Types: Cigarettes     Quit date: 2008     Years since quittin 1    Smokeless tobacco: Never Used   Vaping Use    Vaping Use: Never used   Substance and Sexual Activity    Alcohol use: Never    Drug use: Never    Sexual activity: Not Currently   Other Topics Concern    Not on file   Social History Narrative    Not on file     Social Determinants of Health     Financial Resource Strain: Not on file   Food Insecurity: No Food Insecurity    Worried About Running Out of Food in the Last Year: Never true    [place] of Food in the Last Year: Never true   Transportation Needs: No Transportation Needs    Lack of Transportation (Medical): No    Lack of Transportation (Non-Medical):  No   Physical Activity: Not on file   Stress: Not on file   Social Connections: Not on file   Intimate Partner Violence: Not on file   Housing Stability: Low Risk     Unable to Pay for Housing in the Last Year: No    Number of Places Lived in the Last Year: 1    Unstable Housing in the Last Year: No       Subjective    Subjective Data: sleeping    Objective    Physical Exam:   Assessment Type: Assess only  General Appearance: Awake,Alert  Respiratory Pattern: Normal  Chest Assessment: Chest expansion symmetrical  Bilateral Breath Sounds: Diminished  O2 Device: NC    Vitals:  Blood pressure 117/63, pulse 70, temperature 97 7 °F (36 5 °C), temperature source Oral, resp  rate 19, height 5' 7" (1 702 m), weight 83 5 kg (184 lb 1 4 oz), SpO2 93 %  Imaging and other studies: I have personally reviewed pertinent reports        O2 Device: NC     Plan    Respiratory Plan: Home Bronchodilator Patient pathway  Airway Clearance Plan: Incentive Spirometer     Resp Comments: Pt awake and alert in no apparent resp distress at this time, no adverse breath sounds continue with home med orders

## 2022-04-05 NOTE — PLAN OF CARE
Problem: Potential for Falls  Goal: Patient will remain free of falls  Description: INTERVENTIONS:  - Educate patient/family on patient safety including physical limitations  - Instruct patient to call for assistance with activity   - Consult OT/PT to assist with strengthening/mobility   - Keep Call bell within reach  - Keep bed low and locked with side rails adjusted as appropriate  - Keep care items and personal belongings within reach  - Initiate and maintain comfort rounds  - Make Fall Risk Sign visible to staff  - Offer Toileting every 2 Hours, in advance of need  - Initiate/Maintain bed alarm  - Apply yellow socks and bracelet for high fall risk patients  - Consider moving patient to room near nurses station  Outcome: Progressing     Problem: MOBILITY - ADULT  Goal: Maintain or return to baseline ADL function  Description: INTERVENTIONS:  -  Assess patient's ability to carry out ADLs; assess patient's baseline for ADL function and identify physical deficits which impact ability to perform ADLs (bathing, care of mouth/teeth, toileting, grooming, dressing, etc )  - Assess/evaluate cause of self-care deficits   - Assess range of motion  - Assess patient's mobility; develop plan if impaired  - Assess patient's need for assistive devices and provide as appropriate  - Encourage maximum independence but intervene and supervise when necessary  - Involve family in performance of ADLs  - Assess for home care needs following discharge   - Consider OT consult to assist with ADL evaluation and planning for discharge  - Provide patient education as appropriate  Outcome: Progressing  Goal: Maintains/Returns to pre admission functional level  Description: INTERVENTIONS:  - Perform BMAT or MOVE assessment daily    - Set and communicate daily mobility goal to care team and patient/family/caregiver  - Collaborate with rehabilitation services on mobility goals if consulted  - Perform Range of Motion 3 times a day    - Reposition patient every 2 hours    - Dangle patient 3 times a day  - Stand patient 3 times a day  - Ambulate patient 3 times a day  - Out of bed to chair 3 times a day   - Out of bed for meals 3 times a day  - Out of bed for toileting  - Record patient progress and toleration of activity level   Outcome: Progressing     Problem: PAIN - ADULT  Goal: Verbalizes/displays adequate comfort level or baseline comfort level  Description: Interventions:  - Encourage patient to monitor pain and request assistance  - Assess pain using appropriate pain scale  - Administer analgesics based on type and severity of pain and evaluate response  - Implement non-pharmacological measures as appropriate and evaluate response  - Consider cultural and social influences on pain and pain management  - Notify physician/advanced practitioner if interventions unsuccessful or patient reports new pain  Outcome: Progressing     Problem: INFECTION - ADULT  Goal: Absence or prevention of progression during hospitalization  Description: INTERVENTIONS:  - Assess and monitor for signs and symptoms of infection  - Monitor lab/diagnostic results  - Monitor all insertion sites, i e  indwelling lines, tubes, and drains  - Monitor endotracheal if appropriate and nasal secretions for changes in amount and color  - [place] appropriate cooling/warming therapies per order  - Administer medications as ordered  - Instruct and encourage patient and family to use good hand hygiene technique  - Identify and instruct in appropriate isolation precautions for identified infection/condition  Outcome: Progressing     Problem: SAFETY ADULT  Goal: Patient will remain free of falls  Description: INTERVENTIONS:  - Educate patient/family on patient safety including physical limitations  - Instruct patient to call for assistance with activity   - Consult OT/PT to assist with strengthening/mobility   - Keep Call bell within reach  - Keep bed low and locked with side rails adjusted as appropriate  - Keep care items and personal belongings within reach  - Initiate and maintain comfort rounds  - Make Fall Risk Sign visible to staff  - Offer Toileting every 2 Hours, in advance of need  - Initiate/Maintain bed alarm  - Apply yellow socks and bracelet for high fall risk patients  - Consider moving patient to room near nurses station  Outcome: Progressing  Goal: Maintain or return to baseline ADL function  Description: INTERVENTIONS:  -  Assess patient's ability to carry out ADLs; assess patient's baseline for ADL function and identify physical deficits which impact ability to perform ADLs (bathing, care of mouth/teeth, toileting, grooming, dressing, etc )  - Assess/evaluate cause of self-care deficits   - Assess range of motion  - Assess patient's mobility; develop plan if impaired  - Assess patient's need for assistive devices and provide as appropriate  - Encourage maximum independence but intervene and supervise when necessary  - Involve family in performance of ADLs  - Assess for home care needs following discharge   - Consider OT consult to assist with ADL evaluation and planning for discharge  - Provide patient education as appropriate  Outcome: Progressing  Goal: Maintains/Returns to pre admission functional level  Description: INTERVENTIONS:  - Perform BMAT or MOVE assessment daily    - Set and communicate daily mobility goal to care team and patient/family/caregiver  - Collaborate with rehabilitation services on mobility goals if consulted  - Perform Range of Motion 3 times a day  - Reposition patient every 2 hours    - Dangle patient 3 times a day  - Stand patient 3 times a day  - Ambulate patient 3 times a day  - Out of bed to chair 3 times a day   - Out of bed for meals 3 times a day  - Out of bed for toileting  - Record patient progress and toleration of activity level   Outcome: Progressing     Problem: DISCHARGE PLANNING  Goal: Discharge to home or other facility with appropriate resources  Description: INTERVENTIONS:  - Identify barriers to discharge w/patient and caregiver  - Arrange for needed discharge resources and transportation as appropriate  - Identify discharge learning needs (meds, wound care, etc )  - Arrange for interpretive services to assist at discharge as needed  - Refer to Case Management Department for coordinating discharge planning if the patient needs post-hospital services based on physician/advanced practitioner order or complex needs related to functional status, cognitive ability, or social support system  Outcome: Progressing     Problem: Knowledge Deficit  Goal: Patient/family/caregiver demonstrates understanding of disease process, treatment plan, medications, and discharge instructions  Description: Complete learning assessment and assess knowledge base  Interventions:  - Provide teaching at level of understanding  - Provide teaching via preferred learning methods  Outcome: Progressing     Problem: Nutrition/Hydration-ADULT  Goal: Nutrient/Hydration intake appropriate for improving, restoring or maintaining nutritional needs  Description: Monitor and assess patient's nutrition/hydration status for malnutrition  Collaborate with interdisciplinary team and initiate plan and interventions as ordered  Monitor patient's weight and dietary intake as ordered or per policy  Utilize nutrition screening tool and intervene as necessary  Determine patient's food preferences and provide high-protein, high-caloric foods as appropriate       INTERVENTIONS:  - Monitor oral intake, urinary output, labs, and treatment plans  - Assess nutrition and hydration status and recommend course of action  - Evaluate amount of meals eaten  - Assist patient with eating if necessary   - Allow adequate time for meals  - Recommend/ encourage appropriate diets, oral nutritional supplements, and vitamin/mineral supplements  - Order, calculate, and assess calorie counts as needed  - Recommend, monitor, and adjust tube feedings and TPN/PPN based on assessed needs  - Assess need for intravenous fluids  - Provide specific nutrition/hydration education as appropriate  - Include patient/family/caregiver in decisions related to nutrition  Outcome: Progressing

## 2022-04-05 NOTE — ASSESSMENT & PLAN NOTE
· Hs troponin elevated at 105, no chest pain, no EKG changes of ST elevation  · Peaked at 224 and downtrended  · Favor non-MI troponin elevation in setting of acute hypoxic respiratory failure  · Denies chest pain  · Recent NM stress test revealing moderate to large predominantly fixed defect of the basal to mid inferior wall   This is possible secondary to artifact

## 2022-04-05 NOTE — ASSESSMENT & PLAN NOTE
· Suspect primarily long term sequelae of COVID-19 pneumonia:  COVID pneumonitis  Continue IV Solu-Medrol as per Pulmonary recommendations plan to transition to p o  Steroids tomorrow  · Previously required high flow nasal cannula, now on traditional nasal cannula, cont to titrate down  · Pulmonary following     · On IV steroids taper per pulm and CRP  · Follow daily CRP:  Downtrending  · Continue Furosemide 40mg daily:  Euvolemic currently  · Continue outpatient breo-ellipta  · On cefepime, day 6 /7  Vanco d/cd given neg mrsa  · Encourage pulmonary hygiene  · Scheduled Tessalon Perles TID and mucinex for cough as coughing is what contributes most to desaturation episodes

## 2022-04-05 NOTE — PROGRESS NOTES
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  4  Acute on chronic diastolic CHF  5  Suspected bacterial pneumonia    Plan:  Patient now on 2 L nasal cannula, was requiring high-flow nasal cannula earlier in admission, titrate to maintain O2 sats greater than 88%  Will continue tapering steroids to 40 Q 12 today, anticipate further taper tomorrow, inflammatory markers are decreasing  Continue Breo daily  Diuresis per primary service  Today is day 6/7 of cefepime  Will need outpatient follow-up for PFTs as well as HRCT  Will continue to follow  Subjective:   Patient resting in the chair eating breakfast   Reports continued improvement in his breathing and cough  Objective:     Vitals: Blood pressure 126/60, pulse 63, temperature 97 9 °F (36 6 °C), resp  rate 17, height 5' 7" (1 702 m), weight 83 5 kg (184 lb 1 4 oz), SpO2 97 %  ,Body mass index is 28 83 kg/m²        Intake/Output Summary (Last 24 hours) at 4/5/2022 8230  Last data filed at 4/5/2022 0700  Gross per 24 hour   Intake 350 ml   Output 2200 ml   Net -1850 ml       Invasive Devices  Report    Peripheral Intravenous Line            Peripheral IV 04/03/22 Right Hand 2 days                Physical Exam: /60   Pulse 63   Temp 97 9 °F (36 6 °C)   Resp 17   Ht 5' 7" (1 702 m)   Wt 83 5 kg (184 lb 1 4 oz)   SpO2 97%   BMI 28 83 kg/m²   General appearance: alert and oriented, in no acute distress  Head: Normocephalic, without obvious abnormality, atraumatic  Eyes: negative findings: conjunctivae and sclerae normal  Lungs: clear to auscultation bilaterally  Heart: regular rate and rhythm  Abdomen: normal findings: soft, non-tender  Extremities: No edema  Skin: Warm and dry  Neurologic: Mental status: Alert, oriented, thought content appropriate     Labs: I have personally reviewed pertinent lab results  , CBC:   Lab Results   Component Value Date    WBC 14 54 (H) 04/05/2022    HGB 11 3 (L) 04/05/2022    HCT 33 4 (L) 04/05/2022     (H) 04/05/2022     04/05/2022    MCH 33 8 04/05/2022    MCHC 33 8 04/05/2022    RDW 15 0 04/05/2022    MPV 9 7 04/05/2022   , CMP:   Lab Results   Component Value Date    SODIUM 135 (L) 04/05/2022    K 4 5 04/05/2022     04/05/2022    CO2 23 04/05/2022    BUN 27 (H) 04/05/2022    CREATININE 0 93 04/05/2022    CALCIUM 8 7 04/05/2022    EGFR 85 04/05/2022     Imaging and other studies: I have personally reviewed pertinent reports     and I have personally reviewed pertinent films in PACS

## 2022-04-05 NOTE — PROGRESS NOTES
[address] [facility]  Progress Note - Minnie Borders 1955, 78 y o  male MRN: [id number]  Unit/Bed#:  Encounter: [id number]  Primary Care Provider: Jesus Heller MD   Date and time admitted to hospital: 3/30/2022  5:53 PM    * Acute on chronic respiratory failure with hypoxia [facility])  Assessment & Plan  · Suspect primarily long term sequelae of COVID-19 pneumonia:  COVID pneumonitis  Continue IV Solu-Medrol as per Pulmonary recommendations plan to transition to p o  Steroids tomorrow  · Previously required high flow nasal cannula, now on traditional nasal cannula, cont to titrate down  · Pulmonary following     · On IV steroids taper per pulm and CRP  · Follow daily CRP:  Downtrending  · Continue Furosemide 40mg daily:  Euvolemic currently  · Continue outpatient breo-ellipta  · On cefepime, day 6 /7  Vanco d/cd given neg mrsa  · Encourage pulmonary hygiene  · Scheduled Tessalon Perles TID and mucinex for cough as coughing is what contributes most to desaturation episodes         Acute diastolic heart failure (HCC)  Assessment & Plan  · Elevated BNP noted, vascular congestion on chest x-ray  · On Lasix 40 mg daily now euvolemic  · 3/31 TTE: LVEF 89%, Grade 1 diastolic dysfunction, mildly reduced RV systolic function      Type 2 diabetes mellitus, without long-term current use of insulin [facility])  Assessment & Plan  Lab Results   Component Value Date    HGBA1C 7 7 (H) 12/06/2021       Recent Labs     04/04/22  1628 04/04/22  2107 04/05/22  0713 04/05/22  1115   POCGLU 328* 316* 225* 246*       Blood Sugar Average: Last 72 hrs:  (P) 213 [id number]     · Continues to be elevated, will increase Lantus to 30 units HS  ·  Steroid-induced hyperglycemia thus was on insulin gtt now off  · Cont insulin regimen and titrate up for optimal control  · Close monitor given on IV steroids    Non MI elevated troponin  Assessment & Plan  · Hs troponin elevated at 105, no chest pain, no EKG changes of ST elevation  · Peaked at 224 and downtrended  · Favor non-MI troponin elevation in setting of acute hypoxic respiratory failure  · Denies chest pain  · Recent NM stress test revealing moderate to large predominantly fixed defect of the basal to mid inferior wall  This is possible secondary to artifact             VTE Pharmacologic Prophylaxis: VTE Score: 6 Lovenox    Patient Centered Rounds: I performed bedside rounds with nursing staff today  Discussions with Specialists or Other Care Team Provider:  Yes, nursing, case management    Education and Discussions with Family / Patient: Patient will update his family himself he says  Time Spent for Care: 20 minutes  More than 50% of total time spent on counseling and coordination of care as described above  Current Length of Stay: 6 day(s)  Current Patient Status: Inpatient   Certification Statement: The patient will continue to require additional inpatient hospital stay due to Need for IV steroids  Discharge Plan: Anticipate discharge in 24-48 hrs to home  Code Status: Level 1 - Full Code    Subjective:   Seen and examined at bedside  Breathing has improved  Coughing has improved, no sputum production    Objective:     Vitals:   Temp (24hrs), Av °F (36 7 °C), Min:97 9 °F (36 6 °C), Max:98 °F (36 7 °C)    Temp:  [97 9 °F (36 6 °C)-98 °F (36 7 °C)] 97 9 °F (36 6 °C)  HR:  [63-82] 63  Resp:  [17-20] 17  BP: (126-159)/(60-69) 126/60  SpO2:  [93 %-97 %] 97 %  Body mass index is 28 83 kg/m²  Input and Output Summary (last 24 hours):      Intake/Output Summary (Last 24 hours) at 2022 1527  Last data filed at 2022 1225  Gross per 24 hour   Intake 830 ml   Output 3400 ml   Net -2570 ml       Physical Exam:   Physical Exam General- Awake, alert and oriented x 3, looks comfortable  HEENT- Normocephalic, atraumatic, oral mucosa- moist  Neck- Supple, No carotid bruit, no JVD  CVS- Normal S1/ S2, Regular rate and rhythm, No murmur, +2 pitting chronic edema  Respiratory system- B/L clear breath sounds, no wheezing  Abdomen- Soft, Non distended, no tenderness, Bowel sound- present 4 quads  Genitourinary- No suprapubic tenderness, No CVA tenderness  Skin- No new bruise or rash  Musculoskeletal- No gross deformity  Psych- No acute psychosis  CNS- CN II- XII grossly intact, No acute focal neurologic deficit noted      Additional Data:     Labs:  Results from last 7 days   Lab Units 04/05/22  0553 04/01/22  0515 03/31/22  0427   WBC Thousand/uL 14 54*   < > 7 56   HEMOGLOBIN g/dL 11 3*   < > 10 8*   HEMATOCRIT % 33 4*   < > 31 4*   PLATELETS Thousands/uL 267   < > 160   NEUTROS PCT %  --   --  71   LYMPHS PCT %  --   --  26   MONOS PCT %  --   --  2*   EOS PCT %  --   --  0    < > = values in this interval not displayed  Results from last 7 days   Lab Units 04/05/22 0553 04/01/22  0515 03/31/22  0427   SODIUM mmol/L 135*   < > 135*   POTASSIUM mmol/L 4 5   < > 4 7   CHLORIDE mmol/L 101   < > 100   CO2 mmol/L 23   < > 24   BUN mg/dL 27*   < > 20   CREATININE mg/dL 0 93   < > 1 00   ANION GAP mmol/L 11   < > 11   CALCIUM mg/dL 8 7   < > 8 6   ALBUMIN g/dL  --   --  2 6*   TOTAL BILIRUBIN mg/dL  --   --  1 39*   ALK PHOS U/L  --   --  61   ALT U/L  --   --  37   AST U/L  --   --  40   GLUCOSE RANDOM mg/dL 272*   < > 302*    < > = values in this interval not displayed       Results from last 7 days   Lab Units 03/30/22  1149   INR  1 11     Results from last 7 days   Lab Units 04/05/22  1115 04/05/22  0713 04/04/22  2107 04/04/22  1628 04/04/22  1139 04/04/22  0845 04/04/22  0819 04/04/22  0800 04/04/22  0559 04/04/22  0401 04/04/22  0202 04/04/22  0002   POC GLUCOSE mg/dl 246* 225* 316* 328* 186* 102 99 68 85 114 106 153*         Results from last 7 days   Lab Units 04/04/22  0623 04/02/22  0439 04/01/22  0601 03/31/22  0427 03/30/22  1924 03/30/22  1425 03/30/22  1149   LACTIC ACID mmol/L  --   --   --   --   --  1 6 2 8*   PROCALCITONIN ng/ml 0 16 0 30* 0 44* 0 64* 0 36*  --   --        Lines/Drains:  Invasive Devices  Report    Peripheral Intravenous Line            Peripheral IV 04/03/22 Right Hand 2 days                      Imaging: No pertinent imaging reviewed  Recent Cultures (last 7 days):   Results from last 7 days   Lab Units 04/02/22  0922 03/30/22  1149 03/30/22  1139   BLOOD CULTURE   --  No Growth After 5 Days  No Growth After 5 Days     SPUTUM CULTURE  3+ Growth of   --   --    GRAM STAIN RESULT  No Polys or Bacteria seen  Rare Epithelial Cells  --   --        Last 24 Hours Medication List:   Current Facility-Administered Medications   Medication Dose Route Frequency Provider Last Rate    ascorbic acid  500 mg Oral Daily Doug Gottron Lucke, CRNP      atorvastatin  10 mg Oral Daily Doug Gottron Lucke, CRNP      benzonatate  200 mg Oral TID Doug Gottron Lucke, CRNP      cefepime  2,000 mg Intravenous Q12H Doug Gottron Lucke, CRNP 2,000 mg (04/05/22 0501)    enoxaparin  40 mg Subcutaneous Daily Doug Gottron Lucke, CRNP      fluticasone-vilanterol  1 puff Inhalation Daily Doug Gottron Lucke, CRNP      furosemide  40 mg Oral Daily Doug Gottron Lucke, CRNP      guaiFENesin  600 mg Oral Q12H [address] Doug Gottron Lucke, CRNP      insulin glargine  25 Units Subcutaneous HS Leanasita Menchaca, DO      insulin lispro  12 Units Subcutaneous TID With Meals [employer], DO      methylPREDNISolone sodium succinate  40 mg Intravenous Q12H [address] Sylvie Schmitz, DO      pantoprazole  40 mg Oral Early Morning Doug Gottron [place], CRNP      [START ON 4/6/2022] predniSONE  50 mg Oral Daily Sylvie Schmitz,       senna-docusate sodium  2 tablet Oral BID Doug Gottron Lucke, CRNP      sodium chloride  1 spray Each Nare Q1H PRN NEGRITA Saldivar      thiamine  100 mg Oral Daily Doug Gottron Lucke, CRNP      zinc sulfate  220 mg Oral Daily NEGRITA Saldivar          Today, Patient Was Seen By: Angi Holden MD    **Please Note: This note may have been constructed using a voice recognition system  **

## 2022-04-06 ENCOUNTER — IMMUNIZATIONS (INPATIENT)
Dept: FAMILY MEDICINE CLINIC | Facility: HOSPITAL | Age: 67
DRG: 193 | End: 2022-04-06
Payer: COMMERCIAL

## 2022-04-06 VITALS
HEART RATE: 91 BPM | TEMPERATURE: 98.1 F | SYSTOLIC BLOOD PRESSURE: 121 MMHG | BODY MASS INDEX: 28.89 KG/M2 | RESPIRATION RATE: 26 BRPM | OXYGEN SATURATION: 91 % | DIASTOLIC BLOOD PRESSURE: 78 MMHG | HEIGHT: 67 IN | WEIGHT: 184.08 LBS

## 2022-04-06 LAB
ANION GAP SERPL CALCULATED.3IONS-SCNC: 8 MMOL/L (ref 4–13)
BUN SERPL-MCNC: 25 MG/DL (ref 5–25)
CALCIUM SERPL-MCNC: 8.9 MG/DL (ref 8.3–10.1)
CHLORIDE SERPL-SCNC: 102 MMOL/L (ref 100–108)
CO2 SERPL-SCNC: 26 MMOL/L (ref 21–32)
CREAT SERPL-MCNC: 0.81 MG/DL (ref 0.6–1.3)
CRP SERPL QL: 4.2 MG/L
ERYTHROCYTE [DISTWIDTH] IN BLOOD BY AUTOMATED COUNT: 15.2 % (ref 11.6–15.1)
GFR SERPL CREATININE-BSD FRML MDRD: 92 ML/MIN/1.73SQ M
GLUCOSE SERPL-MCNC: 179 MG/DL (ref 65–140)
GLUCOSE SERPL-MCNC: 248 MG/DL (ref 65–140)
GLUCOSE SERPL-MCNC: 332 MG/DL (ref 65–140)
HCT VFR BLD AUTO: 34.4 % (ref 36.5–49.3)
HGB BLD-MCNC: 11.6 G/DL (ref 12–17)
MCH RBC QN AUTO: 34 PG (ref 26.8–34.3)
MCHC RBC AUTO-ENTMCNC: 33.7 G/DL (ref 31.4–37.4)
MCV RBC AUTO: 101 FL (ref 82–98)
PLATELET # BLD AUTO: 261 THOUSANDS/UL (ref 149–390)
PMV BLD AUTO: 9.8 FL (ref 8.9–12.7)
POTASSIUM SERPL-SCNC: 4.5 MMOL/L (ref 3.5–5.3)
RBC # BLD AUTO: 3.41 MILLION/UL (ref 3.88–5.62)
SODIUM SERPL-SCNC: 136 MMOL/L (ref 136–145)
WBC # BLD AUTO: 13.41 THOUSAND/UL (ref 4.31–10.16)

## 2022-04-06 PROCEDURE — 80048 BASIC METABOLIC PNL TOTAL CA: CPT | Performed by: FAMILY MEDICINE

## 2022-04-06 PROCEDURE — 0051A COVID-19 PFIZER VACC TRIS-SUCROSE GRAY CAP 0.3 ML: CPT

## 2022-04-06 PROCEDURE — 86140 C-REACTIVE PROTEIN: CPT | Performed by: FAMILY MEDICINE

## 2022-04-06 PROCEDURE — 99232 SBSQ HOSP IP/OBS MODERATE 35: CPT | Performed by: INTERNAL MEDICINE

## 2022-04-06 PROCEDURE — XW023S6 INTRODUCTION OF COVID-19 VACCINE DOSE 1 INTO MUSCLE, PERCUTANEOUS APPROACH, NEW TECHNOLOGY GROUP 6: ICD-10-PCS | Performed by: INTERNAL MEDICINE

## 2022-04-06 PROCEDURE — 85027 COMPLETE CBC AUTOMATED: CPT | Performed by: FAMILY MEDICINE

## 2022-04-06 PROCEDURE — 99239 HOSP IP/OBS DSCHRG MGMT >30: CPT | Performed by: FAMILY MEDICINE

## 2022-04-06 PROCEDURE — 82948 REAGENT STRIP/BLOOD GLUCOSE: CPT

## 2022-04-06 PROCEDURE — 91305 COVID-19 PFIZER VACC TRIS-SUCROSE GRAY CAP 0.3 ML: CPT

## 2022-04-06 RX ORDER — GUAIFENESIN 600 MG
600 TABLET, EXTENDED RELEASE 12 HR ORAL EVERY 12 HOURS PRN
Qty: 20 TABLET | Refills: 0 | Status: SHIPPED | OUTPATIENT
Start: 2022-04-06 | End: 2022-04-16

## 2022-04-06 RX ORDER — INSULIN LISPRO 100 [IU]/ML
12 INJECTION, SOLUTION INTRAVENOUS; SUBCUTANEOUS
Qty: 15 ML | Refills: 0 | Status: SHIPPED | OUTPATIENT
Start: 2022-04-06

## 2022-04-06 RX ORDER — FUROSEMIDE 40 MG/1
40 TABLET ORAL DAILY
Qty: 30 TABLET | Refills: 0 | Status: SHIPPED | OUTPATIENT
Start: 2022-04-07

## 2022-04-06 RX ORDER — PREDNISONE 10 MG/1
TABLET ORAL
Qty: 100 TABLET | Refills: 0 | Status: SHIPPED | OUTPATIENT
Start: 2022-04-07 | End: 2022-05-11

## 2022-04-06 RX ADMIN — BENZONATATE 200 MG: 100 CAPSULE ORAL at 09:04

## 2022-04-06 RX ADMIN — PREDNISONE 50 MG: 20 TABLET ORAL at 09:03

## 2022-04-06 RX ADMIN — CEFEPIME HYDROCHLORIDE 2000 MG: 2 INJECTION, POWDER, FOR SOLUTION INTRAVENOUS at 04:36

## 2022-04-06 RX ADMIN — OXYCODONE HYDROCHLORIDE AND ACETAMINOPHEN 500 MG: 500 TABLET ORAL at 09:03

## 2022-04-06 RX ADMIN — SENNOSIDES AND DOCUSATE SODIUM 2 TABLET: 50; 8.6 TABLET ORAL at 09:03

## 2022-04-06 RX ADMIN — FUROSEMIDE 40 MG: 40 TABLET ORAL at 09:04

## 2022-04-06 RX ADMIN — PANTOPRAZOLE SODIUM 40 MG: 40 TABLET, DELAYED RELEASE ORAL at 05:27

## 2022-04-06 RX ADMIN — FLUTICASONE FUROATE AND VILANTEROL TRIFENATATE 1 PUFF: 100; 25 POWDER RESPIRATORY (INHALATION) at 09:06

## 2022-04-06 RX ADMIN — THIAMINE HCL TAB 100 MG 100 MG: 100 TAB at 09:03

## 2022-04-06 RX ADMIN — ZINC SULFATE 220 MG (50 MG) CAPSULE 220 MG: CAPSULE at 09:04

## 2022-04-06 RX ADMIN — ATORVASTATIN CALCIUM 10 MG: 10 TABLET, FILM COATED ORAL at 09:04

## 2022-04-06 RX ADMIN — ENOXAPARIN SODIUM 40 MG: 40 INJECTION SUBCUTANEOUS at 09:04

## 2022-04-06 RX ADMIN — GUAIFENESIN 600 MG: 600 TABLET ORAL at 09:03

## 2022-04-06 NOTE — RESPIRATORY THERAPY NOTE
RT Protocol Note  Pepito Chambers 78 y o  male MRN: [id number]  Unit/Bed#:  Encounter: [id number]    Assessment    Principal Problem:    Acute on chronic respiratory failure with hypoxia (HCC)  Active Problems:    Non MI elevated troponin    Type 2 diabetes mellitus, without long-term current use of insulin (HCC)    Elevated C-reactive protein (CRP)    Acute diastolic heart failure (HCC)      Home Pulmonary Medications:  Breo  Home Devices/Therapy: Home O2    Past Medical History:   Diagnosis Date    COVID-19 2022    Diabetes mellitus ([facility] [address] )     Hypertension      Social History     Socioeconomic History    Marital status: /Civil Union     Spouse name: Not on file    Number of children: Not on file    Years of education: Not on file    Highest education level: Not on file   Occupational History    Not on file   Tobacco Use    Smoking status: Former Smoker     Packs/day: 1 00     Years: 30 00     Pack years: 30 00     Types: Cigarettes     Quit date: 2008     Years since quittin 1    Smokeless tobacco: Never Used   Vaping Use    Vaping Use: Never used   Substance and Sexual Activity    Alcohol use: Never    Drug use: Never    Sexual activity: Not Currently   Other Topics Concern    Not on file   Social History Narrative    Not on file     Social Determinants of Health     Financial Resource Strain: Not on file   Food Insecurity: No Food Insecurity    Worried About Running Out of Food in the Last Year: Never true    Regis of Food in the Last Year: Never true   Transportation Needs: No Transportation Needs    Lack of Transportation (Medical): No    Lack of Transportation (Non-Medical):  No   Physical Activity: Not on file   Stress: Not on file   Social Connections: Not on file   Intimate Partner Violence: Not on file   Housing Stability: Low Risk     Unable to Pay for Housing in the Last Year: No    Number of Places Lived in the Last Year: 1    Unstable Housing in the Last Year: No       Subjective    Subjective Data: sleeping    Objective    Physical Exam:   Assessment Type: Assess only  General Appearance: Alert,Awake  Respiratory Pattern: Normal  Chest Assessment: Chest expansion symmetrical  Bilateral Breath Sounds: Diminished,Clear  O2 Device: NC    Vitals:  Blood pressure 126/60, pulse 68, temperature (!) 97 4 °F (36 3 °C), temperature source Oral, resp  rate 20, height 5' 7" (1 702 m), weight 83 5 kg (184 lb 1 4 oz), SpO2 93 %  Imaging and other studies: I have personally reviewed pertinent reports        O2 Device: NC     Plan    Respiratory Plan: Discontinue Protocol  Airway Clearance Plan: Incentive Spirometer     Resp Comments: patient with pulmonary history has not used any PRN and is currently doing well on just his Breo discontinuing protocol

## 2022-04-06 NOTE — CASE MANAGEMENT
Case Management Discharge Planning Note    Patient name Tatianna Bailey  Location / MRN [id number]  : 1955 Date 2022       Current Admission Date: 3/30/2022  Current Admission Diagnosis:Acute on chronic respiratory failure with hypoxia [facility])   Patient Active Problem List    Diagnosis Date Noted    Elevated C-reactive protein (CRP) 2022    Acute diastolic heart failure ([facility] [address] ) 2022    Positive blood culture 2022    [facility] on [facility] with [facility] 20[facility]    COVID 2022    Non MI elevated troponin 2022    Type 2 diabetes mellitus, without long-term current use of insulin ([facility] ) 2022    Primary hypertension 2022      LOS (days): 7  Geometric Mean LOS (GMLOS) (days): 4 10  Days to GMLOS:-2 7     OBJECTIVE:  Risk of Unplanned Readmission Score: 23         Current admission status: Inpatient   Preferred Pharmacy:    [place] [address]  Phone: [phone] Fax: [phone], [facility] [address]  Phone: [phone] Fax: [facility] [address] [place] [address] [address] [facility]  Phone: [phone] Fax: [phone]    Primary Care Provider: Samantha Sosa MD    Primary Insurance: [facility]  Secondary Insurance:     DISCHARGE DETAILS:    Discharge planning discussed with[de-identified] patient  Freedom of Choice: Yes  Comments - Freedom of Choice: cm discussed freedom of choice anbd gave pt outpatient therapy list as requested for outpatient PT OT as recommended for therapy as outpatient  Pt verbalized understanding  LESLYE Woods made aware

## 2022-04-06 NOTE — ASSESSMENT & PLAN NOTE
· Elevated BNP noted, vascular congestion on chest x-ray  · On Lasix 40 mg daily now euvolemic  · 3/31 TTE: LVEF 56%, Grade 1 diastolic dysfunction, mildly reduced RV systolic function

## 2022-04-06 NOTE — ASSESSMENT & PLAN NOTE
· In setting of prolonged covid illness  · Now on oral Prednisone, 50 mg daily  Taper by 10 mg every 7 days per Pulmonology recommendations  · Trend daily; CRP 4 2 today

## 2022-04-06 NOTE — ASSESSMENT & PLAN NOTE
· Stable  · Suspect primarily long term sequelae of COVID-19 pneumonia:  COVID pneumonitis  Transitioned to oral steroids today by Pulmonology - 50 mg daily  Decrease by 10 mg every 7 days  · Previously required high flow nasal cannula, now on 2L NC, 91-94%; continue to wean as tolerated  · Pulmonary following  · Follow daily CRP:  4 2 today  · Continue Furosemide 40 mg daily:  Euvolemic currently  · Continue outpatient breo-ellipta  · On cefepime, day 7 /7   Vanco d/cd given neg mrsa  · Encourage pulmonary hygiene  · Scheduled Tessalon Perles TID and mucinex for cough as coughing is what contributes most to desaturation episodes

## 2022-04-06 NOTE — PROGRESS NOTES
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  4  Acute on chronic diastolic CHF  5  Suspected bacterial pneumonia    Plan:  Patient remains on 2 L nasal cannula, was requiring high-flow nasal cannula earlier in admission, titrate to maintain O2 sats greater than 88%  He was switched to prednisone 50 mg today, would decrease by 10 mg every 7 days  Continue Breo daily  Today is the last day of cefepime  Will need outpatient follow-up for PFTs as well as HRCT  Hopeful discharge today or tomorrow, can follow up with our [address] [place] [address] office    Subjective:   Patient resting in the chair eating breakfast   He is feeling well this morning  He took a shower and got dressed and is feeling much better  Objective:     Vitals: Blood pressure 121/78, pulse 103, temperature 98 1 °F (36 7 °C), temperature source Oral, resp  rate (!) 26, height 5' 7" (1 702 m), weight 83 5 kg (184 lb 1 4 oz), SpO2 91 %  ,Body mass index is 28 83 kg/m²        Intake/Output Summary (Last 24 hours) at 4/6/2022 0912  Last data filed at 4/6/2022 0573  Gross per 24 hour   Intake --   Output 2725 ml   Net -2725 ml       Invasive Devices  Report    Peripheral Intravenous Line            Peripheral IV 04/03/22 Right Hand 3 days                Physical Exam: /78 (BP Location: Right arm)   Pulse 103   Temp 98 1 °F (36 7 °C) (Oral)   Resp (!) 26   Ht 5' 7" (1 702 m)   Wt 83 5 kg (184 lb 1 4 oz)   SpO2 91%   BMI 28 83 kg/m²   General appearance: alert and oriented, in no acute distress  Head: Normocephalic, without obvious abnormality, atraumatic  Eyes: negative findings: conjunctivae and sclerae normal  Lungs: clear to auscultation bilaterally  Heart: regular rate and rhythm  Abdomen: normal findings: soft, non-tender  Extremities: No edema  Skin: Warm and dry  Neurologic: Mental status: Alert, oriented, thought content appropriate     Labs: I have personally reviewed pertinent lab results  , CBC:   Lab Results   Component Value Date    WBC 13 41 (H) 04/06/2022    HGB 11 6 (L) 04/06/2022    HCT 34 4 (L) 04/06/2022     (H) 04/06/2022     04/06/2022    MCH 34 0 04/06/2022    MCHC 33 7 04/06/2022    RDW 15 2 (H) 04/06/2022    MPV 9 8 04/06/2022   , CMP:   Lab Results   Component Value Date    SODIUM 136 04/06/2022    K 4 5 04/06/2022     04/06/2022    CO2 26 04/06/2022    BUN 25 04/06/2022    CREATININE 0 81 04/06/2022    CALCIUM 8 9 04/06/2022    EGFR 92 04/06/2022     Imaging and other studies: I have personally reviewed pertinent reports     and I have personally reviewed pertinent films in PACS

## 2022-04-06 NOTE — ASSESSMENT & PLAN NOTE
Lab Results   Component Value Date    HGBA1C 7 7 (H) 12/06/2021       Recent Labs     04/05/22  1115 04/05/22  1647 04/05/22  2109 04/06/22  0815   POCGLU 246* 348* 255* 179*       Blood Sugar Average: Last 72 hrs:  (P) 881 [id number]     · Continues to be elevated, will continue Lantus at 30 units HS; blood glucose levels are improving    · Steroid-induced hyperglycemia thus was on insulin gtt now off  · Cont insulin regimen and titrate up for optimal control  · Close monitor given on IV steroids

## 2022-04-06 NOTE — DISCHARGE SUMMARY
[address] [facility]  Discharge- Guille Cristina 1955, 78 y o  male MRN: [id number]  Unit/Bed#:  Encounter: [id number]  Primary Care Provider: Bart Mills MD   Date and time admitted to hospital: 3/30/2022  5:53 PM    * Acute on chronic respiratory failure with hypoxia ([address] )  Assessment & Plan  · Stable  · Suspect primarily long term sequelae of COVID-19 pneumonia:  COVID pneumonitis  Transitioned to oral steroids today by Pulmonology - 50 mg daily  Decrease by 10 mg every 7 days  · Previously required high flow nasal cannula, now on 2L NC, 91-94%; continue to wean as tolerated  · Pulmonary following  · Follow daily CRP:  4 2 today  · Continue Furosemide 40 mg daily:  Euvolemic currently  · Continue outpatient breo-ellipta  · On cefepime, day 7 /7  Vanco d/cd given neg mrsa  · Encourage pulmonary hygiene  · Scheduled Tessalon Perles TID and mucinex for cough as coughing is what contributes most to desaturation episodes     Acute diastolic heart failure (HCC)  Assessment & Plan  · Elevated BNP noted, vascular congestion on chest x-ray  · On Lasix 40 mg daily now euvolemic  · 3/31 TTE: LVEF 26%, Grade 1 diastolic dysfunction, mildly reduced RV systolic function    Elevated C-reactive protein (CRP)  Assessment & Plan  · In setting of prolonged covid illness  · Now on oral Prednisone, 50 mg daily  Taper by 10 mg every 7 days per Pulmonology recommendations  · Trend daily; CRP 4 2 today  Type 2 diabetes mellitus, without long-term current use of insulin [facility])  Assessment & Plan  Lab Results   Component Value Date    HGBA1C 7 7 (H) 12/06/2021       Recent Labs     04/05/22  1115 04/05/22  1647 04/05/22  2109 04/06/22  0815   POCGLU 246* 348* 255* 179*       Blood Sugar Average: Last 72 hrs:  (P) 403 [id number]     · Continues to be elevated, will continue Lantus at 30 units HS; blood glucose levels are improving    · Steroid-induced hyperglycemia thus was on insulin gtt now off  · Cont insulin regimen and titrate up for optimal control  · Close monitor given on IV steroids    Non MI elevated troponin  Assessment & Plan  · Hs troponin elevated at 105, no chest pain, no EKG changes of ST elevation  · Peaked at 224 and downtrended  · Favor non-MI troponin elevation in setting of acute hypoxic respiratory failure  · Denies chest pain  · Recent NM stress test revealing moderate to large predominantly fixed defect of the basal to mid inferior wall  This is possible secondary to artifact           Medical Problems             Resolved Problems  Date Reviewed: 4/6/2022    None              Discharging Physician / Practitioner: Jenny Tinoco MD  PCP: Alessandra Fleming MD  Admission Date:   Admission Orders (From admission, onward)     Ordered        03/30/22 1169  Inpatient Admission  Once                      Discharge Date: 04/06/22    Consultations During Hospital Stay:  IP CONSULT TO CASE MANAGEMENT  IP CONSULT TO PULMONOLOGY  · IP CONSULT TO PHARMACY      Procedures Performed:     CTA ED chest PE Study [[id number]] Collected: 03/30/22 1309   Order Status: Completed Updated: 03/30/22 1318   Narrative:     CTA - CHEST WITH IV CONTRAST - PULMONARY ANGIOGRAM     INDICATION:   Severe hypoxia tachypnea tachycardia recent COVID no longer on anticoagulants       COMPARISON: 3/1/2022     TECHNIQUE: CTA examination of the chest was performed using angiographic technique according to a protocol specifically tailored to evaluate for pulmonary embolism   Axial, sagittal, and coronal 2D reformatted images were created from the source data and    submitted for interpretation   In addition, coronal 3D MIP postprocessing was performed on the acquisition scanner        Radiation dose length product (DLP) for this visit:  518 73 mGy-cm    This examination, like all CT scans performed in the [facility], was performed utilizing techniques to minimize radiation dose exposure, including the use of iterative    reconstruction and automated exposure control  IV Contrast:  100 mL of iohexol (OMNIPAQUE)       FINDINGS:     PULMONARY ARTERIAL TREE:  No pulmonary embolus is seen  LUNGS:     Low, symmetric lung volumes  Increased, bilateral, symmetric airspace disease with interlobular septal thickening, superimposed on emphysema  Patent airways  PLEURA:  Mildly increased small pleural effusions  HEART/GREAT VESSELS:  Atherosclerosis   Normal aortic caliber and enhancement   Patent branch vessels and visualized vertebral arteries   Similar mild cardiomegaly  MEDIASTINUM AND CHUY:  Similar mildly enlarged hilar and mediastinal lymph nodes, largest 1 4 cm short axis, likely reactive  CHEST WALL AND LOWER NECK:   Within normal limits  VISUALIZED STRUCTURES IN THE UPPER ABDOMEN:  Chronic pancreatitis  OSSEOUS STRUCTURES:  No acute or suspicious findings  The study was marked in [facility] for immediate notification  Impression:       Compared to 3/1/2022, increased bilateral, symmetric airspace disease and small pleural effusions   Morphology of airspace disease is compatible with Covid 19 infection, but cannot exclude superimposed cardiogenic or noncardiogenic edema  No PE identified  Workstation performed: [id number]    XR chest 1 view portable [[id number]] Collected: 03/30/22 1407   Order Status: Completed Updated: 03/30/22 1410   Narrative:     CHEST     INDICATION:   Severe shortness of breath hypoxia, must rule out pneumothorax prior to awaiting labs for cta  COMPARISON:  122     EXAM PERFORMED/VIEWS:  XR CHEST PORTABLE       FINDINGS:     Cardiomediastinal silhouette appears unremarkable  Diffuse interstitial prominence consistent with fluid overload   Small left pleural effusion  Osseous structures appear within normal limits for patient age  Impression:       Vascular congestion with small left pleural effusion        ·     Significant Findings / Test Results:   · As above    Incidental Findings:   · none     Test Results Pending at Discharge (will require follow up):   · none     Outpatient Tests Requested:  · Cbc bmp     Complications:  none    Reason for Admission:  Patient was initially in the ICU with acute on chronic respiratory failure    Hospital Course:   Wes Stafford is a 78 y o  male patient who originally presented to the hospital on 3/30/2022 due to acute on chronic hypoxic respiratory failure likely secondary to sequelae of COVID-19 pneumonia  Initially patient required high-flow nasal cannula 70% and 50 L and then he was weaned down to nasal cannula  Pulmonary saw the patient and the patient has been on IV steroids and now transitioned to p o  Steroids  Procalcitonin has been trending down  Troponins are elevated blood they were non MI troponin elevation  Patient did not have any chest pain or any acute EKG changes indicative of ACS  Patient was hypoglycemic and insulin was increased for management of his hyperglycemia  With elevated BNP patient was also diagnosed with acute diastolic CHF for which patient received IV Lasix initially and was transitioned to p o  Lasix  Will be discharged with p o  Lasix   Outpatient cardiology referral has been made  Patient needs to follow up with outpatient pulmonary    Please see above list of diagnoses and related plan for additional information       Condition at Discharge: stable    Discharge Day Visit / Exam:   Subjective:  I am feeling better  Vitals: Blood Pressure: 121/78 (04/06/22 0653)  Pulse: 103 (04/06/22 0653)  Temperature: 98 1 °F (36 7 °C) (04/06/22 0653)  Temp Source: Oral (04/06/22 0653)  Respirations: (!) 26 (04/06/22 0653)  Height: 5' 7" (170 2 cm) (03/31/22 0920)  Weight - Scale: 83 5 kg (184 lb 1 4 oz) (04/03/22 0548)  SpO2: 91 % (04/06/22 0653)  Exam:   Physical Exam General- Awake, alert and oriented x 3, looks comfortable  HEENT- Normocephalic, atraumatic, oral mucosa- moist  Neck- Supple, No carotid bruit, no JVD  CVS- Normal S1/ S2, Regular rate and rhythm, No murmur, No edema  Respiratory system- B/L clear breath sounds, no wheezing  Abdomen- Soft, Non distended, no tenderness, Bowel sound- present 4 quads  Genitourinary- No suprapubic tenderness, No CVA tenderness  Skin- No new bruise or rash  Musculoskeletal- No gross deformity  Psych- No acute psychosis  CNS- CN II- XII grossly intact, No acute focal neurologic deficit noted      Discussion with Family: Patient declined call to   Discharge instructions/Information to patient and family:   See after visit summary for information provided to patient and family  Provisions for Follow-Up Care:  See after visit summary for information related to follow-up care and any pertinent home health orders  Disposition:   Home    Planned Readmission:  No     Discharge Statement:  I spent 35 minutes discharging the patient  This time was spent on the day of discharge  I had direct contact with the patient on the day of discharge  Greater than 50% of the total time was spent examining patient, answering all patient questions, arranging and discussing plan of care with patient as well as directly providing post-discharge instructions  Additional time then spent on discharge activities  Discharge Medications:  See after visit summary for reconciled discharge medications provided to patient and/or family        **Please Note: This note may have been constructed using a voice recognition system**

## 2022-04-06 NOTE — PLAN OF CARE
Problem: Potential for Falls  Goal: Patient will remain free of falls  Description: INTERVENTIONS:  - Educate patient/family on patient safety including physical limitations  - Instruct patient to call for assistance with activity   - Consult OT/PT to assist with strengthening/mobility   - Keep Call bell within reach  - Keep bed low and locked with side rails adjusted as appropriate  - Keep care items and personal belongings within reach  - Initiate and maintain comfort rounds  - Make Fall Risk Sign visible to staff  - Offer Toileting every 2 Hours, in advance of need  - Initiate/Maintain bed alarm  - Obtain necessary fall risk management equipment:   - Apply yellow socks and bracelet for high fall risk patients  - Consider moving patient to room near nurses station  Outcome: Progressing     Problem: MOBILITY - ADULT  Goal: Maintain or return to baseline ADL function  Description: INTERVENTIONS:  -  Assess patient's ability to carry out ADLs; assess patient's baseline for ADL function and identify physical deficits which impact ability to perform ADLs (bathing, care of mouth/teeth, toileting, grooming, dressing, etc )  - Assess/evaluate cause of self-care deficits   - Assess range of motion  - Assess patient's mobility; develop plan if impaired  - Assess patient's need for assistive devices and provide as appropriate  - Encourage maximum independence but intervene and supervise when necessary  - Involve family in performance of ADLs  - Assess for home care needs following discharge   - Consider OT consult to assist with ADL evaluation and planning for discharge  - Provide patient education as appropriate  Outcome: Progressing  Goal: Maintains/Returns to pre admission functional level  Description: INTERVENTIONS:  - Perform BMAT or MOVE assessment daily    - Set and communicate daily mobility goal to care team and patient/family/caregiver     - Collaborate with rehabilitation services on mobility goals if consulted  - Perform Range of Motion 3 times a day  - Reposition patient every 2 hours    - Dangle patient 3 times a day  - Stand patient 3 times a day  - Ambulate patient 3 times a day  - Out of bed to chair 3 times a day   - Out of bed for meals 3 times a day  - Out of bed for toileting  - Record patient progress and toleration of activity level   Outcome: Progressing     Problem: PAIN - ADULT  Goal: Verbalizes/displays adequate comfort level or baseline comfort level  Description: Interventions:  - Encourage patient to monitor pain and request assistance  - Assess pain using appropriate pain scale  - Administer analgesics based on type and severity of pain and evaluate response  - Implement non-pharmacological measures as appropriate and evaluate response  - Consider cultural and social influences on pain and pain management  - Notify physician/advanced practitioner if interventions unsuccessful or patient reports new pain  Outcome: Progressing     Problem: INFECTION - ADULT  Goal: Absence or prevention of progression during hospitalization  Description: INTERVENTIONS:  - Assess and monitor for signs and symptoms of infection  - Monitor lab/diagnostic results  - Monitor all insertion sites, i e  indwelling lines, tubes, and drains  - Monitor endotracheal if appropriate and nasal secretions for changes in amount and color  - [place] appropriate cooling/warming therapies per order  - Administer medications as ordered  - Instruct and encourage patient and family to use good hand hygiene technique  - Identify and instruct in appropriate isolation precautions for identified infection/condition  Outcome: Progressing     Problem: SAFETY ADULT  Goal: Patient will remain free of falls  Description: INTERVENTIONS:  - Educate patient/family on patient safety including physical limitations  - Instruct patient to call for assistance with activity   - Consult OT/PT to assist with strengthening/mobility   - Keep Call bell within reach  - Keep bed low and locked with side rails adjusted as appropriate  - Keep care items and personal belongings within reach  - Initiate and maintain comfort rounds  - Make Fall Risk Sign visible to staff  - Offer Toileting every 2 Hours, in advance of need  - Initiate/Maintain bed alarm  - Obtain necessary fall risk management equipment:   - Apply yellow socks and bracelet for high fall risk patients  - Consider moving patient to room near nurses station  Outcome: Progressing  Goal: Maintain or return to baseline ADL function  Description: INTERVENTIONS:  -  Assess patient's ability to carry out ADLs; assess patient's baseline for ADL function and identify physical deficits which impact ability to perform ADLs (bathing, care of mouth/teeth, toileting, grooming, dressing, etc )  - Assess/evaluate cause of self-care deficits   - Assess range of motion  - Assess patient's mobility; develop plan if impaired  - Assess patient's need for assistive devices and provide as appropriate  - Encourage maximum independence but intervene and supervise when necessary  - Involve family in performance of ADLs  - Assess for home care needs following discharge   - Consider OT consult to assist with ADL evaluation and planning for discharge  - Provide patient education as appropriate  Outcome: Progressing  Goal: Maintains/Returns to pre admission functional level  Description: INTERVENTIONS:  - Perform BMAT or MOVE assessment daily    - Set and communicate daily mobility goal to care team and patient/family/caregiver  - Collaborate with rehabilitation services on mobility goals if consulted  - Perform Range of Motion 3 times a day  - Reposition patient every 2 hours    - Dangle patient 3 times a day  - Stand patient 3 times a day  - Ambulate patient 3 times a day  - Out of bed to chair 3 times a day   - Out of bed for meals 3 times a day  - Out of bed for toileting  - Record patient progress and toleration of activity level   Outcome: Progressing     Problem: DISCHARGE PLANNING  Goal: Discharge to home or other facility with appropriate resources  Description: INTERVENTIONS:  - Identify barriers to discharge w/patient and caregiver  - Arrange for needed discharge resources and transportation as appropriate  - Identify discharge learning needs (meds, wound care, etc )  - Arrange for interpretive services to assist at discharge as needed  - Refer to Case Management Department for coordinating discharge planning if the patient needs post-hospital services based on physician/advanced practitioner order or complex needs related to functional status, cognitive ability, or social support system  Outcome: Progressing     Problem: Knowledge Deficit  Goal: Patient/family/caregiver demonstrates understanding of disease process, treatment plan, medications, and discharge instructions  Description: Complete learning assessment and assess knowledge base  Interventions:  - Provide teaching at level of understanding  - Provide teaching via preferred learning methods  Outcome: Progressing     Problem: Nutrition/Hydration-ADULT  Goal: Nutrient/Hydration intake appropriate for improving, restoring or maintaining nutritional needs  Description: Monitor and assess patient's nutrition/hydration status for malnutrition  Collaborate with interdisciplinary team and initiate plan and interventions as ordered  Monitor patient's weight and dietary intake as ordered or per policy  Utilize nutrition screening tool and intervene as necessary  Determine patient's food preferences and provide high-protein, high-caloric foods as appropriate       INTERVENTIONS:  - Monitor oral intake, urinary output, labs, and treatment plans  - Assess nutrition and hydration status and recommend course of action  - Evaluate amount of meals eaten  - Assist patient with eating if necessary   - Allow adequate time for meals  - Recommend/ encourage appropriate diets, oral nutritional supplements, and vitamin/mineral supplements  - Order, calculate, and assess calorie counts as needed  - Recommend, monitor, and adjust tube feedings and TPN/PPN based on assessed needs  - Assess need for intravenous fluids  - Provide specific nutrition/hydration education as appropriate  - Include patient/family/caregiver in decisions related to nutrition  Outcome: Progressing

## 2022-04-21 NOTE — ASSESSMENT & PLAN NOTE
----- Message from Aure Obrien Patient reports no history of CHF  · Was scheduled to have TTE today by outpatient cardiologist  · BNP elevated at 1116, vascular congestion on CXR  · PRN diuresis  · 3/31 TTE: LVEF 60%, G1DD, mildly reduced RV systolic function

## 2022-05-06 ENCOUNTER — OFFICE VISIT (OUTPATIENT)
Dept: PULMONOLOGY | Facility: CLINIC | Age: 67
End: 2022-05-06
Payer: COMMERCIAL

## 2022-05-06 VITALS
HEIGHT: 67 IN | TEMPERATURE: 98.3 F | SYSTOLIC BLOOD PRESSURE: 108 MMHG | DIASTOLIC BLOOD PRESSURE: 64 MMHG | WEIGHT: 197 LBS | RESPIRATION RATE: 20 BRPM | BODY MASS INDEX: 30.92 KG/M2 | OXYGEN SATURATION: 94 % | HEART RATE: 101 BPM

## 2022-05-06 DIAGNOSIS — J12.82 PNEUMONIA DUE TO COVID-19 VIRUS: ICD-10-CM

## 2022-05-06 DIAGNOSIS — R93.89 ABNORMAL CT OF THE CHEST: ICD-10-CM

## 2022-05-06 DIAGNOSIS — U07.1 COVID: Primary | ICD-10-CM

## 2022-05-06 DIAGNOSIS — U07.1 PNEUMONIA DUE TO COVID-19 VIRUS: ICD-10-CM

## 2022-05-06 DIAGNOSIS — J96.11 CHRONIC RESPIRATORY FAILURE WITH HYPOXIA (HCC): ICD-10-CM

## 2022-05-06 PROCEDURE — 99214 OFFICE O/P EST MOD 30 MIN: CPT | Performed by: PHYSICIAN ASSISTANT

## 2022-05-06 PROCEDURE — 94618 PULMONARY STRESS TESTING: CPT | Performed by: PHYSICIAN ASSISTANT

## 2022-05-06 RX ORDER — CHLORAL HYDRATE 500 MG
1000 CAPSULE ORAL 2 TIMES DAILY
COMMUNITY

## 2022-05-06 RX ORDER — FLUTICASONE FUROATE AND VILANTEROL TRIFENATATE 100; 25 UG/1; UG/1
1 POWDER RESPIRATORY (INHALATION) DAILY
Qty: 60 EACH | Refills: 2 | Status: SHIPPED | OUTPATIENT
Start: 2022-05-06

## 2022-05-06 NOTE — PROGRESS NOTES
Pulmonary Follow Up Note   Jorge Lopez 78 y o  male MRN: [id number]  5/6/2022      Assessment:    COVID  Patient's history of recent COVID pneumonia now dealing with COVID pneumonitis on most recent CT imaging  He has been treated with a long taper of steroids that his plan to complete next Thursday  Will follow up with high-resolution CT chest in 2 weeks to evaluate for continued COVID changes versus fibrosis  Will also obtain pulmonary function test at that time  Recommend patient continue to use Breo 1 puff daily  6 minutes walk performed in the office today indicates that patient still require supplemental oxygen  If this persists could consider referral to Pulmonary Rehab but though his exercise tolerance at home does seem to be adequate currently  Chronic respiratory failure with hypoxia (HCC)  6 minutes walk performed in the office today indicates that patient still requires 2 L nasal cannula  Unfortunately cannot return to work as a  per [employer] until he is off supplemental O2  Will readdress at next visit  Long term disability paperwork filled out for patient today in the office  Abnormal CT of the chest  CTA chest PE study from 03/30/2022 showed increased bilateral ground-glass opacities and small bilateral pleural effusions  This is likely a combination of COVID pneumonitis with superimposed bacterial infection and volume overload  Obtain high-resolution CT chest in 2 weeks as above to reassess  Plan:    Diagnoses and all orders for this visit:    COVID  -     POCT 6 minute walk  -     Complete PFT with post bronchodilator; Future    Chronic respiratory failure with hypoxia (HCC)  -     POCT 6 minute walk  -     Complete PFT with post bronchodilator; Future  -     fluticasone-vilanterol (Breo Ellipta) 100-25 mcg/inh inhaler; Inhale 1 puff daily Rinse mouth after use  Abnormal CT of the chest  -     CT chest high resolution;  Future  -     Complete PFT with post bronchodilator; Future    Pneumonia due to COVID-19 virus  -     fluticasone-vilanterol (Breo Ellipta) 100-25 mcg/inh inhaler; Inhale 1 puff daily Rinse mouth after use  Other orders  -     Omega-3 Fatty Acids (fish oil) 1,000 mg; Take 1,000 mg by mouth 2 (two) times a day  -     Milk Thistle 1000 MG CAPS; Take by mouth        Return in about 4 weeks (around 6/3/2022)  History of Present Illness   HPI:  Dario Alvarado is a 78 y o  male who presents the office today for hospital follow-up  Patient was recently hospitalized beginning of April for acute on chronic hypoxic respiratory failure secondary to COVID pneumonitis with superimposed bacterial infection and acute on chronic diastolic CHF  Initially required Vapotherm for his hypoxia and was transferred to [address] [place] [address]  He complained of dyspnea and worsened cough and hypoxia  He was found have elevated inflammatory markers and diffuse ground-glass infiltrates on CT imaging  Patient was placed on cefepime, systemic steroids and diuresed  His supplemental oxygen improved to the point where he got back down to his baseline of 2 L which now he wears on an as-needed basis  He completed cefepime while inpatient and was sent home on a prednisone taper which he plans to complete next week  Patient now states that he feels like he is back to his baseline from pre COVID  Denies any worsening shortness of breath  He is out gardening without limitations due to his breathing  Denies significant cough or sputum production  No chest pain or palpitations denies fevers or chills  Review of Systems   All other systems reviewed and are negative        Historical Information   Past Medical History:   Diagnosis Date    COVID-19 02/08/2022    Diabetes mellitus ([facility] [address] )     Hypertension      Past Surgical History:   Procedure Laterality Date    ANKLE FRACTURE SURGERY Left     FOOT FRACTURE SURGERY      KNEE SURGERY Right     VASECTOMY Family History   Problem Relation Age of Onset    Diabetes Mother     Alcohol abuse Father        Social History     Tobacco Use   Smoking Status Former Smoker    Packs/day: 1 00    Years: 30 00    Pack years: 30 00    Types: Cigarettes    Quit date: 2008    Years since quittin 2   Smokeless Tobacco Never Used         Meds/Allergies     Current Outpatient Medications:     Alcohol Swabs 70 % PADS, May substitute brand based on insurance coverage  Check glucose TID , Disp: 100 each, Rfl: 0    ascorbic acid (VITAMIN C) 500 mg tablet, in the morning, Disp: , Rfl:     atorvastatin (LIPITOR) 10 mg tablet, Take 10 mg by mouth daily, Disp: , Rfl:     benzonatate (TESSALON PERLES) 100 mg capsule, Take 1 capsule (100 mg total) by mouth 3 (three) times a day, Disp: 20 capsule, Rfl: 0    Blood Glucose Monitoring Suppl (OneTouch Verio Reflect) w/Device KIT, May substitute brand based on insurance coverage  Check glucose TID , Disp: 1 kit, Rfl: 0    furosemide (LASIX) 40 mg tablet, Take 1 tablet (40 mg total) by mouth daily, Disp: 30 tablet, Rfl: 0    glucose blood (OneTouch Verio) test strip, May substitute brand based on insurance coverage  Check glucose TID , Disp: 100 each, Rfl: 0    insulin glargine (Lantus SoloStar) 100 units/mL injection pen, Inject 20 Units under the skin daily at bedtime, Disp: 15 mL, Rfl: 0    insulin lispro (HumaLOG KwikPen) 100 units/mL injection pen, Inject 12 Units under the skin 3 (three) times a day with meals, Disp: 15 mL, Rfl: 0    Insulin Pen Needle (BD Pen Needle Silvia 2nd Gen) 32G X 4 MM MISC, For use with insulin pen  Pharmacy may dispense brand covered by insurance , Disp: 100 each, Rfl: 0    Insulin Pen Needle (BD Pen Needle Silvia 2nd Gen) 32G X 4 MM MISC, For use with insulin pen   Pharmacy may dispense brand covered by insurance , Disp: 100 each, Rfl: 0    lisinopril (ZESTRIL) 5 mg tablet, Take 5 mg by mouth daily, Disp: , Rfl:     metFORMIN (GLUCOPHAGE) 1000 MG tablet, Take 1,000 mg by mouth 2 (two) times a day with meals, Disp: , Rfl:     Milk Thistle 1000 MG CAPS, Take by mouth, Disp: , Rfl:     Multiple Vitamin (multivitamin) tablet, Take 1 tablet by mouth daily, Disp: , Rfl:     Omega-3 Fatty Acids (fish oil) 1,000 mg, Take 1,000 mg by mouth 2 (two) times a day, Disp: , Rfl:     OneTouch Delica Lancets 53N MISC, May substitute brand based on insurance coverage  Check glucose TID , Disp: 100 each, Rfl: 0    pantoprazole (PROTONIX) 40 mg tablet, Take 1 tablet (40 mg total) by mouth daily in the early morning, Disp: 60 tablet, Rfl: 0    predniSONE 10 mg tablet, Take 5 tablets (50 mg total) by mouth daily for 6 days, THEN 4 tablets (40 mg total) daily for 7 days, THEN 3 tablets (30 mg total) daily for 7 days, THEN 2 tablets (20 mg total) daily for 7 days, THEN 1 tablet (10 mg total) daily for 7 days  , Disp: 100 tablet, Rfl: 0    thiamine (VITAMIN B1) 100 mg tablet, Take 1 tablet (100 mg total) by mouth daily, Disp: 60 tablet, Rfl: 0    VITAMIN D PO, daily, Disp: , Rfl:     zinc sulfate (ZINCATE) 220 mg capsule, Take 1 capsule (220 mg total) by mouth daily, Disp: 60 capsule, Rfl: 0    fluticasone-vilanterol (Breo Ellipta) 100-25 mcg/inh inhaler, Inhale 1 puff daily Rinse mouth after use , Disp: 60 each, Rfl: 2  No Known Allergies    Vitals: Blood pressure 108/64, pulse 101, temperature 98 3 °F (36 8 °C), resp  rate 20, height 5' 7" (1 702 m), weight 89 4 kg (197 lb), SpO2 94 %  Body mass index is 30 85 kg/m²  Oxygen Therapy  SpO2: 94 %    Physical Exam  Physical Exam  Vitals reviewed  Constitutional:       Appearance: Normal appearance  He is well-developed  HENT:      Head: Normocephalic and atraumatic  Nose: Nose normal       Mouth/Throat:      Mouth: Mucous membranes are moist       Pharynx: Oropharynx is clear  Eyes:      Extraocular Movements: Extraocular movements intact     Cardiovascular:      Rate and Rhythm: Normal rate and regular rhythm  Pulses: Normal pulses  Heart sounds: Normal heart sounds  No murmur heard  Pulmonary:      Effort: Pulmonary effort is normal  No respiratory distress  Breath sounds: No wheezing, rhonchi or rales  Abdominal:      Palpations: Abdomen is soft  Tenderness: There is no abdominal tenderness  Musculoskeletal:         General: No swelling or tenderness  Normal range of motion  Cervical back: Normal range of motion and neck supple  Skin:     General: Skin is warm and dry  Neurological:      General: No focal deficit present  Mental Status: He is alert  Mental status is at baseline  Psychiatric:         Mood and Affect: Mood normal          Behavior: Behavior normal          Labs: I have personally reviewed pertinent lab results  , ABG: No results found for: PHART, BKK8JPH, PO2ART, AAM4JSL, X6FBGSWO, BEART, SOURCE, BNP: No results found for: BNP, CBC: No results found for: WBC, HGB, HCT, MCV, PLT, ADJUSTEDWBC, MCH, MCHC, RDW, MPV, NRBC, CMP: No results found for: SODIUM, K, CL, CO2, ANIONGAP, BUN, CREATININE, GLUCOSE, CALCIUM, AST, ALT, ALKPHOS, PROT, BILITOT, EGFR, PT/INR: No results found for: PT, INR, Troponin: No results found for: TROPONINI  Lab Results   Component Value Date    WBC 13 41 (H) 04/06/2022    HGB 11 6 (L) 04/06/2022    HCT 34 4 (L) 04/06/2022     (H) 04/06/2022     04/06/2022     Lab Results   Component Value Date    CALCIUM 8 9 04/06/2022    K 4 5 04/06/2022    CO2 26 04/06/2022     04/06/2022    BUN 25 04/06/2022    CREATININE 0 81 04/06/2022     No results found for: IGE  Lab Results   Component Value Date    ALT 37 03/31/2022    AST 40 03/31/2022    ALKPHOS 61 03/31/2022       Imaging and other studies: I have personally reviewed pertinent reports  and I have personally reviewed pertinent films in PACS     CTA chest PE study 03/30/2022  Increased bilateral symmetric airspace density and small pleural effusions  No PE    Similar mildly enlarged hilar mediastinal lymph nodes

## 2022-05-06 NOTE — ASSESSMENT & PLAN NOTE
6 minutes walk performed in the office today indicates that patient still requires 2 L nasal cannula  Unfortunately cannot return to work as a  per [employer] until he is off supplemental O2  Will readdress at next visit  Long term disability paperwork filled out for patient today in the office

## 2022-05-06 NOTE — ASSESSMENT & PLAN NOTE
Patient's history of recent COVID pneumonia now dealing with COVID pneumonitis on most recent CT imaging  He has been treated with a long taper of steroids that his plan to complete next Thursday  Will follow up with high-resolution CT chest in 2 weeks to evaluate for continued COVID changes versus fibrosis  Will also obtain pulmonary function test at that time  Recommend patient continue to use Breo 1 puff daily  6 minutes walk performed in the office today indicates that patient still require supplemental oxygen  If this persists could consider referral to Pulmonary Rehab but though his exercise tolerance at home does seem to be adequate currently

## 2022-05-06 NOTE — ASSESSMENT & PLAN NOTE
CTA chest PE study from 03/30/2022 showed increased bilateral ground-glass opacities and small bilateral pleural effusions  This is likely a combination of COVID pneumonitis with superimposed bacterial infection and volume overload  Obtain high-resolution CT chest in 2 weeks as above to reassess

## 2022-05-13 ENCOUNTER — OFFICE VISIT (OUTPATIENT)
Dept: CARDIOLOGY CLINIC | Facility: CLINIC | Age: 67
End: 2022-05-13
Payer: COMMERCIAL

## 2022-05-13 VITALS
DIASTOLIC BLOOD PRESSURE: 68 MMHG | HEIGHT: 67 IN | WEIGHT: 196 LBS | SYSTOLIC BLOOD PRESSURE: 112 MMHG | BODY MASS INDEX: 30.76 KG/M2 | HEART RATE: 86 BPM

## 2022-05-13 DIAGNOSIS — E66.3 OVERWEIGHT (BMI 25.0-29.9): ICD-10-CM

## 2022-05-13 DIAGNOSIS — I50.32 CHRONIC DIASTOLIC HEART FAILURE (HCC): Primary | ICD-10-CM

## 2022-05-13 DIAGNOSIS — Z82.49 FAMILY HISTORY OF EARLY CAD: ICD-10-CM

## 2022-05-13 DIAGNOSIS — E11.9 TYPE 2 DIABETES MELLITUS WITHOUT COMPLICATION, WITHOUT LONG-TERM CURRENT USE OF INSULIN (HCC): ICD-10-CM

## 2022-05-13 DIAGNOSIS — R06.02 SHORTNESS OF BREATH: ICD-10-CM

## 2022-05-13 PROCEDURE — 99214 OFFICE O/P EST MOD 30 MIN: CPT | Performed by: INTERNAL MEDICINE

## 2022-05-13 RX ORDER — ASPIRIN 81 MG/1
81 TABLET, CHEWABLE ORAL DAILY
COMMUNITY

## 2022-05-13 NOTE — PROGRESS NOTES
[facility], [facility] [address]  Phone#  [phone]  Fax#  [phone]                                               Cardiology Office Follow up  Natalia Snowden, 78 y o  male  YOB: 1955  MRN: [id number] Encounter: [id number]      PCP - Lucas Huber MD  Referring Provider - Paty Alexander MD    Chief Complaint   Patient presents with    Follow-up       Assessment  Shortness of breath  CAD  Nuclear Rx stress - 3/24/22 -  LVEF 70%, Moderate size, complete fixed perfusion defect of basal to mid inferior wall - probable old inferior infarct, no significant ischemia  Echo - 3/31/22 -  LVEF 10%, grade 1 diastolic dysfunction, mildly dilated RV with reduced systolic function,  Family history of early CAD  Father  of a heart attack at age 48  Diabetes Mellitus Type 2  Overweight, Body mass index is 30 7 kg/m²  Plan  CAD  Has evidence of probable complete infarct of basal to mid inferior wall on nuclear stress test without any significant ischemia  Echo - 3/31/22 -  Personally reviewed images  Is technically difficult study  Although the official report did not have any regional wall motion abnormalities listed,, personal review there does appear to be at least akinesis of basal inferior wall    Mildly dilated RV with mildly reduced systolic function   no ongoing complains of chest pain or other ischemic symptoms, and nuclear stress without any ischemia   Will continue medical therapy for now   Continue aspirin, Atorvastatin  Blood pressure on the lower side, unable to add beta blockers    Shortness of breath, chronic diastolic heart failure, COPD  Shortness of breath is multifactorial related to diastolic heart failure plus COPD  He has ongoing exertional shortness of breath with moderate exertion, but remains highly functional  His weight is down about 10-15 lb over the last year, per patient  He does still have mild exertional symptoms with moderate to severe activity   No gross volume overload/edema or rales on exam  Will repeat chest x-ray and NT proBNP  Consider increase diuretic regimen based on same  Continue Lasix 40 mg daily  Follow up with pulmonary     No results found for this visit on 05/13/22  Orders Placed This Encounter   Procedures    XR chest pa & lateral    NT-BNP PRO     Return in about 3 months (around 8/13/2022), or if symptoms worsen or fail to improve  History of Present Illness   78 y o  male,  (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), comes in as a new patient for consultation regarding ongoing symptoms of shortness of breath and hypoxia as well as elevated troponins noted during recent hospitalization  In late January 2022, 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 and subsequently saw his PCP who recommended a COVID test and home pulse ox monitoring  He tested positive and was being monitored at home, but then developed hypoxia and as a result presented to [facility] carbon ED on 02/08/2022  He had mild troponin elevation (112 --> 235 --> 191) and was evaluated by Cardiologist Dr Deanne Rivas, and was felt to be non-MI troponin elevation related to COVID infection  He was treated with remdesivir and then left AMA on 2/17/22  He subsequently presented back to [facility] ED the same night, and was found to have lactic acidosis and found to have more elevated troponin levels (3350 --> 3675)  After review with on-call cardiologist Arlette Barraza,  He was transferred to 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  and as a result is here to complete cardiology evaluation    Family history -   Mother - diabetic, had gangrene,   Father  of heart attack at 48    Interval history - 2022  He comes back for follow-up after about 2 months  He completed the nuclear stress test and echocardiogram, which do seem to suggest him having had an old inferior infarct  He shortness of breath is overall improving, but he continues to have issues with moderate to severe exertion  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, without any major limitation  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, 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 after completion of 12 weeks of FMLA, due to ongoing shortness of breath  He will be following up with pulmonary regarding same  Historical Information   Past Medical History:   Diagnosis Date    COVID-19 2022    Diabetes mellitus ([facility] [address] )     Hypertension      Past Surgical History:   Procedure Laterality Date    ANKLE FRACTURE SURGERY Left     FOOT FRACTURE SURGERY      KNEE SURGERY Right     VASECTOMY       Family History   Problem Relation Age of Onset    Diabetes Mother     Alcohol abuse Father      Current Outpatient Medications on File Prior to Visit   Medication Sig Dispense Refill    Alcohol Swabs 70 % PADS May substitute brand based on insurance coverage  Check glucose TID  100 each 0    ascorbic acid (VITAMIN C) 500 mg tablet in the morning      aspirin 81 mg chewable tablet Chew 81 mg  in the morning   atorvastatin (LIPITOR) 10 mg tablet Take 10 mg by mouth daily      benzonatate (TESSALON PERLES) 100 mg capsule Take 1 capsule (100 mg total) by mouth 3 (three) times a day 20 capsule 0    Blood Glucose Monitoring Suppl (OneTouch Verio Reflect) w/Device KIT May substitute brand based on insurance coverage   Check glucose TID  1 kit 0    fluticasone-vilanterol (Breo Ellipta) 100-25 mcg/inh inhaler Inhale 1 puff daily Rinse mouth after use  60 each 2    furosemide (LASIX) 40 mg tablet Take 1 tablet (40 mg total) by mouth daily 30 tablet 0    glucose blood (OneTouch Verio) test strip May substitute brand based on insurance coverage  Check glucose TID  100 each 0    insulin glargine (Lantus SoloStar) 100 units/mL injection pen Inject 20 Units under the skin daily at bedtime 15 mL 0    insulin lispro (HumaLOG KwikPen) 100 units/mL injection pen Inject 12 Units under the skin 3 (three) times a day with meals 15 mL 0    Insulin Pen Needle (BD Pen Needle Silvia 2nd Gen) 32G X 4 MM MISC For use with insulin pen  Pharmacy may dispense brand covered by insurance  100 each 0    Insulin Pen Needle (BD Pen Needle Silvia 2nd Gen) 32G X 4 MM MISC For use with insulin pen  Pharmacy may dispense brand covered by insurance  100 each 0    lisinopril (ZESTRIL) 5 mg tablet Take 5 mg by mouth daily      metFORMIN (GLUCOPHAGE) 1000 MG tablet Take 1,000 mg by mouth 2 (two) times a day with meals      Milk Thistle 1000 MG CAPS Take by mouth      Multiple Vitamin (multivitamin) tablet Take 1 tablet by mouth daily      Omega-3 Fatty Acids (fish oil) 1,000 mg Take 1,000 mg by mouth 2 (two) times a day      OneTouch Delica Lancets 80B MISC May substitute brand based on insurance coverage  Check glucose TID  100 each 0    pantoprazole (PROTONIX) 40 mg tablet Take 1 tablet (40 mg total) by mouth daily in the early morning 60 tablet 0    thiamine (VITAMIN B1) 100 mg tablet Take 1 tablet (100 mg total) by mouth daily 60 tablet 0    VITAMIN D PO daily      zinc sulfate (ZINCATE) 220 mg capsule Take 1 capsule (220 mg total) by mouth daily 60 capsule 0     No current facility-administered medications on file prior to visit       No Known Allergies  Social History     Socioeconomic History    Marital status: /Civil Union     Spouse name: None  Number of children: None    Years of education: None    Highest education level: None   Occupational History    None   Tobacco Use    Smoking status: Former Smoker     Packs/day: 1 00     Years: 30 00     Pack years: 30 00     Types: Cigarettes     Quit date: 2008     Years since quittin 2    Smokeless tobacco: Never Used   Vaping Use    Vaping Use: Never used   Substance and Sexual Activity    Alcohol use: Never    Drug use: Never    Sexual activity: Not Currently   Other Topics Concern    None   Social History Narrative    None     Social Determinants of Health     Financial Resource Strain: Not on file   Food Insecurity: No Food Insecurity    Worried About Running Out of Food in the Last Year: Never true    Regis of Food in the Last Year: Never true   Transportation Needs: No Transportation Needs    Lack of Transportation (Medical): No    Lack of Transportation (Non-Medical): No   Physical Activity: Not on file   Stress: Not on file   Social Connections: Not on file   Intimate Partner Violence: Not on file   Housing Stability: Low Risk     Unable to Pay for Housing in the Last Year: No    Number of Places Lived in the Last Year: 1    Unstable Housing in the Last Year: No        Review of Systems   All other systems reviewed and are negative  Vitals:  Vitals:    22 0855   BP: 112/68   Pulse: 86   Weight: 88 9 kg (196 lb)   Height: 5' 7" (1 702 m)     BMI - Body mass index is 30 7 kg/m²  Wt Readings from Last 7 Encounters:   22 88 9 kg (196 lb)   22 89 4 kg (197 lb)   22 83 5 kg (184 lb 1 4 oz)   22 86 6 kg (191 lb)   22 86 6 kg (191 lb)   22 79 7 kg (175 lb 12 8 oz)   22 88 3 kg (194 lb 10 7 oz)       Physical Exam  Vitals and nursing note reviewed  Constitutional:       General: He is not in acute distress  Appearance: He is well-developed  He is obese  He is ill-appearing  He is not diaphoretic     HENT:      Head: Normocephalic and atraumatic  Nose: No congestion  Eyes:      General: No scleral icterus  Conjunctiva/sclera: Conjunctivae normal    Neck:      Vascular: No carotid bruit or JVD  Cardiovascular:      Rate and Rhythm: Normal rate and regular rhythm  Heart sounds: Normal heart sounds  No murmur heard  No friction rub  No gallop  Pulmonary:      Effort: Pulmonary effort is normal  No respiratory distress  Breath sounds: Normal breath sounds  No wheezing or rales  Chest:      Chest wall: No tenderness  Abdominal:      General: There is no distension  Palpations: Abdomen is soft  Tenderness: There is no abdominal tenderness  Musculoskeletal:         General: No swelling, tenderness or deformity  Cervical back: Neck supple  No muscular tenderness  Right lower leg: No edema  Left lower leg: No edema  Skin:     General: Skin is warm  Neurological:      General: No focal deficit present  Mental Status: He is alert and oriented to person, place, and time  Mental status is at baseline  Psychiatric:         Mood and Affect: Mood normal          Behavior: Behavior normal          Thought Content:  Thought content normal            Labs:  CBC:   Lab Results   Component Value Date    WBC 13 41 (H) 04/06/2022    RBC 3 41 (L) 04/06/2022    HGB 11 6 (L) 04/06/2022    HCT 34 4 (L) 04/06/2022     (H) 04/06/2022     04/06/2022    RDW 15 2 (H) 04/06/2022       CMP:   Lab Results   Component Value Date    K 4 5 04/06/2022     04/06/2022    CO2 26 04/06/2022    BUN 25 04/06/2022    CREATININE 0 81 04/06/2022    EGFR 92 04/06/2022    CALCIUM 8 9 04/06/2022    AST 40 03/31/2022    ALT 37 03/31/2022    ALKPHOS 61 03/31/2022       Magnesium:  Lab Results   Component Value Date    MG 2 2 04/05/2022       Lipid Profile:   No results found for: CHOL, HDL, TRIG, LDLCALC    Thyroid Studies: No results found for: [address] [place] [address], [address] [place] [address] [facility], [facility], [address] [facility], [id number]    A1c:  No components found for: HGA1C    INR:  Lab Results   Component Value Date    INR 1 11 03/30/2022    INR 1 12 02/17/2022    INR 0 99 02/08/2022   5    Imaging: XR chest portable    Result Date: 3/1/2022  Narrative: CHEST INDICATION:   worsening oxygen requirements  Patient has confirmed COVID-19  COMPARISON:  Chest x-ray 2/20/2022 EXAM PERFORMED/VIEWS:  XR CHEST PORTABLE FINDINGS: Cardiomediastinal silhouette appears unremarkable  Extensive peripheral infiltrates again seen bilaterally, partially improved on the right, stable on the left  No pneumothorax or pleural effusion  Osseous structures appear within normal limits for patient age  Impression: Extensive peripheral infiltrates again seen bilaterally, partially improved on the right, stable on the left  Findings again most suggestive of Covid 19 pneumonia  Workstation performed: [id number]     XR chest portable    Result Date: 2/20/2022  Narrative: CHEST INDICATION:   increase oxygen requirement  Patient has confirmed COVID-19  COMPARISON:  None EXAM PERFORMED/VIEWS:  XR CHEST PORTABLE FINDINGS: Cardiomediastinal silhouette appears unremarkable  Worsened peripheral pulmonary opacities  No effusion or pneumothorax  Osseous structures appear within normal limits for patient age  Impression: Worsened peripheral pulmonary opacities  Workstation performed: [id number]     XR chest 1 view portable    Result Date: 2/18/2022  Narrative: CHEST INDICATION:   sob covid  "patient c/o sob ongoing for several days, COVID +  recently signed out AMA from [facility] because they "weren't telling him anything about his care"  patient hypoxic on arrival"  Patient has confirmed COVID-19  COMPARISON:  2/8/2022 EXAM PERFORMED/VIEWS:  XR CHEST PORTABLE FINDINGS: Cardiomediastinal silhouette appears unremarkable  Peripheral pulmonary opacities most prominent at the lung bases indicating Covid pneumonia in this patient with recently confirmed Covid 19   No effusion or pneumothorax  Osseous structures appear within normal limits for patient age  Impression: Peripheral pulmonary opacities most prominent at the lung bases indicating Covid pneumonia in this patient with recently confirmed Covid 23  Findings are concordant with preliminary interpretation provided in the emergency department  Workstation performed: [id number]     CTA chest pe study    Result Date: 3/1/2022  Narrative: CTA - CHEST WITH IV CONTRAST - PULMONARY ANGIOGRAM INDICATION:   Hypoxia and COVID  COMPARISON: Chest x-rays from 2/8/2022, 2/17/2022, and 2/20/2022 TECHNIQUE: CTA examination of the chest was performed using angiographic technique according to a protocol specifically tailored to evaluate for pulmonary embolism  Axial, sagittal, and coronal 2D reformatted images were created from the source data and  submitted for interpretation  In addition, coronal 3D MIP postprocessing was performed on the acquisition scanner  Radiation dose length product (DLP) for this visit:  589 55 mGy-cm   This examination, like all CT scans performed in the [facility], was performed utilizing techniques to minimize radiation dose exposure, including the use of iterative  reconstruction and automated exposure control  IV Contrast:  85 mL of iohexol (OMNIPAQUE)  FINDINGS: PULMONARY ARTERIAL TREE:  No pulmonary embolus is seen  There is a small amount of gas in the right ventricle and pulmonary artery likely related to injection  LUNGS:  There is peripheral consolidation, predominantly in the lower lung fields, similar to the recent chest x-ray from 2/20/2022 though progressed from earlier chest x-rays from 2/17/2022 and 2/8/2022  There are cystic changes throughout, mostly in a  subpleural distribution though there also appears to be traction bronchiectasis at the lung bases  PLEURA:  Unremarkable  HEART/GREAT VESSELS:  Unremarkable for patient's age  No thoracic aortic aneurysm   MEDIASTINUM AND CHUY:  There is mild lymphadenopathy including a 1 3 x 1 5 cm right hilar lymph node and a 1 4 x 2 1 cm subcarinal lymph node  Additional shotty lymph nodes are also present  CHEST WALL AND LOWER NECK:   Unremarkable  VISUALIZED STRUCTURES IN THE UPPER ABDOMEN:  Unremarkable  OSSEOUS STRUCTURES:  No acute fracture or destructive osseous lesion  Impression: 1  No acute pulmonary embolism  2   Covid-19 pneumonia, progressed from chest x-rays in early February, now with developing cystic changes, mostly in a subpleural distribution, with traction bronchiectasis at the lung bases  This may represent early fibrosis though continued follow-up  is recommended  3   Paraseptal emphysema  Workstation performed: [id number]     VAS lower limb venous duplex study, complete bilateral    Result Date: 2/21/2022  Narrative:  [facility] REPORT CLINICAL: Indications: Physician wants to determine patency of the venous system secondary to elevated D-Dimer and Covid 19  Operative History: No Cardiovascular Surgeries Risk Factors The patient has history of Diabetes (IDDM) and previous smoking (quit >10yrs ago)  CONCLUSION: Impression: RIGHT LOWER LIMB: No evidence of acute or chronic deep vein thrombosis  No evidence of superficial thrombophlebitis noted  Doppler evaluation shows a normal response to augmentation maneuvers  LEFT LOWER LIMB: No evidence of acute or chronic deep vein thrombosis  No evidence of superficial thrombophlebitis noted  Doppler evaluation shows a normal response to augmentation maneuvers  Technical findings posted in EPIC  SIGNATURE: Electronically Signed by: Delmi Goel MD on 2022-02-21 05:44:14 PM      Cardiac testing:   No results found for this or any previous visit  No results found for this or any previous visit  No results found for this or any previous visit  No results found for this or any previous visit        Echo complete w/ contrast if indicated    Left Ventricle: Left ventricular cavity size is normal  Wall thickness   is normal  Systolic function is normal   Estimated LVEF 60% Wall motion is   normal  Diastolic function is mildly abnormal, consistent with grade I   (abnormal) relaxation    IVS: There is interventricular septal "bounce" of unclear etiology    Right Ventricle: Right ventricular cavity size is mildly dilated  Systolic function is mildly reduced    Mitral Valve: There is mild annular calcification  There is mild   regurgitation    Tricuspid Valve: There is mild regurgitation      IVC/SVC: IVC is at upper normal limit 1 9 cm with normal respirophasic   changes

## 2022-05-17 ENCOUNTER — TELEPHONE (OUTPATIENT)
Dept: CARDIOLOGY CLINIC | Facility: CLINIC | Age: 67
End: 2022-05-17

## 2022-05-17 NOTE — TELEPHONE ENCOUNTER
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

## 2022-05-18 ENCOUNTER — HOSPITAL ENCOUNTER (OUTPATIENT)
Dept: CT IMAGING | Facility: HOSPITAL | Age: 67
Discharge: HOME | End: 2022-05-18
Payer: COMMERCIAL

## 2022-05-18 DIAGNOSIS — R93.89 ABNORMAL CT OF THE CHEST: ICD-10-CM

## 2022-05-18 PROCEDURE — G1004 CDSM NDSC: HCPCS

## 2022-05-18 PROCEDURE — 71250 CT THORAX DX C-: CPT

## 2022-05-18 NOTE — TELEPHONE ENCOUNTER
Spoke with patient that Dr Roya Mcdaniel ok from cardiac standpoint to go back to work as long as asymptomatic

## 2022-05-27 ENCOUNTER — HOSPITAL ENCOUNTER (OUTPATIENT)
Dept: PULMONOLOGY | Facility: HOSPITAL | Age: 67
Discharge: HOME | End: 2022-05-27
Payer: COMMERCIAL

## 2022-05-27 DIAGNOSIS — J96.11 CHRONIC RESPIRATORY FAILURE WITH HYPOXIA (HCC): ICD-10-CM

## 2022-05-27 DIAGNOSIS — R93.89 ABNORMAL CT OF THE CHEST: ICD-10-CM

## 2022-05-27 DIAGNOSIS — U07.1 COVID: ICD-10-CM

## 2022-05-27 PROCEDURE — 94729 DIFFUSING CAPACITY: CPT

## 2022-05-27 PROCEDURE — 94726 PLETHYSMOGRAPHY LUNG VOLUMES: CPT

## 2022-05-27 PROCEDURE — 94760 N-INVAS EAR/PLS OXIMETRY 1: CPT

## 2022-05-27 PROCEDURE — 94729 DIFFUSING CAPACITY: CPT | Performed by: INTERNAL MEDICINE

## 2022-05-27 PROCEDURE — 94060 EVALUATION OF WHEEZING: CPT

## 2022-05-27 PROCEDURE — 94726 PLETHYSMOGRAPHY LUNG VOLUMES: CPT | Performed by: INTERNAL MEDICINE

## 2022-05-27 PROCEDURE — 94060 EVALUATION OF WHEEZING: CPT | Performed by: INTERNAL MEDICINE

## 2022-05-27 RX ORDER — ALBUTEROL SULFATE 2.5 MG/3ML
2.5 SOLUTION RESPIRATORY (INHALATION) ONCE AS NEEDED
Status: COMPLETED | OUTPATIENT
Start: 2022-05-27 | End: 2022-05-27

## 2022-05-27 RX ADMIN — ALBUTEROL SULFATE 2.5 MG: 2.5 SOLUTION RESPIRATORY (INHALATION) at 09:06

## 2022-06-16 ENCOUNTER — TELEPHONE (OUTPATIENT)
Dept: PULMONOLOGY | Facility: CLINIC | Age: 67
End: 2022-06-16

## 2022-06-16 ENCOUNTER — OFFICE VISIT (OUTPATIENT)
Dept: PULMONOLOGY | Facility: CLINIC | Age: 67
End: 2022-06-16
Payer: COMMERCIAL

## 2022-06-16 VITALS
BODY MASS INDEX: 32.24 KG/M2 | WEIGHT: 205.4 LBS | TEMPERATURE: 98 F | SYSTOLIC BLOOD PRESSURE: 120 MMHG | HEIGHT: 67 IN | OXYGEN SATURATION: 98 % | RESPIRATION RATE: 17 BRPM | DIASTOLIC BLOOD PRESSURE: 72 MMHG | HEART RATE: 97 BPM

## 2022-06-16 DIAGNOSIS — F17.211 NICOTINE DEPENDENCE, CIGARETTES, IN REMISSION: ICD-10-CM

## 2022-06-16 DIAGNOSIS — E66.09 CLASS 1 OBESITY DUE TO EXCESS CALORIES WITHOUT SERIOUS COMORBIDITY WITH BODY MASS INDEX (BMI) OF 32.0 TO 32.9 IN ADULT: ICD-10-CM

## 2022-06-16 DIAGNOSIS — Z86.16 HISTORY OF COVID-19: ICD-10-CM

## 2022-06-16 DIAGNOSIS — J43.2 CENTRILOBULAR EMPHYSEMA (HCC): Primary | ICD-10-CM

## 2022-06-16 PROCEDURE — 94618 PULMONARY STRESS TESTING: CPT | Performed by: INTERNAL MEDICINE

## 2022-06-16 PROCEDURE — 99214 OFFICE O/P EST MOD 30 MIN: CPT | Performed by: INTERNAL MEDICINE

## 2022-06-16 NOTE — TELEPHONE ENCOUNTER
Patient is calling, he said [facility] never received the discontinuation order for his oxygen  Can someone please send the oxygen D/C to [facility] and let me know once completed  I can give the patient a return call with an update   Please advise

## 2022-06-16 NOTE — PROGRESS NOTES
Office Progress Note - Pulmonary    Mook Jasso 78 y o  male MRN: [id number]    Encounter: [id number]      Assessment:   Centrilobular emphysema   History of COVID-19 pneumonia   Obesity   History of tobacco use  Plan:    6 minutes walk test    Discontinue home oxygen   Follow-up as needed  Discussion:   Patient has chronic hypoxemia has resolved  He did very well on the 6 minutes walk test   He is currently asymptomatic  I have discontinue the home oxygen  He can go back to work as a   I have not scheduled him for another appointment however I will be happy to see him in the future if the need arises  Subjective: The patient is here for follow-up visit  He denies shortness of breath  Denies cough, wheezing or sputum production  Denies chest pain  He has no nocturnal symptoms  He stopped using the oxygen for good 2 months  Review of systems:  A 12 point system review is done and aside from what is stated above the rest of the review of systems is negative  Family history and social history are reviewed  Medications list is reviewed  Vitals: Blood pressure 120/72, pulse 97, temperature 98 °F (36 7 °C), resp  rate 17, height 5' 7" (1 702 m), weight 93 2 kg (205 lb 6 4 oz), SpO2 98 %  ,     Physical Exam  Gen: Awake, alert, oriented x 3, no acute distress  HEENT: Mucous membranes moist, no oral lesions, no thrush  NECK: No accessory muscle use, JVP not elevated  Cardiac: Regular, single S1, single S2, no murmurs, no rubs, no gallops  Lungs:  Clear breath sounds  No wheezing or rhonchi  Abdomen: normoactive bowel sounds, soft nontender, nondistended, no rebound or rigidity, no guarding  Extremities: no cyanosis, no clubbing, no edema  Neuro:  Grossly nonfocal   Skin:  No rash  CT of the chest done on May 18th is reviewed on the [facility] system and compared to the CT done in March  The bilateral airspace the has markedly improved       Ref Range & Units 5/28/22 10:01 AM   Glucose 65 - 99 mg/dL 110 High     BUN 7 - 28 mg/dL 14    Creatinine 0 53 - 1 30 mg/dL 0 96    Sodium 135 - 145 mmol/L 142    Potassium 3 5 - 5 2 mmol/L 4 3    Chloride 100 - 109 mmol/L 108    Carbon Dioxide 23 - 31 mmol/L 24    Calcium 8 5 - 10 1 mg/dL 9 8    Alkaline Phosphatase 35 - 120 U/L 73    Albumin 3 5 - 4 8 g/dL 4 1    Bilirubin, Total 0 2 - 1 0 mg/dL 0 5    Comment: Use of this assay is not recommended for patients undergoing treatment with eltrombopag due to the potential for falsely elevated results  Protein, Total 6 3 - 8 3 g/dL 7 2    AST <41 U/L 68 High     ALT <56 U/L 94 High     Anion Gap 3 - 11 10    eGFRcr >59 87       Ref Range & Units 4/8/22  7:48 AM   Hemoglobin 12 5 - 17 0 g/dL 12 4 Low     Hematocrit 37 0 - 48 0 % 36 4 Low     WBC 4 0 - 10 5 thou/cmm 13 8 High     RBC 4 00 - 5 40 mill/cmm 3 65 Low     Platelet Count 350 - 350 thou/cmm 273    MPV 7 5 - 11 3 fL 7 9    MCV 80 - 100 fL 100    MCH 27 0 - 36 0 pg 34 1    MCHC 32 0 - 37 0 g/dL 34 1    RDW 12 0 - 16 0 % 16     6 minutes walk test was done today in the office  The patient started with a heart rate of 95 beats per minute and O2 saturation 96%  At the end of the test the heart rate was 103 beats per minute and the O2 saturation 92%  He walked 322 m

## 2022-06-20 ENCOUNTER — TELEPHONE (OUTPATIENT)
Dept: PULMONOLOGY | Facility: CLINIC | Age: 67
End: 2022-06-20

## 2022-06-20 NOTE — TELEPHONE ENCOUNTER
Pt called re: his disability ([employer]) contacted him asking to contact us to have some documents faxed to them  Please fax "Med rec release to [employer]" that was downloaded in media on 6/16/22 as well as the letter for his work that Dr Alpha Shone wrote    Fax #: [phone]  Please advise: [phone]

## 2022-10-17 ENCOUNTER — OFFICE VISIT (OUTPATIENT)
Dept: CARDIOLOGY CLINIC | Facility: CLINIC | Age: 67
End: 2022-10-17
Payer: COMMERCIAL

## 2022-10-17 VITALS
SYSTOLIC BLOOD PRESSURE: 114 MMHG | DIASTOLIC BLOOD PRESSURE: 74 MMHG | BODY MASS INDEX: 32.02 KG/M2 | WEIGHT: 204 LBS | HEIGHT: 67 IN | HEART RATE: 77 BPM

## 2022-10-17 DIAGNOSIS — E66.09 CLASS 1 OBESITY DUE TO EXCESS CALORIES WITHOUT SERIOUS COMORBIDITY WITH BODY MASS INDEX (BMI) OF 32.0 TO 32.9 IN ADULT: ICD-10-CM

## 2022-10-17 DIAGNOSIS — I25.10 CORONARY ARTERY DISEASE INVOLVING NATIVE CORONARY ARTERY OF NATIVE HEART WITHOUT ANGINA PECTORIS: ICD-10-CM

## 2022-10-17 DIAGNOSIS — I45.2 BIFASCICULAR BLOCK: ICD-10-CM

## 2022-10-17 DIAGNOSIS — I10 PRIMARY HYPERTENSION: ICD-10-CM

## 2022-10-17 DIAGNOSIS — Z82.49 FAMILY HISTORY OF EARLY CAD: ICD-10-CM

## 2022-10-17 DIAGNOSIS — I50.32 CHRONIC DIASTOLIC (CONGESTIVE) HEART FAILURE (HCC): Primary | ICD-10-CM

## 2022-10-17 PROCEDURE — 99214 OFFICE O/P EST MOD 30 MIN: CPT | Performed by: INTERNAL MEDICINE

## 2022-10-17 PROCEDURE — 93000 ELECTROCARDIOGRAM COMPLETE: CPT | Performed by: INTERNAL MEDICINE

## 2022-10-17 RX ORDER — INSULIN LISPRO 100 [IU]/ML
INJECTION, SOLUTION INTRAVENOUS; SUBCUTANEOUS
COMMUNITY

## 2022-10-17 NOTE — PROGRESS NOTES
[facility], [facility] Wally Som[address]  [place] [address]  Phone#  [phone]  Fax#  [phone]                                               Cardiology Office Follow up  Leatha Salmon, 78 y o  male  YOB: 1955  MRN: [id number] Encounter: [id number]      PCP - Val Thakkar MD  Referring Provider - No ref  provider found    Chief Complaint   Patient presents with   • Follow-up       Assessment  Chronic diastolic heart failure  Echo - 3/31/22 -  LVEF 69%, grade 1 diastolic dysfunction, mildly dilated RV with reduced systolic function  CAD  Inferior infarct on nuclear stress  Nuclear Rx stress - 3/24/22 -  LVEF 70%, Moderate size, complete fixed perfusion defect of basal to mid inferior wall - probable old inferior infarct, no significant ischemia  Bifascicular block  Family history of early CAD  Father  of a heart attack at age 48  Diabetes Mellitus Type 2  Overweight, Body mass index is 31 95 kg/m²  Plan  CAD - old inferior infaract  Has evidence of probable complete infarct of basal to mid inferior wall on nuclear stress test without any significant ischemia  On my own review her echo, he does akinesis of basal inferior wall  Low/borderline blood pressure, unable to add beta blockers  Continue aspirin, Atorvastatin    Chronic diastolic heart failure, COPD  Shortness of breath is multifactorial - diastolic heart failure + COPD/emphysema  Shortness of breath is overall much improved on diuresis  6MWT - 22 - 322 m, lowest SpO2 92%  Continue Lasix 40 mg daily    Bifascicular block  ECG is unchanged today with bifascicular block  No symptoms of dizziness or lightheadedness, near-syncope or syncope  Continue to monitor clinically and follow annually on ECG    Hypertriglyceridemia, Obesity - Body mass index is 31 95 kg/m²       Has CAD with inferior infarct on nuclear stress, and also has diabetes --> on atorvastatin 10  LDL 72  He has gained 20 lb over the last 6 months from eating indiscriminately  I have counseled him regarding need to cut down on carbohydrates and fat and work on increasing exercise in order to achieve weight loss  Follow up lipid panel --> If LDL greater than 70, up titrate atorvastatin 40 mg daily      Results for orders placed or performed in visit on 10/17/22   POCT ECG    Impression    A normal sinus rhythm  Right bundle-branch block  Left anterior fascicular block  = Bifascicular block =       Orders Placed This Encounter   Procedures   • POCT ECG     Return in about 1 year (around 10/17/2023), or if symptoms worsen or fail to improve  History of Present Illness   78 y o  male,  (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), comes in as a new patient for consultation regarding ongoing symptoms of shortness of breath and hypoxia as well as elevated troponins noted during recent hospitalization  In late January 2022, 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 and subsequently saw his PCP who recommended a COVID test and home pulse ox monitoring  He tested positive and was being monitored at home, but then developed hypoxia and as a result presented to [facility] ED on 02/08/2022  He had mild troponin elevation (112 --> 235 --> 191) and was evaluated by Cardiologist Dr Zurdo Harris, and was felt to be non-MI troponin elevation related to COVID infection  He was treated with remdesivir and then left AMA on 2/17/22  He subsequently presented back to [facility] ED the same night, and was found to have lactic acidosis and found to have more elevated troponin levels (3350 --> 3675)  After review with on-call cardiologist Hiram Herrera,  He was transferred to [facility] for cardiac evaluation  He was seen by Cardiology Dr Barbara Jones and this was felt to be all related to COVID and as a result he was recommended outpatient cardiology follow up    He remained in the hospital for another 2 weeks and was eventually discharged on 2022 on oxygen and steroid therapy  Since discharge, he continues to be hypoxic with ambulation and continues to need ambulatory oxygen  He has no complains of chest pain, palpitations, dizziness  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  and as a result is here to complete cardiology evaluation    Family history -   Mother - diabetic, had gangrene,   Father  of heart attack at 48    Interval history - 2022  He comes back for follow-up after about 2 months  He completed the nuclear stress test and echocardiogram, which do seem to suggest him having had an old inferior infarct  He shortness of breath is overall improving, but he continues to have issues with moderate to severe exertion  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, without any major limitation  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, 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 after completion of 12 weeks of FMLA, due to ongoing shortness of breath  He will be following up with pulmonary regarding same  Interval history - 10/17/2022  He comes back for follow-up after about 5 months  No current complains of chest pain or shortness of breath and he feels much better  He is off oxygen and back to working as a     He has gained about 20 lb over the last 6 months, which he states is related to his eating a lot of his own cooking        Historical Information   Past Medical History:   Diagnosis Date   • COVID-19 2022   • Diabetes mellitus ([facility] [address] )    • Hypertension      Past Surgical History:   Procedure Laterality Date   • ANKLE FRACTURE SURGERY Left    • FOOT FRACTURE SURGERY     • KNEE SURGERY Right    • VASECTOMY       Family History   Problem Relation Age of Onset   • Diabetes Mother    • Alcohol abuse Father      Current Outpatient Medications on File Prior to Visit   Medication Sig Dispense Refill   • Alcohol Swabs 70 % PADS May substitute brand based on insurance coverage  Check glucose TID  100 each 0   • ascorbic acid (VITAMIN C) 500 mg tablet in the morning     • aspirin 81 mg chewable tablet Chew 81 mg  in the morning  • atorvastatin (LIPITOR) 10 mg tablet Take 10 mg by mouth daily     • Blood Glucose Monitoring Suppl (OneTouch Verio Reflect) w/Device KIT May substitute brand based on insurance coverage  Check glucose TID  1 kit 0   • furosemide (LASIX) 40 mg tablet Take 1 tablet (40 mg total) by mouth daily 30 tablet 0   • glucose blood (OneTouch Verio) test strip May substitute brand based on insurance coverage  Check glucose TID  100 each 0   • Insulin Glargine (TOUJEO SOLOSTAR SC) Inject under the skin in the morning     • insulin lispro (HumaLOG) 100 units/mL injection Inject under the skin 3 (three) times a day with meals     • Insulin Pen Needle (BD Pen Needle Silvia 2nd Gen) 32G X 4 MM MISC For use with insulin pen  Pharmacy may dispense brand covered by insurance  100 each 0   • Insulin Pen Needle (BD Pen Needle Silvia 2nd Gen) 32G X 4 MM MISC For use with insulin pen  Pharmacy may dispense brand covered by insurance  100 each 0   • lisinopril (ZESTRIL) 5 mg tablet Take 5 mg by mouth daily     • metFORMIN (GLUCOPHAGE) 1000 MG tablet Take 1,000 mg by mouth 2 (two) times a day with meals     • Milk Thistle 1000 MG CAPS Take by mouth     • Multiple Vitamin (multivitamin) tablet Take 1 tablet by mouth daily     • Omega-3 Fatty Acids (fish oil) 1,000 mg Take 1,000 mg by mouth 2 (two) times a day     • OneTouch Delica Lancets 42F MISC May substitute brand based on insurance coverage  Check glucose TID   100 each 0   • pantoprazole (PROTONIX) 40 mg tablet Take 1 tablet (40 mg total) by mouth daily in the early morning 60 tablet 0   • thiamine (VITAMIN B1) 100 mg tablet Take 1 tablet (100 mg total) by mouth daily 60 tablet 0   • VITAMIN D PO daily     • zinc sulfate (ZINCATE) 220 mg capsule Take 1 capsule (220 mg total) by mouth daily 60 capsule 0   • benzonatate (TESSALON PERLES) 100 mg capsule Take 1 capsule (100 mg total) by mouth 3 (three) times a day 20 capsule 0   • fluticasone-vilanterol (Breo Ellipta) 100-25 mcg/inh inhaler Inhale 1 puff daily Rinse mouth after use  60 each 2   • insulin glargine (Lantus SoloStar) 100 units/mL injection pen Inject 20 Units under the skin daily at bedtime (Patient not taking: Reported on 10/17/2022) 15 mL 0   • insulin lispro (HumaLOG KwikPen) 100 units/mL injection pen Inject 12 Units under the skin 3 (three) times a day with meals 15 mL 0     No current facility-administered medications on file prior to visit  No Known Allergies  Social History     Socioeconomic History   • Marital status: /Civil Union     Spouse name: None   • Number of children: None   • Years of education: None   • Highest education level: None   Occupational History   • None   Tobacco Use   • Smoking status: Former Smoker     Packs/day: 1 00     Years: 30 00     Pack years: 30 00     Types: Cigarettes     Quit date: 2008     Years since quittin 6   • Smokeless tobacco: Never Used   Vaping Use   • Vaping Use: Never used   Substance and Sexual Activity   • Alcohol use: Never   • Drug use: Never   • Sexual activity: Not Currently   Other Topics Concern   • None   Social History Narrative   • None     Social Determinants of Health     Financial Resource Strain: Not on file   Food Insecurity: No Food Insecurity   • Worried About Running Out of Food in the Last Year: Never true   • Ran Out of Food in the Last Year: Never true   Transportation Needs: No Transportation Needs   • Lack of Transportation (Medical): No   • Lack of Transportation (Non-Medical):  No   Physical Activity: Not on file   Stress: Not on file   Social Connections: Not on file   Intimate Partner Violence: Not on file   Housing Stability: Low Risk    • Unable to Pay for Housing in the Last Year: No   • Number of Places Lived in the Last Year: 1   • Unstable Housing in the Last Year: No        Review of Systems   All other systems reviewed and are negative  Vitals:  Vitals:    10/17/22 0824   BP: 114/74   Pulse: 77   Weight: 92 5 kg (204 lb)   Height: 5' 7" (1 702 m)     BMI - Body mass index is 31 95 kg/m²  Wt Readings from Last 7 Encounters:   10/17/22 92 5 kg (204 lb)   06/16/22 93 2 kg (205 lb 6 4 oz)   05/13/22 88 9 kg (196 lb)   05/06/22 89 4 kg (197 lb)   04/03/22 83 5 kg (184 lb 1 4 oz)   03/24/22 86 6 kg (191 lb)   03/17/22 86 6 kg (191 lb)       Physical Exam  Vitals and nursing note reviewed  Constitutional:       General: He is not in acute distress  Appearance: He is well-developed  He is obese  He is ill-appearing  He is not diaphoretic  HENT:      Head: Normocephalic and atraumatic  Nose: No congestion  Eyes:      General: No scleral icterus  Conjunctiva/sclera: Conjunctivae normal    Neck:      Vascular: No carotid bruit or JVD  Cardiovascular:      Rate and Rhythm: Normal rate and regular rhythm  Heart sounds: Normal heart sounds  No murmur heard  No friction rub  No gallop  Pulmonary:      Effort: Pulmonary effort is normal  No respiratory distress  Breath sounds: Normal breath sounds  No wheezing or rales  Chest:      Chest wall: No tenderness  Abdominal:      General: There is no distension  Palpations: Abdomen is soft  Tenderness: There is no abdominal tenderness  Musculoskeletal:         General: No swelling, tenderness or deformity  Cervical back: Neck supple  No muscular tenderness  Right lower leg: No edema  Left lower leg: No edema  Skin:     General: Skin is warm  Neurological:      General: No focal deficit present  Mental Status: He is alert and oriented to person, place, and time  Mental status is at baseline  Psychiatric:         Mood and Affect: Mood normal          Behavior: Behavior normal          Thought Content:  Thought content normal  Labs:  CBC:   Lab Results   Component Value Date    WBC 13 41 (H) 04/06/2022    RBC 3 41 (L) 04/06/2022    HGB 11 6 (L) 04/06/2022    HCT 34 4 (L) 04/06/2022     (H) 04/06/2022     04/06/2022    RDW 15 2 (H) 04/06/2022       CMP:   Lab Results   Component Value Date    K 4 5 04/06/2022     04/06/2022    CO2 26 04/06/2022    BUN 25 04/06/2022    CREATININE 0 81 04/06/2022    EGFR 92 04/06/2022    CALCIUM 8 9 04/06/2022    AST 40 03/31/2022    ALT 37 03/31/2022    ALKPHOS 61 03/31/2022       Magnesium:  Lab Results   Component Value Date    MG 2 2 04/05/2022       Lipid Profile:   No results found for: CHOL, HDL, TRIG, LDLCALC    Thyroid Studies: No results found for: Marcine [place], FREET4, G8UONXF, E1XJABL    A1c:  No components found for: HGA1C    INR:  Lab Results   Component Value Date    INR 1 11 03/30/2022    INR 1 12 02/17/2022    INR 0 99 02/08/2022   5    Imaging: XR chest portable    Result Date: 3/1/2022  Narrative: CHEST INDICATION:   worsening oxygen requirements  Patient has confirmed COVID-19  COMPARISON:  Chest x-ray 2/20/2022 EXAM PERFORMED/VIEWS:  XR CHEST PORTABLE FINDINGS: Cardiomediastinal silhouette appears unremarkable  Extensive peripheral infiltrates again seen bilaterally, partially improved on the right, stable on the left  No pneumothorax or pleural effusion  Osseous structures appear within normal limits for patient age  Impression: Extensive peripheral infiltrates again seen bilaterally, partially improved on the right, stable on the left  Findings again most suggestive of Covid 19 pneumonia  Workstation performed: [id number]     XR chest portable    Result Date: 2/20/2022  Narrative: CHEST INDICATION:   increase oxygen requirement  Patient has confirmed COVID-19  COMPARISON:  None EXAM PERFORMED/VIEWS:  XR CHEST PORTABLE FINDINGS: Cardiomediastinal silhouette appears unremarkable  Worsened peripheral pulmonary opacities   No effusion or pneumothorax  Osseous structures appear within normal limits for patient age  Impression: Worsened peripheral pulmonary opacities  Workstation performed: [id number]     XR chest 1 view portable    Result Date: 2/18/2022  Narrative: CHEST INDICATION:   sob covid  "patient c/o sob ongoing for several days, COVID +  recently signed out AMA from [facility] because they "weren't telling him anything about his care"  patient hypoxic on arrival"  Patient has confirmed COVID-19  COMPARISON:  2/8/2022 EXAM PERFORMED/VIEWS:  XR CHEST PORTABLE FINDINGS: Cardiomediastinal silhouette appears unremarkable  Peripheral pulmonary opacities most prominent at the lung bases indicating Covid pneumonia in this patient with recently confirmed Covid 19  No effusion or pneumothorax  Osseous structures appear within normal limits for patient age  Impression: Peripheral pulmonary opacities most prominent at the lung bases indicating Covid pneumonia in this patient with recently confirmed Covid 23  Findings are concordant with preliminary interpretation provided in the emergency department  Workstation performed: [id number]     CTA chest pe study    Result Date: 3/1/2022  Narrative: CTA - CHEST WITH IV CONTRAST - PULMONARY ANGIOGRAM INDICATION:   Hypoxia and COVID  COMPARISON: Chest x-rays from 2/8/2022, 2/17/2022, and 2/20/2022 TECHNIQUE: CTA examination of the chest was performed using angiographic technique according to a protocol specifically tailored to evaluate for pulmonary embolism  Axial, sagittal, and coronal 2D reformatted images were created from the source data and  submitted for interpretation  In addition, coronal 3D MIP postprocessing was performed on the acquisition scanner  Radiation dose length product (DLP) for this visit:  589 55 mGy-cm     This examination, like all CT scans performed in the [place], was performed utilizing techniques to minimize radiation dose exposure, including the use of iterative  reconstruction and automated exposure control  IV Contrast:  85 mL of iohexol (OMNIPAQUE)  FINDINGS: PULMONARY ARTERIAL TREE:  No pulmonary embolus is seen  There is a small amount of gas in the right ventricle and pulmonary artery likely related to injection  LUNGS:  There is peripheral consolidation, predominantly in the lower lung fields, similar to the recent chest x-ray from 2/20/2022 though progressed from earlier chest x-rays from 2/17/2022 and 2/8/2022  There are cystic changes throughout, mostly in a  subpleural distribution though there also appears to be traction bronchiectasis at the lung bases  PLEURA:  Unremarkable  HEART/GREAT VESSELS:  Unremarkable for patient's age  No thoracic aortic aneurysm  MEDIASTINUM AND CHUY:  There is mild lymphadenopathy including a 1 3 x 1 5 cm right hilar lymph node and a 1 4 x 2 1 cm subcarinal lymph node  Additional shotty lymph nodes are also present  CHEST WALL AND LOWER NECK:   Unremarkable  VISUALIZED STRUCTURES IN THE UPPER ABDOMEN:  Unremarkable  OSSEOUS STRUCTURES:  No acute fracture or destructive osseous lesion  Impression: 1  No acute pulmonary embolism  2   Covid-19 pneumonia, progressed from chest x-rays in early February, now with developing cystic changes, mostly in a subpleural distribution, with traction bronchiectasis at the lung bases  This may represent early fibrosis though continued follow-up  is recommended  3   Paraseptal emphysema  Workstation performed: [id number]     VAS lower limb venous duplex study, complete bilateral    Result Date: 2/21/2022  Narrative:  [facility] REPORT CLINICAL: Indications: Physician wants to determine patency of the venous system secondary to elevated D-Dimer and Covid 19  Operative History: No Cardiovascular Surgeries Risk Factors The patient has history of Diabetes (IDDM) and previous smoking (quit >10yrs ago)     CONCLUSION: Impression: RIGHT LOWER LIMB: No evidence of acute or chronic deep vein thrombosis  No evidence of superficial thrombophlebitis noted  Doppler evaluation shows a normal response to augmentation maneuvers  LEFT LOWER LIMB: No evidence of acute or chronic deep vein thrombosis  No evidence of superficial thrombophlebitis noted  Doppler evaluation shows a normal response to augmentation maneuvers  Technical findings posted in EPIC  SIGNATURE: Electronically Signed by: Man Jerry MD on 2022 05:44:14 PM      Cardiac testing:   No results found for this or any previous visit  No results found for this or any previous visit  No results found for this or any previous visit  No results found for this or any previous visit  Complete PFT with post bronchodilator  Pulmonary Function Test Report  Alexis Mejia 78 y o  male MRN: [id number]    Date of Testin22    Date of Interpretation:  22    Requesting Physician:  Sanai Lundberg    Reason for Testing:  COVID-19, chronic respiratory failure, abnormal chest   CT    Reference set for interpretation: [id number]    Procedure: The patient was taken to pulmonary function testing laboratory  The patient demonstrated good effort and cooperation  The results of   this test meet ATS standards for acceptability and repeatability  Data   set appears appropriate for interpretation  Post bronchodilator testing performed after the administration of 2 5mg   albuterol in 3cc normal saline administered via nebulizer per   bronchodilator protocol  Results:  FEV1/FVC Ratio: 79 %  FEV1: 2 45 L 80 % predicted  Forced Vital Capacity: 3 09 L 78 % predicted  After administration of bronchodilator:  No change    Lung volumes:  Total Lung Capacity 68 % predicted Residual volume 45 %   predicted    DLCO corrected for patients hemoglobin level: 37 % predicted    Flow volume loop:  Normal    Interpretation:    • Spirometry and lung volumes with mild restriction  • No change with bronchodilators  • Severely reduced diffusion BRANNON Enrique , [facility]  [facility]

## 2022-10-17 NOTE — PATIENT INSTRUCTIONS
Hyperlipidemia   AMBULATORY CARE:   Hyperlipidemia  is a high level of lipids (fats) in your blood  These lipids include cholesterol or triglycerides  Lipids are made by your body  They also come from the foods you eat  Your body needs lipids to work properly, but high levels increase your risk for heart disease, heart attack, and stroke  Call your local emergency number (911 in the [address], located within [facility] [facility]) or have someone call if:   You have any of the following signs of a heart attack:      Squeezing, pressure, or pain in your chest    You may  also have any of the following:     Discomfort or pain in your back, neck, jaw, stomach, or arm    Shortness of breath    Nausea or vomiting    Lightheadedness or a sudden cold sweat    You have any of the following signs of a stroke:      Numbness or drooping on one side of your face     Weakness in an arm or leg    Confusion or difficulty speaking    Dizziness, a severe headache, or vision loss    Call your doctor if:   You have questions or concerns about your condition or care  Treatment  may first include lifestyle changes to help decrease your lipid levels  Your provider may recommend you work with a team to manage hyperlipidemia  The team may include medical experts such as a dietitian, an exercise or physical therapist, and a behavior therapist  Your family members may be included in helping you create lifestyle changes  You may also need to take medicine to lower your lipid levels  Some of the lifestyle changes you may need to make include the following:  Maintain a healthy weight  Ask your healthcare provider what a healthy weight is for you  Ask him or her to help you create a weight loss plan if you are overweight  Weight loss can decrease your cholesterol and triglyceride levels  Be physically active throughout the day  Physical activity, such as exercise, lowers your cholesterol levels and helps you maintain a healthy weight   Get 30 minutes or more of aerobic exercise 4 to 6 days each week  You can split your exercise into four 10-minute workouts instead of 30 minutes at one time  Examples of aerobic exercises include walking briskly, swimming, or riding a bike  Work with your healthcare provider to plan the best exercise program for you  Also include strength training at least 2 times each week  Your healthcare providers can help you create a physical activity plan  Do not smoke  Nicotine and other chemicals in cigarettes and cigars can increase your risk for a heart attack and stroke  Ask your healthcare provider for information if you currently smoke and need help to quit  E-cigarettes or smokeless tobacco still contain nicotine  Talk to your healthcare provider before you use these products  Eat heart-healthy foods  A dietitian or your provider can give you more information on low-sodium plans or the DASH (Dietary Approaches to Stop Hypertension) eating plan  The DASH plan is low in sodium, processed sugar, unhealthy fats, and total fat  It is high in potassium, calcium, and fiber  It is high in potassium, calcium, and fiber  These can be found in vegetables, fruit, and whole-grain foods  The following are ways to get more heart-healthy foods:         Decrease the total amount of fat you eat  Choose lean meats, fat-free or 1% fat milk, and low-fat dairy products, such as yogurt and cheese  Limit or do not eat red meat  Red meats are high in fat and cholesterol  Replace unhealthy fats with healthy fats  Unhealthy fats include saturated fat, trans fat, and cholesterol  Choose soft margarines that are low in saturated fat and have little or no trans fat  Monounsaturated fats are healthy fats  These are found in olive oil, canola oil, avocado, and nuts  Polyunsaturated fats are also healthy  These are found in fish, flaxseed, walnuts, and soybeans  Eat 5 or more servings of fruits and vegetables every day    They are low in calories and fat and a good source of essential vitamins  Include dark green, red, and orange vegetables  Examples include spinach, kale, broccoli, and carrots  Eat foods high in fiber  Fiber can help lower your cholesterol levels  Choose whole grain, high-fiber foods  Good choices include whole-wheat breads or cereals, beans, peas, fruits, and vegetables  Limit sodium (salt) as directed  Too much sodium can affect your fluid balance and blood pressure  Your healthcare provider will tell you how much sodium and potassium are safe for you to have in a day  He or she may recommend that you limit sodium to 2,300 mg a day  Your provider or a dietitian can help you find ways to limit sodium  For example, if you add salt while you cook, do not add more salt at the table  Check labels to find low-sodium or no-salt-added foods  Some low-sodium foods use potassium salts for flavor  Too much potassium can also cause health problems  Ask your healthcare provider if it is okay for you to drink alcohol  Alcohol can increase your cholesterol and triglyceride levels  Your provider can tell you how many drinks are okay to have within 24 hours and within 1 week  A drink of alcohol is 12 ounces of beer, 5 ounces of wine, or 1½ ounces of liquor  Follow up with your doctor as directed: You may need to return for more tests  Your healthcare provider may refer you to a dietitian  Write down your questions so you remember to ask them during your visits  © Copyright [employer] 2022 Information is for End User's use only and may not be sold, redistributed or otherwise used for commercial purposes  All illustrations and images included in CareNotes® are the copyrighted property of [employer]  or [facility] [employer]ule[facility]   The above information is an  only  It is not intended as medical advice for individual conditions or treatments   Talk to your doctor, nurse or pharmacist before following any medical regimen to see if it is safe and effective for you

## 2022-11-12 ENCOUNTER — HOSPITAL ENCOUNTER (OUTPATIENT)
Dept: ULTRASOUND IMAGING | Facility: HOSPITAL | Age: 67
Discharge: HOME | End: 2022-11-12

## 2022-11-12 DIAGNOSIS — Z13.6 ENCOUNTER FOR ABDOMINAL AORTIC ANEURYSM (AAA) SCREENING: ICD-10-CM

## 2023-06-21 NOTE — ED NOTES
Have You Had Botox Before?: has had botox Patient continually making comments toward staff about everything we are trying to do to help him  States "can't you see my chart I was just admitted"  Explained that unfortunately we have to start from the beginning since this is a new encounter  Patient states that no one would tell him what's going on when he was admitted and "he expects the same thing here"  I said that we have been trying to explain everything we are doing and he "doesn't want to hear it"        Kitty Sicard, LESLYE  02/17/22 8745 When Was Your Last Botox Treatment?: 1 year ago

## 2023-12-04 ENCOUNTER — APPOINTMENT (OUTPATIENT)
Dept: LAB | Facility: CLINIC | Age: 68
End: 2023-12-04
Payer: COMMERCIAL

## 2023-12-04 DIAGNOSIS — E11.9 TYPE 2 DIABETES MELLITUS WITHOUT COMPLICATION, UNSPECIFIED WHETHER LONG TERM INSULIN USE (HCC): ICD-10-CM

## 2023-12-04 DIAGNOSIS — Z12.5 ENCOUNTER FOR SCREENING FOR MALIGNANT NEOPLASM OF PROSTATE: ICD-10-CM

## 2023-12-04 DIAGNOSIS — E78.5 HYPERLIPIDEMIA, UNSPECIFIED HYPERLIPIDEMIA TYPE: ICD-10-CM

## 2023-12-04 LAB
CHOLEST SERPL-MCNC: 130 MG/DL
EST. AVERAGE GLUCOSE BLD GHB EST-MCNC: 134 MG/DL
HBA1C MFR BLD: 6.3 %
HDLC SERPL-MCNC: 40 MG/DL
LDLC SERPL CALC-MCNC: 61 MG/DL (ref 0–100)
NONHDLC SERPL-MCNC: 90 MG/DL
PSA SERPL-MCNC: 0.27 NG/ML (ref 0–4)
TRIGL SERPL-MCNC: 146 MG/DL

## 2023-12-04 PROCEDURE — 83036 HEMOGLOBIN GLYCOSYLATED A1C: CPT

## 2023-12-04 PROCEDURE — G0103 PSA SCREENING: HCPCS

## 2023-12-04 PROCEDURE — 80061 LIPID PANEL: CPT

## 2023-12-04 PROCEDURE — 36415 COLL VENOUS BLD VENIPUNCTURE: CPT

## 2024-01-13 ENCOUNTER — HOSPITAL ENCOUNTER (EMERGENCY)
Facility: HOSPITAL | Age: 69
Discharge: HOME | End: 2024-01-13
Attending: EMERGENCY MEDICINE
Payer: COMMERCIAL

## 2024-01-13 ENCOUNTER — APPOINTMENT (EMERGENCY)
Dept: RADIOLOGY | Facility: HOSPITAL | Age: 69
End: 2024-01-13
Payer: COMMERCIAL

## 2024-01-13 VITALS
RESPIRATION RATE: 20 BRPM | WEIGHT: 215.83 LBS | BODY MASS INDEX: 33.8 KG/M2 | TEMPERATURE: 97.9 F | HEART RATE: 113 BPM | SYSTOLIC BLOOD PRESSURE: 149 MMHG | DIASTOLIC BLOOD PRESSURE: 70 MMHG | OXYGEN SATURATION: 91 %

## 2024-01-13 DIAGNOSIS — R00.0 TACHYCARDIA: ICD-10-CM

## 2024-01-13 DIAGNOSIS — B33.8 RSV INFECTION: Primary | ICD-10-CM

## 2024-01-13 LAB
ANION GAP SERPL CALCULATED.3IONS-SCNC: 9 MMOL/L
BASOPHILS # BLD AUTO: 0.05 THOUSANDS/ÂΜL (ref 0–0.1)
BASOPHILS NFR BLD AUTO: 0 % (ref 0–1)
BNP SERPL-MCNC: 16 PG/ML (ref 0–100)
BUN SERPL-MCNC: 26 MG/DL (ref 5–25)
CALCIUM SERPL-MCNC: 9.9 MG/DL (ref 8.4–10.2)
CHLORIDE SERPL-SCNC: 105 MMOL/L (ref 96–108)
CO2 SERPL-SCNC: 22 MMOL/L (ref 21–32)
CREAT SERPL-MCNC: 1.03 MG/DL (ref 0.6–1.3)
D DIMER PPP FEU-MCNC: 0.29 UG/ML FEU
EOSINOPHIL # BLD AUTO: 0.14 THOUSAND/ÂΜL (ref 0–0.61)
EOSINOPHIL NFR BLD AUTO: 1 % (ref 0–6)
ERYTHROCYTE [DISTWIDTH] IN BLOOD BY AUTOMATED COUNT: 12.9 % (ref 11.6–15.1)
FLUAV RNA RESP QL NAA+PROBE: NEGATIVE
FLUBV RNA RESP QL NAA+PROBE: NEGATIVE
GFR SERPL CREATININE-BSD FRML MDRD: 74 ML/MIN/1.73SQ M
GLUCOSE SERPL-MCNC: 111 MG/DL (ref 65–140)
HCT VFR BLD AUTO: 41.9 % (ref 36.5–49.3)
HGB BLD-MCNC: 14.7 G/DL (ref 12–17)
IMM GRANULOCYTES # BLD AUTO: 0.06 THOUSAND/UL (ref 0–0.2)
IMM GRANULOCYTES NFR BLD AUTO: 0 % (ref 0–2)
LYMPHOCYTES # BLD AUTO: 4.24 THOUSANDS/ÂΜL (ref 0.6–4.47)
LYMPHOCYTES NFR BLD AUTO: 30 % (ref 14–44)
MCH RBC QN AUTO: 34.3 PG (ref 26.8–34.3)
MCHC RBC AUTO-ENTMCNC: 35.1 G/DL (ref 31.4–37.4)
MCV RBC AUTO: 98 FL (ref 82–98)
MONOCYTES # BLD AUTO: 0.93 THOUSAND/ÂΜL (ref 0.17–1.22)
MONOCYTES NFR BLD AUTO: 7 % (ref 4–12)
NEUTROPHILS # BLD AUTO: 8.89 THOUSANDS/ÂΜL (ref 1.85–7.62)
NEUTS SEG NFR BLD AUTO: 62 % (ref 43–75)
NRBC BLD AUTO-RTO: 0 /100 WBCS
PLATELET # BLD AUTO: 168 THOUSANDS/UL (ref 149–390)
PMV BLD AUTO: 10 FL (ref 8.9–12.7)
POTASSIUM SERPL-SCNC: 4.3 MMOL/L (ref 3.5–5.3)
PROCALCITONIN SERPL-MCNC: 0.12 NG/ML
RBC # BLD AUTO: 4.28 MILLION/UL (ref 3.88–5.62)
RSV RNA RESP QL NAA+PROBE: POSITIVE
SARS-COV-2 RNA RESP QL NAA+PROBE: NEGATIVE
SODIUM SERPL-SCNC: 136 MMOL/L (ref 135–147)
WBC # BLD AUTO: 14.31 THOUSAND/UL (ref 4.31–10.16)

## 2024-01-13 PROCEDURE — 99285 EMERGENCY DEPT VISIT HI MDM: CPT

## 2024-01-13 PROCEDURE — 83880 ASSAY OF NATRIURETIC PEPTIDE: CPT

## 2024-01-13 PROCEDURE — 80048 BASIC METABOLIC PNL TOTAL CA: CPT

## 2024-01-13 PROCEDURE — 85379 FIBRIN DEGRADATION QUANT: CPT

## 2024-01-13 PROCEDURE — 36415 COLL VENOUS BLD VENIPUNCTURE: CPT

## 2024-01-13 PROCEDURE — 71046 X-RAY EXAM CHEST 2 VIEWS: CPT

## 2024-01-13 PROCEDURE — 85025 COMPLETE CBC W/AUTO DIFF WBC: CPT

## 2024-01-13 PROCEDURE — 94640 AIRWAY INHALATION TREATMENT: CPT

## 2024-01-13 PROCEDURE — 0241U HB NFCT DS VIR RESP RNA 4 TRGT: CPT

## 2024-01-13 PROCEDURE — 84145 PROCALCITONIN (PCT): CPT

## 2024-01-13 PROCEDURE — 93005 ELECTROCARDIOGRAM TRACING: CPT

## 2024-01-13 RX ORDER — IPRATROPIUM BROMIDE AND ALBUTEROL SULFATE 2.5; .5 MG/3ML; MG/3ML
3 SOLUTION RESPIRATORY (INHALATION) ONCE
Status: COMPLETED | OUTPATIENT
Start: 2024-01-13 | End: 2024-01-13

## 2024-01-13 RX ADMIN — IPRATROPIUM BROMIDE AND ALBUTEROL SULFATE 3 ML: 2.5; .5 SOLUTION RESPIRATORY (INHALATION) at 20:30

## 2024-01-14 LAB
ATRIAL RATE: 122 BPM
P AXIS: 59 DEGREES
PR INTERVAL: 160 MS
QRS AXIS: -70 DEGREES
QRSD INTERVAL: 134 MS
QT INTERVAL: 330 MS
QTC INTERVAL: 456 MS
T WAVE AXIS: 80 DEGREES
VENTRICULAR RATE: 115 BPM

## 2024-01-14 NOTE — ED PROVIDER NOTES
History  Chief Complaint   Patient presents with    Shortness of Breath     Patient reports SOB, cough, fever started today. Reports has been visiting wife inpatient all day.  +runny nose x1 week  Diabetic and reports [id number] at 1730     Mandeep is a 68-year-old male with history of diabetes, hypertension, previous diagnosis of heart failure presenting to the emergency room with shortness of breath x 5 days.  He reports that he has had shortness of breath, cough with yellow mucus, subjective fevers over the past 5 days.  His wife is currently in the hospital recovering from procedure.  He reports that he has been visiting her here in the hospital is and has since become ill.  Today, he reports that his shortness of breath worsened.  He is not on oxygen at baseline.  His occupation is driving trucks, he reports that he is taken time off recently to be with his wife. He denies leg swelling, chest pain, pleuritic pain.  He has a 30-pack-year but does not currently smoke.      Shortness of Breath  Severity:  Moderate  Onset quality:  Gradual  Duration:  5 days  Timing:  Constant  Progression:  Worsening  Chronicity:  New  Context: URI    Context: not smoke exposure    Worsened by:  Activity  Associated symptoms: cough    Associated symptoms: no abdominal pain, no chest pain, no claudication, no diaphoresis, no ear pain, no fever, no headaches, no hemoptysis, no neck pain, no PND, no rash, no sore throat, no sputum production, no syncope, no swollen glands, no vomiting and no wheezing        Prior to Admission Medications   Prescriptions Last Dose Informant Patient Reported? Taking?   Alcohol Swabs 70 % PADS   No No   Sig: May substitute brand based on insurance coverage. Check glucose TID.   Blood Glucose Monitoring Suppl (OneTouch Verio Reflect) w/Device KIT   No No   Sig: May substitute brand based on insurance coverage. Check glucose TID.   Insulin Glargine (TOUJEO SOLOSTAR SC)   Yes No   Sig: Inject under the skin in  the morning   Insulin Pen Needle (BD Pen Needle Silvia 2nd Gen) 32G X 4 MM MISC   No No   Sig: For use with insulin pen. Pharmacy may dispense brand covered by insurance.   Insulin Pen Needle (BD Pen Needle Silvia 2nd Gen) 32G X 4 MM MISC   No No   Sig: For use with insulin pen. Pharmacy may dispense brand covered by insurance.   Milk Thistle 1000 MG CAPS   Yes No   Sig: Take by mouth   Multiple Vitamin (multivitamin) tablet   Yes No   Sig: Take 1 tablet by mouth daily   Omega-3 Fatty Acids (fish oil) 1,000 mg   Yes No   Sig: Take 1,000 mg by mouth 2 (two) times a day   OneTouch Delica Lancets 33G MISC   No No   Sig: May substitute brand based on insurance coverage. Check glucose TID.   VITAMIN D PO   Yes No   Sig: daily   ascorbic acid (VITAMIN C) 500 mg tablet   Yes No   Sig: in the morning   aspirin 81 mg chewable tablet   Yes No   Sig: Chew 81 mg  in the morning.   atorvastatin (LIPITOR) 10 mg tablet   Yes No   Sig: Take 10 mg by mouth daily   benzonatate (TESSALON PERLES) 100 mg capsule   No No   Sig: Take 1 capsule (100 mg total) by mouth 3 (three) times a day   fluticasone-vilanterol (Breo Ellipta) 100-25 mcg/inh inhaler   No No   Sig: Inhale 1 puff daily Rinse mouth after use.   furosemide (LASIX) 40 mg tablet   No No   Sig: Take 1 tablet (40 mg total) by mouth daily   glucose blood (OneTouch Verio) test strip   No No   Sig: May substitute brand based on insurance coverage. Check glucose TID.   insulin glargine (Lantus SoloStar) 100 units/mL injection pen   No No   Sig: Inject 20 Units under the skin daily at bedtime   Patient not taking: Reported on 10/17/2022   insulin lispro (HumaLOG KwikPen) 100 units/mL injection pen   No No   Sig: Inject 12 Units under the skin 3 (three) times a day with meals   insulin lispro (HumaLOG) 100 units/mL injection   Yes No   Sig: Inject under the skin 3 (three) times a day with meals   lisinopril (ZESTRIL) 5 mg tablet   Yes No   Sig: Take 5 mg by mouth daily   metFORMIN  (GLUCOPHAGE) 1000 MG tablet   Yes No   Sig: Take 1,000 mg by mouth 2 (two) times a day with meals   pantoprazole (PROTONIX) 40 mg tablet   No No   Sig: Take 1 tablet (40 mg total) by mouth daily in the early morning   thiamine (VITAMIN B1) 100 mg tablet   No No   Sig: Take 1 tablet (100 mg total) by mouth daily   zinc sulfate (ZINCATE) 220 mg capsule   No No   Sig: Take 1 capsule (220 mg total) by mouth daily      Facility-Administered Medications: None       Past Medical History:   Diagnosis Date    COVID-19 02/08/2022    Diabetes mellitus (HCC)     Hypertension        Past Surgical History:   Procedure Laterality Date    ANKLE FRACTURE SURGERY Left     FOOT FRACTURE SURGERY      KNEE SURGERY Right     VASECTOMY         Family History   Problem Relation Age of Onset    Diabetes Mother     Alcohol abuse Father      I have reviewed and agree with the history as documented.    E-Cigarette/Vaping    E-Cigarette Use Never User     Cartridges/Day 0      E-Cigarette/Vaping Substances    Nicotine No     THC No     CBD No     Flavoring No     Other No     Unknown No      Social History     Tobacco Use    Smoking status: Former     Current packs/day: 0.00     Average packs/day: 1 pack/day for 30.0 years (30.0 ttl pk-yrs)     Types: Cigarettes     Start date: 2/11/1978     Quit date: 2/11/2008     Years since quitting: 15.9    Smokeless tobacco: Never   Vaping Use    Vaping status: Never Used   Substance Use Topics    Alcohol use: Yes     Comment: occ    Drug use: Never       Review of Systems   Constitutional:  Negative for chills, diaphoresis and fever.   HENT:  Negative for ear pain and sore throat.    Eyes:  Negative for pain and visual disturbance.   Respiratory:  Positive for cough and shortness of breath. Negative for hemoptysis, sputum production, choking, chest tightness and wheezing.    Cardiovascular:  Negative for chest pain, palpitations, claudication, leg swelling, syncope and PND.   Gastrointestinal:  Negative  for abdominal pain, diarrhea, nausea and vomiting.   Genitourinary:  Negative for dysuria and hematuria.   Musculoskeletal:  Negative for arthralgias, back pain and neck pain.   Skin:  Negative for color change and rash.   Neurological:  Negative for seizures, syncope and headaches.   All other systems reviewed and are negative.      Physical Exam  Physical Exam  Vitals and nursing note reviewed.   Constitutional:       General: He is not in acute distress.     Appearance: He is well-developed. He is not ill-appearing or diaphoretic.   HENT:      Head: Normocephalic and atraumatic.   Eyes:      Conjunctiva/sclera: Conjunctivae normal.   Cardiovascular:      Rate and Rhythm: Normal rate and regular rhythm.      Heart sounds: No murmur heard.  Pulmonary:      Effort: Pulmonary effort is normal. No accessory muscle usage or respiratory distress.      Breath sounds: Normal breath sounds. No decreased breath sounds, wheezing, rhonchi or rales.   Chest:      Chest wall: No mass, deformity, tenderness, crepitus or edema. There is no dullness to percussion.   Abdominal:      Palpations: Abdomen is soft.      Tenderness: There is no abdominal tenderness.   Musculoskeletal:         General: No swelling.      Cervical back: Neck supple.   Skin:     General: Skin is warm and dry.      Capillary Refill: Capillary refill takes less than 2 seconds.   Neurological:      General: No focal deficit present.      Mental Status: He is alert and oriented to person, place, and time.   Psychiatric:         Mood and Affect: Mood normal.         Vital Signs  ED Triage Vitals [01/13/24 2003]   Temperature Pulse Respirations Blood Pressure SpO2   97.9 °F (36.6 °C) (!) 123 20 153/72 92 %      Temp Source Heart Rate Source Patient Position - Orthostatic VS BP Location FiO2 (%)   Oral Monitor Sitting Right arm --      Pain Score       No Pain           Vitals:    01/13/24 2003 01/13/24 2115 01/13/24 2237   BP: 153/72 149/70    Pulse: (!) 123 (!)  120 (!) 113   Patient Position - Orthostatic VS: Sitting Sitting          Visual Acuity      ED Medications  Medications   ipratropium-albuterol (DUO-NEB) 0.5-2.5 mg/3 mL inhalation solution 3 mL (3 mL Nebulization Given 1/13/24 2030)       Diagnostic Studies  Results Reviewed       Procedure Component Value Units Date/Time    B-Type Natriuretic Peptide(BNP) [[id number]]  (Normal) Collected: 01/13/24 2036    Lab Status: Final result Specimen: Blood from Arm, Right Updated: 01/13/24 2203     BNP 16 pg/mL     D-Dimer [[id number]]  (Normal) Collected: 01/13/24 2117    Lab Status: Final result Specimen: Blood from Arm, Right Updated: 01/13/24 2155     D-Dimer, Quant 0.29 ug/ml FEU     Narrative:      In the evaluation for possible pulmonary embolism, in the appropriate (Well's Score of 4 or less) patient, the age adjusted d-dimer cutoff for this patient can be calculated as:    Age x 0.01 (in ug/mL) for Age-adjusted D-dimer exclusion threshold for a patient over 50 years.    FLU/RSV/COVID - if FLU/RSV clinically relevant [[id number]]  (Abnormal) Collected: 01/13/24 2030    Lab Status: Final result Specimen: Nares from Nose Updated: 01/13/24 2130     SARS-CoV-2 Negative     INFLUENZA A PCR Negative     INFLUENZA B PCR Negative     RSV PCR Positive    Narrative:      FOR PEDIATRIC PATIENTS - copy/paste COVID Guidelines URL to browser: [email or web address]    SARS-CoV-2 assay is a Nucleic Acid Amplification assay intended for the  qualitative detection of nucleic acid from SARS-CoV-2 in nasopharyngeal  swabs. Results are for the presumptive identification of SARS-CoV-2 RNA.    Positive results are indicative of infection with SARS-CoV-2, the virus  causing COVID-19, but do not rule out bacterial infection or co-infection  with other viruses. Laboratories within the [place] and its  territories are required to report all positive results to the appropriate  public  health authorities. Negative results do not preclude SARS-CoV-2  infection and should not be used as the sole basis for treatment or other  patient management decisions. Negative results must be combined with  clinical observations, patient history, and epidemiological information.  This test has not been FDA cleared or approved.    This test has been authorized by FDA under an Emergency Use Authorization  (EUA). This test is only authorized for the duration of time the  declaration that circumstances exist justifying the authorization of the  emergency use of an in vitro diagnostic tests for detection of SARS-CoV-2  virus and/or diagnosis of COVID-19 infection under section 564(b)(1) of  the Act, 21 U.S.C. 360bbb-3(b)(1), unless the authorization is terminated  or revoked sooner. The test has been validated but independent review by FDA  and CLIA is pending.    Test performed using Cepheid GeneXpert: This RT-PCR assay targets N2,  a region unique to SARS-CoV-2. A conserved region in the E-gene was chosen  for pan-Sarbecovirus detection which includes SARS-CoV-2.    According to CMS-2020-01-R, this platform meets the definition of high-throughput technology.    Procalcitonin [[id number]]  (Normal) Collected: 01/13/24 2036    Lab Status: Final result Specimen: Blood from Arm, Right Updated: 01/13/24 2113     Procalcitonin 0.12 ng/ml     Basic metabolic panel [[id number]]  (Abnormal) Collected: 01/13/24 2036    Lab Status: Final result Specimen: Blood from Arm, Right Updated: 01/13/24 2107     Sodium 136 mmol/L      Potassium 4.3 mmol/L      Chloride 105 mmol/L      CO2 22 mmol/L      ANION GAP 9 mmol/L      BUN 26 mg/dL      Creatinine 1.03 mg/dL      Glucose 111 mg/dL      Calcium 9.9 mg/dL      eGFR 74 ml/min/1.73sq m     Narrative:      National Kidney Disease Foundation guidelines for Chronic Kidney Disease (CKD):     Stage 1 with normal or high GFR (GFR > 90 mL/min/1.73 square meters)    Stage 2 Mild CKD (GFR =  60-89 mL/min/1.73 square meters)    Stage 3A Moderate CKD (GFR = 45-59 mL/min/1.73 square meters)    Stage 3B Moderate CKD (GFR = 30-44 mL/min/1.73 square meters)    Stage 4 Severe CKD (GFR = 15-29 mL/min/1.73 square meters)    Stage 5 End Stage CKD (GFR <15 mL/min/1.73 square meters)  Note: GFR calculation is accurate only with a steady state creatinine    CBC and differential [[id number]]  (Abnormal) Collected: 01/13/24 2036    Lab Status: Final result Specimen: Blood from Arm, Right Updated: 01/13/24 2041     WBC 14.31 Thousand/uL      RBC 4.28 Million/uL      Hemoglobin 14.7 g/dL      Hematocrit 41.9 %      MCV 98 fL      MCH 34.3 pg      MCHC 35.1 g/dL      RDW 12.9 %      MPV 10.0 fL      Platelets 168 Thousands/uL      nRBC 0 /100 WBCs      Neutrophils Relative 62 %      Immat GRANS % 0 %      Lymphocytes Relative 30 %      Monocytes Relative 7 %      Eosinophils Relative 1 %      Basophils Relative 0 %      Neutrophils Absolute 8.89 Thousands/µL      Immature Grans Absolute 0.06 Thousand/uL      Lymphocytes Absolute 4.24 Thousands/µL      Monocytes Absolute 0.93 Thousand/µL      Eosinophils Absolute 0.14 Thousand/µL      Basophils Absolute 0.05 Thousands/µL                    XR chest 2 views    (Results Pending)              Procedures  Procedures         ED Course  ED Course as of 01/13/24 2301   Sat Jan 13, 2024 2039 Patient is complaining of SOB and congestion x 5 days.  Patient comfortable in room.  Basic labs ordered and DuoNeb initiated.   2138 Lungs clear.  RSV positive.  D-dimer and BNP ordered due to persistent tachycardia.  EKG ordered.   2226 RBB on ECG. Recommended o/p f/u   2227 RSV PCR(!): Positive   2227 HR elevated. 123 down to 114 after oral water intake.                                             Medical Decision Making  Patient presents with shortness of breath, congestion, subjective fever.  Tested for COVID flu and RSV.  RSV test positive.  Patient tachycardic at 123 at rest.  Denies  CP, troponin not indicated at this time as ACS is not likely given symptoms and vitals.  Given SOB, tachycardia, occupation of  ordered D-dimer to rule out PE/DVT.  D-dimer negative.  EKG ordered due to persistent tachycardia.  Shows right bundle branch block that was present on previous EKG.  Recommended follow-up with his cardiologist regarding this finding.  Discussed that the CXR was independently interpreted by me as no acute cardiopulmonary process.  I did not visualize pneumonia, there was a spot of concern in the left lower lobe but previous CXR showed may be present there as well.  Discussed that we will reach out if the radiologist reads it differently.  Monitored HR in the ED due to ambulating with heart rate up to 150.  Discussed that this could be combination of illness, dehydration.  Patient did not wish to stay for further monitoring.  Patient given the option of oral and IV fluids, he opted for oral.  Recommended continuing his home medications, hydrating, he may continue to take OTC medications for cough and cold.  Educated to avoid Sudafed due to elevated heart rate/history of HTN.     Amount and/or Complexity of Data Reviewed  Labs: ordered. Decision-making details documented in ED Course.  Radiology: ordered.    Risk  Prescription drug management.             Disposition  Final diagnoses:   RSV infection   Tachycardia     Time reflects when diagnosis was documented in both MDM as applicable and the Disposition within this note       Time User Action Codes Description Comment    1/13/2024 10:25 PM Katie Winn [B33.8] RSV infection     1/13/2024 10:25 PM Katie Winn [R00.0] Tachycardia           ED Disposition       ED Disposition   Discharge    Condition   Stable    Date/Time   Sat Jan 13, 2024 10:38 PM    Comment   Mandeep Pierce discharge to home/self care.                   Follow-up Information       Follow up With Specialties Details Why Contact Info    Nikolai  MD [facility]   [address] [place] [id number]  [phone]              Discharge Medication List as of 1/13/2024 10:39 PM        CONTINUE these medications which have NOT CHANGED    Details   Alcohol Swabs 70 % PADS May substitute brand based on insurance coverage. Check glucose TID., Normal      ascorbic acid (VITAMIN C) 500 mg tablet in the morning, Historical Med      aspirin 81 mg chewable tablet Chew 81 mg  in the morning., Historical Med      atorvastatin (LIPITOR) 10 mg tablet Take 10 mg by mouth daily, Historical Med      benzonatate (TESSALON PERLES) 100 mg capsule Take 1 capsule (100 mg total) by mouth 3 (three) times a day, Starting Fri 3/4/2022, Normal      Blood Glucose Monitoring Suppl (OneTouch Verio Reflect) w/Device KIT May substitute brand based on insurance coverage. Check glucose TID., Normal      fluticasone-vilanterol (Breo Ellipta) 100-25 mcg/inh inhaler Inhale 1 puff daily Rinse mouth after use., Starting Fri 5/6/2022, Normal      furosemide (LASIX) 40 mg tablet Take 1 tablet (40 mg total) by mouth daily, Starting Thu 4/7/2022, Normal      glucose blood (OneTouch Verio) test strip May substitute brand based on insurance coverage. Check glucose TID., Normal      insulin glargine (Lantus SoloStar) 100 units/mL injection pen Inject 20 Units under the skin daily at bedtime, Starting Fri 3/4/2022, Normal      Insulin Glargine (TOUJEO SOLOSTAR SC) Inject under the skin in the morning, Historical Med      insulin lispro (HumaLOG KwikPen) 100 units/mL injection pen Inject 12 Units under the skin 3 (three) times a day with meals, Starting Wed 4/6/2022, Normal      insulin lispro (HumaLOG) 100 units/mL injection Inject under the skin 3 (three) times a day with meals, Historical Med      !! Insulin Pen Needle (BD Pen Needle Silvia 2nd Gen) 32G X 4 MM MISC For use with insulin pen. Pharmacy may dispense brand covered by insurance., Normal      !! Insulin Pen Needle (BD Pen  Needle Silvia 2nd Gen) 32G X 4 MM MISC For use with insulin pen. Pharmacy may dispense brand covered by insurance., Normal      lisinopril (ZESTRIL) 5 mg tablet Take 5 mg by mouth daily, Historical Med      metFORMIN (GLUCOPHAGE) 1000 MG tablet Take 1,000 mg by mouth 2 (two) times a day with meals, Historical Med      Milk Thistle 1000 MG CAPS Take by mouth, Historical Med      Multiple Vitamin (multivitamin) tablet Take 1 tablet by mouth daily, Historical Med      Omega-3 Fatty Acids (fish oil) 1,000 mg Take 1,000 mg by mouth 2 (two) times a day, Historical Med      OneTouch Delica Lancets 33G MISC May substitute brand based on insurance coverage. Check glucose TID., Normal      pantoprazole (PROTONIX) 40 mg tablet Take 1 tablet (40 mg total) by mouth daily in the early morning, Starting Fri 2/18/2022, Normal      thiamine (VITAMIN B1) 100 mg tablet Take 1 tablet (100 mg total) by mouth daily, Starting Fri 2/18/2022, Normal      VITAMIN D PO daily, Historical Med      zinc sulfate (ZINCATE) 220 mg capsule Take 1 capsule (220 mg total) by mouth daily, Starting Fri 2/18/2022, Normal       !! - Potential duplicate medications found. Please discuss with provider.          No discharge procedures on file.    PDMP Review       None            ED Provider  Electronically Signed by             Katie Winn PA-C  01/13/24 [id number]

## 2024-03-09 ENCOUNTER — APPOINTMENT (OUTPATIENT)
Dept: LAB | Facility: CLINIC | Age: 69
End: 2024-03-09
Payer: COMMERCIAL

## 2024-03-09 DIAGNOSIS — E13.29: ICD-10-CM

## 2024-03-09 LAB
ALBUMIN SERPL BCP-MCNC: 4.5 G/DL (ref 3.5–5)
ALP SERPL-CCNC: 52 U/L (ref 34–104)
ALT SERPL W P-5'-P-CCNC: 32 U/L (ref 7–52)
ANION GAP SERPL CALCULATED.3IONS-SCNC: 10 MMOL/L
AST SERPL W P-5'-P-CCNC: 27 U/L (ref 13–39)
BASOPHILS # BLD AUTO: 0.02 THOUSANDS/ÂΜL (ref 0–0.1)
BASOPHILS NFR BLD AUTO: 0 % (ref 0–1)
BILIRUB SERPL-MCNC: 0.56 MG/DL (ref 0.2–1)
BUN SERPL-MCNC: 17 MG/DL (ref 5–25)
CALCIUM SERPL-MCNC: 9.4 MG/DL (ref 8.4–10.2)
CHLORIDE SERPL-SCNC: 109 MMOL/L (ref 96–108)
CHOLEST SERPL-MCNC: 120 MG/DL
CO2 SERPL-SCNC: 22 MMOL/L (ref 21–32)
CREAT SERPL-MCNC: 0.73 MG/DL (ref 0.6–1.3)
CREAT UR-MCNC: 125.4 MG/DL
EOSINOPHIL # BLD AUTO: 0.12 THOUSAND/ÂΜL (ref 0–0.61)
EOSINOPHIL NFR BLD AUTO: 1 % (ref 0–6)
ERYTHROCYTE [DISTWIDTH] IN BLOOD BY AUTOMATED COUNT: 13.2 % (ref 11.6–15.1)
EST. AVERAGE GLUCOSE BLD GHB EST-MCNC: 120 MG/DL
GFR SERPL CREATININE-BSD FRML MDRD: 95 ML/MIN/1.73SQ M
GLUCOSE P FAST SERPL-MCNC: 106 MG/DL (ref 65–99)
HBA1C MFR BLD: 5.8 %
HCT VFR BLD AUTO: 44.2 % (ref 36.5–49.3)
HDLC SERPL-MCNC: 41 MG/DL
HGB BLD-MCNC: 15.1 G/DL (ref 12–17)
IMM GRANULOCYTES # BLD AUTO: 0.02 THOUSAND/UL (ref 0–0.2)
IMM GRANULOCYTES NFR BLD AUTO: 0 % (ref 0–2)
LDLC SERPL CALC-MCNC: 52 MG/DL (ref 0–100)
LYMPHOCYTES # BLD AUTO: 4.34 THOUSANDS/ÂΜL (ref 0.6–4.47)
LYMPHOCYTES NFR BLD AUTO: 50 % (ref 14–44)
MCH RBC QN AUTO: 34 PG (ref 26.8–34.3)
MCHC RBC AUTO-ENTMCNC: 34.2 G/DL (ref 31.4–37.4)
MCV RBC AUTO: 100 FL (ref 82–98)
MICROALBUMIN UR-MCNC: 15.5 MG/L
MICROALBUMIN/CREAT 24H UR: 12 MG/G CREATININE (ref 0–30)
MONOCYTES # BLD AUTO: 0.65 THOUSAND/ÂΜL (ref 0.17–1.22)
MONOCYTES NFR BLD AUTO: 7 % (ref 4–12)
NEUTROPHILS # BLD AUTO: 3.65 THOUSANDS/ÂΜL (ref 1.85–7.62)
NEUTS SEG NFR BLD AUTO: 42 % (ref 43–75)
NONHDLC SERPL-MCNC: 79 MG/DL
NRBC BLD AUTO-RTO: 0 /100 WBCS
PLATELET # BLD AUTO: 162 THOUSANDS/UL (ref 149–390)
PMV BLD AUTO: 11.3 FL (ref 8.9–12.7)
POTASSIUM SERPL-SCNC: 4.3 MMOL/L (ref 3.5–5.3)
PROT SERPL-MCNC: 6.6 G/DL (ref 6.4–8.4)
RBC # BLD AUTO: 4.44 MILLION/UL (ref 3.88–5.62)
SODIUM SERPL-SCNC: 141 MMOL/L (ref 135–147)
TRIGL SERPL-MCNC: 137 MG/DL
WBC # BLD AUTO: 8.8 THOUSAND/UL (ref 4.31–10.16)

## 2024-03-09 PROCEDURE — 85025 COMPLETE CBC W/AUTO DIFF WBC: CPT

## 2024-03-09 PROCEDURE — 36415 COLL VENOUS BLD VENIPUNCTURE: CPT

## 2024-03-09 PROCEDURE — 82570 ASSAY OF URINE CREATININE: CPT

## 2024-03-09 PROCEDURE — 83036 HEMOGLOBIN GLYCOSYLATED A1C: CPT

## 2024-03-09 PROCEDURE — 80061 LIPID PANEL: CPT

## 2024-03-09 PROCEDURE — 82043 UR ALBUMIN QUANTITATIVE: CPT

## 2024-03-09 PROCEDURE — 80053 COMPREHEN METABOLIC PANEL: CPT

## 2024-06-03 ENCOUNTER — HOSPITAL ENCOUNTER (OUTPATIENT)
Dept: NON INVASIVE DIAGNOSTICS | Facility: CLINIC | Age: 69
Discharge: HOME | End: 2024-06-03
Payer: COMMERCIAL

## 2024-06-03 ENCOUNTER — OFFICE VISIT (OUTPATIENT)
Dept: CARDIOLOGY CLINIC | Facility: CLINIC | Age: 69
End: 2024-06-03
Payer: COMMERCIAL

## 2024-06-03 VITALS
HEART RATE: 55 BPM | WEIGHT: 210 LBS | BODY MASS INDEX: 32.96 KG/M2 | HEIGHT: 67 IN | SYSTOLIC BLOOD PRESSURE: 122 MMHG | DIASTOLIC BLOOD PRESSURE: 66 MMHG

## 2024-06-03 VITALS
DIASTOLIC BLOOD PRESSURE: 66 MMHG | BODY MASS INDEX: 33.09 KG/M2 | HEART RATE: 60 BPM | SYSTOLIC BLOOD PRESSURE: 122 MMHG | WEIGHT: 210.8 LBS | HEIGHT: 67 IN

## 2024-06-03 DIAGNOSIS — Z02.4 ENCOUNTER FOR CDL (COMMERCIAL DRIVING LICENSE) EXAM: ICD-10-CM

## 2024-06-03 DIAGNOSIS — I25.10 CORONARY ARTERY DISEASE INVOLVING NATIVE CORONARY ARTERY OF NATIVE HEART WITHOUT ANGINA PECTORIS: ICD-10-CM

## 2024-06-03 DIAGNOSIS — I10 PRIMARY HYPERTENSION: ICD-10-CM

## 2024-06-03 DIAGNOSIS — I45.2 BIFASCICULAR BLOCK: ICD-10-CM

## 2024-06-03 DIAGNOSIS — Z95.5 S/P DRUG ELUTING CORONARY STENT PLACEMENT: Primary | ICD-10-CM

## 2024-06-03 DIAGNOSIS — E66.09 CLASS 1 OBESITY DUE TO EXCESS CALORIES WITH SERIOUS COMORBIDITY AND BODY MASS INDEX (BMI) OF 32.0 TO 32.9 IN ADULT: ICD-10-CM

## 2024-06-03 DIAGNOSIS — Z82.49 FAMILY HISTORY OF EARLY CAD: ICD-10-CM

## 2024-06-03 DIAGNOSIS — E11.9 TYPE 2 DIABETES MELLITUS WITHOUT COMPLICATION, WITHOUT LONG-TERM CURRENT USE OF INSULIN (HCC): ICD-10-CM

## 2024-06-03 LAB
AORTIC ROOT: 2.8 CM
AORTIC VALVE MEAN VELOCITY: 8.6 M/S
APICAL FOUR CHAMBER EJECTION FRACTION: 58 %
ASCENDING AORTA: 3.1 CM
AV LVOT MEAN GRADIENT: 2 MMHG
AV LVOT PEAK GRADIENT: 5 MMHG
AV MEAN GRADIENT: 4 MMHG
AV PEAK GRADIENT: 8 MMHG
AV VELOCITY RATIO: 0.85
BSA FOR ECHO PROCEDURE: 2.06 M2
DOP CALC AO PEAK VEL: 1.37 M/S
DOP CALC AO VTI: 31.81 CM
DOP CALC LVOT PEAK VEL VTI: 29.54 CM
DOP CALC LVOT PEAK VEL: 1.16 M/S
E WAVE DECELERATION TIME: 242 MS
E/A RATIO: 1.24
FRACTIONAL SHORTENING: 34 (ref 28–44)
INTERVENTRICULAR SEPTUM IN DIASTOLE (PARASTERNAL SHORT AXIS VIEW): 1.2 CM
INTERVENTRICULAR SEPTUM: 1.2 CM (ref 0.6–1.1)
LAAS-AP2: 17.7 CM2
LAAS-AP4: 21.5 CM2
LEFT ATRIUM SIZE: 3.7 CM
LEFT ATRIUM VOLUME (MOD BIPLANE): 56 ML
LEFT ATRIUM VOLUME INDEX (MOD BIPLANE): 27.1 ML/M2
LEFT INTERNAL DIMENSION IN SYSTOLE: 2.9 CM (ref 2.1–4)
LEFT VENTRICULAR INTERNAL DIMENSION IN DIASTOLE: 4.4 CM (ref 3.5–6)
LEFT VENTRICULAR POSTERIOR WALL IN END DIASTOLE: 1.1 CM
LEFT VENTRICULAR STROKE VOLUME: 58 ML
LVSV (TEICH): 58 ML
MV E'TISSUE VEL-SEP: 7 CM/S
MV PEAK A VEL: 0.7 M/S
MV PEAK E VEL: 87 CM/S
MV STENOSIS PRESSURE HALF TIME: 70 MS
MV VALVE AREA P 1/2 METHOD: 3.1
RA PRESSURE ESTIMATED: 5 MMHG
RIGHT ATRIUM AREA SYSTOLE A4C: 10.8 CM2
RIGHT VENTRICLE ID DIMENSION: 3.2 CM
RV PSP: 20 MMHG
SL CV LEFT ATRIUM LENGTH A2C: 5.5 CM
SL CV LV EF: 60
SL CV PED ECHO LEFT VENTRICLE DIASTOLIC VOLUME (MOD BIPLANE) 2D: 89 ML
SL CV PED ECHO LEFT VENTRICLE SYSTOLIC VOLUME (MOD BIPLANE) 2D: 31 ML
TR MAX PG: 15 MMHG
TR PEAK VELOCITY: 1.9 M/S
TRICUSPID ANNULAR PLANE SYSTOLIC EXCURSION: 1.6 CM
TRICUSPID VALVE PEAK REGURGITATION VELOCITY: 1.93 M/S

## 2024-06-03 PROCEDURE — 99215 OFFICE O/P EST HI 40 MIN: CPT | Performed by: INTERNAL MEDICINE

## 2024-06-03 PROCEDURE — 93306 TTE W/DOPPLER COMPLETE: CPT

## 2024-06-03 PROCEDURE — 93306 TTE W/DOPPLER COMPLETE: CPT | Performed by: INTERNAL MEDICINE

## 2024-06-03 RX ORDER — AMPICILLIN TRIHYDRATE 250 MG
CAPSULE ORAL
COMMUNITY

## 2024-06-03 RX ORDER — ALBUTEROL SULFATE 90 UG/1
AEROSOL, METERED RESPIRATORY (INHALATION)
COMMUNITY
End: 2024-06-03

## 2024-06-03 RX ORDER — LORATADINE 10 MG/1
TABLET ORAL
COMMUNITY

## 2024-06-03 RX ORDER — GLIPIZIDE 5 MG/1
TABLET, FILM COATED, EXTENDED RELEASE ORAL
COMMUNITY

## 2024-06-03 RX ORDER — ATORVASTATIN CALCIUM 40 MG/1
40 TABLET, FILM COATED ORAL DAILY
Qty: 90 TABLET | Refills: 3 | Status: SHIPPED | OUTPATIENT
Start: 2024-06-03

## 2024-06-03 RX ORDER — GLIPIZIDE 10 MG/1
1 TABLET, FILM COATED, EXTENDED RELEASE ORAL DAILY
COMMUNITY

## 2024-06-03 RX ORDER — FLUTICASONE PROPIONATE 50 MCG
SPRAY, SUSPENSION (ML) NASAL
COMMUNITY
End: 2024-06-03

## 2024-06-03 RX ORDER — METOPROLOL SUCCINATE 25 MG/1
1 TABLET, EXTENDED RELEASE ORAL DAILY
COMMUNITY

## 2024-06-03 RX ORDER — CLOPIDOGREL BISULFATE 75 MG/1
TABLET ORAL
COMMUNITY
Start: 2024-05-21

## 2024-06-03 NOTE — PROGRESS NOTES
[facility] [place] [address] [facility] [address] [phone]                                                Cardiology Office Follow up  Mandeep Pierce, 69 y.o. male  YOB: 1955  MRN: [id number] Encounter: [id number]      PCP - Nikolai Ruiz MD  Referring Provider - No ref. provider found    Chief Complaint   Patient presents with   • Follow-up     CDL clearance        Assessment  CAD s/p PCI with ALONDRA to RCA  [facility] -   22 (Dr.Michael Smith) - RCA stent x2  Had prior Inferior infarct on nuclear stress  3/24/22 Nuclear Rx stress -  LVEF 70%, Moderate size, complete fixed perfusion defect of basal to mid inferior wall - probable old inferior infarct, no significant ischemia  3/31/22 TTE -  LVEF 60%, grade 1 diastolic dysfunction, mildly dilated RV with reduced systolic function  Bifascicular block  Family history of early CAD  Father  of a heart attack at age 53  COPD  6MWT - 22 - 322 m, lowest SpO2 92%  Diabetes Mellitus Type 2  Overweight, Body mass index is 33.02 kg/m².     Plan  CAD s/p PCI with ALONDRA to RCA, old inferior infarct  Overview  3/2022 Nuclear Rx: LVEF 70%, medium to large sized fixed defect of basal to mid inferior wall suggestive of infarct v/s artifact  Patient unable to prone  TTE (personal review) - he does akinesis of basal inferior wall  2022: ACS (troponin up to 1388) --> C -high-grade sequential calcified stenosis of RCA --> s/p PCI with ALONDRA to RCA ([facility])  2024: No major chest pain or shortness of breath, but has limited activity or exercise.  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, recommend proceeding with nuclear Lexiscan stress test to reassess ischemia and  infarct  Check follow-up echocardiogram  Obtain records from prior cardiologist   Received and reviewed post-visit and noted in chart  Continue aspirin, Plavix, atorvastatin 40 mg daily  Continue metoprolol succinate 25 mg daily  Previously used to be on Lasix 40 mg daily, but has been off it recently    Bifascicular block  Known bifascicular block since at least February 2022  No symptoms of dizziness or lightheadedness, near-syncope or syncope  Monitor clinically and follow-up with annual EKG  Currently tolerating metoprolol without any major problems, and has had issues with sinus tachycardia in the past    Hyperlipidemia, Obesity - Body mass index is 33.02 kg/m².       Primarily had mild triglyceride elevations in the past  cholesterol levels now much better controlled, LDL at goal at 52  Continue atorvastatin 40 mg daily  Weight loss and exercise recommended  Optimize diabetes control, and follow-up with PCP       No results found for this visit on 06/03/24.      Orders Placed This Encounter   Procedures   • Echo complete w/ contrast if indicated       Return in about 2 months (around 8/3/2024), or if symptoms worsen or fail to improve.      History of Present Illness   69 y.o. 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  He tested positive and was being monitored at home, but then developed hypoxia and as a result presented to [facility] on 02/08/2022.  He had mild troponin elevation (112 --> 235 --> 191) and was evaluated by Cardiologist Dr. Hackett, and was felt to be non-MI troponin elevation related to COVID infection.   He was treated  with remdesivir and then left AMA on 22.  He subsequently presented back to [place] ED the same night, and was found to have lactic acidosis and found to have more elevated troponin levels (3350 --> 3675).  After review with on-call cardiologist ,  He was transferred to [facility] for cardiac evaluation.   He was seen by Cardiology Dr. Patel and this was felt to be all related to COVID and as a result he was recommended outpatient cardiology follow up.  He remained in the hospital for another 2 weeks and was eventually discharged on 2022 on oxygen and steroid therapy.    Since discharge, he continues to be hypoxic with ambulation and continues to need ambulatory oxygen.  He has no complains of chest pain, palpitations, dizziness.  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  He completed the nuclear stress test and echocardiogram, which do seem to suggest him having had an old inferior infarct.  He shortness of breath is overall improving, but he continues to have issues with moderate to severe exertion.  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 He will be following up with pulmonary regarding same.    Interval history - 10/17/2022  He comes back for follow-up after about 5 months.  No current complains of chest pain or shortness of breath and he feels much better.  He is off oxygen and back to working as a .  He has gained about 20 lb over the last 6 months, which he states is related to his eating a lot of his own  cooking    Interval history - 6/3/2024  He returns for follow-up with me after almost 2 years.  In between he reports that in November 2022 he had an episode of chest discomfort/pressure while sitting and watching TV. He did not feel well during this and ended up going to the [facility] ED. He ruled in for ACS, and underwent cardiac catheterization which revealed high-grade sequential calcified stenosis of the dominant RCA.  This was successfully stented with a complex PCI with multiple stents to RCA.  He subsequently followed up with a cardiologist there Dr. Smith and has been seeing him over the last year and a half.  He denies having had any further major chest pain or shortness of breath thereafter.  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  No records on the stent available for me to review at the time of the visit.      Historical Information   Past Medical History:   Diagnosis Date   • Bifascicular block    • CAD (coronary artery disease)    • CHF (congestive heart failure) (HCC)    • Coronary artery disease    • COVID-19 02/08/2022   • Diabetes mellitus (HCC)    • Family history of early CAD    • Hyperlipidemia    • Hypertension    • RBBB      Past Surgical History:   Procedure Laterality Date   • ANKLE FRACTURE SURGERY Left    • FOOT FRACTURE SURGERY     • KNEE SURGERY Right    • VASECTOMY       Family History   Problem Relation Age of Onset   • Diabetes Mother    • Heart attack Father    • Alcohol abuse Father      Current Outpatient Medications on File Prior to Visit   Medication Sig Dispense Refill   • Alcohol Swabs 70 % PADS May substitute brand based on insurance coverage. Check glucose TID. 100 each 0   • ascorbic acid (VITAMIN C) 500 mg tablet in the morning     • aspirin 81 mg chewable tablet Chew 81 mg  in the  morning.     • Blood Glucose Monitoring Suppl (OneTouch Verio Reflect) w/Device KIT May substitute brand based on insurance coverage. Check glucose TID. 1 kit 0   • Cinnamon 500 MG capsule Take 2 capsules twice a day by oral route.     • clopidogrel (PLAVIX) 75 mg tablet Take 1 tablet every day by oral route.     • furosemide (LASIX) 40 mg tablet Take 1 tablet (40 mg total) by mouth daily 30 tablet 0   • glipiZIDE (GLUCOTROL XL) 5 mg 24 hr tablet Take 1 tablet every day by oral route with meals for 90 days.     • glucose blood (OneTouch Verio) test strip May substitute brand based on insurance coverage. Check glucose TID. 100 each 0   • Insulin Glargine (TOUJEO SOLOSTAR SC) Inject 20 Units under the skin in the morning     • insulin lispro (HumaLOG KwikPen) 100 units/mL injection pen Inject 12 Units under the skin 3 (three) times a day with meals (Patient taking differently: Inject 15 Units under the skin 3 (three) times a day with meals) 15 mL 0   • Insulin Pen Needle (BD Pen Needle Silvia 2nd Gen) 32G X 4 MM MISC For use with insulin pen. Pharmacy may dispense brand covered by insurance. 100 each 0   • Insulin Pen Needle (BD Pen Needle Silvia 2nd Gen) 32G X 4 MM MISC For use with insulin pen. Pharmacy may dispense brand covered by insurance. 100 each 0   • lisinopril (ZESTRIL) 5 mg tablet Take 10 mg by mouth daily     • loratadine (Claritin) 10 mg tablet Take 1 tablet every day by oral route.     • metFORMIN (GLUCOPHAGE) 1000 MG tablet Take 1,000 mg by mouth 2 (two) times a day with meals     • metoprolol succinate (TOPROL-XL) 25 mg 24 hr tablet Take 1 tablet by mouth daily     • Milk Thistle 1000 MG CAPS Take by mouth     • Multiple Vitamin (multivitamin) tablet Take 1 tablet by mouth daily     • Omega-3 Fatty Acids (fish oil) 1,000 mg Take 1,000 mg by mouth 2 (two) times a day     • OneTouch Delica Lancets 33G MISC May substitute brand based on insurance coverage. Check glucose TID. 100 each 0   • pantoprazole  (PROTONIX) 40 mg tablet Take 1 tablet (40 mg total) by mouth daily in the early morning (Patient not taking: Reported on 2024) 60 tablet 0   • thiamine (VITAMIN B1) 100 mg tablet Take 1 tablet (100 mg total) by mouth daily 60 tablet 0   • VITAMIN D PO daily     • zinc sulfate (ZINCATE) 220 mg capsule Take 1 capsule (220 mg total) by mouth daily 60 capsule 0   • fluticasone-vilanterol (Breo Ellipta) 100-25 mcg/inh inhaler Inhale 1 puff daily Rinse mouth after use. (Patient not taking: Reported on 6/3/2024) 60 each 2   • glipiZIDE (GLUCOTROL XL) 10 mg 24 hr tablet Take 1 tablet by mouth daily (Patient not taking: Reported on 6/3/2024)     • insulin glargine (Lantus SoloStar) 100 units/mL injection pen Inject 20 Units under the skin daily at bedtime (Patient not taking: Reported on 6/3/2024) 15 mL 0     No current facility-administered medications on file prior to visit.     No Known Allergies  Social History     Socioeconomic History   • Marital status: /Civil Union     Spouse name: None   • Number of children: None   • Years of education: None   • Highest education level: None   Occupational History   • None   Tobacco Use   • Smoking status: Former     Current packs/day: 0.00     Average packs/day: 1 pack/day for 30.0 years (30.0 ttl pk-yrs)     Types: Cigarettes     Start date: 1978     Quit date: 2008     Years since quittin.3   • Smokeless tobacco: Never   Vaping Use   • Vaping status: Never Used   Substance and Sexual Activity   • Alcohol use: Yes     Comment: occ   • Drug use: Never   • Sexual activity: Not Currently   Other Topics Concern   • None   Social History Narrative   • None     Social Determinants of Health     Financial Resource Strain: Not on file   Food Insecurity: No Food Insecurity (3/31/2022)    Hunger Vital Sign    • Worried About Running Out of Food in the Last Year: Never true    • Ran Out of Food in the Last Year: Never true   Transportation Needs: No Transportation  "Needs (3/31/2022)    PRAPARE - Transportation    • Lack of Transportation (Medical): No    • Lack of Transportation (Non-Medical): No   Physical Activity: Not on file   Stress: Not on file   Social Connections: Not on file   Intimate Partner Violence: Not on file   Housing Stability: Low Risk  (3/31/2022)    Housing Stability Vital Sign    • Unable to Pay for Housing in the Last Year: No    • Number of Places Lived in the Last Year: 1    • Unstable Housing in the Last Year: No        Review of Systems   All other systems reviewed and are negative.        Vitals:  Vitals:    06/03/24 1328   BP: 122/66   Pulse: 60   Weight: 95.6 kg (210 lb 12.8 oz)   Height: 5' 7\" (1.702 m)     BMI - Body mass index is 33.02 kg/m².  Wt Readings from Last 7 Encounters:   06/07/24 95.3 kg (210 lb)   06/03/24 95.3 kg (210 lb)   06/03/24 95.6 kg (210 lb 12.8 oz)   01/13/24 97.9 kg (215 lb 13.3 oz)   10/17/22 92.5 kg (204 lb)   06/16/22 93.2 kg (205 lb 6.4 oz)   05/13/22 88.9 kg (196 lb)       Physical Exam  Vitals and nursing note reviewed.   Constitutional:       General: He is not in acute distress.     Appearance: He is well-developed. He is obese. He is ill-appearing. He is not diaphoretic.   HENT:      Head: Normocephalic and atraumatic.      Nose: No congestion.   Eyes:      General: No scleral icterus.     Conjunctiva/sclera: Conjunctivae normal.   Neck:      Vascular: No carotid bruit or JVD.   Cardiovascular:      Rate and Rhythm: Normal rate and regular rhythm.      Heart sounds: Normal heart sounds. No murmur heard.     No friction rub. No gallop.   Pulmonary:      Effort: Pulmonary effort is normal. No respiratory distress.      Breath sounds: Normal breath sounds. No wheezing or rales.   Chest:      Chest wall: No tenderness.   Abdominal:      General: There is no distension.      Palpations: Abdomen is soft.      Tenderness: There is no abdominal tenderness.   Musculoskeletal:         General: No swelling, tenderness or " "deformity.      Cervical back: Neck supple. No muscular tenderness.      Right lower leg: No edema.      Left lower leg: No edema.   Skin:     General: Skin is warm.   Neurological:      General: No focal deficit present.      Mental Status: He is alert and oriented to person, place, and time. Mental status is at baseline.   Psychiatric:         Mood and Affect: Mood normal.         Behavior: Behavior normal.         Thought Content: Thought content normal.           Labs:  CBC:   Lab Results   Component Value Date    WBC 8.80 03/09/2024    RBC 4.44 03/09/2024    HGB 15.1 03/09/2024    HCT 44.2 03/09/2024     (H) 03/09/2024     03/09/2024    RDW 13.2 03/09/2024       CMP:   Lab Results   Component Value Date    K 4.3 03/09/2024     (H) 03/09/2024    CO2 22 03/09/2024    BUN 17 03/09/2024    CREATININE 0.73 03/09/2024    EGFR 95 03/09/2024    CALCIUM 9.4 03/09/2024    AST 27 03/09/2024    ALT 32 03/09/2024    ALKPHOS 52 03/09/2024       Magnesium:  Lab Results   Component Value Date    MG 2.2 04/05/2022       Lipid Profile:   Lab Results   Component Value Date    HDL 41 03/09/2024    TRIG 137 03/09/2024    LDLCALC 52 03/09/2024       Thyroid Studies: No results found for: \"[id number]\", \"T3FREE\", \"FREET4\", \"C5JTFZU\", \"G6MOBAG\"    A1c:  No components found for: \"HGA1C\"    INR:  Lab Results   Component Value Date    INR 1.11 03/30/2022    INR 1.12 02/17/2022    INR 0.99 02/08/2022   5    Imaging: XR chest portable    Result Date: 3/1/2022  Narrative: CHEST INDICATION:   worsening oxygen requirements. Patient has confirmed COVID-19. COMPARISON:  Chest x-ray 2/20/2022 EXAM PERFORMED/VIEWS:  XR CHEST PORTABLE FINDINGS: Cardiomediastinal silhouette appears unremarkable. Extensive peripheral infiltrates again seen bilaterally, partially improved on the right, stable on the left. No pneumothorax or pleural effusion. Osseous structures appear within normal limits for patient age.     Impression: Extensive " "peripheral infiltrates again seen bilaterally, partially improved on the right, stable on the left.  Findings again most suggestive of Covid 19 pneumonia. Workstation performed: [id number]     XR chest portable    Result Date: 2/20/2022  Narrative: CHEST INDICATION:   increase oxygen requirement. Patient has confirmed COVID-19. COMPARISON:  None EXAM PERFORMED/VIEWS:  XR CHEST PORTABLE FINDINGS: Cardiomediastinal silhouette appears unremarkable. Worsened peripheral pulmonary opacities. No effusion or pneumothorax. Osseous structures appear within normal limits for patient age.     Impression: Worsened peripheral pulmonary opacities. Workstation performed: [id number]     XR chest 1 view portable    Result Date: 2/18/2022  Narrative: CHEST INDICATION:   sob covid.   \"patient c/o sob ongoing for several days, COVID +. recently signed out AMA from [facility] because they \"weren't telling him anything about his care\". patient hypoxic on arrival\"  Patient has confirmed COVID-19. COMPARISON:  2/8/2022 EXAM PERFORMED/VIEWS:  XR CHEST PORTABLE FINDINGS: Cardiomediastinal silhouette appears unremarkable. Peripheral pulmonary opacities most prominent at the lung bases indicating Covid pneumonia in this patient with recently confirmed Covid 19. No effusion or pneumothorax. Osseous structures appear within normal limits for patient age.     Impression: Peripheral pulmonary opacities most prominent at the lung bases indicating Covid pneumonia in this patient with recently confirmed Covid 19. Findings are concordant with preliminary interpretation provided in the emergency department.  Workstation performed: [id number]     CTA chest pe study    Result Date: 3/1/2022  Narrative: CTA - CHEST WITH IV CONTRAST - PULMONARY ANGIOGRAM INDICATION:   Hypoxia and COVID. COMPARISON: Chest x-rays from 2/8/2022, 2/17/2022, and 2/20/2022 TECHNIQUE: CTA examination of the chest was performed using angiographic technique according to a protocol " specifically tailored to evaluate for pulmonary embolism.  Axial, sagittal, and coronal 2D reformatted images were created from the source data and  submitted for interpretation.  In addition, coronal 3D MIP postprocessing was performed on the acquisition scanner.  Radiation dose length product (DLP) for this visit:  589.55 mGy-cm .  This examination, like all CT scans performed in the [facility], was performed utilizing techniques to minimize radiation dose exposure, including the use of iterative  reconstruction and automated exposure control. IV Contrast:  85 mL of iohexol (OMNIPAQUE)  FINDINGS: PULMONARY ARTERIAL TREE:  No pulmonary embolus is seen.  There is a small amount of gas in the right ventricle and pulmonary artery likely related to injection. LUNGS:  There is peripheral consolidation, predominantly in the lower lung fields, similar to the recent chest x-ray from 2/20/2022 though progressed from earlier chest x-rays from 2/17/2022 and 2/8/2022.  There are cystic changes throughout, mostly in a  subpleural distribution though there also appears to be traction bronchiectasis at the lung bases. PLEURA:  Unremarkable. HEART/GREAT VESSELS:  Unremarkable for patient's age. No thoracic aortic aneurysm. MEDIASTINUM AND CHUY:  There is mild lymphadenopathy including a 1.3 x 1.5 cm right hilar lymph node and a 1.4 x 2.1 cm subcarinal lymph node.  Additional shotty lymph nodes are also present. CHEST WALL AND LOWER NECK:   Unremarkable. VISUALIZED STRUCTURES IN THE UPPER ABDOMEN:  Unremarkable. OSSEOUS STRUCTURES:  No acute fracture or destructive osseous lesion.     Impression: 1.  No acute pulmonary embolism. 2.  Covid-19 pneumonia, progressed from chest x-rays in early February, now with developing cystic changes, mostly in a subpleural distribution, with traction bronchiectasis at the lung bases.  This may represent early fibrosis though continued follow-up  is recommended. 3.  Paraseptal  emphysema. Workstation performed: [id number]     VAS lower limb venous duplex study, complete bilateral    Result Date: 2/21/2022  Narrative:  [facility] REPORT CLINICAL: Indications: Physician wants to determine patency of the venous system secondary to elevated D-Dimer and Covid 19. Operative History: No Cardiovascular Surgeries Risk Factors The patient has history of Diabetes (IDDM) and previous smoking (quit >10yrs ago).   CONCLUSION: Impression: RIGHT LOWER LIMB: No evidence of acute or chronic deep vein thrombosis. No evidence of superficial thrombophlebitis noted. Doppler evaluation shows a normal response to augmentation maneuvers.  LEFT LOWER LIMB: No evidence of acute or chronic deep vein thrombosis. No evidence of superficial thrombophlebitis noted. Doppler evaluation shows a normal response to augmentation maneuvers.   Technical findings posted in EPIC.  SIGNATURE: Electronically Signed by: SHUN HUSTON MD on 2022-02-21 05:44:14 PM      Cardiac testing:   No results found for this or any previous visit.    No results found for this or any previous visit.    No results found for this or any previous visit.    No results found for this or any previous visit.      Stress strip  Confirmed by BYRCE AGARWAL ([id number]),  Thelma Gunn ([id number]) on 6/7/2024 1:27:53 PM  NM myocardial perfusion spect (rx stress and/or rest)  •  Stress ECG: Arrhythmias during recovery: rare PACs. The ECG was not   diagnostic due to pharmacological (vasodilator) stress.  •  Perfusion: There is a left ventricular perfusion defect that is medium   to large in size present in the inferior location(s) that is partially   reversible concerning for ischemia. There is a left ventricular perfusion   defect that is small in size present in the basal anterior location(s)   that is predominantly reversible concerning for possible ischemia.  •  Stress Combined Conclusion: Left ventricular perfusion is abnormal.  •  Stress Function:  Stress ejection fraction is 53%.

## 2024-06-03 NOTE — H&P (VIEW-ONLY)
[facility] [place] [address] [address] [facility] [address] [phone]                                                Cardiology Office Follow up  Mandeep Pierce, 69 y.o. male  YOB: 1955  MRN: [id number] Encounter: [id number]      PCP - Nikolai Ruiz MD  Referring Provider - No ref. provider found    Chief Complaint   Patient presents with   • Follow-up     CDL clearance        Assessment  CAD s/p PCI with ALONDRA to RCA  [facility] -   22 (Dr.Michael Smith) - RCA stent x2  Had prior Inferior infarct on nuclear stress  3/24/22 Nuclear Rx stress -  LVEF 70%, Moderate size, complete fixed perfusion defect of basal to mid inferior wall - probable old inferior infarct, no significant ischemia  3/31/22 TTE -  LVEF 60%, grade 1 diastolic dysfunction, mildly dilated RV with reduced systolic function  Bifascicular block  Family history of early CAD  Father  of a heart attack at age 53  COPD  6MWT - 22 - 322 m, lowest SpO2 92%  Diabetes Mellitus Type 2  Overweight, Body mass index is 33.02 kg/m².     Plan  CAD s/p PCI with ALONDRA to RCA, old inferior infarct  Overview  3/2022 Nuclear Rx: LVEF 70%, medium to large sized fixed defect of basal to mid inferior wall suggestive of infarct v/s artifact  Patient unable to prone  TTE (personal review) - he does akinesis of basal inferior wall  2022: ACS (troponin up to 1388) --> C -high-grade sequential calcified stenosis of RCA --> s/p PCI with ALONDRA to RCA ([facility])  2024: No major chest pain or shortness of breath, but has limited activity or exercise.  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, recommend proceeding with nuclear Lexiscan stress test to reassess ischemia and  infarct  Check follow-up echocardiogram  Obtain records from prior cardiologist   Received and reviewed post-visit and noted in chart  Continue aspirin, Plavix, atorvastatin 40 mg daily  Continue metoprolol succinate 25 mg daily  Previously used to be on Lasix 40 mg daily, but has been off it recently    Bifascicular block  Known bifascicular block since at least February 2022  No symptoms of dizziness or lightheadedness, near-syncope or syncope  Monitor clinically and follow-up with annual EKG  Currently tolerating metoprolol without any major problems, and has had issues with sinus tachycardia in the past    Hyperlipidemia, Obesity - Body mass index is 33.02 kg/m².       Primarily had mild triglyceride elevations in the past  cholesterol levels now much better controlled, LDL at goal at 52  Continue atorvastatin 40 mg daily  Weight loss and exercise recommended  Optimize diabetes control, and follow-up with PCP       No results found for this visit on 06/03/24.      Orders Placed This Encounter   Procedures   • Echo complete w/ contrast if indicated       Return in about 2 months (around 8/3/2024), or if symptoms worsen or fail to improve.      History of Present Illness   69 y.o. 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  He tested positive and was being monitored at home, but then developed hypoxia and as a result presented to [facility] on 02/08/2022.  He had mild troponin elevation (112 --> 235 --> 191) and was evaluated by Cardiologist Dr. Hackett, and was felt to be non-MI troponin elevation related to COVID infection.   He was treated  with remdesivir and then left AMA on 22.  He subsequently presented back to [place] ED the same night, and was found to have lactic acidosis and found to have more elevated troponin levels (3350 --> 3675).  After review with on-call cardiologist ,  He was transferred to [facility] for cardiac evaluation.   He was seen by Cardiology Dr. Patel and this was felt to be all related to COVID and as a result he was recommended outpatient cardiology follow up.  He remained in the hospital for another 2 weeks and was eventually discharged on 2022 on oxygen and steroid therapy.    Since discharge, he continues to be hypoxic with ambulation and continues to need ambulatory oxygen.  He has no complains of chest pain, palpitations, dizziness.  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  He completed the nuclear stress test and echocardiogram, which do seem to suggest him having had an old inferior infarct.  He shortness of breath is overall improving, but he continues to have issues with moderate to severe exertion.  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 He will be following up with pulmonary regarding same.    Interval history - 10/17/2022  He comes back for follow-up after about 5 months.  No current complains of chest pain or shortness of breath and he feels much better.  He is off oxygen and back to working as a .  He has gained about 20 lb over the last 6 months, which he states is related to his eating a lot of his own  cooking    Interval history - 6/3/2024  He returns for follow-up with me after almost 2 years.  In between he reports that in November 2022 he had an episode of chest discomfort/pressure while sitting and watching TV. He did not feel well during this and ended up going to the [facility] ED. He ruled in for ACS, and underwent cardiac catheterization which revealed high-grade sequential calcified stenosis of the dominant RCA.  This was successfully stented with a complex PCI with multiple stents to RCA.  He subsequently followed up with a cardiologist there Dr. Smith and has been seeing him over the last year and a half.  He denies having had any further major chest pain or shortness of breath thereafter.  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  No records on the stent available for me to review at the time of the visit.      Historical Information   Past Medical History:   Diagnosis Date   • Bifascicular block    • CAD (coronary artery disease)    • CHF (congestive heart failure) (HCC)    • Coronary artery disease    • COVID-19 02/08/2022   • Diabetes mellitus (HCC)    • Family history of early CAD    • Hyperlipidemia    • Hypertension    • RBBB      Past Surgical History:   Procedure Laterality Date   • ANKLE FRACTURE SURGERY Left    • FOOT FRACTURE SURGERY     • KNEE SURGERY Right    • VASECTOMY       Family History   Problem Relation Age of Onset   • Diabetes Mother    • Heart attack Father    • Alcohol abuse Father      Current Outpatient Medications on File Prior to Visit   Medication Sig Dispense Refill   • Alcohol Swabs 70 % PADS May substitute brand based on insurance coverage. Check glucose TID. 100 each 0   • ascorbic acid (VITAMIN C) 500 mg tablet in the morning     • aspirin 81 mg chewable tablet Chew 81 mg  in the  morning.     • Blood Glucose Monitoring Suppl (OneTouch Verio Reflect) w/Device KIT May substitute brand based on insurance coverage. Check glucose TID. 1 kit 0   • Cinnamon 500 MG capsule Take 2 capsules twice a day by oral route.     • clopidogrel (PLAVIX) 75 mg tablet Take 1 tablet every day by oral route.     • furosemide (LASIX) 40 mg tablet Take 1 tablet (40 mg total) by mouth daily 30 tablet 0   • glipiZIDE (GLUCOTROL XL) 5 mg 24 hr tablet Take 1 tablet every day by oral route with meals for 90 days.     • glucose blood (OneTouch Verio) test strip May substitute brand based on insurance coverage. Check glucose TID. 100 each 0   • Insulin Glargine (TOUJEO SOLOSTAR SC) Inject 20 Units under the skin in the morning     • insulin lispro (HumaLOG KwikPen) 100 units/mL injection pen Inject 12 Units under the skin 3 (three) times a day with meals (Patient taking differently: Inject 15 Units under the skin 3 (three) times a day with meals) 15 mL 0   • Insulin Pen Needle (BD Pen Needle Silvia 2nd Gen) 32G X 4 MM MISC For use with insulin pen. Pharmacy may dispense brand covered by insurance. 100 each 0   • Insulin Pen Needle (BD Pen Needle Silvia 2nd Gen) 32G X 4 MM MISC For use with insulin pen. Pharmacy may dispense brand covered by insurance. 100 each 0   • lisinopril (ZESTRIL) 5 mg tablet Take 10 mg by mouth daily     • loratadine (Claritin) 10 mg tablet Take 1 tablet every day by oral route.     • metFORMIN (GLUCOPHAGE) 1000 MG tablet Take 1,000 mg by mouth 2 (two) times a day with meals     • metoprolol succinate (TOPROL-XL) 25 mg 24 hr tablet Take 1 tablet by mouth daily     • Milk Thistle 1000 MG CAPS Take by mouth     • Multiple Vitamin (multivitamin) tablet Take 1 tablet by mouth daily     • Omega-3 Fatty Acids (fish oil) 1,000 mg Take 1,000 mg by mouth 2 (two) times a day     • OneTouch Delica Lancets 33G MISC May substitute brand based on insurance coverage. Check glucose TID. 100 each 0   • pantoprazole  (PROTONIX) 40 mg tablet Take 1 tablet (40 mg total) by mouth daily in the early morning (Patient not taking: Reported on 2024) 60 tablet 0   • thiamine (VITAMIN B1) 100 mg tablet Take 1 tablet (100 mg total) by mouth daily 60 tablet 0   • VITAMIN D PO daily     • zinc sulfate (ZINCATE) 220 mg capsule Take 1 capsule (220 mg total) by mouth daily 60 capsule 0   • fluticasone-vilanterol (Breo Ellipta) 100-25 mcg/inh inhaler Inhale 1 puff daily Rinse mouth after use. (Patient not taking: Reported on 6/3/2024) 60 each 2   • glipiZIDE (GLUCOTROL XL) 10 mg 24 hr tablet Take 1 tablet by mouth daily (Patient not taking: Reported on 6/3/2024)     • insulin glargine (Lantus SoloStar) 100 units/mL injection pen Inject 20 Units under the skin daily at bedtime (Patient not taking: Reported on 6/3/2024) 15 mL 0     No current facility-administered medications on file prior to visit.     No Known Allergies  Social History     Socioeconomic History   • Marital status: /Civil Union     Spouse name: None   • Number of children: None   • Years of education: None   • Highest education level: None   Occupational History   • None   Tobacco Use   • Smoking status: Former     Current packs/day: 0.00     Average packs/day: 1 pack/day for 30.0 years (30.0 ttl pk-yrs)     Types: Cigarettes     Start date: 1978     Quit date: 2008     Years since quittin.3   • Smokeless tobacco: Never   Vaping Use   • Vaping status: Never Used   Substance and Sexual Activity   • Alcohol use: Yes     Comment: occ   • Drug use: Never   • Sexual activity: Not Currently   Other Topics Concern   • None   Social History Narrative   • None     Social Determinants of Health     Financial Resource Strain: Not on file   Food Insecurity: No Food Insecurity (3/31/2022)    Hunger Vital Sign    • Worried About Running Out of Food in the Last Year: Never true    • Ran Out of Food in the Last Year: Never true   Transportation Needs: No Transportation  "Needs (3/31/2022)    PRAPARE - Transportation    • Lack of Transportation (Medical): No    • Lack of Transportation (Non-Medical): No   Physical Activity: Not on file   Stress: Not on file   Social Connections: Not on file   Intimate Partner Violence: Not on file   Housing Stability: Low Risk  (3/31/2022)    Housing Stability Vital Sign    • Unable to Pay for Housing in the Last Year: No    • Number of Places Lived in the Last Year: 1    • Unstable Housing in the Last Year: No        Review of Systems   All other systems reviewed and are negative.        Vitals:  Vitals:    06/03/24 1328   BP: 122/66   Pulse: 60   Weight: 95.6 kg (210 lb 12.8 oz)   Height: 5' 7\" (1.702 m)     BMI - Body mass index is 33.02 kg/m².  Wt Readings from Last 7 Encounters:   06/07/24 95.3 kg (210 lb)   06/03/24 95.3 kg (210 lb)   06/03/24 95.6 kg (210 lb 12.8 oz)   01/13/24 97.9 kg (215 lb 13.3 oz)   10/17/22 92.5 kg (204 lb)   06/16/22 93.2 kg (205 lb 6.4 oz)   05/13/22 88.9 kg (196 lb)       Physical Exam  Vitals and nursing note reviewed.   Constitutional:       General: He is not in acute distress.     Appearance: He is well-developed. He is obese. He is ill-appearing. He is not diaphoretic.   HENT:      Head: Normocephalic and atraumatic.      Nose: No congestion.   Eyes:      General: No scleral icterus.     Conjunctiva/sclera: Conjunctivae normal.   Neck:      Vascular: No carotid bruit or JVD.   Cardiovascular:      Rate and Rhythm: Normal rate and regular rhythm.      Heart sounds: Normal heart sounds. No murmur heard.     No friction rub. No gallop.   Pulmonary:      Effort: Pulmonary effort is normal. No respiratory distress.      Breath sounds: Normal breath sounds. No wheezing or rales.   Chest:      Chest wall: No tenderness.   Abdominal:      General: There is no distension.      Palpations: Abdomen is soft.      Tenderness: There is no abdominal tenderness.   Musculoskeletal:         General: No swelling, tenderness or " "deformity.      Cervical back: Neck supple. No muscular tenderness.      Right lower leg: No edema.      Left lower leg: No edema.   Skin:     General: Skin is warm.   Neurological:      General: No focal deficit present.      Mental Status: He is alert and oriented to person, place, and time. Mental status is at baseline.   Psychiatric:         Mood and Affect: Mood normal.         Behavior: Behavior normal.         Thought Content: Thought content normal.           Labs:  CBC:   Lab Results   Component Value Date    WBC 8.80 03/09/2024    RBC 4.44 03/09/2024    HGB 15.1 03/09/2024    HCT 44.2 03/09/2024     (H) 03/09/2024     03/09/2024    RDW 13.2 03/09/2024       CMP:   Lab Results   Component Value Date    K 4.3 03/09/2024     (H) 03/09/2024    CO2 22 03/09/2024    BUN 17 03/09/2024    CREATININE 0.73 03/09/2024    EGFR 95 03/09/2024    CALCIUM 9.4 03/09/2024    AST 27 03/09/2024    ALT 32 03/09/2024    ALKPHOS 52 03/09/2024       Magnesium:  Lab Results   Component Value Date    MG 2.2 04/05/2022       Lipid Profile:   Lab Results   Component Value Date    HDL 41 03/09/2024    TRIG 137 03/09/2024    LDLCALC 52 03/09/2024       Thyroid Studies: No results found for: \"MGI8OQRBIWUU\", \"T3FREE\", \"FREET4\", \"Y8MXUDX\", \"N7TIZCG\"    A1c:  No components found for: \"HGA1C\"    INR:  Lab Results   Component Value Date    INR 1.11 03/30/2022    INR 1.12 02/17/2022    INR 0.99 02/08/2022   5    Imaging: XR chest portable    Result Date: 3/1/2022  Narrative: CHEST INDICATION:   worsening oxygen requirements. Patient has confirmed COVID-19. COMPARISON:  Chest x-ray 2/20/2022 EXAM PERFORMED/VIEWS:  XR CHEST PORTABLE FINDINGS: Cardiomediastinal silhouette appears unremarkable. Extensive peripheral infiltrates again seen bilaterally, partially improved on the right, stable on the left. No pneumothorax or pleural effusion. Osseous structures appear within normal limits for patient age.     Impression: Extensive " "peripheral infiltrates again seen bilaterally, partially improved on the right, stable on the left.  Findings again most suggestive of Covid 19 pneumonia. Workstation performed: [id number]     XR chest portable    Result Date: 2/20/2022  Narrative: CHEST INDICATION:   increase oxygen requirement. Patient has confirmed COVID-19. COMPARISON:  None EXAM PERFORMED/VIEWS:  XR CHEST PORTABLE FINDINGS: Cardiomediastinal silhouette appears unremarkable. Worsened peripheral pulmonary opacities. No effusion or pneumothorax. Osseous structures appear within normal limits for patient age.     Impression: Worsened peripheral pulmonary opacities. Workstation performed: [id number]     XR chest 1 view portable    Result Date: 2/18/2022  Narrative: CHEST INDICATION:   sob covid.   \"patient c/o sob ongoing for several days, COVID +. recently signed out AMA from [facility] because they \"weren't telling him anything about his care\". patient hypoxic on arrival\"  Patient has confirmed COVID-19. COMPARISON:  2/8/2022 EXAM PERFORMED/VIEWS:  XR CHEST PORTABLE FINDINGS: Cardiomediastinal silhouette appears unremarkable. Peripheral pulmonary opacities most prominent at the lung bases indicating Covid pneumonia in this patient with recently confirmed Covid 19. No effusion or pneumothorax. Osseous structures appear within normal limits for patient age.     Impression: Peripheral pulmonary opacities most prominent at the lung bases indicating Covid pneumonia in this patient with recently confirmed Covid 19. Findings are concordant with preliminary interpretation provided in the emergency department.  Workstation performed: [id number]     CTA chest pe study    Result Date: 3/1/2022  Narrative: CTA - CHEST WITH IV CONTRAST - PULMONARY ANGIOGRAM INDICATION:   Hypoxia and COVID. COMPARISON: Chest x-rays from 2/8/2022, 2/17/2022, and 2/20/2022 TECHNIQUE: CTA examination of the chest was performed using angiographic technique according to a protocol " specifically tailored to evaluate for pulmonary embolism.  Axial, sagittal, and coronal 2D reformatted images were created from the source data and  submitted for interpretation.  In addition, coronal 3D MIP postprocessing was performed on the acquisition scanner.  Radiation dose length product (DLP) for this visit:  589.55 mGy-cm .  This examination, like all CT scans performed in the [facility], was performed utilizing techniques to minimize radiation dose exposure, including the use of iterative  reconstruction and automated exposure control. IV Contrast:  85 mL of iohexol (OMNIPAQUE)  FINDINGS: PULMONARY ARTERIAL TREE:  No pulmonary embolus is seen.  There is a small amount of gas in the right ventricle and pulmonary artery likely related to injection. LUNGS:  There is peripheral consolidation, predominantly in the lower lung fields, similar to the recent chest x-ray from 2/20/2022 though progressed from earlier chest x-rays from 2/17/2022 and 2/8/2022.  There are cystic changes throughout, mostly in a  subpleural distribution though there also appears to be traction bronchiectasis at the lung bases. PLEURA:  Unremarkable. HEART/GREAT VESSELS:  Unremarkable for patient's age. No thoracic aortic aneurysm. MEDIASTINUM AND CHUY:  There is mild lymphadenopathy including a 1.3 x 1.5 cm right hilar lymph node and a 1.4 x 2.1 cm subcarinal lymph node.  Additional shotty lymph nodes are also present. CHEST WALL AND LOWER NECK:   Unremarkable. VISUALIZED STRUCTURES IN THE UPPER ABDOMEN:  Unremarkable. OSSEOUS STRUCTURES:  No acute fracture or destructive osseous lesion.     Impression: 1.  No acute pulmonary embolism. 2.  Covid-19 pneumonia, progressed from chest x-rays in early February, now with developing cystic changes, mostly in a subpleural distribution, with traction bronchiectasis at the lung bases.  This may represent early fibrosis though continued follow-up  is recommended. 3.  Paraseptal  emphysema. Workstation performed: [id number]     VAS lower limb venous duplex study, complete bilateral    Result Date: 2/21/2022  Narrative:  [facility] REPORT CLINICAL: Indications: Physician wants to determine patency of the venous system secondary to elevated D-Dimer and Covid 19. Operative History: No Cardiovascular Surgeries Risk Factors The patient has history of Diabetes (IDDM) and previous smoking (quit >10yrs ago).   CONCLUSION: Impression: RIGHT LOWER LIMB: No evidence of acute or chronic deep vein thrombosis. No evidence of superficial thrombophlebitis noted. Doppler evaluation shows a normal response to augmentation maneuvers.  LEFT LOWER LIMB: No evidence of acute or chronic deep vein thrombosis. No evidence of superficial thrombophlebitis noted. Doppler evaluation shows a normal response to augmentation maneuvers.   Technical findings posted in EPIC.  SIGNATURE: Electronically Signed by: SHUN HUSTON MD on 2022-02-21 05:44:14 PM      Cardiac testing:   No results found for this or any previous visit.    No results found for this or any previous visit.    No results found for this or any previous visit.    No results found for this or any previous visit.      Stress strip  Confirmed by BRYCE AGARWAL ([id number]),  Thelma Gunn ([id number]) on 6/7/2024 1:27:53 PM  NM myocardial perfusion spect (rx stress and/or rest)  •  Stress ECG: Arrhythmias during recovery: rare PACs. The ECG was not   diagnostic due to pharmacological (vasodilator) stress.  •  Perfusion: There is a left ventricular perfusion defect that is medium   to large in size present in the inferior location(s) that is partially   reversible concerning for ischemia. There is a left ventricular perfusion   defect that is small in size present in the basal anterior location(s)   that is predominantly reversible concerning for possible ischemia.  •  Stress Combined Conclusion: Left ventricular perfusion is abnormal.  •  Stress Function:  Stress ejection fraction is 53%.

## 2024-06-07 ENCOUNTER — TELEPHONE (OUTPATIENT)
Dept: CARDIOLOGY CLINIC | Facility: CLINIC | Age: 69
End: 2024-06-07

## 2024-06-07 ENCOUNTER — HOSPITAL ENCOUNTER (OUTPATIENT)
Dept: NUCLEAR MEDICINE | Facility: HOSPITAL | Age: 69
End: 2024-06-07
Attending: INTERNAL MEDICINE
Payer: COMMERCIAL

## 2024-06-07 ENCOUNTER — HOSPITAL ENCOUNTER (OUTPATIENT)
Dept: NON INVASIVE DIAGNOSTICS | Facility: HOSPITAL | Age: 69
Discharge: HOME | End: 2024-06-07
Attending: INTERNAL MEDICINE
Payer: COMMERCIAL

## 2024-06-07 ENCOUNTER — PREP FOR PROCEDURE (OUTPATIENT)
Dept: CARDIOLOGY CLINIC | Facility: CLINIC | Age: 69
End: 2024-06-07

## 2024-06-07 VITALS
OXYGEN SATURATION: 97 % | DIASTOLIC BLOOD PRESSURE: 80 MMHG | HEIGHT: 67 IN | BODY MASS INDEX: 32.96 KG/M2 | WEIGHT: 210 LBS | RESPIRATION RATE: 20 BRPM | SYSTOLIC BLOOD PRESSURE: 146 MMHG | HEART RATE: 61 BPM

## 2024-06-07 DIAGNOSIS — E11.9 TYPE 2 DIABETES MELLITUS WITHOUT COMPLICATION, WITHOUT LONG-TERM CURRENT USE OF INSULIN (HCC): ICD-10-CM

## 2024-06-07 DIAGNOSIS — R94.39 ABNORMAL NUCLEAR STRESS TEST: Primary | ICD-10-CM

## 2024-06-07 DIAGNOSIS — Z95.5 S/P DRUG ELUTING CORONARY STENT PLACEMENT: ICD-10-CM

## 2024-06-07 DIAGNOSIS — I25.10 CORONARY ARTERY DISEASE INVOLVING NATIVE CORONARY ARTERY OF NATIVE HEART WITHOUT ANGINA PECTORIS: ICD-10-CM

## 2024-06-07 DIAGNOSIS — Z02.4 ENCOUNTER FOR CDL (COMMERCIAL DRIVING LICENSE) EXAM: ICD-10-CM

## 2024-06-07 LAB
ARRHY DURING EX: NORMAL
CHEST PAIN STATEMENT: NORMAL
MAX DIASTOLIC BP: 84 MMHG
MAX PREDICTED HEART RATE: 152 BPM
NUC STRESS EJECTION FRACTION: 53 %
PROTOCOL NAME: NORMAL
RATE PRESSURE PRODUCT: NORMAL
REASON FOR TERMINATION: NORMAL
SL CV REST NUCLEAR ISOTOPE DOSE: 11 MCI
SL CV STRESS NUCLEAR ISOTOPE DOSE: 33 MCI
SL CV STRESS RECOVERY BP: NORMAL MMHG
SL CV STRESS RECOVERY HR: 63 BPM
SL CV STRESS RECOVERY O2 SAT: 99 %
STRESS ANGINA INDEX: 0
STRESS BASELINE BP: NORMAL MMHG
STRESS BASELINE HR: 61 BPM
STRESS O2 SAT REST: 97 %
STRESS PEAK HR: 89 BPM
STRESS POST EXERCISE DUR MIN: 3 MIN
STRESS POST EXERCISE DUR SEC: 0 SEC
STRESS POST O2 SAT PEAK: 98 %
STRESS POST PEAK BP: 130 MMHG
STRESS POST PEAK HR: 96 BPM
STRESS POST PEAK SYSTOLIC BP: 160 MMHG
STRESS/REST PERFUSION RATIO: 1.12
TARGET HR FORMULA: NORMAL

## 2024-06-07 PROCEDURE — 93017 CV STRESS TEST TRACING ONLY: CPT

## 2024-06-07 PROCEDURE — A9502 TC99M TETROFOSMIN: HCPCS

## 2024-06-07 PROCEDURE — 78452 HT MUSCLE IMAGE SPECT MULT: CPT

## 2024-06-07 PROCEDURE — 78452 HT MUSCLE IMAGE SPECT MULT: CPT | Performed by: INTERNAL MEDICINE

## 2024-06-07 PROCEDURE — 93016 CV STRESS TEST SUPVJ ONLY: CPT | Performed by: INTERNAL MEDICINE

## 2024-06-07 PROCEDURE — 93018 CV STRESS TEST I&R ONLY: CPT | Performed by: INTERNAL MEDICINE

## 2024-06-07 RX ORDER — ASPIRIN 81 MG/1
324 TABLET, CHEWABLE ORAL ONCE
OUTPATIENT
Start: 2024-06-07 | End: 2024-06-07

## 2024-06-07 RX ORDER — REGADENOSON 0.08 MG/ML
0.4 INJECTION, SOLUTION INTRAVENOUS ONCE
Status: COMPLETED | OUTPATIENT
Start: 2024-06-07 | End: 2024-06-07

## 2024-06-07 RX ADMIN — REGADENOSON 0.4 MG: 0.08 INJECTION, SOLUTION INTRAVENOUS at 08:53

## 2024-06-07 NOTE — TELEPHONE ENCOUNTER
----- Message from Antonio Vogel MD sent at 6/7/2024 11:45 AM EDT -----  Your stress test is abnormal with concerns about possible new blockages in the blood vessels in the heart.  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  I will have the office contact you to help set this procedure up for you.  In the meantime if you have any other problems with chest pain/pressure or shortness of breath then you will need to be evaluated earlier in the hospital.

## 2024-06-10 ENCOUNTER — TELEPHONE (OUTPATIENT)
Dept: CARDIOLOGY CLINIC | Facility: CLINIC | Age: 69
End: 2024-06-10

## 2024-06-10 DIAGNOSIS — I25.10 CORONARY ARTERY DISEASE INVOLVING NATIVE CORONARY ARTERY OF NATIVE HEART WITHOUT ANGINA PECTORIS: ICD-10-CM

## 2024-06-10 DIAGNOSIS — R94.39 ABNORMAL NUCLEAR STRESS TEST: Primary | ICD-10-CM

## 2024-06-10 NOTE — TELEPHONE ENCOUNTER
Patient scheduled for LEFT Heart Cath on 6/25/24 at [facility] with Dr. MILLIGAN.      Instructions sent to patient through [employer].     Patient aware of all general instructions.    Medication holds:   LASIX: DO NOT take this medication the morning of the procedure.     insulin lispro (HumaLOG KwikPen): DO NOT take this medication the morning of the procedure.     insulin glargine (Lantus SoloStar):  Take your regular dose as you normally would either the evening before your procedure or the morning of your procedure.     Metformin - Take your regular dose day/evening before procedure and do not take morning of procedure.      TOUJEO: TAKE your regular dose as you normally would either the evening before your procedure or morning of your procedure.     Glipizide - Take your regular dose day/evening before procedure and do not take morning of procedure.        Labs to be done NO LATER THAN 6/18/24  CMP / CBC (fasting 8 hours)

## 2024-06-18 ENCOUNTER — APPOINTMENT (OUTPATIENT)
Dept: LAB | Facility: CLINIC | Age: 69
End: 2024-06-18
Payer: COMMERCIAL

## 2024-06-18 DIAGNOSIS — R94.39 ABNORMAL NUCLEAR STRESS TEST: ICD-10-CM

## 2024-06-18 DIAGNOSIS — I25.10 CORONARY ARTERY DISEASE INVOLVING NATIVE CORONARY ARTERY OF NATIVE HEART WITHOUT ANGINA PECTORIS: ICD-10-CM

## 2024-06-18 LAB
ALBUMIN SERPL BCP-MCNC: 4.6 G/DL (ref 3.5–5)
ALP SERPL-CCNC: 58 U/L (ref 34–104)
ALT SERPL W P-5'-P-CCNC: 43 U/L (ref 7–52)
ANION GAP SERPL CALCULATED.3IONS-SCNC: 8 MMOL/L (ref 4–13)
AST SERPL W P-5'-P-CCNC: 30 U/L (ref 13–39)
BASOPHILS # BLD AUTO: 0.03 THOUSANDS/ÂΜL (ref 0–0.1)
BASOPHILS NFR BLD AUTO: 0 % (ref 0–1)
BILIRUB SERPL-MCNC: 0.63 MG/DL (ref 0.2–1)
BUN SERPL-MCNC: 24 MG/DL (ref 5–25)
CALCIUM SERPL-MCNC: 9.6 MG/DL (ref 8.4–10.2)
CHLORIDE SERPL-SCNC: 110 MMOL/L (ref 96–108)
CO2 SERPL-SCNC: 23 MMOL/L (ref 21–32)
CREAT SERPL-MCNC: 0.9 MG/DL (ref 0.6–1.3)
EOSINOPHIL # BLD AUTO: 0.17 THOUSAND/ÂΜL (ref 0–0.61)
EOSINOPHIL NFR BLD AUTO: 2 % (ref 0–6)
ERYTHROCYTE [DISTWIDTH] IN BLOOD BY AUTOMATED COUNT: 13.2 % (ref 11.6–15.1)
GFR SERPL CREATININE-BSD FRML MDRD: 87 ML/MIN/1.73SQ M
GLUCOSE P FAST SERPL-MCNC: 132 MG/DL (ref 65–99)
HCT VFR BLD AUTO: 42 % (ref 36.5–49.3)
HGB BLD-MCNC: 14.5 G/DL (ref 12–17)
IMM GRANULOCYTES # BLD AUTO: 0.03 THOUSAND/UL (ref 0–0.2)
IMM GRANULOCYTES NFR BLD AUTO: 0 % (ref 0–2)
LYMPHOCYTES # BLD AUTO: 4.44 THOUSANDS/ÂΜL (ref 0.6–4.47)
LYMPHOCYTES NFR BLD AUTO: 49 % (ref 14–44)
MCH RBC QN AUTO: 34.4 PG (ref 26.8–34.3)
MCHC RBC AUTO-ENTMCNC: 34.5 G/DL (ref 31.4–37.4)
MCV RBC AUTO: 100 FL (ref 82–98)
MONOCYTES # BLD AUTO: 0.66 THOUSAND/ÂΜL (ref 0.17–1.22)
MONOCYTES NFR BLD AUTO: 7 % (ref 4–12)
NEUTROPHILS # BLD AUTO: 3.83 THOUSANDS/ÂΜL (ref 1.85–7.62)
NEUTS SEG NFR BLD AUTO: 42 % (ref 43–75)
NRBC BLD AUTO-RTO: 0 /100 WBCS
PLATELET # BLD AUTO: 166 THOUSANDS/UL (ref 149–390)
PMV BLD AUTO: 10.8 FL (ref 8.9–12.7)
POTASSIUM SERPL-SCNC: 4.7 MMOL/L (ref 3.5–5.3)
PROT SERPL-MCNC: 6.8 G/DL (ref 6.4–8.4)
RBC # BLD AUTO: 4.21 MILLION/UL (ref 3.88–5.62)
SODIUM SERPL-SCNC: 141 MMOL/L (ref 135–147)
WBC # BLD AUTO: 9.16 THOUSAND/UL (ref 4.31–10.16)

## 2024-06-18 PROCEDURE — 80053 COMPREHEN METABOLIC PANEL: CPT

## 2024-06-18 PROCEDURE — 36415 COLL VENOUS BLD VENIPUNCTURE: CPT

## 2024-06-18 PROCEDURE — 85025 COMPLETE CBC W/AUTO DIFF WBC: CPT

## 2024-06-25 ENCOUNTER — TELEPHONE (OUTPATIENT)
Dept: CARDIOLOGY CLINIC | Facility: CLINIC | Age: 69
End: 2024-06-25

## 2024-06-25 ENCOUNTER — HOSPITAL ENCOUNTER (OUTPATIENT)
Facility: HOSPITAL | Age: 69
Setting detail: OUTPATIENT SURGERY
Discharge: HOME | End: 2024-06-25
Attending: INTERNAL MEDICINE | Admitting: INTERNAL MEDICINE
Payer: COMMERCIAL

## 2024-06-25 VITALS
TEMPERATURE: 97.9 F | DIASTOLIC BLOOD PRESSURE: 58 MMHG | RESPIRATION RATE: 16 BRPM | HEART RATE: 58 BPM | BODY MASS INDEX: 31.83 KG/M2 | SYSTOLIC BLOOD PRESSURE: 122 MMHG | OXYGEN SATURATION: 95 % | HEIGHT: 68 IN | WEIGHT: 210 LBS

## 2024-06-25 DIAGNOSIS — R94.39 ABNORMAL NUCLEAR STRESS TEST: ICD-10-CM

## 2024-06-25 DIAGNOSIS — E11.9 TYPE 2 DIABETES MELLITUS WITHOUT COMPLICATION, WITHOUT LONG-TERM CURRENT USE OF INSULIN (HCC): ICD-10-CM

## 2024-06-25 DIAGNOSIS — Z98.61 S/P CORONARY ANGIOPLASTY: Primary | ICD-10-CM

## 2024-06-25 PROBLEM — T82.855A CORONARY STENT RESTENOSIS: Status: ACTIVE | Noted: 2024-06-25

## 2024-06-25 PROBLEM — I25.10 CORONARY ARTERY DISEASE INVOLVING NATIVE CORONARY ARTERY: Status: ACTIVE | Noted: 2024-06-25

## 2024-06-25 LAB
ATRIAL RATE: 49 BPM
ATRIAL RATE: 53 BPM
GLUCOSE SERPL-MCNC: 111 MG/DL (ref 65–140)
GLUCOSE SERPL-MCNC: 113 MG/DL (ref 65–140)
KCT BLD-ACNC: 274 SEC (ref 89–137)
KCT BLD-ACNC: 307 SEC (ref 89–137)
P AXIS: 41 DEGREES
P AXIS: 51 DEGREES
PR INTERVAL: 142 MS
PR INTERVAL: 152 MS
QRS AXIS: -61 DEGREES
QRS AXIS: -65 DEGREES
QRSD INTERVAL: 138 MS
QRSD INTERVAL: 138 MS
QT INTERVAL: 488 MS
QT INTERVAL: 512 MS
QTC INTERVAL: 457 MS
QTC INTERVAL: 462 MS
SPECIMEN SOURCE: ABNORMAL
SPECIMEN SOURCE: ABNORMAL
T WAVE AXIS: -46 DEGREES
T WAVE AXIS: -51 DEGREES
VENTRICULAR RATE: 49 BPM
VENTRICULAR RATE: 53 BPM

## 2024-06-25 PROCEDURE — C1894 INTRO/SHEATH, NON-LASER: HCPCS | Performed by: INTERNAL MEDICINE

## 2024-06-25 PROCEDURE — 99152 MOD SED SAME PHYS/QHP 5/>YRS: CPT | Performed by: INTERNAL MEDICINE

## 2024-06-25 PROCEDURE — 99153 MOD SED SAME PHYS/QHP EA: CPT | Performed by: INTERNAL MEDICINE

## 2024-06-25 PROCEDURE — 93010 ELECTROCARDIOGRAM REPORT: CPT | Performed by: INTERNAL MEDICINE

## 2024-06-25 PROCEDURE — C1887 CATHETER, GUIDING: HCPCS | Performed by: INTERNAL MEDICINE

## 2024-06-25 PROCEDURE — 92920 PRQ TRLUML C ANGIOP 1ART&/BR: CPT | Performed by: INTERNAL MEDICINE

## 2024-06-25 PROCEDURE — C1725 CATH, TRANSLUMIN NON-LASER: HCPCS | Performed by: INTERNAL MEDICINE

## 2024-06-25 PROCEDURE — C1769 GUIDE WIRE: HCPCS | Performed by: INTERNAL MEDICINE

## 2024-06-25 PROCEDURE — 92978 ENDOLUMINL IVUS OCT C 1ST: CPT | Performed by: INTERNAL MEDICINE

## 2024-06-25 PROCEDURE — 93454 CORONARY ARTERY ANGIO S&I: CPT | Performed by: INTERNAL MEDICINE

## 2024-06-25 PROCEDURE — C1753 CATH, INTRAVAS ULTRASOUND: HCPCS | Performed by: INTERNAL MEDICINE

## 2024-06-25 PROCEDURE — 85347 COAGULATION TIME ACTIVATED: CPT

## 2024-06-25 PROCEDURE — 82948 REAGENT STRIP/BLOOD GLUCOSE: CPT

## 2024-06-25 PROCEDURE — 93005 ELECTROCARDIOGRAM TRACING: CPT

## 2024-06-25 RX ORDER — CLOPIDOGREL BISULFATE 75 MG/1
75 TABLET ORAL DAILY
Status: DISCONTINUED | OUTPATIENT
Start: 2024-06-26 | End: 2024-06-25 | Stop reason: HOSPADM

## 2024-06-25 RX ORDER — NITROGLYCERIN 20 MG/100ML
INJECTION INTRAVENOUS CODE/TRAUMA/SEDATION MEDICATION
Status: DISCONTINUED | OUTPATIENT
Start: 2024-06-25 | End: 2024-06-25 | Stop reason: HOSPADM

## 2024-06-25 RX ORDER — MIDAZOLAM HYDROCHLORIDE 2 MG/2ML
INJECTION, SOLUTION INTRAMUSCULAR; INTRAVENOUS CODE/TRAUMA/SEDATION MEDICATION
Status: DISCONTINUED | OUTPATIENT
Start: 2024-06-25 | End: 2024-06-25 | Stop reason: HOSPADM

## 2024-06-25 RX ORDER — INSULIN LISPRO 100 [IU]/ML
1-5 INJECTION, SOLUTION INTRAVENOUS; SUBCUTANEOUS
Status: DISCONTINUED | OUTPATIENT
Start: 2024-06-25 | End: 2024-06-25 | Stop reason: HOSPADM

## 2024-06-25 RX ORDER — LORATADINE 10 MG/1
10 TABLET ORAL DAILY
Status: DISCONTINUED | OUTPATIENT
Start: 2024-06-26 | End: 2024-06-25 | Stop reason: HOSPADM

## 2024-06-25 RX ORDER — LIDOCAINE HYDROCHLORIDE 10 MG/ML
INJECTION, SOLUTION EPIDURAL; INFILTRATION; INTRACAUDAL; PERINEURAL CODE/TRAUMA/SEDATION MEDICATION
Status: DISCONTINUED | OUTPATIENT
Start: 2024-06-25 | End: 2024-06-25 | Stop reason: HOSPADM

## 2024-06-25 RX ORDER — LISINOPRIL 10 MG/1
10 TABLET ORAL DAILY
Status: DISCONTINUED | OUTPATIENT
Start: 2024-06-26 | End: 2024-06-25 | Stop reason: HOSPADM

## 2024-06-25 RX ORDER — ASCORBIC ACID 500 MG
500 TABLET ORAL DAILY
Status: DISCONTINUED | OUTPATIENT
Start: 2024-06-26 | End: 2024-06-25 | Stop reason: HOSPADM

## 2024-06-25 RX ORDER — ASPIRIN 81 MG/1
81 TABLET, CHEWABLE ORAL DAILY
Status: DISCONTINUED | OUTPATIENT
Start: 2024-06-26 | End: 2024-06-25 | Stop reason: HOSPADM

## 2024-06-25 RX ORDER — LANOLIN ALCOHOL/MO/W.PET/CERES
100 CREAM (GRAM) TOPICAL DAILY
Status: DISCONTINUED | OUTPATIENT
Start: 2024-06-25 | End: 2024-06-25 | Stop reason: HOSPADM

## 2024-06-25 RX ORDER — FUROSEMIDE 40 MG/1
40 TABLET ORAL DAILY
Status: DISCONTINUED | OUTPATIENT
Start: 2024-06-26 | End: 2024-06-25 | Stop reason: HOSPADM

## 2024-06-25 RX ORDER — HEPARIN SODIUM 1000 [USP'U]/ML
INJECTION, SOLUTION INTRAVENOUS; SUBCUTANEOUS CODE/TRAUMA/SEDATION MEDICATION
Status: DISCONTINUED | OUTPATIENT
Start: 2024-06-25 | End: 2024-06-25 | Stop reason: HOSPADM

## 2024-06-25 RX ORDER — ASPIRIN 81 MG/1
324 TABLET, CHEWABLE ORAL ONCE
Status: COMPLETED | OUTPATIENT
Start: 2024-06-25 | End: 2024-06-25

## 2024-06-25 RX ORDER — SODIUM CHLORIDE 9 MG/ML
125 INJECTION, SOLUTION INTRAVENOUS CONTINUOUS
Status: DISPENSED | OUTPATIENT
Start: 2024-06-25 | End: 2024-06-25

## 2024-06-25 RX ORDER — ZINC SULFATE 50(220)MG
220 CAPSULE ORAL DAILY
Status: DISCONTINUED | OUTPATIENT
Start: 2024-06-25 | End: 2024-06-25 | Stop reason: HOSPADM

## 2024-06-25 RX ORDER — ACETAMINOPHEN 325 MG/1
650 TABLET ORAL EVERY 4 HOURS PRN
Status: DISCONTINUED | OUTPATIENT
Start: 2024-06-25 | End: 2024-06-25 | Stop reason: HOSPADM

## 2024-06-25 RX ORDER — PANTOPRAZOLE SODIUM 40 MG/1
40 TABLET, DELAYED RELEASE ORAL
Status: DISCONTINUED | OUTPATIENT
Start: 2024-06-26 | End: 2024-06-25 | Stop reason: HOSPADM

## 2024-06-25 RX ORDER — NITROGLYCERIN 0.4 MG/1
0.4 TABLET SUBLINGUAL
Status: DISCONTINUED | OUTPATIENT
Start: 2024-06-25 | End: 2024-06-25 | Stop reason: HOSPADM

## 2024-06-25 RX ORDER — VERAPAMIL HCL 2.5 MG/ML
AMPUL (ML) INTRAVENOUS CODE/TRAUMA/SEDATION MEDICATION
Status: DISCONTINUED | OUTPATIENT
Start: 2024-06-25 | End: 2024-06-25 | Stop reason: HOSPADM

## 2024-06-25 RX ORDER — INSULIN LISPRO 100 [IU]/ML
15 INJECTION, SOLUTION INTRAVENOUS; SUBCUTANEOUS
Status: DISCONTINUED | OUTPATIENT
Start: 2024-06-25 | End: 2024-06-25 | Stop reason: HOSPADM

## 2024-06-25 RX ORDER — SODIUM CHLORIDE 9 MG/ML
125 INJECTION, SOLUTION INTRAVENOUS CONTINUOUS
Status: DISCONTINUED | OUTPATIENT
Start: 2024-06-25 | End: 2024-06-25 | Stop reason: HOSPADM

## 2024-06-25 RX ORDER — CHLORAL HYDRATE 500 MG
1000 CAPSULE ORAL 2 TIMES DAILY
Status: DISCONTINUED | OUTPATIENT
Start: 2024-06-25 | End: 2024-06-25 | Stop reason: HOSPADM

## 2024-06-25 RX ORDER — ONDANSETRON 2 MG/ML
4 INJECTION INTRAMUSCULAR; INTRAVENOUS EVERY 6 HOURS PRN
Status: DISCONTINUED | OUTPATIENT
Start: 2024-06-25 | End: 2024-06-25 | Stop reason: HOSPADM

## 2024-06-25 RX ORDER — FENTANYL CITRATE 50 UG/ML
INJECTION, SOLUTION INTRAMUSCULAR; INTRAVENOUS CODE/TRAUMA/SEDATION MEDICATION
Status: DISCONTINUED | OUTPATIENT
Start: 2024-06-25 | End: 2024-06-25 | Stop reason: HOSPADM

## 2024-06-25 RX ORDER — INSULIN GLARGINE 100 [IU]/ML
20 INJECTION, SOLUTION SUBCUTANEOUS
Status: DISCONTINUED | OUTPATIENT
Start: 2024-06-25 | End: 2024-06-25 | Stop reason: HOSPADM

## 2024-06-25 RX ORDER — METOPROLOL SUCCINATE 25 MG/1
25 TABLET, EXTENDED RELEASE ORAL DAILY
Status: DISCONTINUED | OUTPATIENT
Start: 2024-06-26 | End: 2024-06-25 | Stop reason: HOSPADM

## 2024-06-25 RX ORDER — INSULIN LISPRO 100 [IU]/ML
15 INJECTION, SOLUTION INTRAVENOUS; SUBCUTANEOUS
Start: 2024-06-25

## 2024-06-25 RX ORDER — ATORVASTATIN CALCIUM 40 MG/1
40 TABLET, FILM COATED ORAL
Status: DISCONTINUED | OUTPATIENT
Start: 2024-06-25 | End: 2024-06-25 | Stop reason: HOSPADM

## 2024-06-25 RX ADMIN — ASPIRIN 81 MG CHEWABLE TABLET 243 MG: 81 TABLET CHEWABLE at 07:26

## 2024-06-25 RX ADMIN — SODIUM CHLORIDE 125 ML/HR: 0.9 INJECTION, SOLUTION INTRAVENOUS at 07:27

## 2024-06-25 RX ADMIN — SODIUM CHLORIDE 125 ML/HR: 0.9 INJECTION, SOLUTION INTRAVENOUS at 10:24

## 2024-06-25 NOTE — INTERVAL H&P NOTE
"H&P reviewed. After examining the patient, I find no changed to the H&P since it had been written.  /63   Pulse (!) 50   Temp 97.7 °F (36.5 °C) (Temporal)   Resp 18   Ht 5' 8\" (1.727 m)   Wt 95.3 kg (210 lb)   SpO2 96%   BMI 31.93 kg/m²   Patient with abnormal stress test.    There is a left ventricular perfusion defect that is medium to large in size present in the inferior location(s) that is partially reversible concerning for ischemia. There is a left ventricular perfusion defect that is small in size present in the basal anterior location(s) that is predominantly reversible concerning for possible ischemia.   Patient electively admitted for cardiac cath to identify coronary anatomy and ischemic burden.      Patient re-evaluated. Accept as history and physical.    NEGRITA Clemons/June 25, 2024/8:25 AM  "

## 2024-06-25 NOTE — DISCHARGE INSTR - AVS FIRST PAGE
1. Please see the post angioplasty discharge instructions.   No heavy lifting, greater than 10 lbs. or strenuous activity for 5 days.  Follow angioplasty discharge instructions.    2.Remove band aid tomorrow.  Shower and wash area- wrist gently with soap and water- beginning tomorrow. Rinse and pat dry.  Apply new water seal band aid.  Repeat this process for 5 days. No powders, creams lotions or antibiotic ointments  for 5 days.  No tub baths, hot tubs or swimming for 5 days.     3. Call [facility]'s Cardiology Office ([phone]) if you develop a fever, redness or drainage at your wrist access site.      4. No driving for 2 days    5. Do not stop aspirin or Plavix (clopiogrel) any reason without a cardiologist’s consent, or the stent could block up and cause a heart attack.

## 2024-06-25 NOTE — TELEPHONE ENCOUNTER
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  Please move up his follow-up appointment to within the next 2 to 4 weeks.  ----- Message -----  From: Homer Bruno MD  Sent: 6/25/2024  10:44 AM EDT  To: Antonio Vogel MD

## 2024-07-18 ENCOUNTER — OFFICE VISIT (OUTPATIENT)
Dept: CARDIOLOGY CLINIC | Facility: CLINIC | Age: 69
End: 2024-07-18
Payer: COMMERCIAL

## 2024-07-18 VITALS
BODY MASS INDEX: 32.43 KG/M2 | HEIGHT: 68 IN | SYSTOLIC BLOOD PRESSURE: 122 MMHG | WEIGHT: 214 LBS | DIASTOLIC BLOOD PRESSURE: 70 MMHG | HEART RATE: 74 BPM

## 2024-07-18 DIAGNOSIS — Z95.5 S/P DRUG ELUTING CORONARY STENT PLACEMENT: Primary | ICD-10-CM

## 2024-07-18 DIAGNOSIS — I45.2 BIFASCICULAR BLOCK: ICD-10-CM

## 2024-07-18 DIAGNOSIS — Z82.49 FAMILY HISTORY OF EARLY CAD: ICD-10-CM

## 2024-07-18 DIAGNOSIS — E66.09 CLASS 1 OBESITY DUE TO EXCESS CALORIES WITH SERIOUS COMORBIDITY AND BODY MASS INDEX (BMI) OF 32.0 TO 32.9 IN ADULT: ICD-10-CM

## 2024-07-18 DIAGNOSIS — I10 PRIMARY HYPERTENSION: ICD-10-CM

## 2024-07-18 DIAGNOSIS — Z02.4 ENCOUNTER FOR CDL (COMMERCIAL DRIVING LICENSE) EXAM: ICD-10-CM

## 2024-07-18 DIAGNOSIS — E11.9 TYPE 2 DIABETES MELLITUS WITHOUT COMPLICATION, WITHOUT LONG-TERM CURRENT USE OF INSULIN (HCC): ICD-10-CM

## 2024-07-18 DIAGNOSIS — I25.10 CORONARY ARTERY DISEASE INVOLVING NATIVE CORONARY ARTERY OF NATIVE HEART WITHOUT ANGINA PECTORIS: ICD-10-CM

## 2024-07-18 PROCEDURE — 99214 OFFICE O/P EST MOD 30 MIN: CPT | Performed by: INTERNAL MEDICINE

## 2024-07-18 NOTE — PROGRESS NOTES
[facility] [place] [address] [facility] [address] [phone]                                                Cardiology Office Follow up  Mandeep Pierce, 69 y.o. male  YOB: 1955  MRN: [id number] Encounter: [id number]      PCP - Nikolai Ruiz MD  Referring Provider - No ref. provider found    Chief Complaint   Patient presents with   • hospital follow up     S/p cath       Assessment  CAD s/p PCI with ALONDRA to RCA  [facility] -   22 (Dr.Michael Smith) - RCA stent x2  Had prior Inferior infarct on nuclear stress  3/24/22 Nuclear Rx stress -  LVEF 70%, Moderate size, complete fixed perfusion defect of basal to mid inferior wall - probable old inferior infarct, no significant ischemia  3/31/22 TTE -  LVEF 60%, grade 1 diastolic dysfunction, mildly dilated RV with reduced systolic function  Bifascicular block  Family history of early CAD  Father  of a heart attack at age 53  COPD  6MWT - 22 - 322 m, lowest SpO2 92%  Diabetes Mellitus Type 2  Overweight, Body mass index is 32.54 kg/m².     Plan  CAD s/p PCI with ALONDRA to RCA, in-stent stenosis of RCA s/p PTCA (2024), old inferior infarct  Overview  3/2022 Nuclear Rx: LVEF 70%, medium to large sized fixed defect of basal to mid inferior wall suggestive of infarct v/s artifact  Patient unable to prone  TTE (personal review) - he does akinesis of basal inferior wall  2022: ACS (troponin up to 1388) --> LHC -high-grade sequential calcified stenosis of RCA --> s/p PCI with ALONDRA to RCA ([facility])  2024: No major chest pain or shortness of breath, but has limited activity or exercise.  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  He is doing well with medical therapy, and has been quite active at home  Impression  Had in-stent stenosis of RCA, which was successfully treated with balloon angioplasty and he is asymptomatic thereafter  Plan  Continue aspirin indefinitely  Continue Plavix 75 mg daily for 1 year at least  Continue metoprolol succinate 25 g daily, atorvastatin 40 mg daily  Now back on Lasix 40 mg - 4days/week (tu, th,sat, Sunday) - continue same    Bifascicular block  Known bifascicular block since at least February 2022  Remains free of any dizziness and tolerating metoprolol succinate 25 mg daily without problems  Continue same and monitor with annual ECG     Hyperlipidemia, Obesity - Body mass index is 32.54 kg/m².       Primarily had mild triglyceride elevations in the past  Tolerating atorvastatin and cholesterol levels at goal  Continue atorvastatin 40 mg daily  Diet modifications, weight loss recommended   Optimize diabetes control, and follow-up with PCP       No results found for this visit on 07/18/24.      No orders of the defined types were placed in this encounter.      No follow-ups on file.      History of Present Illness   69 y.o. 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  He tested positive and was being monitored at home, but then developed hypoxia and as a result presented to [facility] on 02/08/2022.  He had mild troponin elevation (112 --> 235 --> 191) and was evaluated by Cardiologist Dr. Hackett, and was felt to be non-MI troponin elevation related to COVID infection.   He was treated with remdesivir and  then left AMA on 22.  He subsequently presented back to [facility] ED the same night, and was found to have lactic acidosis and found to have more elevated troponin levels (3350 --> 3675).  After review with on-call cardiologist ,  He was transferred to [facility] for cardiac evaluation.   He was seen by Cardiology Dr. Patel and this was felt to be all related to COVID and as a result he was recommended outpatient cardiology follow up.  He remained in the hospital for another 2 weeks and was eventually discharged on 2022 on oxygen and steroid therapy.    Since discharge, he continues to be hypoxic with ambulation and continues to need ambulatory oxygen.  He has no complains of chest pain, palpitations, dizziness.  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  He completed the nuclear stress test and echocardiogram, which do seem to suggest him having had an old inferior infarct.  He shortness of breath is overall improving, but he continues to have issues with moderate to severe exertion.  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 He will be following up with pulmonary regarding same.    Interval history - 10/17/2022  He comes back for follow-up after about 5 months.  No current complains of chest pain or shortness of breath and he feels much better.  He is off oxygen and back to working as a .  He has gained about 20 lb over the last 6 months, which he states is related to his eating a lot of his own cooking    Interval  history - 6/3/2024  He returns for follow-up with me after almost 2 years.  In between he reports that in November 2022 he had an episode of chest discomfort/pressure while sitting and watching TV. He did not feel well during this and ended up going to the [facility] ED. He ruled in for ACS, and underwent cardiac catheterization which revealed high-grade sequential calcified stenosis of the dominant RCA.  This was successfully stented with a complex PCI with multiple stents to RCA.  He subsequently followed up with a cardiologist there Dr. Smith and has been seeing him over the last year and a half.  He denies having had any further major chest pain or shortness of breath thereafter.  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  No records on the stent available for me to review at the time of the visit.    Interval history - 7/18/2024  He comes in for follow-up after about 6 weeks.  In the interim he completed nuclear stress test initially which showed evidence of inferior ischemia.  He was referred for left heart cath, which then confirmed 95% in-stent stenosis of the RCA.  This was successfully treated with balloon angioplasty.  He has done well since then and is now very active cutting grass, lifting 30-50 lb bags at home, which he then transferred from garage to dumpster for cleaning his garage, and did not have any symptoms or limitations with the same    Historical Information   Past Medical History:   Diagnosis Date   • Bifascicular block    • CAD (coronary artery disease)    • CHF (congestive heart failure) ([facility])    • Coronary artery disease    • COVID-19 02/08/2022   • Diabetes mellitus ([facility])    • Family history of early CAD    • Hyperlipidemia    • Hypertension    • RBBB      Past Surgical History:   Procedure Laterality  Date   • ANKLE FRACTURE SURGERY Left    • CARDIAC CATHETERIZATION Left 6/25/2024    Procedure: Cardiac Left Heart Cath;  Surgeon: Homer Bruno MD;  Location: [facility];  Service: Cardiology   • CARDIAC CATHETERIZATION N/A 6/25/2024    Procedure: Cardiac pci;  Surgeon: Homer Bruno MD;  Location: [facility];  Service: Cardiology   • CARDIAC CATHETERIZATION N/A 6/25/2024    Procedure: Cardiac PCI Stent;  Surgeon: Homer Bruno MD;  Location: [facility];  Service: Cardiology   • FOOT FRACTURE SURGERY     • KNEE SURGERY Right    • VASECTOMY       Family History   Problem Relation Age of Onset   • Diabetes Mother    • Heart attack Father    • Alcohol abuse Father      Current Outpatient Medications on File Prior to Visit   Medication Sig Dispense Refill   • Alcohol Swabs 70 % PADS May substitute brand based on insurance coverage. Check glucose TID. 100 each 0   • ascorbic acid (VITAMIN C) 500 mg tablet in the morning     • aspirin 81 mg chewable tablet Chew 81 mg  in the morning.     • atorvastatin (LIPITOR) 40 mg tablet Take 1 tablet (40 mg total) by mouth daily 90 tablet 3   • Blood Glucose Monitoring Suppl (OneTouch Verio Reflect) w/Device KIT May substitute brand based on insurance coverage. Check glucose TID. 1 kit 0   • Cinnamon 500 MG capsule Take 2 capsules twice a day by oral route.     • clopidogrel (PLAVIX) 75 mg tablet Take 1 tablet every day by oral route.     • furosemide (LASIX) 40 mg tablet Take 1 tablet (40 mg total) by mouth daily 30 tablet 0   • glipiZIDE (GLUCOTROL XL) 5 mg 24 hr tablet Take 1 tablet every day by oral route with meals for 90 days.     • glucose blood (OneTouch Verio) test strip May substitute brand based on insurance coverage. Check glucose TID. 100 each 0   • Insulin Glargine (TOUJEO SOLOSTAR SC) Inject 20 Units under the skin in the morning At bedtime     • insulin lispro (HumaLOG KwikPen) 100 units/mL injection pen Inject 15 Units under the skin 3 (three)  times a day with meals     • Insulin Pen Needle (BD Pen Needle Silvia 2nd Gen) 32G X 4 MM MISC For use with insulin pen. Pharmacy may dispense brand covered by insurance. 100 each 0   • Insulin Pen Needle (BD Pen Needle Silvia 2nd Gen) 32G X 4 MM MISC For use with insulin pen. Pharmacy may dispense brand covered by insurance. 100 each 0   • lisinopril (ZESTRIL) 5 mg tablet Take 10 mg by mouth daily     • loratadine (Claritin) 10 mg tablet Take 1 tablet every day by oral route.     • metFORMIN (GLUCOPHAGE) 1000 MG tablet Take 1,000 mg by mouth 2 (two) times a day with meals     • metoprolol succinate (TOPROL-XL) 25 mg 24 hr tablet Take 1 tablet by mouth daily     • Milk Thistle 1000 MG CAPS Take by mouth     • Multiple Vitamin (multivitamin) tablet Take 1 tablet by mouth daily     • Omega-3 Fatty Acids (fish oil) 1,000 mg Take 1,000 mg by mouth 2 (two) times a day     • OneTouch Delica Lancets 33G MISC May substitute brand based on insurance coverage. Check glucose TID. 100 each 0   • pantoprazole (PROTONIX) 40 mg tablet Take 1 tablet (40 mg total) by mouth daily in the early morning 60 tablet 0   • thiamine (VITAMIN B1) 100 mg tablet Take 1 tablet (100 mg total) by mouth daily 60 tablet 0   • VITAMIN D PO daily     • zinc sulfate (ZINCATE) 220 mg capsule Take 1 capsule (220 mg total) by mouth daily 60 capsule 0     No current facility-administered medications on file prior to visit.     No Known Allergies  Social History     Socioeconomic History   • Marital status: /Civil Union     Spouse name: None   • Number of children: None   • Years of education: None   • Highest education level: None   Occupational History   • None   Tobacco Use   • Smoking status: Former     Current packs/day: 0.00     Average packs/day: 1 pack/day for 30.0 years (30.0 ttl pk-yrs)     Types: Cigarettes     Start date: 1978     Quit date: 2008     Years since quittin.4   • Smokeless tobacco: Never   Vaping Use   • Vaping  "status: Never Used   Substance and Sexual Activity   • Alcohol use: Yes     Comment: occ   • Drug use: Never   • Sexual activity: Not Currently   Other Topics Concern   • None   Social History Narrative   • None     Social Determinants of Health     Financial Resource Strain: Not on file   Food Insecurity: No Food Insecurity (3/31/2022)    Hunger Vital Sign    • Worried About Running Out of Food in the Last Year: Never true    • Ran Out of Food in the Last Year: Never true   Transportation Needs: No Transportation Needs (3/31/2022)    PRAPARE - Transportation    • Lack of Transportation (Medical): No    • Lack of Transportation (Non-Medical): No   Physical Activity: Not on file   Stress: Not on file   Social Connections: Not on file   Intimate Partner Violence: Not on file   Housing Stability: Low Risk  (3/31/2022)    Housing Stability Vital Sign    • Unable to Pay for Housing in the Last Year: No    • Number of Places Lived in the Last Year: 1    • Unstable Housing in the Last Year: No        Review of Systems   All other systems reviewed and are negative.        Vitals:  Vitals:    07/18/24 1238   BP: 122/70   Pulse: 74   Weight: 97.1 kg (214 lb)   Height: 5' 8\" (1.727 m)     BMI - Body mass index is 32.54 kg/m².  Wt Readings from Last 7 Encounters:   07/18/24 97.1 kg (214 lb)   06/25/24 95.3 kg (210 lb)   06/07/24 95.3 kg (210 lb)   06/03/24 95.3 kg (210 lb)   06/03/24 95.6 kg (210 lb 12.8 oz)   01/13/24 97.9 kg (215 lb 13.3 oz)   10/17/22 92.5 kg (204 lb)       Physical Exam  Vitals and nursing note reviewed.   Constitutional:       General: He is not in acute distress.     Appearance: He is well-developed. He is obese. He is ill-appearing. He is not diaphoretic.   HENT:      Head: Normocephalic and atraumatic.      Nose: No congestion.   Eyes:      General: No scleral icterus.     Conjunctiva/sclera: Conjunctivae normal.   Neck:      Vascular: No carotid bruit or JVD.   Cardiovascular:      Rate and Rhythm: " "Normal rate and regular rhythm.      Heart sounds: Normal heart sounds. No murmur heard.     No friction rub. No gallop.   Pulmonary:      Effort: Pulmonary effort is normal. No respiratory distress.      Breath sounds: Normal breath sounds. No wheezing or rales.   Chest:      Chest wall: No tenderness.   Abdominal:      General: There is no distension.      Palpations: Abdomen is soft.      Tenderness: There is no abdominal tenderness.   Musculoskeletal:         General: No swelling, tenderness or deformity.      Cervical back: Neck supple. No muscular tenderness.      Right lower leg: No edema.      Left lower leg: No edema.   Skin:     General: Skin is warm.   Neurological:      General: No focal deficit present.      Mental Status: He is alert and oriented to person, place, and time. Mental status is at baseline.   Psychiatric:         Mood and Affect: Mood normal.         Behavior: Behavior normal.         Thought Content: Thought content normal.           Labs:  CBC:   Lab Results   Component Value Date    WBC 9.16 06/18/2024    RBC 4.21 06/18/2024    HGB 14.5 06/18/2024    HCT 42.0 06/18/2024     (H) 06/18/2024     06/18/2024    RDW 13.2 06/18/2024       CMP:   Lab Results   Component Value Date    K 4.7 06/18/2024     (H) 06/18/2024    CO2 23 06/18/2024    BUN 24 06/18/2024    CREATININE 0.90 06/18/2024    EGFR 87 06/18/2024    CALCIUM 9.6 06/18/2024    AST 30 06/18/2024    ALT 43 06/18/2024    ALKPHOS 58 06/18/2024       Magnesium:  Lab Results   Component Value Date    MG 2.2 04/05/2022       Lipid Profile:   Lab Results   Component Value Date    HDL 41 03/09/2024    TRIG 137 03/09/2024    LDLCALC 52 03/09/2024       Thyroid Studies: No results found for: \"RGK1TKIWOYGM\", \"T3FREE\", \"FREET4\", \"P6JWNDE\", \"B4TRHIO\"    A1c:  No components found for: \"HGA1C\"    INR:  Lab Results   Component Value Date    INR 1.11 03/30/2022    INR 1.12 02/17/2022    INR 0.99 02/08/2022   5    Imaging: XR " "chest portable    Result Date: 3/1/2022  Narrative: CHEST INDICATION:   worsening oxygen requirements. Patient has confirmed COVID-19. COMPARISON:  Chest x-ray 2/20/2022 EXAM PERFORMED/VIEWS:  XR CHEST PORTABLE FINDINGS: Cardiomediastinal silhouette appears unremarkable. Extensive peripheral infiltrates again seen bilaterally, partially improved on the right, stable on the left. No pneumothorax or pleural effusion. Osseous structures appear within normal limits for patient age.     Impression: Extensive peripheral infiltrates again seen bilaterally, partially improved on the right, stable on the left.  Findings again most suggestive of Covid 19 pneumonia. Workstation performed: [id number]     XR chest portable    Result Date: 2/20/2022  Narrative: CHEST INDICATION:   increase oxygen requirement. Patient has confirmed COVID-19. COMPARISON:  None EXAM PERFORMED/VIEWS:  XR CHEST PORTABLE FINDINGS: Cardiomediastinal silhouette appears unremarkable. Worsened peripheral pulmonary opacities. No effusion or pneumothorax. Osseous structures appear within normal limits for patient age.     Impression: Worsened peripheral pulmonary opacities. Workstation performed: [id number]     XR chest 1 view portable    Result Date: 2/18/2022  Narrative: CHEST INDICATION:   sob covid.   \"patient c/o sob ongoing for several days, COVID +. recently signed out AMA from [facility] because they \"weren't telling him anything about his care\". patient hypoxic on arrival\"  Patient has confirmed COVID-19. COMPARISON:  2/8/2022 EXAM PERFORMED/VIEWS:  XR CHEST PORTABLE FINDINGS: Cardiomediastinal silhouette appears unremarkable. Peripheral pulmonary opacities most prominent at the lung bases indicating Covid pneumonia in this patient with recently confirmed Covid 19. No effusion or pneumothorax. Osseous structures appear within normal limits for patient age.     Impression: Peripheral pulmonary opacities most prominent at the lung bases indicating Covid " pneumonia in this patient with recently confirmed Covid 19. Findings are concordant with preliminary interpretation provided in the emergency department.  Workstation performed: [id number]     CTA chest pe study    Result Date: 3/1/2022  Narrative: CTA - CHEST WITH IV CONTRAST - PULMONARY ANGIOGRAM INDICATION:   Hypoxia and COVID. COMPARISON: Chest x-rays from 2/8/2022, 2/17/2022, and 2/20/2022 TECHNIQUE: CTA examination of the chest was performed using angiographic technique according to a protocol specifically tailored to evaluate for pulmonary embolism.  Axial, sagittal, and coronal 2D reformatted images were created from the source data and  submitted for interpretation.  In addition, coronal 3D MIP postprocessing was performed on the acquisition scanner.  Radiation dose length product (DLP) for this visit:  589.55 mGy-cm .  This examination, like all CT scans performed in the [facility], was performed utilizing techniques to minimize radiation dose exposure, including the use of iterative  reconstruction and automated exposure control. IV Contrast:  85 mL of iohexol (OMNIPAQUE)  FINDINGS: PULMONARY ARTERIAL TREE:  No pulmonary embolus is seen.  There is a small amount of gas in the right ventricle and pulmonary artery likely related to injection. LUNGS:  There is peripheral consolidation, predominantly in the lower lung fields, similar to the recent chest x-ray from 2/20/2022 though progressed from earlier chest x-rays from 2/17/2022 and 2/8/2022.  There are cystic changes throughout, mostly in a  subpleural distribution though there also appears to be traction bronchiectasis at the lung bases. PLEURA:  Unremarkable. HEART/GREAT VESSELS:  Unremarkable for patient's age. No thoracic aortic aneurysm. MEDIASTINUM AND CUHY:  There is mild lymphadenopathy including a 1.3 x 1.5 cm right hilar lymph node and a 1.4 x 2.1 cm subcarinal lymph node.  Additional shotty lymph nodes are also present. CHEST  WALL AND LOWER NECK:   Unremarkable. VISUALIZED STRUCTURES IN THE UPPER ABDOMEN:  Unremarkable. OSSEOUS STRUCTURES:  No acute fracture or destructive osseous lesion.     Impression: 1.  No acute pulmonary embolism. 2.  Covid-19 pneumonia, progressed from chest x-rays in early February, now with developing cystic changes, mostly in a subpleural distribution, with traction bronchiectasis at the lung bases.  This may represent early fibrosis though continued follow-up  is recommended. 3.  Paraseptal emphysema. Workstation performed: [id number]     VAS lower limb venous duplex study, complete bilateral    Result Date: 2/21/2022  Narrative:  [facility] REPORT CLINICAL: Indications: Physician wants to determine patency of the venous system secondary to elevated D-Dimer and Covid 19. Operative History: No Cardiovascular Surgeries Risk Factors The patient has history of Diabetes (IDDM) and previous smoking (quit >10yrs ago).   CONCLUSION: Impression: RIGHT LOWER LIMB: No evidence of acute or chronic deep vein thrombosis. No evidence of superficial thrombophlebitis noted. Doppler evaluation shows a normal response to augmentation maneuvers.  LEFT LOWER LIMB: No evidence of acute or chronic deep vein thrombosis. No evidence of superficial thrombophlebitis noted. Doppler evaluation shows a normal response to augmentation maneuvers.   Technical findings posted in EPIC.  SIGNATURE: Electronically Signed by: SHUN HUSTON MD on 2022-02-21 05:44:14 PM      Cardiac testing:   No results found for this or any previous visit.    No results found for this or any previous visit.    No results found for this or any previous visit.    No results found for this or any previous visit.      Cardiac catheterization  •  Prox RCA to Mid RCA lesion is 10% stenosed.  •  Mid LAD lesion is 30% stenosed.  •  Prox RCA lesion is 95% stenosed.    Single vessel coronary disease, with a 95% in-stent restenosis in the   proximal RCA.    Successful IVUS-guided PTCA, 95% proximal RCA in-stent restenosis, with a   reduction in stenosis from 95% to 0% following dilation of a 2.5mm ALONDRA   with a 3.0mm NC balloon. IVUS confirmed further stent expansion.

## 2024-07-20 ENCOUNTER — APPOINTMENT (OUTPATIENT)
Dept: URGENT CARE | Facility: CLINIC | Age: 69
End: 2024-07-20

## 2024-08-07 ENCOUNTER — TELEPHONE (OUTPATIENT)
Age: 69
End: 2024-08-07

## 2024-08-07 DIAGNOSIS — I50.31 ACUTE DIASTOLIC HEART FAILURE (HCC): ICD-10-CM

## 2024-08-07 RX ORDER — FUROSEMIDE 40 MG/1
TABLET ORAL
Qty: 30 TABLET | Refills: 4 | Status: SHIPPED | OUTPATIENT
Start: 2024-08-07

## 2024-08-07 NOTE — TELEPHONE ENCOUNTER
"Patient called the RX Refill Line. Message is being forwarded to the office.     Patient asking if \"someone called me because I have a missed call\" I checked the chart and cannot find anything documented.       "

## 2024-08-07 NOTE — TELEPHONE ENCOUNTER
Reason for call: Pt states his PCP wants Cardiologist to take over since pt recently had a stent placed.  [x] Refill   [] Prior Auth  [] Other:     Office:   [] PCP/Provider -   [x] Specialty/Provider - Cardio/[place]     Medication:       Pharmacy: Confirmed [facility] pharmacy [facility]    Does the patient have enough for 3 days?   [] Yes   [x] No - Send as HP to POD

## 2024-09-05 ENCOUNTER — APPOINTMENT (OUTPATIENT)
Age: 69
End: 2024-09-05
Payer: COMMERCIAL

## 2024-09-05 DIAGNOSIS — E11.8 DIABETIC COMPLICATION (HCC): ICD-10-CM

## 2024-09-05 PROCEDURE — 36415 COLL VENOUS BLD VENIPUNCTURE: CPT

## 2024-09-05 PROCEDURE — 83519 RIA NONANTIBODY: CPT

## 2024-09-05 PROCEDURE — 84681 ASSAY OF C-PEPTIDE: CPT

## 2024-09-05 PROCEDURE — 86337 INSULIN ANTIBODIES: CPT

## 2024-09-06 LAB — C PEPTIDE SERPL-MCNC: 4.9 NG/ML (ref 1.1–4.4)

## 2024-09-07 LAB — GAD65 AB SER-ACNC: <5 U/ML (ref 0–5)

## 2024-09-16 LAB — INSULIN AB SER-ACNC: <5 UU/ML

## 2024-09-25 ENCOUNTER — APPOINTMENT (OUTPATIENT)
Age: 69
End: 2024-09-25
Payer: COMMERCIAL

## 2024-09-25 DIAGNOSIS — E13.29: ICD-10-CM

## 2024-09-25 DIAGNOSIS — E11.8 DIABETIC COMPLICATION (HCC): ICD-10-CM

## 2024-09-25 LAB
ALBUMIN SERPL BCG-MCNC: 4.5 G/DL (ref 3.5–5)
ALP SERPL-CCNC: 72 U/L (ref 34–104)
ALT SERPL W P-5'-P-CCNC: 82 U/L (ref 7–52)
ANION GAP SERPL CALCULATED.3IONS-SCNC: 10 MMOL/L (ref 4–13)
AST SERPL W P-5'-P-CCNC: 68 U/L (ref 13–39)
BILIRUB SERPL-MCNC: 0.68 MG/DL (ref 0.2–1)
BUN SERPL-MCNC: 25 MG/DL (ref 5–25)
CALCIUM SERPL-MCNC: 9.3 MG/DL (ref 8.4–10.2)
CHLORIDE SERPL-SCNC: 107 MMOL/L (ref 96–108)
CO2 SERPL-SCNC: 22 MMOL/L (ref 21–32)
CREAT SERPL-MCNC: 0.92 MG/DL (ref 0.6–1.3)
EST. AVERAGE GLUCOSE BLD GHB EST-MCNC: 166 MG/DL
GFR SERPL CREATININE-BSD FRML MDRD: 85 ML/MIN/1.73SQ M
GLUCOSE P FAST SERPL-MCNC: 220 MG/DL (ref 65–99)
HBA1C MFR BLD: 7.4 %
POTASSIUM SERPL-SCNC: 4.6 MMOL/L (ref 3.5–5.3)
PROT SERPL-MCNC: 6.6 G/DL (ref 6.4–8.4)
SODIUM SERPL-SCNC: 139 MMOL/L (ref 135–147)

## 2024-09-25 PROCEDURE — 36415 COLL VENOUS BLD VENIPUNCTURE: CPT

## 2024-09-25 PROCEDURE — 80053 COMPREHEN METABOLIC PANEL: CPT

## 2024-09-25 PROCEDURE — 83036 HEMOGLOBIN GLYCOSYLATED A1C: CPT

## 2024-11-20 ENCOUNTER — OFFICE VISIT (OUTPATIENT)
Dept: CARDIOLOGY CLINIC | Facility: CLINIC | Age: 69
End: 2024-11-20
Payer: COMMERCIAL

## 2024-11-20 VITALS
HEIGHT: 65 IN | HEART RATE: 58 BPM | RESPIRATION RATE: 18 BRPM | BODY MASS INDEX: 37.32 KG/M2 | SYSTOLIC BLOOD PRESSURE: 124 MMHG | WEIGHT: 224 LBS | OXYGEN SATURATION: 95 % | DIASTOLIC BLOOD PRESSURE: 76 MMHG

## 2024-11-20 DIAGNOSIS — E66.811 CLASS 1 OBESITY DUE TO EXCESS CALORIES WITH SERIOUS COMORBIDITY AND BODY MASS INDEX (BMI) OF 32.0 TO 32.9 IN ADULT: ICD-10-CM

## 2024-11-20 DIAGNOSIS — R74.8 ELEVATED LIVER ENZYMES: ICD-10-CM

## 2024-11-20 DIAGNOSIS — I50.32 CHRONIC DIASTOLIC (CONGESTIVE) HEART FAILURE (HCC): ICD-10-CM

## 2024-11-20 DIAGNOSIS — I25.10 CORONARY ARTERY DISEASE INVOLVING NATIVE CORONARY ARTERY OF NATIVE HEART WITHOUT ANGINA PECTORIS: Primary | ICD-10-CM

## 2024-11-20 DIAGNOSIS — E66.9 TYPE 2 DIABETES MELLITUS WITH OBESITY  (HCC): ICD-10-CM

## 2024-11-20 DIAGNOSIS — E78.2 MIXED HYPERLIPIDEMIA: ICD-10-CM

## 2024-11-20 DIAGNOSIS — I10 PRIMARY HYPERTENSION: ICD-10-CM

## 2024-11-20 DIAGNOSIS — E66.09 CLASS 1 OBESITY DUE TO EXCESS CALORIES WITH SERIOUS COMORBIDITY AND BODY MASS INDEX (BMI) OF 32.0 TO 32.9 IN ADULT: ICD-10-CM

## 2024-11-20 DIAGNOSIS — E11.69 TYPE 2 DIABETES MELLITUS WITH OBESITY  (HCC): ICD-10-CM

## 2024-11-20 PROCEDURE — 99214 OFFICE O/P EST MOD 30 MIN: CPT | Performed by: INTERNAL MEDICINE

## 2024-11-20 RX ORDER — INSULIN DEGLUDEC 100 U/ML
46 INJECTION, SOLUTION SUBCUTANEOUS DAILY
COMMUNITY
Start: 2024-10-16

## 2024-11-20 RX ORDER — LISINOPRIL 10 MG/1
10 TABLET ORAL DAILY
COMMUNITY
Start: 2024-10-31

## 2024-11-20 NOTE — PROGRESS NOTES
[facility] [place] [address] [facility] [address] [phone]                                                Cardiology Office Follow up  Mandeep Pierce, 69 y.o. male  YOB: 1955  MRN: [id number] Encounter: [id number]      PCP - Nikolai Ruiz MD  Referring Provider - No ref. provider found    Chief Complaint   Patient presents with    Follow-up     4 month       Assessment  CAD s/p PCI with ALONDRA to RCA  [facility] -   22 (Dr.Michael Smith) - RCA stent x2  Had prior Inferior infarct on nuclear stress  3/24/22 Nuclear Rx stress -  LVEF 70%, Moderate size, complete fixed perfusion defect of basal to mid inferior wall - probable old inferior infarct, no significant ischemia  3/31/22 TTE -  LVEF 60%, grade 1 diastolic dysfunction, mildly dilated RV with reduced systolic function  Bifascicular block  Family history of early CAD  Father  of a heart attack at age 53  COPD  6MWT - 22 - 322 m, lowest SpO2 92%  Diabetes Mellitus Type 2  Overweight, Body mass index is 36.87 kg/m².     Plan  CAD s/p PCI with ALONDRA to RCA, in-stent stenosis of RCA s/p PTCA (2024), old inferior infarct  Overview  3/2022 Nuclear Rx: LVEF 70%, medium to large sized fixed defect of basal to mid inferior wall suggestive of infarct v/s artifact  Patient unable to prone  TTE (personal review) - he does akinesis of basal inferior wall  2022: ACS (troponin up to 1388) --> LHC -high-grade sequential calcified stenosis of RCA --> s/p PCI with ALONDRA to RCA ([facility])  2024: No major chest pain or shortness of breath, but has limited activity or exercise.  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  He is doing well with medical therapy, and has been quite active at home  11/2024: Remains without chest pain, he is no longer working as he could not go back to work due to uncontrolled diabetes and is now considering long-term.  Impression  Had in-stent stenosis of RCA, which was successfully treated with balloon angioplasty and he is asymptomatic thereafter  Plan  Continue aspirin indefinitely  Continue Plavix 75 mg daily for 1 year at least (until 6/2025)  Continue metoprolol succinate 25 mg daily, atorvastatin 40 mg daily  Continue Lasix 40 mg - 4days/week (tu, th,sat, Sunday) - remains euvolemic    Bifascicular block  Known bifascicular block since at least February 2022  Continue metoprolol succinate 25 mg daily  Monitor for dizziness  Follow-up with annual ECG     Hyperlipidemia, Obesity - Body mass index is 36.87 kg/m².         No recent lipid panel, but he had a CMP done in September which shows mildly elevated AST/ALT levels   He does have a history of fatty liver and also uses occasional alcohol 1-2 drinks on weekends  Currently otherwise tolerating atorvastatin 40 mg daily without any problems  Check follow-up lipid panel, CMP now  I suspect this is more likely related to fatty liver disease/obesity related   If sustained elevation or worsening in AST/ALT levels, then may need to decrease or discontinue statin therapy and evaluate further  Diet modifications, weight loss recommended   Optimize diabetes control, and follow-up with PCP       No results found for this visit on 11/20/24.      Orders Placed This Encounter   Procedures    Comprehensive metabolic panel    Lipid Panel with Direct LDL reflex       Return in about 6 months (around 5/20/2025), or if symptoms worsen or fail to improve.      History of Present Illness   69 y.o. 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  He tested positive and was being monitored at home, but then developed hypoxia and as a result presented to [facility].  He had mild troponin elevation (112 --> 235 --> 191) and was evaluated by Cardiologist Dr. Hackett, and was felt to be non-MI troponin elevation related to COVID infection.   He was treated with remdesivir and then left AMA on 22.  He subsequently presented back to [facility] ED the same night, and was found to have lactic acidosis and found to have more elevated troponin levels (3350 --> 3675).  After review with on-call cardiologist ,  He was transferred to [facility] for cardiac evaluation.   He was seen by Cardiology Dr. Patel and this was felt to be all related to COVID and as a result he was recommended outpatient cardiology follow up.  He remained in the hospital for another 2 weeks and was eventually discharged on 2022 on oxygen and steroid therapy.    Since discharge, he continues to be hypoxic with ambulation and continues to need ambulatory oxygen.  He has no complains of chest pain, palpitations, dizziness.  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  He completed the nuclear stress test and echocardiogram, which do seem to suggest him having had an old inferior infarct.  He shortness of breath is overall improving, but he continues to have issues with moderate to severe exertion.  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 He will be following up with pulmonary regarding same.    Interval history - 10/17/2022  He comes back for follow-up after about 5 months.  No current complains of chest pain or shortness of breath and he feels much better.  He is off oxygen and back to working as a .  He has gained about 20 lb over the last 6 months, which he states is related to his eating a lot of his own cooking    Interval history - 6/3/2024  He returns for follow-up with me after almost 2 years.  In between he reports that in November 2022 he had an episode of chest discomfort/pressure while sitting and watching TV. He did not feel well during this and ended up going to the [facility] ED. He ruled in for ACS, and underwent cardiac catheterization which revealed high-grade sequential calcified stenosis of the dominant RCA.  This was successfully stented with a complex PCI with multiple stents to RCA.  He subsequently followed up with a cardiologist there Dr. Smith and has been seeing him over the last year and a half.  He denies having had any further major chest pain or shortness of breath thereafter.  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  No records on the stent available for me to review at the time of the visit.    Interval history - 7/18/2024  He comes in for follow-up after about 6 weeks.  In the interim he completed nuclear stress test initially which showed evidence of inferior ischemia.  He was referred for left heart cath, which then confirmed 95% in-stent stenosis of the RCA.  This was successfully treated with balloon  angioplasty.  He has done well since then and is now very active cutting grass, lifting 30-50 lb bags at home, which he then transferred from garage to dumter for cleaning his garage, and did not have any symptoms or limitations with the same    Interval history - 11/20/2024  He returns for 4-month follow-up.  He never ended up going back to work due to issues with uncontrolled diabetes levels, and as a result he is now seeing an endocrinologist.  He has unfortunately gained more weight and is overall up about 50 pounds over the last 2 years (175 --> 224 lbs).  No complaints of chest pain or shortness of breath otherwise      Historical Information   Past Medical History:   Diagnosis Date    Bifascicular block     CAD (coronary artery disease)     CHF (congestive heart failure) ([facility])     Coronary artery disease     COVID-19 02/08/2022    Diabetes mellitus ([facility])     Family history of early CAD     Hyperlipidemia     Hypertension     RBBB      Past Surgical History:   Procedure Laterality Date    ANKLE FRACTURE SURGERY Left     CARDIAC CATHETERIZATION Left 6/25/2024    Procedure: Cardiac Left Heart Cath;  Surgeon: Homer Bruno MD;  Location: [facility];  Service: Cardiology    CARDIAC CATHETERIZATION N/A 6/25/2024    Procedure: Cardiac pci;  Surgeon: Homer Bruno MD;  Location: [facility];  Service: Cardiology    CARDIAC CATHETERIZATION N/A 6/25/2024    Procedure: Cardiac PCI Stent;  Surgeon: Homer Bruno MD;  Location: [facility];  Service: Cardiology    FOOT FRACTURE SURGERY      KNEE SURGERY Right     VASECTOMY       Family History   Problem Relation Age of Onset    Diabetes Mother     Heart attack Father     Alcohol abuse Father      Current Outpatient Medications on File Prior to Visit   Medication Sig Dispense Refill    ascorbic acid (VITAMIN C) 500 mg tablet in the morning      aspirin 81 mg chewable tablet Chew 81 mg  in the morning.      atorvastatin (LIPITOR) 40 mg tablet Take 1  tablet (40 mg total) by mouth daily 90 tablet 3    Cinnamon 500 MG capsule Take 2 capsules twice a day by oral route.      clopidogrel (PLAVIX) 75 mg tablet Take 1 tablet every day by oral route.      furosemide (LASIX) 40 mg tablet Take 1 tablet by mouth four days a week or as directed. 30 tablet 4    insulin lispro (HumaLOG KwikPen) 100 units/mL injection pen Inject 15 Units under the skin 3 (three) times a day with meals      lisinopril (ZESTRIL) 10 mg tablet Take 10 mg by mouth daily      loratadine (Claritin) 10 mg tablet Take 1 tablet every day by oral route.      metFORMIN (GLUCOPHAGE) 1000 MG tablet Take 1,000 mg by mouth 2 (two) times a day with meals      metoprolol succinate (TOPROL-XL) 25 mg 24 hr tablet Take 1 tablet by mouth daily      Milk Thistle 1000 MG CAPS Take by mouth      Multiple Vitamin (multivitamin) tablet Take 1 tablet by mouth daily      Omega-3 Fatty Acids (fish oil) 1,000 mg Take 1,000 mg by mouth 2 (two) times a day      pantoprazole (PROTONIX) 40 mg tablet Take 1 tablet (40 mg total) by mouth daily in the early morning 60 tablet 0    thiamine (VITAMIN B1) 100 mg tablet Take 1 tablet (100 mg total) by mouth daily 60 tablet 0    Tresiba FlexTouch 100 units/mL injection pen Inject 46 Units under the skin daily      VITAMIN D PO daily      zinc sulfate (ZINCATE) 220 mg capsule Take 1 capsule (220 mg total) by mouth daily 60 capsule 0    [DISCONTINUED] glipiZIDE (GLUCOTROL XL) 5 mg 24 hr tablet Take 1 tablet every day by oral route with meals for 90 days.      [DISCONTINUED] lisinopril (ZESTRIL) 5 mg tablet Take 10 mg by mouth daily      Alcohol Swabs 70 % PADS May substitute brand based on insurance coverage. Check glucose TID. 100 each 0    Blood Glucose Monitoring Suppl (OneTouch Verio Reflect) w/Device KIT May substitute brand based on insurance coverage. Check glucose TID. 1 kit 0    glucose blood (OneTouch Verio) test strip May substitute brand based on insurance coverage. Check  glucose TID. 100 each 0    Insulin Glargine (TOUJEO SOLOSTAR SC) Inject 20 Units under the skin in the morning At bedtime (Patient not taking: Reported on 2024)      Insulin Pen Needle (BD Pen Needle Silvia 2nd Gen) 32G X 4 MM MISC For use with insulin pen. Pharmacy may dispense brand covered by insurance. 100 each 0    Insulin Pen Needle (BD Pen Needle Silvia 2nd Gen) 32G X 4 MM MISC For use with insulin pen. Pharmacy may dispense brand covered by insurance. 100 each 0    OneTouch Delica Lancets 33G MISC May substitute brand based on insurance coverage. Check glucose TID. 100 each 0     No current facility-administered medications on file prior to visit.     No Known Allergies  Social History     Socioeconomic History    Marital status: /Civil Union     Spouse name: None    Number of children: None    Years of education: None    Highest education level: None   Occupational History    None   Tobacco Use    Smoking status: Former     Current packs/day: 0.00     Average packs/day: 1 pack/day for 30.0 years (30.0 ttl pk-yrs)     Types: Cigarettes     Start date: 1978     Quit date: 2008     Years since quittin.7    Smokeless tobacco: Never   Vaping Use    Vaping status: Never Used   Substance and Sexual Activity    Alcohol use: Yes     Comment: occ    Drug use: Never    Sexual activity: Not Currently   Other Topics Concern    None   Social History Narrative    None     Social Drivers of Health     Financial Resource Strain: Not on file   Food Insecurity: No Food Insecurity (3/31/2022)    Hunger Vital Sign     Worried About Running Out of Food in the Last Year: Never true     Ran Out of Food in the Last Year: Never true   Transportation Needs: No Transportation Needs (3/31/2022)    PRAPARE - Transportation     Lack of Transportation (Medical): No     Lack of Transportation (Non-Medical): No   Physical Activity: Not on file   Stress: Not on file   Social Connections: Not on file   Intimate Partner  "Violence: Not on file   Housing Stability: Low Risk  (3/31/2022)    Housing Stability Vital Sign     Unable to Pay for Housing in the Last Year: No     Number of Places Lived in the Last Year: 1     Unstable Housing in the Last Year: No        Review of Systems   All other systems reviewed and are negative.        Vitals:  Vitals:    11/20/24 0952   BP: 124/76   BP Location: Left arm   Patient Position: Sitting   Cuff Size: Large   Pulse: 58   Resp: 18   SpO2: 95%   Weight: 102 kg (224 lb)   Height: 5' 5.35\" (1.66 m)     BMI - Body mass index is 36.87 kg/m².  Wt Readings from Last 7 Encounters:   11/20/24 102 kg (224 lb)   07/18/24 97.1 kg (214 lb)   06/25/24 95.3 kg (210 lb)   06/07/24 95.3 kg (210 lb)   06/03/24 95.3 kg (210 lb)   06/03/24 95.6 kg (210 lb 12.8 oz)   01/13/24 97.9 kg (215 lb 13.3 oz)       Physical Exam  Vitals and nursing note reviewed.   Constitutional:       General: He is not in acute distress.     Appearance: He is well-developed. He is obese. He is ill-appearing. He is not diaphoretic.   HENT:      Head: Normocephalic and atraumatic.      Nose: No congestion.   Eyes:      General: No scleral icterus.     Conjunctiva/sclera: Conjunctivae normal.   Neck:      Vascular: No carotid bruit or JVD.   Cardiovascular:      Rate and Rhythm: Normal rate and regular rhythm.      Heart sounds: Normal heart sounds. No murmur heard.     No friction rub. No gallop.   Pulmonary:      Effort: Pulmonary effort is normal. No respiratory distress.      Breath sounds: Normal breath sounds. No wheezing or rales.   Chest:      Chest wall: No tenderness.   Abdominal:      General: There is no distension.      Palpations: Abdomen is soft.      Tenderness: There is no abdominal tenderness.   Musculoskeletal:         General: No swelling, tenderness or deformity.      Cervical back: Neck supple. No muscular tenderness.      Right lower leg: No edema.      Left lower leg: No edema.   Skin:     General: Skin is warm. " "  Neurological:      General: No focal deficit present.      Mental Status: He is alert and oriented to person, place, and time. Mental status is at baseline.   Psychiatric:         Mood and Affect: Mood normal.         Behavior: Behavior normal.         Thought Content: Thought content normal.           Labs:  CBC:   Lab Results   Component Value Date    WBC 9.16 06/18/2024    RBC 4.21 06/18/2024    HGB 14.5 06/18/2024    HCT 42.0 06/18/2024     (H) 06/18/2024     06/18/2024    RDW 13.2 06/18/2024       CMP:   Lab Results   Component Value Date    K 4.6 09/25/2024     09/25/2024    CO2 22 09/25/2024    BUN 25 09/25/2024    CREATININE 0.92 09/25/2024    EGFR 85 09/25/2024    CALCIUM 9.3 09/25/2024    AST 68 (H) 09/25/2024    ALT 82 (H) 09/25/2024    ALKPHOS 72 09/25/2024       Magnesium:  Lab Results   Component Value Date    MG 2.2 04/05/2022       Lipid Profile:   Lab Results   Component Value Date    HDL 41 03/09/2024    TRIG 137 03/09/2024    LDLCALC 52 03/09/2024       Thyroid Studies: No results found for: \"JVX4ZPGDECUS\", \"T3FREE\", \"FREET4\", \"I5WFDWW\", \"P5WNRBE\"    A1c:  No components found for: \"HGA1C\"    INR:  Lab Results   Component Value Date    INR 1.11 03/30/2022    INR 1.12 02/17/2022    INR 0.99 02/08/2022   5    Imaging: XR chest portable    Result Date: 3/1/2022  Narrative: CHEST INDICATION:   worsening oxygen requirements. Patient has confirmed COVID-19. COMPARISON:  Chest x-ray 2/20/2022 EXAM PERFORMED/VIEWS:  XR CHEST PORTABLE FINDINGS: Cardiomediastinal silhouette appears unremarkable. Extensive peripheral infiltrates again seen bilaterally, partially improved on the right, stable on the left. No pneumothorax or pleural effusion. Osseous structures appear within normal limits for patient age.     Impression: Extensive peripheral infiltrates again seen bilaterally, partially improved on the right, stable on the left.  Findings again most suggestive of Covid 19 pneumonia. " "Workstation performed: [id number]     XR chest portable    Result Date: 2/20/2022  Narrative: CHEST INDICATION:   increase oxygen requirement. Patient has confirmed COVID-19. COMPARISON:  None EXAM PERFORMED/VIEWS:  XR CHEST PORTABLE FINDINGS: Cardiomediastinal silhouette appears unremarkable. Worsened peripheral pulmonary opacities. No effusion or pneumothorax. Osseous structures appear within normal limits for patient age.     Impression: Worsened peripheral pulmonary opacities. Workstation performed: [id number]     XR chest 1 view portable    Result Date: 2/18/2022  Narrative: CHEST INDICATION:   sob covid.   \"patient c/o sob ongoing for several days, COVID +. recently signed out AMA from [facility] because they \"weren't telling him anything about his care\". patient hypoxic on arrival\"  Patient has confirmed COVID-19. COMPARISON:  2/8/2022 EXAM PERFORMED/VIEWS:  XR CHEST PORTABLE FINDINGS: Cardiomediastinal silhouette appears unremarkable. Peripheral pulmonary opacities most prominent at the lung bases indicating Covid pneumonia in this patient with recently confirmed Covid 19. No effusion or pneumothorax. Osseous structures appear within normal limits for patient age.     Impression: Peripheral pulmonary opacities most prominent at the lung bases indicating Covid pneumonia in this patient with recently confirmed Covid 19. Findings are concordant with preliminary interpretation provided in the emergency department.  Workstation performed: [id number]     CTA chest pe study    Result Date: 3/1/2022  Narrative: CTA - CHEST WITH IV CONTRAST - PULMONARY ANGIOGRAM INDICATION:   Hypoxia and COVID. COMPARISON: Chest x-rays from 2/8/2022, 2/17/2022, and 2/20/2022 TECHNIQUE: CTA examination of the chest was performed using angiographic technique according to a protocol specifically tailored to evaluate for pulmonary embolism.  Axial, sagittal, and coronal 2D reformatted images were created from the source data and  submitted " for interpretation.  In addition, coronal 3D MIP postprocessing was performed on the acquisition scanner.  Radiation dose length product (DLP) for this visit:  589.55 mGy-cm .  This examination, like all CT scans performed in the [facility], was performed utilizing techniques to minimize radiation dose exposure, including the use of iterative  reconstruction and automated exposure control. IV Contrast:  85 mL of iohexol (OMNIPAQUE)  FINDINGS: PULMONARY ARTERIAL TREE:  No pulmonary embolus is seen.  There is a small amount of gas in the right ventricle and pulmonary artery likely related to injection. LUNGS:  There is peripheral consolidation, predominantly in the lower lung fields, similar to the recent chest x-ray from 2/20/2022 though progressed from earlier chest x-rays from 2/17/2022 and 2/8/2022.  There are cystic changes throughout, mostly in a  subpleural distribution though there also appears to be traction bronchiectasis at the lung bases. PLEURA:  Unremarkable. HEART/GREAT VESSELS:  Unremarkable for patient's age. No thoracic aortic aneurysm. MEDIASTINUM AND CHUY:  There is mild lymphadenopathy including a 1.3 x 1.5 cm right hilar lymph node and a 1.4 x 2.1 cm subcarinal lymph node.  Additional shotty lymph nodes are also present. CHEST WALL AND LOWER NECK:   Unremarkable. VISUALIZED STRUCTURES IN THE UPPER ABDOMEN:  Unremarkable. OSSEOUS STRUCTURES:  No acute fracture or destructive osseous lesion.     Impression: 1.  No acute pulmonary embolism. 2.  Covid-19 pneumonia, progressed from chest x-rays in early February, now with developing cystic changes, mostly in a subpleural distribution, with traction bronchiectasis at the lung bases.  This may represent early fibrosis though continued follow-up  is recommended. 3.  Paraseptal emphysema. Workstation performed: [id number]     VAS lower limb venous duplex study, complete bilateral    Result Date: 2/21/2022  Narrative:  [facility] REPORT CLINICAL: Indications: Physician wants to determine patency of the venous system secondary to elevated D-Dimer and Covid 19. Operative History: No Cardiovascular Surgeries Risk Factors The patient has history of Diabetes (IDDM) and previous smoking (quit >10yrs ago).   CONCLUSION: Impression: RIGHT LOWER LIMB: No evidence of acute or chronic deep vein thrombosis. No evidence of superficial thrombophlebitis noted. Doppler evaluation shows a normal response to augmentation maneuvers.  LEFT LOWER LIMB: No evidence of acute or chronic deep vein thrombosis. No evidence of superficial thrombophlebitis noted. Doppler evaluation shows a normal response to augmentation maneuvers.   Technical findings posted in EPIC.  SIGNATURE: Electronically Signed by: SHUN HUSTON MD on 2022-02-21 05:44:14 PM      Cardiac testing:   No results found for this or any previous visit.    No results found for this or any previous visit.    No results found for this or any previous visit.    No results found for this or any previous visit.      Cardiac catheterization    Prox RCA to Mid RCA lesion is 10% stenosed.    Mid LAD lesion is 30% stenosed.    Prox RCA lesion is 95% stenosed.    Single vessel coronary disease, with a 95% in-stent restenosis in the   proximal RCA.   Successful IVUS-guided PTCA, 95% proximal RCA in-stent restenosis, with a   reduction in stenosis from 95% to 0% following dilation of a 2.5mm ALONDRA   with a 3.0mm NC balloon. IVUS confirmed further stent expansion.

## 2024-11-26 ENCOUNTER — APPOINTMENT (OUTPATIENT)
Age: 69
End: 2024-11-26
Payer: COMMERCIAL

## 2024-11-26 DIAGNOSIS — I25.10 CORONARY ARTERY DISEASE INVOLVING NATIVE CORONARY ARTERY OF NATIVE HEART WITHOUT ANGINA PECTORIS: ICD-10-CM

## 2024-11-26 DIAGNOSIS — R74.8 ELEVATED LIVER ENZYMES: ICD-10-CM

## 2024-11-26 DIAGNOSIS — E78.2 MIXED HYPERLIPIDEMIA: ICD-10-CM

## 2024-11-26 LAB
ALBUMIN SERPL BCG-MCNC: 4.7 G/DL (ref 3.5–5)
ALP SERPL-CCNC: 65 U/L (ref 34–104)
ALT SERPL W P-5'-P-CCNC: 72 U/L (ref 7–52)
ANION GAP SERPL CALCULATED.3IONS-SCNC: 9 MMOL/L (ref 4–13)
AST SERPL W P-5'-P-CCNC: 74 U/L (ref 13–39)
BILIRUB SERPL-MCNC: 0.75 MG/DL (ref 0.2–1)
BUN SERPL-MCNC: 18 MG/DL (ref 5–25)
CALCIUM SERPL-MCNC: 9.1 MG/DL (ref 8.4–10.2)
CHLORIDE SERPL-SCNC: 108 MMOL/L (ref 96–108)
CHOLEST SERPL-MCNC: 139 MG/DL (ref ?–200)
CO2 SERPL-SCNC: 25 MMOL/L (ref 21–32)
CREAT SERPL-MCNC: 0.89 MG/DL (ref 0.6–1.3)
GFR SERPL CREATININE-BSD FRML MDRD: 87 ML/MIN/1.73SQ M
GLUCOSE P FAST SERPL-MCNC: 133 MG/DL (ref 65–99)
HDLC SERPL-MCNC: 38 MG/DL
LDLC SERPL CALC-MCNC: 63 MG/DL (ref 0–100)
POTASSIUM SERPL-SCNC: 4.7 MMOL/L (ref 3.5–5.3)
PROT SERPL-MCNC: 6.9 G/DL (ref 6.4–8.4)
SODIUM SERPL-SCNC: 142 MMOL/L (ref 135–147)
TRIGL SERPL-MCNC: 191 MG/DL (ref ?–150)

## 2024-11-26 PROCEDURE — 80061 LIPID PANEL: CPT

## 2024-11-26 PROCEDURE — 80053 COMPREHEN METABOLIC PANEL: CPT

## 2024-11-26 PROCEDURE — 36415 COLL VENOUS BLD VENIPUNCTURE: CPT

## 2025-01-29 ENCOUNTER — APPOINTMENT (OUTPATIENT)
Age: 70
End: 2025-01-29
Payer: COMMERCIAL

## 2025-01-29 DIAGNOSIS — Z12.5 SPECIAL SCREENING FOR MALIGNANT NEOPLASM OF PROSTATE: ICD-10-CM

## 2025-01-29 DIAGNOSIS — Z98.61 S/P CORONARY ANGIOPLASTY: ICD-10-CM

## 2025-01-29 DIAGNOSIS — E11.69 DIABETES MELLITUS ASSOCIATED WITH HORMONAL ETIOLOGY (HCC): ICD-10-CM

## 2025-01-29 LAB
ALBUMIN SERPL BCG-MCNC: 4.5 G/DL (ref 3.5–5)
ALP SERPL-CCNC: 66 U/L (ref 34–104)
ALT SERPL W P-5'-P-CCNC: 69 U/L (ref 7–52)
ANION GAP SERPL CALCULATED.3IONS-SCNC: 12 MMOL/L (ref 4–13)
AST SERPL W P-5'-P-CCNC: 53 U/L (ref 13–39)
BASOPHILS # BLD AUTO: 0.03 THOUSANDS/ΜL (ref 0–0.1)
BASOPHILS NFR BLD AUTO: 0 % (ref 0–1)
BILIRUB SERPL-MCNC: 0.67 MG/DL (ref 0.2–1)
BUN SERPL-MCNC: 24 MG/DL (ref 5–25)
CALCIUM SERPL-MCNC: 9.2 MG/DL (ref 8.4–10.2)
CHLORIDE SERPL-SCNC: 110 MMOL/L (ref 96–108)
CHOLEST SERPL-MCNC: 137 MG/DL (ref ?–200)
CO2 SERPL-SCNC: 21 MMOL/L (ref 21–32)
CREAT SERPL-MCNC: 0.89 MG/DL (ref 0.6–1.3)
EOSINOPHIL # BLD AUTO: 0.16 THOUSAND/ΜL (ref 0–0.61)
EOSINOPHIL NFR BLD AUTO: 2 % (ref 0–6)
ERYTHROCYTE [DISTWIDTH] IN BLOOD BY AUTOMATED COUNT: 13.2 % (ref 11.6–15.1)
EST. AVERAGE GLUCOSE BLD GHB EST-MCNC: 146 MG/DL
GFR SERPL CREATININE-BSD FRML MDRD: 87 ML/MIN/1.73SQ M
GLUCOSE P FAST SERPL-MCNC: 149 MG/DL (ref 65–99)
HBA1C MFR BLD: 6.7 %
HCT VFR BLD AUTO: 40.9 % (ref 36.5–49.3)
HDLC SERPL-MCNC: 38 MG/DL
HGB BLD-MCNC: 13.9 G/DL (ref 12–17)
IMM GRANULOCYTES # BLD AUTO: 0.02 THOUSAND/UL (ref 0–0.2)
IMM GRANULOCYTES NFR BLD AUTO: 0 % (ref 0–2)
LDLC SERPL CALC-MCNC: 65 MG/DL (ref 0–100)
LYMPHOCYTES # BLD AUTO: 3.34 THOUSANDS/ΜL (ref 0.6–4.47)
LYMPHOCYTES NFR BLD AUTO: 50 % (ref 14–44)
MCH RBC QN AUTO: 34 PG (ref 26.8–34.3)
MCHC RBC AUTO-ENTMCNC: 34 G/DL (ref 31.4–37.4)
MCV RBC AUTO: 100 FL (ref 82–98)
MONOCYTES # BLD AUTO: 0.54 THOUSAND/ΜL (ref 0.17–1.22)
MONOCYTES NFR BLD AUTO: 8 % (ref 4–12)
NEUTROPHILS # BLD AUTO: 2.73 THOUSANDS/ΜL (ref 1.85–7.62)
NEUTS SEG NFR BLD AUTO: 40 % (ref 43–75)
NONHDLC SERPL-MCNC: 99 MG/DL
NRBC BLD AUTO-RTO: 0 /100 WBCS
PLATELET # BLD AUTO: 144 THOUSANDS/UL (ref 149–390)
PMV BLD AUTO: 11.5 FL (ref 8.9–12.7)
POTASSIUM SERPL-SCNC: 4.6 MMOL/L (ref 3.5–5.3)
PROT SERPL-MCNC: 6.7 G/DL (ref 6.4–8.4)
PSA SERPL-MCNC: 0.33 NG/ML (ref 0–4)
RBC # BLD AUTO: 4.09 MILLION/UL (ref 3.88–5.62)
SODIUM SERPL-SCNC: 143 MMOL/L (ref 135–147)
TRIGL SERPL-MCNC: 170 MG/DL (ref ?–150)
WBC # BLD AUTO: 6.82 THOUSAND/UL (ref 4.31–10.16)

## 2025-01-29 PROCEDURE — 83036 HEMOGLOBIN GLYCOSYLATED A1C: CPT

## 2025-01-29 PROCEDURE — 82570 ASSAY OF URINE CREATININE: CPT

## 2025-01-29 PROCEDURE — G0103 PSA SCREENING: HCPCS

## 2025-01-29 PROCEDURE — 85025 COMPLETE CBC W/AUTO DIFF WBC: CPT

## 2025-01-29 PROCEDURE — 82043 UR ALBUMIN QUANTITATIVE: CPT

## 2025-01-29 PROCEDURE — 80061 LIPID PANEL: CPT

## 2025-01-29 PROCEDURE — 80053 COMPREHEN METABOLIC PANEL: CPT

## 2025-01-29 PROCEDURE — 36415 COLL VENOUS BLD VENIPUNCTURE: CPT

## 2025-01-30 LAB
CREAT UR-MCNC: 129.6 MG/DL
MICROALBUMIN UR-MCNC: 12.5 MG/L
MICROALBUMIN/CREAT 24H UR: 10 MG/G CREATININE (ref 0–30)

## 2025-05-13 DIAGNOSIS — I50.31 ACUTE DIASTOLIC HEART FAILURE (HCC): ICD-10-CM

## 2025-05-13 RX ORDER — FUROSEMIDE 40 MG/1
TABLET ORAL
Qty: 30 TABLET | Refills: 5 | Status: SHIPPED | OUTPATIENT
Start: 2025-05-13

## 2025-06-04 ENCOUNTER — OFFICE VISIT (OUTPATIENT)
Dept: CARDIOLOGY CLINIC | Facility: CLINIC | Age: 70
End: 2025-06-04
Payer: COMMERCIAL

## 2025-06-04 VITALS
WEIGHT: 219 LBS | RESPIRATION RATE: 16 BRPM | DIASTOLIC BLOOD PRESSURE: 74 MMHG | SYSTOLIC BLOOD PRESSURE: 136 MMHG | BODY MASS INDEX: 35.2 KG/M2 | OXYGEN SATURATION: 95 % | HEART RATE: 74 BPM | HEIGHT: 66 IN

## 2025-06-04 DIAGNOSIS — I25.10 CORONARY ARTERY DISEASE INVOLVING NATIVE CORONARY ARTERY OF NATIVE HEART WITHOUT ANGINA PECTORIS: Primary | ICD-10-CM

## 2025-06-04 DIAGNOSIS — Z87.19 HISTORY OF FATTY INFILTRATION OF LIVER: ICD-10-CM

## 2025-06-04 DIAGNOSIS — E66.9 TYPE 2 DIABETES MELLITUS WITH OBESITY  (HCC): ICD-10-CM

## 2025-06-04 DIAGNOSIS — E11.69 TYPE 2 DIABETES MELLITUS WITH OBESITY  (HCC): ICD-10-CM

## 2025-06-04 DIAGNOSIS — E66.01 CLASS 2 SEVERE OBESITY DUE TO EXCESS CALORIES WITH SERIOUS COMORBIDITY AND BODY MASS INDEX (BMI) OF 35.0 TO 35.9 IN ADULT (HCC): ICD-10-CM

## 2025-06-04 DIAGNOSIS — R74.8 ELEVATED LIVER ENZYMES: ICD-10-CM

## 2025-06-04 DIAGNOSIS — Z79.899 ENCOUNTER FOR MONITORING STATIN THERAPY: ICD-10-CM

## 2025-06-04 DIAGNOSIS — I50.32 CHRONIC DIASTOLIC (CONGESTIVE) HEART FAILURE (HCC): ICD-10-CM

## 2025-06-04 DIAGNOSIS — Z51.81 ENCOUNTER FOR MONITORING STATIN THERAPY: ICD-10-CM

## 2025-06-04 DIAGNOSIS — E66.812 CLASS 2 SEVERE OBESITY DUE TO EXCESS CALORIES WITH SERIOUS COMORBIDITY AND BODY MASS INDEX (BMI) OF 35.0 TO 35.9 IN ADULT (HCC): ICD-10-CM

## 2025-06-04 PROCEDURE — 99214 OFFICE O/P EST MOD 30 MIN: CPT | Performed by: INTERNAL MEDICINE

## 2025-06-04 PROCEDURE — 93000 ELECTROCARDIOGRAM COMPLETE: CPT | Performed by: INTERNAL MEDICINE

## 2025-06-04 RX ORDER — PERPHENAZINE/AMITRIPTYLINE HCL 2 MG-25 MG
1300 TABLET ORAL DAILY
COMMUNITY

## 2025-06-04 NOTE — PROGRESS NOTES
[facility] [place] [address] [facility] [address] [phone]                                                Cardiology Office Follow up  Mandeep Pierce, 70 y.o. male  YOB: 1955  MRN: [id number] Encounter: [id number]      PCP - Nikolai Ruiz MD  Referring Provider - No ref. provider found    Chief Complaint   Patient presents with   • Follow-up     6 month       Assessment  CAD s/p PCI with ALONDRA to RCA  [facility] -   22 (Dr.Michael Smith) - RCA stent x2  Had prior Inferior infarct on nuclear stress  3/24/22 Nuclear Rx stress -  LVEF 70%, Moderate size, complete fixed perfusion defect of basal to mid inferior wall - probable old inferior infarct, no significant ischemia  3/31/22 TTE -  LVEF 60%, grade 1 diastolic dysfunction, mildly dilated RV with reduced systolic function  Bifascicular block  Family history of early CAD  Father  of a heart attack at age 53  COPD  6MWT - 22 - 322 m, lowest SpO2 92%  Diabetes Mellitus Type 2  Overweight, Body mass index is 35.35 kg/m².     Plan  CAD s/p PCI with ALONDRA to RCA, in-stent stenosis of RCA s/p PTCA (2024), old inferior infarct  Overview  3/2022 Nuclear Rx: LVEF 70%, medium to large sized fixed defect of basal to mid inferior wall suggestive of infarct v/s artifact  Patient unable to prone  TTE (personal review) - he does akinesis of basal inferior wall  2022: ACS (troponin up to 1388) --> LHC -high-grade sequential calcified stenosis of RCA --> s/p PCI with ALONDRA to RCA ([facility])  2024: No major chest pain or shortness of breath, but has limited activity or exercise.  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  He is doing well with medical therapy, and has been quite active at home  11/2024: Remains without chest pain, he is no longer working as he could not go back to work due to uncontrolled diabetes and is now considering [facility].  6/2025: Remains free of chest pain, doing well, is active in and around the house but does not do much other cardio exercises and has gained weight since [facility]FP  Impression  Had in-stent stenosis of RCA, which was successfully treated with balloon angioplasty and he is asymptomatic thereafter  Plan  Continue aspirin indefinitely  Continue Plavix 75 mg daily for 1 year at least (until 6/2025)  Continue metoprolol succinate 25 mg daily  Continue atorvastatin 40 mg daily  LDL at goal (<65)  Continue Lasix 40 mg - 4days/week (tu, th,sat, Sunday) - remains euvolemic    Bifascicular block  Known bifascicular block since at least February 2022  Remains free of dizziness or lightheadedness  Tolerating metoprolol  Continue metoprolol 25 mg daily  Monitor for dizziness and follow-up with annual ECG     Hyperlipidemia, Obesity - Body mass index is 35.35 kg/m².         Cholesterol level earlier this year was well-controlled with LDL down to 65  AST ALT levels have been mildly elevated since September 2024, and remain elevated, although slightly better  He does have a history of fatty liver and also uses occasional alcohol 1-2 drinks on weekends  Currently otherwise tolerating atorvastatin 40 mg daily without any problems  Discussed switching to alternative lipid therapy, but he prefers to stay on same for now and discuss further with PCP  Check follow-up lipid panel, CMP now  I suspect this is more likely related to fatty liver disease/obesity related   If sustained elevation or worsening in AST/ALT levels, then may need to decrease or discontinue statin therapy and evaluate further  Check right upper quadrant ultrasound of liver to better assess liver enzyme  elevation and follow-up with PCP   optimize diabetes control      Results for orders placed or performed in visit on 06/04/25   POCT ECG    Impression    Normal sinus rhythm  Right bundle branch block  Left anterior fascicular block  = Bifascicular block =  Voltage criteria for LVH  Cannot rule out old lateral infarct           Orders Placed This Encounter   Procedures   • US right upper quadrant   • Lipid Panel with Direct LDL reflex   • Comprehensive metabolic panel   • CK   • POCT ECG       Return in about 4 months (around 10/4/2025), or if symptoms worsen or fail to improve.      History of Present Illness   70 y.o. 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  He tested positive and was being monitored at home, but then developed hypoxia and as a result presented to [facility] ED on 02/08/2022.  He had mild troponin elevation (112 --> 235 --> 191) and was evaluated by Cardiologist Dr. Hackett, and was felt to be non-MI troponin elevation related to COVID infection.   He was treated with remdesivir and then left AMA on 2/17/22.  He subsequently presented back to [facility] ED the same night, and was found to have lactic acidosis and found to have more elevated troponin levels (3350 --> 3675).  After review with on-call cardiologist ,  He was transferred to [facility] for cardiac evaluation.   He was seen by Cardiology Dr. Patel and this was felt to be all related to COVID and as a result he was recommended outpatient cardiology follow up.  He remained in the hospital for another 2 weeks and was eventually discharged on 03/04/2022 on  oxygen and steroid therapy.    Since discharge, he continues to be hypoxic with ambulation and continues to need ambulatory oxygen.  He has no complains of chest pain, palpitations, dizziness.  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  He completed the nuclear stress test and echocardiogram, which do seem to suggest him having had an old inferior infarct.  He shortness of breath is overall improving, but he continues to have issues with moderate to severe exertion.  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 He will be following up with pulmonary regarding same.    Interval history - 10/17/2022  He comes back for follow-up after about 5 months.  No current complains of chest pain or shortness of breath and he feels much better.  He is off oxygen and back to working as a .  He has gained about 20 lb over the last 6 months, which he states is related to his eating a lot of his own cooking    Interval history - 6/3/2024  He returns for follow-up with me after almost 2 years.  In between he reports that in 2022 he had an episode of chest discomfort/pressure while sitting and watching TV. He did not feel well during this and ended up going to the [facility] ED. He ruled in for ACS, and underwent cardiac catheterization which revealed high-grade sequential calcified stenosis of the dominant RCA.  This was successfully stented with a complex PCI with multiple stents to RCA.  He subsequently followed up with a cardiologist there Dr. Smith and  has been seeing him over the last year and a half.  He denies having had any further major chest pain or shortness of breath thereafter.  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  No records on the stent available for me to review at the time of the visit.    Interval history - 7/18/2024  He comes in for follow-up after about 6 weeks.  In the interim he completed nuclear stress test initially which showed evidence of inferior ischemia.  He was referred for left heart cath, which then confirmed 95% in-stent stenosis of the RCA.  This was successfully treated with balloon angioplasty.  He has done well since then and is now very active cutting grass, lifting 30-50 lb bags at home, which he then transferred from garage to dumpster for cleaning his garage, and did not have any symptoms or limitations with the same    Interval history - 11/20/2024  He returns for 4-month follow-up.  He never ended up going back to work due to issues with uncontrolled diabetes levels, and as a result he is now seeing an endocrinologist.  He has unfortunately gained more weight and is overall up about 50 pounds over the last 2 years (175 --> 224 lbs).  No complaints of chest pain or shortness of breath otherwise    Interval history - 6/4/2025  He returns for follow-up after about 8 months. Since prison, he has gained 7 lbs since then. He is active with cutting grass, digging, and taking care of a 4 year old, and has not had any problems with same. AST/ALT have been mildly elevated for the past year and relatively stable. He drinks about 2 beers weekly and occasional hard liquor    Historical Information   Past Medical History:   Diagnosis Date   • Bifascicular block    • CAD (coronary artery disease)    • CHF (congestive heart failure) (HCC)    • Coronary  artery disease    • COVID-19 02/08/2022   • Diabetes mellitus (HCC)    • Family history of early CAD    • Hyperlipidemia    • Hypertension    • RBBB    • S/P angiography 06/25/2024     Past Surgical History:   Procedure Laterality Date   • ANKLE FRACTURE SURGERY Left    • CARDIAC CATHETERIZATION Left 6/25/2024    Procedure: Cardiac Left Heart Cath;  Surgeon: Homer Bruno MD;  Location: [facility];  Service: Cardiology   • CARDIAC CATHETERIZATION N/A 6/25/2024    Procedure: Cardiac pci;  Surgeon: Homer Bruno MD;  Location: [facility];  Service: Cardiology   • CARDIAC CATHETERIZATION N/A 6/25/2024    Procedure: Cardiac PCI Stent;  Surgeon: Homer Bruno MD;  Location: [facility];  Service: Cardiology   • FOOT FRACTURE SURGERY     • KNEE SURGERY Right    • VASECTOMY       Family History   Problem Relation Name Age of Onset   • Diabetes Mother     • Heart attack Father     • Alcohol abuse Father       Current Outpatient Medications on File Prior to Visit   Medication Sig Dispense Refill   • ascorbic acid (VITAMIN C) 500 mg tablet in the morning     • aspirin 81 mg chewable tablet Chew 81 mg in the morning.     • atorvastatin (LIPITOR) 40 mg tablet Take 1 tablet (40 mg total) by mouth daily 90 tablet 3   • Cinnamon 500 MG capsule      • clopidogrel (PLAVIX) 75 mg tablet      • Flaxseed, Linseed, (Flax Seed Oil) 1300 MG CAPS Take 1,300 mg by mouth daily     • furosemide (LASIX) 40 mg tablet Take 1 tablet by mouth four days a week or as directed. 30 tablet 5   • insulin lispro (HumaLOG KwikPen) 100 units/mL injection pen Inject 15 Units under the skin 3 (three) times a day with meals     • lisinopril (ZESTRIL) 10 mg tablet Take 10 mg by mouth in the morning.     • loratadine (Claritin) 10 mg tablet      • metFORMIN (GLUCOPHAGE) 1000 MG tablet Take 1,000 mg by mouth in the morning and 1,000 mg in the evening. Take with meals.     • metoprolol succinate (TOPROL-XL) 25 mg 24 hr tablet Take 1 tablet  by mouth in the morning.     • Milk Thistle 1000 MG CAPS Take by mouth     • Multiple Vitamin (multivitamin) tablet Take 1 tablet by mouth in the morning.     • Omega-3 Fatty Acids (fish oil) 1,000 mg Take 1,000 mg by mouth in the morning and 1,000 mg in the evening.     • pantoprazole (PROTONIX) 40 mg tablet Take 1 tablet (40 mg total) by mouth daily in the early morning 60 tablet 0   • thiamine (VITAMIN B1) 100 mg tablet Take 1 tablet (100 mg total) by mouth daily 60 tablet 0   • Tresiba FlexTouch 100 units/mL injection pen Inject 46 Units under the skin in the morning.     • VITAMIN D PO in the morning.     • zinc sulfate (ZINCATE) 220 mg capsule Take 1 capsule (220 mg total) by mouth daily 60 capsule 0   • Alcohol Swabs 70 % PADS May substitute brand based on insurance coverage. Check glucose TID. 100 each 0   • Blood Glucose Monitoring Suppl (OneTouch Verio Reflect) w/Device KIT May substitute brand based on insurance coverage. Check glucose TID. 1 kit 0   • glucose blood (OneTouch Verio) test strip May substitute brand based on insurance coverage. Check glucose TID. 100 each 0   • Insulin Glargine (TOUJEO SOLOSTAR SC) Inject 20 Units under the skin in the morning At bedtime (Patient not taking: Reported on 11/20/2024)     • Insulin Pen Needle (BD Pen Needle Silvia 2nd Gen) 32G X 4 MM MISC For use with insulin pen. Pharmacy may dispense brand covered by insurance. 100 each 0   • Insulin Pen Needle (BD Pen Needle Silvia 2nd Gen) 32G X 4 MM MISC For use with insulin pen. Pharmacy may dispense brand covered by insurance. 100 each 0   • OneTouch Delica Lancets 33G MISC May substitute brand based on insurance coverage. Check glucose TID. 100 each 0     No current facility-administered medications on file prior to visit.     No Known Allergies  Social History     Socioeconomic History   • Marital status: /Civil Union   Tobacco Use   • Smoking status: Former     Current packs/day: 0.00     Average packs/day: 1  "pack/day for 30.0 years (30.0 ttl pk-yrs)     Types: Cigarettes     Start date: 1978     Quit date: 2008     Years since quittin.3   • Smokeless tobacco: Never   Vaping Use   • Vaping status: Never Used   Substance and Sexual Activity   • Alcohol use: Yes     Comment: occ   • Drug use: Never   • Sexual activity: Not Currently     Social Drivers of Health     Food Insecurity: No Food Insecurity (3/31/2022)    Hunger Vital Sign    • Worried About Running Out of Food in the Last Year: Never true    • Ran Out of Food in the Last Year: Never true   Transportation Needs: No Transportation Needs (3/31/2022)    PRAPARE - Transportation    • Lack of Transportation (Medical): No    • Lack of Transportation (Non-Medical): No   Housing Stability: Low Risk  (3/31/2022)    Housing Stability Vital Sign    • Unable to Pay for Housing in the Last Year: No    • Number of Places Lived in the Last Year: 1    • Unstable Housing in the Last Year: No        Review of Systems   All other systems reviewed and are negative.        Vitals:  Vitals:    25 0919   BP: 136/74   BP Location: Left arm   Patient Position: Sitting   Cuff Size: Large   Pulse: 74   Resp: 16   SpO2: 95%   Weight: 99.3 kg (219 lb)   Height: 5' 6\" (1.676 m)     BMI - Body mass index is 35.35 kg/m².  Wt Readings from Last 7 Encounters:   25 99.3 kg (219 lb)   24 102 kg (224 lb)   24 97.1 kg (214 lb)   24 95.3 kg (210 lb)   24 95.3 kg (210 lb)   24 95.3 kg (210 lb)   24 95.6 kg (210 lb 12.8 oz)       Physical Exam  Vitals and nursing note reviewed.   Constitutional:       General: He is not in acute distress.     Appearance: He is well-developed. He is obese. He is ill-appearing. He is not diaphoretic.   HENT:      Head: Normocephalic and atraumatic.      Nose: No congestion.     Eyes:      General: No scleral icterus.     Conjunctiva/sclera: Conjunctivae normal.     Neck:      Vascular: No carotid bruit or JVD. " "    Cardiovascular:      Rate and Rhythm: Normal rate and regular rhythm.      Heart sounds: Normal heart sounds. No murmur heard.     No friction rub. No gallop.   Pulmonary:      Effort: Pulmonary effort is normal. No respiratory distress.      Breath sounds: Normal breath sounds. No wheezing or rales.   Chest:      Chest wall: No tenderness.   Abdominal:      General: There is no distension.      Palpations: Abdomen is soft.      Tenderness: There is no abdominal tenderness.     Musculoskeletal:         General: No swelling, tenderness or deformity.      Cervical back: Neck supple. No muscular tenderness.      Right lower leg: No edema.      Left lower leg: No edema.     Skin:     General: Skin is warm.     Neurological:      General: No focal deficit present.      Mental Status: He is alert and oriented to person, place, and time. Mental status is at baseline.     Psychiatric:         Mood and Affect: Mood normal.         Behavior: Behavior normal.         Thought Content: Thought content normal.           Labs:  CBC:   Lab Results   Component Value Date    WBC 6.82 01/29/2025    RBC 4.09 01/29/2025    HGB 13.9 01/29/2025    HCT 40.9 01/29/2025     (H) 01/29/2025     (L) 01/29/2025    RDW 13.2 01/29/2025       CMP:   Lab Results   Component Value Date    K 4.3 05/22/2025     05/22/2025    CO2 22 05/22/2025    BUN 16 05/22/2025    CREATININE 0.78 05/22/2025    EGFR 96 05/22/2025    CALCIUM 8.9 05/22/2025    AST 57 (H) 05/22/2025    ALT 69 (H) 05/22/2025    ALKPHOS 75 05/22/2025       Magnesium:  Lab Results   Component Value Date    MG 2.2 04/05/2022       Lipid Profile:   Lab Results   Component Value Date    HDL 38 (L) 01/29/2025    TRIG 170 (H) 01/29/2025    LDLCALC 65 01/29/2025       Thyroid Studies: No results found for: \"YBK0SPLVZXRU\", \"T3FREE\", \"FREET4\", \"Z6IBQKM\", \"M7GKLSG\"    A1c:  No components found for: \"HGA1C\"    INR:  Lab Results   Component Value Date    INR 1.11 03/30/2022    " "INR 1.12 02/17/2022    INR 0.99 02/08/2022   5    Imaging: XR chest portable    Result Date: 3/1/2022  Narrative: CHEST INDICATION:   worsening oxygen requirements. Patient has confirmed COVID-19. COMPARISON:  Chest x-ray 2/20/2022 EXAM PERFORMED/VIEWS:  XR CHEST PORTABLE FINDINGS: Cardiomediastinal silhouette appears unremarkable. Extensive peripheral infiltrates again seen bilaterally, partially improved on the right, stable on the left. No pneumothorax or pleural effusion. Osseous structures appear within normal limits for patient age.     Impression: Extensive peripheral infiltrates again seen bilaterally, partially improved on the right, stable on the left.  Findings again most suggestive of Covid 19 pneumonia. Workstation performed: [id number]     XR chest portable    Result Date: 2/20/2022  Narrative: CHEST INDICATION:   increase oxygen requirement. Patient has confirmed COVID-19. COMPARISON:  None EXAM PERFORMED/VIEWS:  XR CHEST PORTABLE FINDINGS: Cardiomediastinal silhouette appears unremarkable. Worsened peripheral pulmonary opacities. No effusion or pneumothorax. Osseous structures appear within normal limits for patient age.     Impression: Worsened peripheral pulmonary opacities. Workstation performed: [id number]     XR chest 1 view portable    Result Date: 2/18/2022  Narrative: CHEST INDICATION:   sob covid.   \"patient c/o sob ongoing for several days, COVID +. recently signed out AMA from [facility] because they \"weren't telling him anything about his care\". patient hypoxic on arrival\"  Patient has confirmed COVID-19. COMPARISON:  2/8/2022 EXAM PERFORMED/VIEWS:  XR CHEST PORTABLE FINDINGS: Cardiomediastinal silhouette appears unremarkable. Peripheral pulmonary opacities most prominent at the lung bases indicating Covid pneumonia in this patient with recently confirmed Covid 19. No effusion or pneumothorax. Osseous structures appear within normal limits for patient age.     Impression: Peripheral " pulmonary opacities most prominent at the lung bases indicating Covid pneumonia in this patient with recently confirmed Covid 19. Findings are concordant with preliminary interpretation provided in the emergency department.  Workstation performed: [id number]     CTA chest pe study    Result Date: 3/1/2022  Narrative: CTA - CHEST WITH IV CONTRAST - PULMONARY ANGIOGRAM INDICATION:   Hypoxia and COVID. COMPARISON: Chest x-rays from 2/8/2022, 2/17/2022, and 2/20/2022 TECHNIQUE: CTA examination of the chest was performed using angiographic technique according to a protocol specifically tailored to evaluate for pulmonary embolism.  Axial, sagittal, and coronal 2D reformatted images were created from the source data and  submitted for interpretation.  In addition, coronal 3D MIP postprocessing was performed on the acquisition scanner.  Radiation dose length product (DLP) for this visit:  589.55 mGy-cm .  This examination, like all CT scans performed in the [facility], was performed utilizing techniques to minimize radiation dose exposure, including the use of iterative  reconstruction and automated exposure control. IV Contrast:  85 mL of iohexol (OMNIPAQUE)  FINDINGS: PULMONARY ARTERIAL TREE:  No pulmonary embolus is seen.  There is a small amount of gas in the right ventricle and pulmonary artery likely related to injection. LUNGS:  There is peripheral consolidation, predominantly in the lower lung fields, similar to the recent chest x-ray from 2/20/2022 though progressed from earlier chest x-rays from 2/17/2022 and 2/8/2022.  There are cystic changes throughout, mostly in a  subpleural distribution though there also appears to be traction bronchiectasis at the lung bases. PLEURA:  Unremarkable. HEART/GREAT VESSELS:  Unremarkable for patient's age. No thoracic aortic aneurysm. MEDIASTINUM AND CHUY:  There is mild lymphadenopathy including a 1.3 x 1.5 cm right hilar lymph node and a 1.4 x 2.1 cm  subcarinal lymph node.  Additional shotty lymph nodes are also present. CHEST WALL AND LOWER NECK:   Unremarkable. VISUALIZED STRUCTURES IN THE UPPER ABDOMEN:  Unremarkable. OSSEOUS STRUCTURES:  No acute fracture or destructive osseous lesion.     Impression: 1.  No acute pulmonary embolism. 2.  Covid-19 pneumonia, progressed from chest x-rays in early February, now with developing cystic changes, mostly in a subpleural distribution, with traction bronchiectasis at the lung bases.  This may represent early fibrosis though continued follow-up  is recommended. 3.  Paraseptal emphysema. Workstation performed: [id number]     VAS lower limb venous duplex study, complete bilateral    Result Date: 2/21/2022  Narrative:  [facility] REPORT CLINICAL: Indications: Physician wants to determine patency of the venous system secondary to elevated D-Dimer and Covid 19. Operative History: No Cardiovascular Surgeries Risk Factors The patient has history of Diabetes (IDDM) and previous smoking (quit >10yrs ago).   CONCLUSION: Impression: RIGHT LOWER LIMB: No evidence of acute or chronic deep vein thrombosis. No evidence of superficial thrombophlebitis noted. Doppler evaluation shows a normal response to augmentation maneuvers.  LEFT LOWER LIMB: No evidence of acute or chronic deep vein thrombosis. No evidence of superficial thrombophlebitis noted. Doppler evaluation shows a normal response to augmentation maneuvers.   Technical findings posted in EPIC.  SIGNATURE: Electronically Signed by: SHUN HUSTON MD on 2022-02-21 05:44:14 PM      Cardiac testing:   No results found for this or any previous visit.    No results found for this or any previous visit.    No results found for this or any previous visit.    No results found for this or any previous visit.      Cardiac catheterization  •  Prox RCA to Mid RCA lesion is 10% stenosed.  •  Mid LAD lesion is 30% stenosed.  •  Prox RCA lesion is 95% stenosed.    Single vessel  coronary disease, with a 95% in-stent restenosis in the   proximal RCA.   Successful IVUS-guided PTCA, 95% proximal RCA in-stent restenosis, with a   reduction in stenosis from 95% to 0% following dilation of a 2.5mm ALONDRA   with a 3.0mm NC balloon. IVUS confirmed further stent expansion.

## 2025-06-12 ENCOUNTER — HOSPITAL ENCOUNTER (OUTPATIENT)
Dept: ULTRASOUND IMAGING | Facility: HOSPITAL | Age: 70
End: 2025-06-12
Attending: INTERNAL MEDICINE
Payer: COMMERCIAL

## 2025-06-12 ENCOUNTER — APPOINTMENT (OUTPATIENT)
Dept: LAB | Facility: HOSPITAL | Age: 70
End: 2025-06-12
Payer: COMMERCIAL

## 2025-06-12 DIAGNOSIS — Z51.81 ENCOUNTER FOR MONITORING STATIN THERAPY: ICD-10-CM

## 2025-06-12 DIAGNOSIS — I25.10 CORONARY ARTERY DISEASE INVOLVING NATIVE CORONARY ARTERY OF NATIVE HEART WITHOUT ANGINA PECTORIS: ICD-10-CM

## 2025-06-12 DIAGNOSIS — Z79.899 ENCOUNTER FOR MONITORING STATIN THERAPY: ICD-10-CM

## 2025-06-12 DIAGNOSIS — R74.8 ELEVATED LIVER ENZYMES: ICD-10-CM

## 2025-06-12 DIAGNOSIS — Z87.19 HISTORY OF FATTY INFILTRATION OF LIVER: ICD-10-CM

## 2025-06-12 LAB
ALBUMIN SERPL BCG-MCNC: 4.5 G/DL (ref 3.5–5)
ALP SERPL-CCNC: 73 U/L (ref 34–104)
ALT SERPL W P-5'-P-CCNC: 76 U/L (ref 7–52)
ANION GAP SERPL CALCULATED.3IONS-SCNC: 6 MMOL/L (ref 4–13)
AST SERPL W P-5'-P-CCNC: 63 U/L (ref 13–39)
BILIRUB SERPL-MCNC: 0.68 MG/DL (ref 0.2–1)
BUN SERPL-MCNC: 19 MG/DL (ref 5–25)
CALCIUM SERPL-MCNC: 9.3 MG/DL (ref 8.4–10.2)
CHLORIDE SERPL-SCNC: 110 MMOL/L (ref 96–108)
CHOLEST SERPL-MCNC: 144 MG/DL (ref ?–200)
CK SERPL-CCNC: 111 U/L (ref 39–308)
CO2 SERPL-SCNC: 22 MMOL/L (ref 21–32)
CREAT SERPL-MCNC: 0.87 MG/DL (ref 0.6–1.3)
GFR SERPL CREATININE-BSD FRML MDRD: 88 ML/MIN/1.73SQ M
GLUCOSE P FAST SERPL-MCNC: 155 MG/DL (ref 65–99)
HDLC SERPL-MCNC: 39 MG/DL
LDLC SERPL CALC-MCNC: 74 MG/DL (ref 0–100)
POTASSIUM SERPL-SCNC: 4.7 MMOL/L (ref 3.5–5.3)
PROT SERPL-MCNC: 6.8 G/DL (ref 6.4–8.4)
SODIUM SERPL-SCNC: 138 MMOL/L (ref 135–147)
TRIGL SERPL-MCNC: 156 MG/DL (ref ?–150)

## 2025-06-12 PROCEDURE — 80053 COMPREHEN METABOLIC PANEL: CPT

## 2025-06-12 PROCEDURE — 80061 LIPID PANEL: CPT

## 2025-06-12 PROCEDURE — 76705 ECHO EXAM OF ABDOMEN: CPT

## 2025-06-12 PROCEDURE — 36415 COLL VENOUS BLD VENIPUNCTURE: CPT

## 2025-06-12 PROCEDURE — 82550 ASSAY OF CK (CPK): CPT

## 2025-06-20 ENCOUNTER — RESULTS FOLLOW-UP (OUTPATIENT)
Dept: CARDIOLOGY CLINIC | Facility: CLINIC | Age: 70
End: 2025-06-20

## (undated) DEVICE — TR BAND RADIAL ARTERY COMPRESSION DEVICE: Brand: TR BAND

## (undated) DEVICE — CORONARY IMAGING CATHETER: Brand: OPTICROSS™ 6 HD

## (undated) DEVICE — GLIDESHEATH BASIC HYDROPHILIC COATED INTRODUCER SHEATH: Brand: GLIDESHEATH

## (undated) DEVICE — GUIDEWIRE WHOLEY HI TORQUE INTERM MOD J .035 145CM

## (undated) DEVICE — BALLOON EUPHORA RX 2.5 X 15MM

## (undated) DEVICE — RUNTHROUGH NS EXTRA FLOPPY PTCA GUIDEWIRE: Brand: RUNTHROUGH

## (undated) DEVICE — DGW .035 FC J3MM 260CM TEF: Brand: EMERALD

## (undated) DEVICE — CATH GUIDE LAUNCHER 6FR JR4 110CM

## (undated) DEVICE — CATH GUIDE LAUNCHER 6FR AL 1.0

## (undated) DEVICE — STERILE BAG FOR MDU5 PLUS MOTORDRIVE: Brand: PERMANENT SLED BAG

## (undated) DEVICE — RADIFOCUS OPTITORQUE ANGIOGRAPHIC CATHETER: Brand: OPTITORQUE

## (undated) DEVICE — BALLOON NC EUPHORA 3 X 20MM